# Patient Record
Sex: MALE | Race: WHITE | NOT HISPANIC OR LATINO | URBAN - METROPOLITAN AREA
[De-identification: names, ages, dates, MRNs, and addresses within clinical notes are randomized per-mention and may not be internally consistent; named-entity substitution may affect disease eponyms.]

---

## 2017-03-14 ENCOUNTER — APPOINTMENT (RX ONLY)
Dept: URBAN - METROPOLITAN AREA CLINIC 349 | Facility: CLINIC | Age: 72
Setting detail: DERMATOLOGY
End: 2017-03-14

## 2017-03-14 DIAGNOSIS — Z85.820 PERSONAL HISTORY OF MALIGNANT MELANOMA OF SKIN: ICD-10-CM

## 2017-03-14 DIAGNOSIS — L57.0 ACTINIC KERATOSIS: ICD-10-CM

## 2017-03-14 DIAGNOSIS — Z85.828 PERSONAL HISTORY OF OTHER MALIGNANT NEOPLASM OF SKIN: ICD-10-CM

## 2017-03-14 DIAGNOSIS — D22 MELANOCYTIC NEVI: ICD-10-CM | Status: STABLE

## 2017-03-14 PROBLEM — D22.71 MELANOCYTIC NEVI OF RIGHT LOWER LIMB, INCLUDING HIP: Status: ACTIVE | Noted: 2017-03-14

## 2017-03-14 PROBLEM — D22.61 MELANOCYTIC NEVI OF RIGHT UPPER LIMB, INCLUDING SHOULDER: Status: ACTIVE | Noted: 2017-03-14

## 2017-03-14 PROBLEM — D22.62 MELANOCYTIC NEVI OF LEFT UPPER LIMB, INCLUDING SHOULDER: Status: ACTIVE | Noted: 2017-03-14

## 2017-03-14 PROBLEM — D22.72 MELANOCYTIC NEVI OF LEFT LOWER LIMB, INCLUDING HIP: Status: ACTIVE | Noted: 2017-03-14

## 2017-03-14 PROBLEM — D22.5 MELANOCYTIC NEVI OF TRUNK: Status: ACTIVE | Noted: 2017-03-14

## 2017-03-14 PROCEDURE — 17003 DESTRUCT PREMALG LES 2-14: CPT

## 2017-03-14 PROCEDURE — 99214 OFFICE O/P EST MOD 30 MIN: CPT | Mod: 25

## 2017-03-14 PROCEDURE — 17000 DESTRUCT PREMALG LESION: CPT

## 2017-03-14 PROCEDURE — ? MEDICAL PHOTOGRAPHY REVIEW

## 2017-03-14 PROCEDURE — ? LIQUID NITROGEN

## 2017-03-14 PROCEDURE — ? COUNSELING

## 2017-03-14 ASSESSMENT — LOCATION DETAILED DESCRIPTION DERM
LOCATION DETAILED: RIGHT CENTRAL MALAR CHEEK
LOCATION DETAILED: RIGHT PROXIMAL POSTERIOR UPPER ARM
LOCATION DETAILED: LEFT ULNAR DORSAL HAND
LOCATION DETAILED: RIGHT PROXIMAL POSTERIOR THIGH
LOCATION DETAILED: RIGHT SUPERIOR LATERAL MIDBACK
LOCATION DETAILED: LEFT ANTERIOR PROXIMAL THIGH
LOCATION DETAILED: LEFT INFERIOR CENTRAL MALAR CHEEK
LOCATION DETAILED: LEFT SUPERIOR HELIX
LOCATION DETAILED: LEFT PROXIMAL POSTERIOR THIGH
LOCATION DETAILED: RIGHT INFERIOR MEDIAL MALAR CHEEK
LOCATION DETAILED: LEFT SUPERIOR CENTRAL MALAR CHEEK
LOCATION DETAILED: EPIGASTRIC SKIN
LOCATION DETAILED: LEFT ANTERIOR EARLOBE
LOCATION DETAILED: RIGHT INFERIOR CENTRAL MALAR CHEEK
LOCATION DETAILED: RIGHT MEDIAL UPPER BACK
LOCATION DETAILED: LEFT PROXIMAL POSTERIOR UPPER ARM

## 2017-03-14 ASSESSMENT — LOCATION SIMPLE DESCRIPTION DERM
LOCATION SIMPLE: LEFT HAND
LOCATION SIMPLE: LEFT THIGH
LOCATION SIMPLE: RIGHT POSTERIOR THIGH
LOCATION SIMPLE: LEFT POSTERIOR THIGH
LOCATION SIMPLE: RIGHT UPPER BACK
LOCATION SIMPLE: RIGHT CHEEK
LOCATION SIMPLE: RIGHT LOWER BACK
LOCATION SIMPLE: LEFT EAR
LOCATION SIMPLE: LEFT UPPER ARM
LOCATION SIMPLE: ABDOMEN
LOCATION SIMPLE: RIGHT UPPER ARM
LOCATION SIMPLE: LEFT CHEEK

## 2017-03-14 ASSESSMENT — LOCATION ZONE DERM
LOCATION ZONE: LEG
LOCATION ZONE: FACE
LOCATION ZONE: EAR
LOCATION ZONE: ARM
LOCATION ZONE: HAND
LOCATION ZONE: TRUNK

## 2017-03-14 ASSESSMENT — PAIN INTENSITY VAS: HOW INTENSE IS YOUR PAIN 0 BEING NO PAIN, 10 BEING THE MOST SEVERE PAIN POSSIBLE?: NO PAIN

## 2017-03-14 NOTE — PROCEDURE: LIQUID NITROGEN
Consent: The patient's consent was obtained including but not limited to risks of crusting, scabbing, blistering, scarring, darker or lighter pigmentary change, recurrence, incomplete removal and infection.
Post-Care Instructions: I reviewed with the patient in detail post-care instructions. Patient is to wear sunprotection, and avoid picking at any of the treated lesions. Pt may apply Vaseline to crusted or scabbing areas.
Number Of Freeze-Thaw Cycles: 2 freeze-thaw cycles
Duration Of Freeze Thaw-Cycle (Seconds): 0
Render Post-Care Instructions In Note?: no
Detail Level: Detailed

## 2017-03-14 NOTE — PROCEDURE: MEDICAL PHOTOGRAPHY REVIEW
Number Of Photographs Reviewed: 3
Review Findings: no new or changing lesions
Detail Level: Detailed

## 2017-03-14 NOTE — HPI: MELANOMA F/U (HISTORY OF MALIGNANT MELANOMA)
What Stage Is The Melanoma?: Stage IA
What Is The Reason For Today's Visit?: History of Melanoma
Year Excised?: 06/2014
Breslow Depth?: .55

## 2017-10-24 ENCOUNTER — APPOINTMENT (RX ONLY)
Dept: URBAN - METROPOLITAN AREA CLINIC 349 | Facility: CLINIC | Age: 72
Setting detail: DERMATOLOGY
End: 2017-10-24

## 2017-10-24 DIAGNOSIS — Z85.820 PERSONAL HISTORY OF MALIGNANT MELANOMA OF SKIN: ICD-10-CM

## 2017-10-24 DIAGNOSIS — L57.0 ACTINIC KERATOSIS: ICD-10-CM

## 2017-10-24 DIAGNOSIS — D22 MELANOCYTIC NEVI: ICD-10-CM | Status: STABLE

## 2017-10-24 DIAGNOSIS — L82.1 OTHER SEBORRHEIC KERATOSIS: ICD-10-CM

## 2017-10-24 DIAGNOSIS — Z85.828 PERSONAL HISTORY OF OTHER MALIGNANT NEOPLASM OF SKIN: ICD-10-CM

## 2017-10-24 PROBLEM — D22.72 MELANOCYTIC NEVI OF LEFT LOWER LIMB, INCLUDING HIP: Status: ACTIVE | Noted: 2017-10-24

## 2017-10-24 PROBLEM — D22.61 MELANOCYTIC NEVI OF RIGHT UPPER LIMB, INCLUDING SHOULDER: Status: ACTIVE | Noted: 2017-10-24

## 2017-10-24 PROBLEM — I10 ESSENTIAL (PRIMARY) HYPERTENSION: Status: ACTIVE | Noted: 2017-10-24

## 2017-10-24 PROBLEM — D22.62 MELANOCYTIC NEVI OF LEFT UPPER LIMB, INCLUDING SHOULDER: Status: ACTIVE | Noted: 2017-10-24

## 2017-10-24 PROBLEM — D22.71 MELANOCYTIC NEVI OF RIGHT LOWER LIMB, INCLUDING HIP: Status: ACTIVE | Noted: 2017-10-24

## 2017-10-24 PROBLEM — D22.5 MELANOCYTIC NEVI OF TRUNK: Status: ACTIVE | Noted: 2017-10-24

## 2017-10-24 PROCEDURE — ? OTHER

## 2017-10-24 PROCEDURE — 17000 DESTRUCT PREMALG LESION: CPT

## 2017-10-24 PROCEDURE — 99214 OFFICE O/P EST MOD 30 MIN: CPT | Mod: 25

## 2017-10-24 PROCEDURE — 17003 DESTRUCT PREMALG LES 2-14: CPT

## 2017-10-24 PROCEDURE — ? LIQUID NITROGEN

## 2017-10-24 PROCEDURE — ? MEDICAL PHOTOGRAPHY REVIEW

## 2017-10-24 PROCEDURE — ? DEFER

## 2017-10-24 PROCEDURE — ? COUNSELING

## 2017-10-24 ASSESSMENT — LOCATION ZONE DERM
LOCATION ZONE: TRUNK
LOCATION ZONE: LEG
LOCATION ZONE: ARM
LOCATION ZONE: EAR
LOCATION ZONE: SCALP
LOCATION ZONE: FACE
LOCATION ZONE: HAND

## 2017-10-24 ASSESSMENT — LOCATION DETAILED DESCRIPTION DERM
LOCATION DETAILED: LEFT ANTERIOR PROXIMAL THIGH
LOCATION DETAILED: RIGHT INFERIOR MEDIAL MALAR CHEEK
LOCATION DETAILED: LEFT AREOLA
LOCATION DETAILED: LEFT ULNAR DORSAL HAND
LOCATION DETAILED: RIGHT MEDIAL UPPER BACK
LOCATION DETAILED: LEFT ANTERIOR EARLOBE
LOCATION DETAILED: LEFT PROXIMAL POSTERIOR THIGH
LOCATION DETAILED: RIGHT SUPERIOR CENTRAL MALAR CHEEK
LOCATION DETAILED: RIGHT SUPERIOR HELIX
LOCATION DETAILED: RIGHT PROXIMAL POSTERIOR UPPER ARM
LOCATION DETAILED: RIGHT INFERIOR HELIX
LOCATION DETAILED: RIGHT SUPERIOR LATERAL MIDBACK
LOCATION DETAILED: LEFT INFERIOR POSTAURICULAR SKIN
LOCATION DETAILED: LEFT INFERIOR CENTRAL MALAR CHEEK
LOCATION DETAILED: RIGHT PROXIMAL POSTERIOR THIGH
LOCATION DETAILED: LEFT PROXIMAL POSTERIOR UPPER ARM
LOCATION DETAILED: LEFT SUPERIOR PREAURICULAR CHEEK
LOCATION DETAILED: EPIGASTRIC SKIN
LOCATION DETAILED: RIGHT INFERIOR CENTRAL MALAR CHEEK
LOCATION DETAILED: RIGHT CENTRAL ZYGOMA

## 2017-10-24 ASSESSMENT — LOCATION SIMPLE DESCRIPTION DERM
LOCATION SIMPLE: RIGHT CHEEK
LOCATION SIMPLE: LEFT CHEEK
LOCATION SIMPLE: RIGHT UPPER BACK
LOCATION SIMPLE: RIGHT POSTERIOR THIGH
LOCATION SIMPLE: LEFT THIGH
LOCATION SIMPLE: LEFT POSTERIOR THIGH
LOCATION SIMPLE: SCALP
LOCATION SIMPLE: RIGHT LOWER BACK
LOCATION SIMPLE: RIGHT EAR
LOCATION SIMPLE: RIGHT UPPER ARM
LOCATION SIMPLE: RIGHT ZYGOMA
LOCATION SIMPLE: LEFT EAR
LOCATION SIMPLE: ABDOMEN
LOCATION SIMPLE: LEFT HAND
LOCATION SIMPLE: LEFT CHEST
LOCATION SIMPLE: LEFT UPPER ARM

## 2017-10-24 NOTE — PROCEDURE: OTHER
Note Text (......Xxx Chief Complaint.): This diagnosis correlates with the
Detail Level: Detailed
Other (Free Text): Spoke to patient about Photo dynamic therapy

## 2017-10-24 NOTE — HPI: MELANOMA F/U (HISTORY OF MALIGNANT MELANOMA)
What Stage Is The Melanoma?: Stage IA
What Is The Reason For Today's Visit?: History of Melanoma
Year Excised?: 06/2014
Breslow Depth?: 0.55

## 2018-01-17 ENCOUNTER — APPOINTMENT (RX ONLY)
Dept: URBAN - METROPOLITAN AREA CLINIC 349 | Facility: CLINIC | Age: 73
Setting detail: DERMATOLOGY
End: 2018-01-17

## 2018-01-17 DIAGNOSIS — L57.0 ACTINIC KERATOSIS: ICD-10-CM

## 2018-01-17 PROCEDURE — ? COUNSELING

## 2018-01-17 PROCEDURE — ? DEFER

## 2018-01-17 PROCEDURE — ? OTHER

## 2018-01-17 ASSESSMENT — LOCATION SIMPLE DESCRIPTION DERM
LOCATION SIMPLE: RIGHT CHEEK
LOCATION SIMPLE: SCALP
LOCATION SIMPLE: RIGHT ZYGOMA
LOCATION SIMPLE: LEFT CHEEK

## 2018-01-17 ASSESSMENT — LOCATION ZONE DERM
LOCATION ZONE: FACE
LOCATION ZONE: SCALP

## 2018-01-17 ASSESSMENT — LOCATION DETAILED DESCRIPTION DERM
LOCATION DETAILED: LEFT SUPERIOR PREAURICULAR CHEEK
LOCATION DETAILED: LEFT INFERIOR POSTAURICULAR SKIN
LOCATION DETAILED: LEFT INFERIOR CENTRAL MALAR CHEEK
LOCATION DETAILED: RIGHT INFERIOR CENTRAL MALAR CHEEK
LOCATION DETAILED: RIGHT CENTRAL ZYGOMA

## 2018-04-25 ENCOUNTER — APPOINTMENT (RX ONLY)
Dept: URBAN - METROPOLITAN AREA CLINIC 349 | Facility: CLINIC | Age: 73
Setting detail: DERMATOLOGY
End: 2018-04-25

## 2018-04-25 DIAGNOSIS — L57.0 ACTINIC KERATOSIS: ICD-10-CM

## 2018-04-25 DIAGNOSIS — Z85.828 PERSONAL HISTORY OF OTHER MALIGNANT NEOPLASM OF SKIN: ICD-10-CM

## 2018-04-25 DIAGNOSIS — D22 MELANOCYTIC NEVI: ICD-10-CM | Status: STABLE

## 2018-04-25 DIAGNOSIS — Z85.820 PERSONAL HISTORY OF MALIGNANT MELANOMA OF SKIN: ICD-10-CM

## 2018-04-25 PROBLEM — D22.72 MELANOCYTIC NEVI OF LEFT LOWER LIMB, INCLUDING HIP: Status: ACTIVE | Noted: 2018-04-25

## 2018-04-25 PROBLEM — D22.62 MELANOCYTIC NEVI OF LEFT UPPER LIMB, INCLUDING SHOULDER: Status: ACTIVE | Noted: 2018-04-25

## 2018-04-25 PROBLEM — D22.71 MELANOCYTIC NEVI OF RIGHT LOWER LIMB, INCLUDING HIP: Status: ACTIVE | Noted: 2018-04-25

## 2018-04-25 PROBLEM — D22.5 MELANOCYTIC NEVI OF TRUNK: Status: ACTIVE | Noted: 2018-04-25

## 2018-04-25 PROBLEM — D22.61 MELANOCYTIC NEVI OF RIGHT UPPER LIMB, INCLUDING SHOULDER: Status: ACTIVE | Noted: 2018-04-25

## 2018-04-25 PROCEDURE — ? MEDICAL PHOTOGRAPHY REVIEW

## 2018-04-25 PROCEDURE — 17003 DESTRUCT PREMALG LES 2-14: CPT

## 2018-04-25 PROCEDURE — 17000 DESTRUCT PREMALG LESION: CPT

## 2018-04-25 PROCEDURE — 99214 OFFICE O/P EST MOD 30 MIN: CPT | Mod: 25

## 2018-04-25 PROCEDURE — ? COUNSELING

## 2018-04-25 PROCEDURE — ? LIQUID NITROGEN

## 2018-04-25 ASSESSMENT — LOCATION DETAILED DESCRIPTION DERM
LOCATION DETAILED: RIGHT SUPERIOR CENTRAL MALAR CHEEK
LOCATION DETAILED: LEFT ANTERIOR EARLOBE
LOCATION DETAILED: EPIGASTRIC SKIN
LOCATION DETAILED: LEFT PROXIMAL POSTERIOR THIGH
LOCATION DETAILED: RIGHT PROXIMAL POSTERIOR UPPER ARM
LOCATION DETAILED: RIGHT MEDIAL UPPER BACK
LOCATION DETAILED: RIGHT CENTRAL MALAR CHEEK
LOCATION DETAILED: LEFT LATERAL FOREHEAD
LOCATION DETAILED: LEFT SUPERIOR CENTRAL MALAR CHEEK
LOCATION DETAILED: RIGHT PROXIMAL POSTERIOR THIGH
LOCATION DETAILED: LEFT PROXIMAL POSTERIOR UPPER ARM
LOCATION DETAILED: RIGHT SUPERIOR LATERAL MIDBACK
LOCATION DETAILED: LEFT ULNAR DORSAL HAND
LOCATION DETAILED: RIGHT INFERIOR MEDIAL MALAR CHEEK
LOCATION DETAILED: LEFT ANTERIOR PROXIMAL THIGH

## 2018-04-25 ASSESSMENT — LOCATION SIMPLE DESCRIPTION DERM
LOCATION SIMPLE: LEFT HAND
LOCATION SIMPLE: LEFT UPPER ARM
LOCATION SIMPLE: RIGHT UPPER BACK
LOCATION SIMPLE: LEFT CHEEK
LOCATION SIMPLE: ABDOMEN
LOCATION SIMPLE: RIGHT UPPER ARM
LOCATION SIMPLE: LEFT POSTERIOR THIGH
LOCATION SIMPLE: LEFT EAR
LOCATION SIMPLE: RIGHT POSTERIOR THIGH
LOCATION SIMPLE: RIGHT LOWER BACK
LOCATION SIMPLE: LEFT THIGH
LOCATION SIMPLE: RIGHT CHEEK
LOCATION SIMPLE: LEFT FOREHEAD

## 2018-04-25 ASSESSMENT — LOCATION ZONE DERM
LOCATION ZONE: FACE
LOCATION ZONE: EAR
LOCATION ZONE: HAND
LOCATION ZONE: TRUNK
LOCATION ZONE: ARM
LOCATION ZONE: LEG

## 2018-04-25 NOTE — PROCEDURE: LIQUID NITROGEN
Number Of Freeze-Thaw Cycles: 2 freeze-thaw cycles
Post-Care Instructions: I reviewed with the patient in detail post-care instructions. Patient is to wear sunprotection, and avoid picking at any of the treated lesions. Pt may apply Vaseline to crusted or scabbing areas.
Duration Of Freeze Thaw-Cycle (Seconds): 3
Consent: The patient's consent was obtained including but not limited to risks of crusting, scabbing, blistering, scarring, darker or lighter pigmentary change, recurrence, incomplete removal and infection.
Detail Level: Detailed
Render Post-Care Instructions In Note?: no

## 2018-09-13 ENCOUNTER — HOSPITAL ENCOUNTER (OUTPATIENT)
Dept: PHYSICAL THERAPY | Age: 73
Discharge: HOME | End: 2018-09-13
Payer: MEDICARE

## 2018-09-13 PROCEDURE — G8981 BODY POS CURRENT STATUS: HCPCS

## 2018-09-13 PROCEDURE — 97161 PT EVAL LOW COMPLEX 20 MIN: CPT

## 2018-09-13 PROCEDURE — G8982 BODY POS GOAL STATUS: HCPCS

## 2018-09-13 NOTE — PROGRESS NOTES
Ambulatory/Rehab Services H2 Model Falls Risk Assessment    Risk Factor Pts. ·   Confusion/Disorientation/Impulsivity  []    4 ·   Symptomatic Depression  []   2 ·   Altered Elimination  []   1 ·   Dizziness/Vertigo  []   1 ·   Gender (Male)  [x]   1 ·   Any administered antiepileptics (anticonvulsants):  []   2 ·   Any administered benzodiazepines:  []   1 ·   Visual Impairment (specify):  []   1 ·   Portable Oxygen Use  []   1 ·   Orthostatic ? BP  []   1 ·   History of Recent Falls (within 3 mos.)  []   5     Ability to Rise from Chair (choose one) Pts. ·   Ability to rise in a single movement  []   0 ·   Pushes up, successful in one attempt  [x]   1 ·   Multiple attempts, but successful  []   3 ·   Unable to rise without assistance  []   4   Total: (5 or greater = High Risk) 1     Falls Prevention Plan:   []                Physical Limitations to Exercise (specify):   []                Mobility Assistance Device (type):   []                Exercise/Equipment Adaptation (specify):    ©2010 [facility] [place] Patent #[id number].  Federal Law prohibits the replication, distribution or use without written permission from [facility] [place]
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
## 2018-09-19 ENCOUNTER — HOSPITAL ENCOUNTER (OUTPATIENT)
Dept: PHYSICAL THERAPY | Age: 73
Discharge: HOME | End: 2018-09-19
Payer: MEDICARE

## 2018-09-19 PROCEDURE — 97110 THERAPEUTIC EXERCISES: CPT
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
## 2018-09-21 ENCOUNTER — HOSPITAL ENCOUNTER (OUTPATIENT)
Dept: PHYSICAL THERAPY | Age: 73
Discharge: HOME | End: 2018-09-21
Payer: MEDICARE

## 2018-09-21 PROCEDURE — 97110 THERAPEUTIC EXERCISES: CPT

## 2018-09-21 PROCEDURE — 97140 MANUAL THERAPY 1/> REGIONS: CPT
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
## 2018-09-24 ENCOUNTER — HOSPITAL ENCOUNTER (OUTPATIENT)
Dept: PHYSICAL THERAPY | Age: 73
Discharge: HOME | End: 2018-09-24
Payer: MEDICARE

## 2018-09-24 PROCEDURE — 97110 THERAPEUTIC EXERCISES: CPT

## 2018-09-24 PROCEDURE — 97140 MANUAL THERAPY 1/> REGIONS: CPT
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
## 2018-09-28 ENCOUNTER — HOSPITAL ENCOUNTER (OUTPATIENT)
Dept: PHYSICAL THERAPY | Age: 73
Discharge: HOME | End: 2018-09-28
Payer: MEDICARE

## 2018-09-28 PROCEDURE — 97140 MANUAL THERAPY 1/> REGIONS: CPT

## 2018-09-28 PROCEDURE — 97110 THERAPEUTIC EXERCISES: CPT
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
## 2018-10-02 ENCOUNTER — HOSPITAL ENCOUNTER (OUTPATIENT)
Dept: PHYSICAL THERAPY | Age: 73
Discharge: HOME | End: 2018-10-02
Payer: MEDICARE

## 2018-10-02 PROCEDURE — 97110 THERAPEUTIC EXERCISES: CPT
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
## 2018-10-04 ENCOUNTER — HOSPITAL ENCOUNTER (OUTPATIENT)
Dept: PHYSICAL THERAPY | Age: 73
Discharge: HOME | End: 2018-10-04
Payer: MEDICARE

## 2018-10-04 PROCEDURE — 97110 THERAPEUTIC EXERCISES: CPT
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
## 2018-10-10 ENCOUNTER — HOSPITAL ENCOUNTER (OUTPATIENT)
Dept: PHYSICAL THERAPY | Age: 73
Discharge: HOME | End: 2018-10-10
Payer: MEDICARE

## 2018-10-10 PROCEDURE — 97110 THERAPEUTIC EXERCISES: CPT
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
## 2018-10-12 ENCOUNTER — HOSPITAL ENCOUNTER (OUTPATIENT)
Dept: PHYSICAL THERAPY | Age: 73
Discharge: HOME | End: 2018-10-12
Payer: MEDICARE

## 2018-10-12 PROCEDURE — 97110 THERAPEUTIC EXERCISES: CPT
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
## 2018-10-15 ENCOUNTER — HOSPITAL ENCOUNTER (OUTPATIENT)
Dept: PHYSICAL THERAPY | Age: 73
Discharge: HOME | End: 2018-10-15
Payer: MEDICARE

## 2018-10-15 PROCEDURE — G8983 BODY POS D/C STATUS: HCPCS

## 2018-10-15 PROCEDURE — 97110 THERAPEUTIC EXERCISES: CPT

## 2018-10-15 PROCEDURE — G8982 BODY POS GOAL STATUS: HCPCS
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
## 2018-10-17 ENCOUNTER — APPOINTMENT (OUTPATIENT)
Dept: PHYSICAL THERAPY | Age: 73
End: 2018-10-17
Payer: MEDICARE

## 2018-10-25 ENCOUNTER — APPOINTMENT (RX ONLY)
Dept: URBAN - METROPOLITAN AREA CLINIC 349 | Facility: CLINIC | Age: 73
Setting detail: DERMATOLOGY
End: 2018-10-25

## 2018-10-25 DIAGNOSIS — Z85.820 PERSONAL HISTORY OF MALIGNANT MELANOMA OF SKIN: ICD-10-CM

## 2018-10-25 DIAGNOSIS — D22 MELANOCYTIC NEVI: ICD-10-CM | Status: STABLE

## 2018-10-25 DIAGNOSIS — L57.0 ACTINIC KERATOSIS: ICD-10-CM

## 2018-10-25 DIAGNOSIS — Z85.828 PERSONAL HISTORY OF OTHER MALIGNANT NEOPLASM OF SKIN: ICD-10-CM

## 2018-10-25 PROBLEM — D22.72 MELANOCYTIC NEVI OF LEFT LOWER LIMB, INCLUDING HIP: Status: ACTIVE | Noted: 2018-10-25

## 2018-10-25 PROBLEM — D22.5 MELANOCYTIC NEVI OF TRUNK: Status: ACTIVE | Noted: 2018-10-25

## 2018-10-25 PROCEDURE — 17003 DESTRUCT PREMALG LES 2-14: CPT

## 2018-10-25 PROCEDURE — ? COUNSELING

## 2018-10-25 PROCEDURE — ? LIQUID NITROGEN

## 2018-10-25 PROCEDURE — 17000 DESTRUCT PREMALG LESION: CPT

## 2018-10-25 PROCEDURE — ? MEDICAL PHOTOGRAPHY REVIEW

## 2018-10-25 PROCEDURE — 99214 OFFICE O/P EST MOD 30 MIN: CPT | Mod: 25

## 2018-10-25 ASSESSMENT — LOCATION DETAILED DESCRIPTION DERM
LOCATION DETAILED: LEFT SUPERIOR LATERAL MALAR CHEEK
LOCATION DETAILED: LEFT ANTERIOR EARLOBE
LOCATION DETAILED: RIGHT DORSAL INDEX METACARPOPHALANGEAL JOINT
LOCATION DETAILED: RIGHT PROXIMAL DORSAL FOREARM
LOCATION DETAILED: RIGHT INFERIOR MEDIAL MALAR CHEEK
LOCATION DETAILED: RIGHT INFERIOR CENTRAL MALAR CHEEK
LOCATION DETAILED: RIGHT INFERIOR FOREHEAD
LOCATION DETAILED: RIGHT MEDIAL UPPER BACK
LOCATION DETAILED: LEFT LATERAL EYEBROW
LOCATION DETAILED: LEFT ULNAR DORSAL HAND
LOCATION DETAILED: EPIGASTRIC SKIN
LOCATION DETAILED: LEFT CENTRAL EYEBROW
LOCATION DETAILED: LEFT RADIAL DORSAL HAND
LOCATION DETAILED: RIGHT SUPERIOR LATERAL MIDBACK
LOCATION DETAILED: LEFT SUPERIOR HELIX
LOCATION DETAILED: LEFT ANTERIOR PROXIMAL THIGH

## 2018-10-25 ASSESSMENT — LOCATION SIMPLE DESCRIPTION DERM
LOCATION SIMPLE: LEFT EAR
LOCATION SIMPLE: LEFT THIGH
LOCATION SIMPLE: LEFT EYEBROW
LOCATION SIMPLE: RIGHT UPPER BACK
LOCATION SIMPLE: RIGHT CHEEK
LOCATION SIMPLE: RIGHT HAND
LOCATION SIMPLE: ABDOMEN
LOCATION SIMPLE: RIGHT LOWER BACK
LOCATION SIMPLE: LEFT CHEEK
LOCATION SIMPLE: RIGHT FOREHEAD
LOCATION SIMPLE: RIGHT FOREARM
LOCATION SIMPLE: LEFT HAND

## 2018-10-25 ASSESSMENT — LOCATION ZONE DERM
LOCATION ZONE: FACE
LOCATION ZONE: LEG
LOCATION ZONE: ARM
LOCATION ZONE: EAR
LOCATION ZONE: TRUNK
LOCATION ZONE: HAND

## 2018-10-25 NOTE — PROCEDURE: MEDICAL PHOTOGRAPHY REVIEW
Review Findings: no new or changing lesions
Number Of Photographs Reviewed: 3
Detail Level: Detailed

## 2019-05-23 ENCOUNTER — APPOINTMENT (RX ONLY)
Dept: URBAN - METROPOLITAN AREA CLINIC 349 | Facility: CLINIC | Age: 74
Setting detail: DERMATOLOGY
End: 2019-05-23

## 2019-05-23 DIAGNOSIS — Z85.828 PERSONAL HISTORY OF OTHER MALIGNANT NEOPLASM OF SKIN: ICD-10-CM

## 2019-05-23 DIAGNOSIS — L85.3 XEROSIS CUTIS: ICD-10-CM

## 2019-05-23 DIAGNOSIS — Z85.820 PERSONAL HISTORY OF MALIGNANT MELANOMA OF SKIN: ICD-10-CM

## 2019-05-23 DIAGNOSIS — L57.0 ACTINIC KERATOSIS: ICD-10-CM

## 2019-05-23 DIAGNOSIS — D22 MELANOCYTIC NEVI: ICD-10-CM | Status: STABLE

## 2019-05-23 PROBLEM — D22.5 MELANOCYTIC NEVI OF TRUNK: Status: ACTIVE | Noted: 2019-05-23

## 2019-05-23 PROBLEM — I48.91 UNSPECIFIED ATRIAL FIBRILLATION: Status: ACTIVE | Noted: 2019-05-23

## 2019-05-23 PROBLEM — I10 ESSENTIAL (PRIMARY) HYPERTENSION: Status: ACTIVE | Noted: 2019-05-23

## 2019-05-23 PROCEDURE — ? COUNSELING

## 2019-05-23 PROCEDURE — ? MEDICAL PHOTOGRAPHY REVIEW

## 2019-05-23 PROCEDURE — 17003 DESTRUCT PREMALG LES 2-14: CPT

## 2019-05-23 PROCEDURE — ? TREATMENT REGIMEN

## 2019-05-23 PROCEDURE — 99214 OFFICE O/P EST MOD 30 MIN: CPT | Mod: 25

## 2019-05-23 PROCEDURE — ? LIQUID NITROGEN

## 2019-05-23 PROCEDURE — 17000 DESTRUCT PREMALG LESION: CPT

## 2019-05-23 ASSESSMENT — LOCATION DETAILED DESCRIPTION DERM
LOCATION DETAILED: RIGHT MEDIAL UPPER BACK
LOCATION DETAILED: RIGHT SUPERIOR LATERAL MIDBACK
LOCATION DETAILED: LEFT SUPERIOR HELIX
LOCATION DETAILED: RIGHT POSTERIOR ANKLE
LOCATION DETAILED: LEFT ANTERIOR EARLOBE
LOCATION DETAILED: LEFT SUPERIOR CENTRAL MALAR CHEEK
LOCATION DETAILED: LEFT CENTRAL EYEBROW
LOCATION DETAILED: LEFT INFERIOR HELIX
LOCATION DETAILED: RIGHT INFERIOR MEDIAL MALAR CHEEK
LOCATION DETAILED: LEFT INFERIOR LATERAL FOREHEAD
LOCATION DETAILED: LEFT ULNAR DORSAL HAND

## 2019-05-23 ASSESSMENT — LOCATION SIMPLE DESCRIPTION DERM
LOCATION SIMPLE: LEFT EYEBROW
LOCATION SIMPLE: RIGHT CHEEK
LOCATION SIMPLE: LEFT EAR
LOCATION SIMPLE: RIGHT LOWER BACK
LOCATION SIMPLE: RIGHT UPPER BACK
LOCATION SIMPLE: LEFT CHEEK
LOCATION SIMPLE: LEFT HAND
LOCATION SIMPLE: RIGHT ANKLE
LOCATION SIMPLE: LEFT FOREHEAD

## 2019-05-23 ASSESSMENT — LOCATION ZONE DERM
LOCATION ZONE: FACE
LOCATION ZONE: HAND
LOCATION ZONE: LEG
LOCATION ZONE: EAR
LOCATION ZONE: TRUNK

## 2019-05-23 ASSESSMENT — PAIN INTENSITY VAS: HOW INTENSE IS YOUR PAIN 0 BEING NO PAIN, 10 BEING THE MOST SEVERE PAIN POSSIBLE?: 1/10 PAIN

## 2019-05-23 NOTE — PROCEDURE: LIQUID NITROGEN
Detail Level: Detailed
Duration Of Freeze Thaw-Cycle (Seconds): 3
Post-Care Instructions: I reviewed with the patient in detail post-care instructions. Patient is to wear sunprotection, and avoid picking at any of the treated lesions. Pt may apply Vaseline to crusted or scabbing areas.
Render Note In Bullet Format When Appropriate: No
Consent: The patient's consent was obtained including but not limited to risks of crusting, scabbing, blistering, scarring, darker or lighter pigmentary change, recurrence, incomplete removal and infection.
Number Of Freeze-Thaw Cycles: 2 freeze-thaw cycles

## 2019-05-23 NOTE — PROCEDURE: MIPS QUALITY
Quality 137: Melanoma: Continuity Of Care - Recall System: Recall system not utilized, reason not otherwise specified
Quality 138: Melanoma: Coordination Of Care: A treatment plan was communicated to the physicians providing continuing care within one month of diagnosis outlining: diagnosis, tumor thickness and a plan for surgery or alternate care.
Quality 111:Pneumonia Vaccination Status For Older Adults: Pneumococcal Vaccination Previously Received
Quality 397: Melanoma: Reporting: The pathology report includes a pT Category and statement on thickness and ulceration for pT1, mitotic rate.
Quality 110: Preventive Care And Screening: Influenza Immunization: Influenza Immunization not Administered because Patient Refused.
Detail Level: Detailed

## 2019-05-23 NOTE — HPI: MELANOMA F/U (HISTORY OF MALIGNANT MELANOMA)
What Stage Is The Melanoma?: Stage IB
What Is The Reason For Today's Visit?: History of Melanoma
Year Excised?: 06/14
Breslow Depth?: 0.55

## 2019-12-03 ENCOUNTER — APPOINTMENT (RX ONLY)
Dept: URBAN - METROPOLITAN AREA CLINIC 349 | Facility: CLINIC | Age: 74
Setting detail: DERMATOLOGY
End: 2019-12-03

## 2019-12-03 DIAGNOSIS — Z85.820 PERSONAL HISTORY OF MALIGNANT MELANOMA OF SKIN: ICD-10-CM

## 2019-12-03 DIAGNOSIS — D22 MELANOCYTIC NEVI: ICD-10-CM | Status: STABLE

## 2019-12-03 DIAGNOSIS — D18.0 HEMANGIOMA: ICD-10-CM

## 2019-12-03 DIAGNOSIS — L57.0 ACTINIC KERATOSIS: ICD-10-CM

## 2019-12-03 DIAGNOSIS — Z85.828 PERSONAL HISTORY OF OTHER MALIGNANT NEOPLASM OF SKIN: ICD-10-CM

## 2019-12-03 PROBLEM — I48.91 UNSPECIFIED ATRIAL FIBRILLATION: Status: ACTIVE | Noted: 2019-12-03

## 2019-12-03 PROBLEM — D18.01 HEMANGIOMA OF SKIN AND SUBCUTANEOUS TISSUE: Status: ACTIVE | Noted: 2019-12-03

## 2019-12-03 PROBLEM — D22.5 MELANOCYTIC NEVI OF TRUNK: Status: ACTIVE | Noted: 2019-12-03

## 2019-12-03 PROBLEM — H91.90 UNSPECIFIED HEARING LOSS, UNSPECIFIED EAR: Status: ACTIVE | Noted: 2019-12-03

## 2019-12-03 PROCEDURE — ? MEDICAL PHOTOGRAPHY REVIEW

## 2019-12-03 PROCEDURE — ? LIQUID NITROGEN

## 2019-12-03 PROCEDURE — ? OBSERVATION

## 2019-12-03 PROCEDURE — 99214 OFFICE O/P EST MOD 30 MIN: CPT | Mod: 25

## 2019-12-03 PROCEDURE — ? MEDICATION COUNSELING

## 2019-12-03 PROCEDURE — 17003 DESTRUCT PREMALG LES 2-14: CPT

## 2019-12-03 PROCEDURE — ? PRESCRIPTION

## 2019-12-03 PROCEDURE — 17000 DESTRUCT PREMALG LESION: CPT

## 2019-12-03 PROCEDURE — ? COUNSELING

## 2019-12-03 RX ORDER — FLUOROURACIL 2 G/40G
CREAM TOPICAL
Qty: 1 | Refills: 0 | Status: ERX | COMMUNITY
Start: 2019-12-03

## 2019-12-03 RX ADMIN — FLUOROURACIL: 2 CREAM TOPICAL at 00:00

## 2019-12-03 ASSESSMENT — LOCATION ZONE DERM
LOCATION ZONE: FACE
LOCATION ZONE: EAR
LOCATION ZONE: HAND
LOCATION ZONE: LEG
LOCATION ZONE: ARM
LOCATION ZONE: TRUNK

## 2019-12-03 ASSESSMENT — LOCATION DETAILED DESCRIPTION DERM
LOCATION DETAILED: RIGHT INFERIOR HELIX
LOCATION DETAILED: LEFT INFERIOR CENTRAL MALAR CHEEK
LOCATION DETAILED: RIGHT MEDIAL UPPER BACK
LOCATION DETAILED: RIGHT SUPERIOR CENTRAL MALAR CHEEK
LOCATION DETAILED: LEFT LATERAL EYEBROW
LOCATION DETAILED: LEFT INFERIOR HELIX
LOCATION DETAILED: RIGHT INFERIOR MEDIAL MALAR CHEEK
LOCATION DETAILED: LEFT ULNAR DORSAL HAND
LOCATION DETAILED: LEFT ANTERIOR EARLOBE
LOCATION DETAILED: RIGHT SUPERIOR LATERAL MIDBACK
LOCATION DETAILED: LEFT SUPERIOR CENTRAL MALAR CHEEK
LOCATION DETAILED: RIGHT SUPERIOR HELIX
LOCATION DETAILED: LEFT LATERAL FOREHEAD
LOCATION DETAILED: RIGHT RADIAL DORSAL HAND
LOCATION DETAILED: LEFT LATERAL PROXIMAL PRETIBIAL REGION
LOCATION DETAILED: RIGHT DISTAL RADIAL DORSAL FOREARM

## 2019-12-03 ASSESSMENT — PAIN INTENSITY VAS: HOW INTENSE IS YOUR PAIN 0 BEING NO PAIN, 10 BEING THE MOST SEVERE PAIN POSSIBLE?: 1/10 PAIN

## 2019-12-03 ASSESSMENT — LOCATION SIMPLE DESCRIPTION DERM
LOCATION SIMPLE: RIGHT HAND
LOCATION SIMPLE: RIGHT FOREARM
LOCATION SIMPLE: LEFT HAND
LOCATION SIMPLE: RIGHT EAR
LOCATION SIMPLE: RIGHT UPPER BACK
LOCATION SIMPLE: RIGHT CHEEK
LOCATION SIMPLE: LEFT CHEEK
LOCATION SIMPLE: LEFT PRETIBIAL REGION
LOCATION SIMPLE: RIGHT LOWER BACK
LOCATION SIMPLE: LEFT EAR
LOCATION SIMPLE: LEFT EYEBROW
LOCATION SIMPLE: LEFT FOREHEAD

## 2019-12-03 NOTE — PROCEDURE: MEDICATION COUNSELING
Xeldoylez Pregnancy And Lactation Text: This medication is Pregnancy Category D and is not considered safe during pregnancy.  The risk during breast feeding is also uncertain.

## 2019-12-03 NOTE — PROCEDURE: REASSURANCE
Quality 137: Melanoma: Continuity Of Care - Recall System: Recall system not utilized, reason not otherwise specified
Quality 224: Stage 0-Iic Melanoma: Overutilization Of Imaging Studies For Only Stage 0-Iic Melanoma: None of the following diagnostic imaging studies ordered: chest X-ray, CT, Ultrasound, MRI, PET, or nuclear medicine scans (ML)
Detail Level: Detailed

## 2019-12-03 NOTE — PROCEDURE: LIQUID NITROGEN
Number Of Freeze-Thaw Cycles: 2 freeze-thaw cycles
Render Note In Bullet Format When Appropriate: No
Duration Of Freeze Thaw-Cycle (Seconds): 3
Consent: The patient's consent was obtained including but not limited to risks of crusting, scabbing, blistering, scarring, darker or lighter pigmentary change, recurrence, incomplete removal and infection.
Detail Level: Detailed
Post-Care Instructions: I reviewed with the patient in detail post-care instructions. Patient is to wear sunprotection, and avoid picking at any of the treated lesions. Pt may apply Vaseline to crusted or scabbing areas.

## 2020-03-30 NOTE — PROCEDURE: LIQUID NITROGEN
Number Of Freeze-Thaw Cycles: 2 freeze-thaw cycles
Duration Of Freeze Thaw-Cycle (Seconds): 3
Consent: The patient's consent was obtained including but not limited to risks of crusting, scabbing, blistering, scarring, darker or lighter pigmentary change, recurrence, incomplete removal and infection.
Post-Care Instructions: I reviewed with the patient in detail post-care instructions. Patient is to wear sunprotection, and avoid picking at any of the treated lesions. Pt may apply Vaseline to crusted or scabbing areas.
Render Post-Care Instructions In Note?: no
Detail Level: Detailed
PAIN SCALE 10 OF 10.

## 2020-06-03 ENCOUNTER — APPOINTMENT (RX ONLY)
Dept: URBAN - METROPOLITAN AREA CLINIC 349 | Facility: CLINIC | Age: 75
Setting detail: DERMATOLOGY
End: 2020-06-03

## 2020-06-03 DIAGNOSIS — L57.0 ACTINIC KERATOSIS: ICD-10-CM

## 2020-06-03 DIAGNOSIS — Z85.828 PERSONAL HISTORY OF OTHER MALIGNANT NEOPLASM OF SKIN: ICD-10-CM

## 2020-06-03 DIAGNOSIS — Z12.83 ENCOUNTER FOR SCREENING FOR MALIGNANT NEOPLASM OF SKIN: ICD-10-CM

## 2020-06-03 DIAGNOSIS — Z85.820 PERSONAL HISTORY OF MALIGNANT MELANOMA OF SKIN: ICD-10-CM

## 2020-06-03 DIAGNOSIS — D22 MELANOCYTIC NEVI: ICD-10-CM | Status: STABLE

## 2020-06-03 PROBLEM — H91.90 UNSPECIFIED HEARING LOSS, UNSPECIFIED EAR: Status: ACTIVE | Noted: 2020-06-03

## 2020-06-03 PROBLEM — M12.9 ARTHROPATHY, UNSPECIFIED: Status: ACTIVE | Noted: 2020-06-03

## 2020-06-03 PROBLEM — E13.9 OTHER SPECIFIED DIABETES MELLITUS WITHOUT COMPLICATIONS: Status: ACTIVE | Noted: 2020-06-03

## 2020-06-03 PROBLEM — D22.5 MELANOCYTIC NEVI OF TRUNK: Status: ACTIVE | Noted: 2020-06-03

## 2020-06-03 PROCEDURE — 17003 DESTRUCT PREMALG LES 2-14: CPT

## 2020-06-03 PROCEDURE — ? COUNSELING

## 2020-06-03 PROCEDURE — 99214 OFFICE O/P EST MOD 30 MIN: CPT | Mod: 25

## 2020-06-03 PROCEDURE — ? MEDICAL PHOTOGRAPHY REVIEW

## 2020-06-03 PROCEDURE — ? LIQUID NITROGEN

## 2020-06-03 PROCEDURE — 17000 DESTRUCT PREMALG LESION: CPT

## 2020-06-03 ASSESSMENT — LOCATION ZONE DERM
LOCATION ZONE: ARM
LOCATION ZONE: HAND
LOCATION ZONE: TRUNK
LOCATION ZONE: EAR
LOCATION ZONE: FACE

## 2020-06-03 ASSESSMENT — LOCATION DETAILED DESCRIPTION DERM
LOCATION DETAILED: LEFT INFERIOR FOREHEAD
LOCATION DETAILED: LEFT PROXIMAL DORSAL FOREARM
LOCATION DETAILED: LEFT ANTERIOR EARLOBE
LOCATION DETAILED: LEFT ULNAR DORSAL HAND
LOCATION DETAILED: LEFT RADIAL DORSAL HAND
LOCATION DETAILED: LEFT LATERAL EYEBROW
LOCATION DETAILED: LEFT SUPERIOR HELIX
LOCATION DETAILED: RIGHT DISTAL DORSAL FOREARM
LOCATION DETAILED: RIGHT LATERAL ZYGOMA
LOCATION DETAILED: LEFT DISTAL DORSAL FOREARM
LOCATION DETAILED: RIGHT MEDIAL UPPER BACK
LOCATION DETAILED: RIGHT RADIAL DORSAL HAND
LOCATION DETAILED: RIGHT INFERIOR MEDIAL MALAR CHEEK
LOCATION DETAILED: RIGHT INFERIOR MEDIAL UPPER BACK
LOCATION DETAILED: RIGHT SUPERIOR LATERAL MIDBACK

## 2020-06-03 ASSESSMENT — LOCATION SIMPLE DESCRIPTION DERM
LOCATION SIMPLE: RIGHT LOWER BACK
LOCATION SIMPLE: RIGHT ZYGOMA
LOCATION SIMPLE: LEFT EYEBROW
LOCATION SIMPLE: LEFT FOREHEAD
LOCATION SIMPLE: LEFT FOREARM
LOCATION SIMPLE: RIGHT UPPER BACK
LOCATION SIMPLE: RIGHT CHEEK
LOCATION SIMPLE: RIGHT HAND
LOCATION SIMPLE: RIGHT FOREARM
LOCATION SIMPLE: LEFT EAR
LOCATION SIMPLE: LEFT HAND

## 2020-06-03 ASSESSMENT — PAIN INTENSITY VAS: HOW INTENSE IS YOUR PAIN 0 BEING NO PAIN, 10 BEING THE MOST SEVERE PAIN POSSIBLE?: 1/10 PAIN

## 2020-06-03 NOTE — PROCEDURE: REASSURANCE
Quality 224: Stage 0-Iic Melanoma: Overutilization Of Imaging Studies For Only Stage 0-Iic Melanoma: None of the following diagnostic imaging studies ordered: chest X-ray, CT, Ultrasound, MRI, PET, or nuclear medicine scans (ML)
Quality 137: Melanoma: Continuity Of Care - Recall System: Recall system not utilized, reason not otherwise specified
Detail Level: Detailed
Hide Additional Notes?: No
Additional Notes (Optional): Compared to photo this has resolved\\nPatient states he has also been using efudex on this spot. He is instructed to discontinue

## 2020-06-03 NOTE — PROCEDURE: LIQUID NITROGEN
Render Note In Bullet Format When Appropriate: No
Consent: The patient's consent was obtained including but not limited to risks of crusting, scabbing, blistering, scarring, darker or lighter pigmentary change, recurrence, incomplete removal and infection.
Post-Care Instructions: I reviewed with the patient in detail post-care instructions. Patient is to wear sunprotection, and avoid picking at any of the treated lesions. Pt may apply Vaseline to crusted or scabbing areas.
Detail Level: Detailed
Duration Of Freeze Thaw-Cycle (Seconds): 3
Number Of Freeze-Thaw Cycles: 2 freeze-thaw cycles

## 2020-06-09 PROBLEM — K21.9 GASTROESOPHAGEAL REFLUX DISEASE WITHOUT ESOPHAGITIS: Status: ACTIVE | Noted: 2020-06-09

## 2020-06-09 PROBLEM — R42 DIZZINESS: Status: ACTIVE | Noted: 2020-06-09

## 2020-06-09 PROBLEM — E66.01 CLASS 2 SEVERE OBESITY WITH SERIOUS COMORBIDITY AND BODY MASS INDEX (BMI) OF 39.0 TO 39.9 IN ADULT (HCC): Status: ACTIVE | Noted: 2020-06-09

## 2020-06-09 PROBLEM — I73.9 PVD (PERIPHERAL VASCULAR DISEASE) WITH CLAUDICATION (HCC): Status: ACTIVE | Noted: 2020-06-09

## 2020-06-09 PROBLEM — E78.2 MIXED HYPERLIPIDEMIA: Status: ACTIVE | Noted: 2020-06-09

## 2020-06-09 PROBLEM — N18.30 STAGE 3 CHRONIC KIDNEY DISEASE (HCC): Status: ACTIVE | Noted: 2020-06-09

## 2020-06-09 PROBLEM — E11.9 ENCOUNTER FOR DIABETIC FOOT EXAM (HCC): Status: ACTIVE | Noted: 2020-06-09

## 2020-09-23 PROBLEM — Z79.4 TYPE 2 DIABETES MELLITUS WITH HYPERGLYCEMIA, WITH LONG-TERM CURRENT USE OF INSULIN (HCC): Status: ACTIVE | Noted: 2020-09-23

## 2020-09-23 PROBLEM — G25.2 INTENTION TREMOR: Status: ACTIVE | Noted: 2020-09-23

## 2020-09-23 PROBLEM — M10.9 GOUT: Status: ACTIVE | Noted: 2020-09-23

## 2020-09-23 PROBLEM — E11.65 TYPE 2 DIABETES MELLITUS WITH HYPERGLYCEMIA, WITH LONG-TERM CURRENT USE OF INSULIN (HCC): Status: ACTIVE | Noted: 2020-09-23

## 2020-12-03 ENCOUNTER — APPOINTMENT (RX ONLY)
Dept: URBAN - METROPOLITAN AREA CLINIC 349 | Facility: CLINIC | Age: 75
Setting detail: DERMATOLOGY
End: 2020-12-03

## 2020-12-03 DIAGNOSIS — Z85.820 PERSONAL HISTORY OF MALIGNANT MELANOMA OF SKIN: ICD-10-CM

## 2020-12-03 DIAGNOSIS — D22 MELANOCYTIC NEVI: ICD-10-CM | Status: STABLE

## 2020-12-03 DIAGNOSIS — Z85.828 PERSONAL HISTORY OF OTHER MALIGNANT NEOPLASM OF SKIN: ICD-10-CM

## 2020-12-03 DIAGNOSIS — L57.0 ACTINIC KERATOSIS: ICD-10-CM

## 2020-12-03 PROBLEM — D22.5 MELANOCYTIC NEVI OF TRUNK: Status: ACTIVE | Noted: 2020-12-03

## 2020-12-03 PROBLEM — M12.9 ARTHROPATHY, UNSPECIFIED: Status: ACTIVE | Noted: 2020-12-03

## 2020-12-03 PROCEDURE — 17000 DESTRUCT PREMALG LESION: CPT

## 2020-12-03 PROCEDURE — 99214 OFFICE O/P EST MOD 30 MIN: CPT | Mod: 25

## 2020-12-03 PROCEDURE — ? LIQUID NITROGEN

## 2020-12-03 PROCEDURE — 17003 DESTRUCT PREMALG LES 2-14: CPT

## 2020-12-03 PROCEDURE — ? COUNSELING

## 2020-12-03 PROCEDURE — ? MEDICAL PHOTOGRAPHY REVIEW

## 2020-12-03 ASSESSMENT — LOCATION SIMPLE DESCRIPTION DERM
LOCATION SIMPLE: RIGHT FOREHEAD
LOCATION SIMPLE: RIGHT TEMPLE
LOCATION SIMPLE: LEFT FOREHEAD
LOCATION SIMPLE: LEFT HAND
LOCATION SIMPLE: NOSE
LOCATION SIMPLE: LEFT CHEEK
LOCATION SIMPLE: RIGHT CHEEK
LOCATION SIMPLE: LEFT EAR
LOCATION SIMPLE: RIGHT LOWER BACK
LOCATION SIMPLE: RIGHT UPPER BACK
LOCATION SIMPLE: RIGHT EYEBROW
LOCATION SIMPLE: LEFT TEMPLE

## 2020-12-03 ASSESSMENT — LOCATION DETAILED DESCRIPTION DERM
LOCATION DETAILED: LEFT FOREHEAD
LOCATION DETAILED: LEFT MID TEMPLE
LOCATION DETAILED: RIGHT MID TEMPLE
LOCATION DETAILED: LEFT SUPERIOR HELIX
LOCATION DETAILED: LEFT ANTERIOR EARLOBE
LOCATION DETAILED: RIGHT MEDIAL UPPER BACK
LOCATION DETAILED: RIGHT INFERIOR MEDIAL FOREHEAD
LOCATION DETAILED: LEFT INFERIOR FOREHEAD
LOCATION DETAILED: NASAL ROOT
LOCATION DETAILED: LEFT INFERIOR TEMPLE
LOCATION DETAILED: RIGHT CENTRAL EYEBROW
LOCATION DETAILED: RIGHT INFERIOR MEDIAL MALAR CHEEK
LOCATION DETAILED: RIGHT SUPERIOR LATERAL MIDBACK
LOCATION DETAILED: LEFT ANTIHELIX
LOCATION DETAILED: LEFT SUPERIOR CENTRAL MALAR CHEEK
LOCATION DETAILED: LEFT ULNAR DORSAL HAND

## 2020-12-03 ASSESSMENT — LOCATION ZONE DERM
LOCATION ZONE: FACE
LOCATION ZONE: TRUNK
LOCATION ZONE: NOSE
LOCATION ZONE: HAND
LOCATION ZONE: EAR

## 2020-12-03 ASSESSMENT — PAIN INTENSITY VAS: HOW INTENSE IS YOUR PAIN 0 BEING NO PAIN, 10 BEING THE MOST SEVERE PAIN POSSIBLE?: 1/10 PAIN

## 2020-12-03 NOTE — HPI: MELANOMA F/U (HISTORY OF MALIGNANT MELANOMA)
What Is The Reason For Today's Visit?: History of Melanoma
Year Excised?: 6/2014
Breslow Depth?: 0.55mm

## 2020-12-03 NOTE — PROCEDURE: LIQUID NITROGEN
Consent: The patient's consent was obtained including but not limited to risks of crusting, scabbing, blistering, scarring, darker or lighter pigmentary change, recurrence, incomplete removal and infection.
Duration Of Freeze Thaw-Cycle (Seconds): 3
Render Note In Bullet Format When Appropriate: No
Detail Level: Detailed
Post-Care Instructions: I reviewed with the patient in detail post-care instructions. Patient is to wear sunprotection, and avoid picking at any of the treated lesions. Pt may apply Vaseline to crusted or scabbing areas.
Number Of Freeze-Thaw Cycles: 2 freeze-thaw cycles

## 2020-12-16 ENCOUNTER — RX ONLY (OUTPATIENT)
Age: 75
Setting detail: RX ONLY
End: 2020-12-16

## 2020-12-16 RX ORDER — TRIAMCINOLONE ACETONIDE 1 MG/G
OINTMENT TOPICAL
Qty: 1 | Refills: 0 | Status: ERX | COMMUNITY
Start: 2020-12-16

## 2020-12-21 ENCOUNTER — HOSPITAL ENCOUNTER (EMERGENCY)
Age: 75
Discharge: HOME | End: 2020-12-22
Attending: STUDENT IN AN ORGANIZED HEALTH CARE EDUCATION/TRAINING PROGRAM
Payer: MEDICARE

## 2020-12-21 ENCOUNTER — APPOINTMENT (OUTPATIENT)
Dept: GENERAL RADIOLOGY | Age: 75
End: 2020-12-21
Attending: EMERGENCY MEDICINE
Payer: MEDICARE

## 2020-12-21 DIAGNOSIS — R07.9 CHEST PAIN, UNSPECIFIED TYPE: Primary | ICD-10-CM

## 2020-12-21 LAB
ALBUMIN SERPL-MCNC: 4.1 G/DL (ref 3.2–4.6)
ALBUMIN/GLOB SERPL: 1 {RATIO} (ref 1.2–3.5)
ALP SERPL-CCNC: 92 U/L (ref 50–136)
ALT SERPL-CCNC: 41 U/L (ref 12–65)
ANION GAP SERPL CALC-SCNC: 5 MMOL/L (ref 7–16)
AST SERPL-CCNC: 20 U/L (ref 15–37)
BASOPHILS # BLD: 0.1 K/UL (ref 0–0.2)
BASOPHILS NFR BLD: 0 % (ref 0–2)
BILIRUB SERPL-MCNC: 0.3 MG/DL (ref 0.2–1.1)
BNP SERPL-MCNC: 175 PG/ML (ref 5–125)
BUN SERPL-MCNC: 29 MG/DL (ref 8–23)
CALCIUM SERPL-MCNC: 9.5 MG/DL (ref 8.3–10.4)
CHLORIDE SERPL-SCNC: 104 MMOL/L (ref 98–107)
CO2 SERPL-SCNC: 28 MMOL/L (ref 21–32)
CREAT SERPL-MCNC: 1.84 MG/DL (ref 0.8–1.5)
D DIMER PPP FEU-MCNC: 0.63 UG/ML(FEU)
DIFFERENTIAL METHOD BLD: ABNORMAL
EOSINOPHIL # BLD: 0.2 K/UL (ref 0–0.8)
EOSINOPHIL NFR BLD: 1 % (ref 0.5–7.8)
ERYTHROCYTE [DISTWIDTH] IN BLOOD BY AUTOMATED COUNT: 13.4 % (ref 11.9–14.6)
GLOBULIN SER CALC-MCNC: 4.1 G/DL (ref 2.3–3.5)
GLUCOSE SERPL-MCNC: 185 MG/DL (ref 65–100)
HCT VFR BLD AUTO: 43.8 % (ref 41.1–50.3)
HGB BLD-MCNC: 13.8 G/DL (ref 13.6–17.2)
IMM GRANULOCYTES # BLD AUTO: 0.1 K/UL (ref 0–0.5)
IMM GRANULOCYTES NFR BLD AUTO: 0 % (ref 0–5)
LYMPHOCYTES # BLD: 1.9 K/UL (ref 0.5–4.6)
LYMPHOCYTES NFR BLD: 12 % (ref 13–44)
MAGNESIUM SERPL-MCNC: 2 MG/DL (ref 1.8–2.4)
MCH RBC QN AUTO: 28.2 PG (ref 26.1–32.9)
MCHC RBC AUTO-ENTMCNC: 31.5 G/DL (ref 31.4–35)
MCV RBC AUTO: 89.4 FL (ref 79.6–97.8)
MONOCYTES # BLD: 1.3 K/UL (ref 0.1–1.3)
MONOCYTES NFR BLD: 9 % (ref 4–12)
NEUTS SEG # BLD: 11.9 K/UL (ref 1.7–8.2)
NEUTS SEG NFR BLD: 77 % (ref 43–78)
NRBC # BLD: 0 K/UL (ref 0–0.2)
PLATELET # BLD AUTO: 244 K/UL (ref 150–450)
PMV BLD AUTO: 10.3 FL (ref 9.4–12.3)
POTASSIUM SERPL-SCNC: 4.7 MMOL/L (ref 3.5–5.1)
PROT SERPL-MCNC: 8.2 G/DL (ref 6.3–8.2)
RBC # BLD AUTO: 4.9 M/UL (ref 4.23–5.6)
SODIUM SERPL-SCNC: 137 MMOL/L (ref 138–145)
TROPONIN-HIGH SENSITIVITY: 14.7 PG/ML (ref 0–14)
TROPONIN-HIGH SENSITIVITY: 16.5 PG/ML (ref 0–14)
WBC # BLD AUTO: 15.4 K/UL (ref 4.3–11.1)

## 2020-12-21 PROCEDURE — 71046 X-RAY EXAM CHEST 2 VIEWS: CPT

## 2020-12-21 PROCEDURE — 83880 ASSAY OF NATRIURETIC PEPTIDE: CPT

## 2020-12-21 PROCEDURE — 85379 FIBRIN DEGRADATION QUANT: CPT

## 2020-12-21 PROCEDURE — 99285 EMERGENCY DEPT VISIT HI MDM: CPT

## 2020-12-21 PROCEDURE — 74011250637 HC RX REV CODE- 250/637: Performed by: STUDENT IN AN ORGANIZED HEALTH CARE EDUCATION/TRAINING PROGRAM

## 2020-12-21 PROCEDURE — 84484 ASSAY OF TROPONIN QUANT: CPT

## 2020-12-21 PROCEDURE — 80053 COMPREHEN METABOLIC PANEL: CPT

## 2020-12-21 PROCEDURE — 83735 ASSAY OF MAGNESIUM: CPT

## 2020-12-21 PROCEDURE — 85025 COMPLETE CBC W/AUTO DIFF WBC: CPT

## 2020-12-21 PROCEDURE — 93005 ELECTROCARDIOGRAM TRACING: CPT | Performed by: STUDENT IN AN ORGANIZED HEALTH CARE EDUCATION/TRAINING PROGRAM

## 2020-12-21 PROCEDURE — 83605 ASSAY OF LACTIC ACID: CPT

## 2020-12-21 RX ORDER — NITROGLYCERIN 0.4 MG/1
0.4 TABLET SUBLINGUAL
Status: DISCONTINUED | OUTPATIENT
Start: 2020-12-21 | End: 2020-12-22 | Stop reason: HOSPADM

## 2020-12-21 RX ORDER — GUAIFENESIN 100 MG/5ML
324 LIQUID (ML) ORAL
Status: COMPLETED | OUTPATIENT
Start: 2020-12-21 | End: 2020-12-21

## 2020-12-21 RX ADMIN — ASPIRIN 81 MG CHEWABLE TABLET 324 MG: 81 TABLET CHEWABLE at 23:03

## 2020-12-21 RX ADMIN — NITROGLYCERIN 0.4 MG: 0.4 TABLET SUBLINGUAL at 23:03

## 2020-12-22 VITALS
HEIGHT: 69 IN | TEMPERATURE: 97.9 F | OXYGEN SATURATION: 97 % | RESPIRATION RATE: 20 BRPM | DIASTOLIC BLOOD PRESSURE: 69 MMHG | BODY MASS INDEX: 38.51 KG/M2 | HEART RATE: 84 BPM | SYSTOLIC BLOOD PRESSURE: 145 MMHG | WEIGHT: 260 LBS

## 2020-12-22 LAB
ATRIAL RATE: 113 BPM
ATRIAL RATE: 92 BPM
CALCULATED P AXIS, ECG09: 60 DEGREES
CALCULATED P AXIS, ECG09: 69 DEGREES
CALCULATED R AXIS, ECG10: -134 DEGREES
CALCULATED R AXIS, ECG10: -144 DEGREES
CALCULATED T AXIS, ECG11: 51 DEGREES
CALCULATED T AXIS, ECG11: 60 DEGREES
DIAGNOSIS, 93000: NORMAL
DIAGNOSIS, 93000: NORMAL
LACTATE SERPL-SCNC: 1.4 MMOL/L (ref 0.4–2)
P-R INTERVAL, ECG05: 162 MS
P-R INTERVAL, ECG05: 164 MS
Q-T INTERVAL, ECG07: 386 MS
Q-T INTERVAL, ECG07: 400 MS
QRS DURATION, ECG06: 168 MS
QRS DURATION, ECG06: 174 MS
QTC CALCULATION (BEZET), ECG08: 477 MS
QTC CALCULATION (BEZET), ECG08: 548 MS
VENTRICULAR RATE, ECG03: 113 BPM
VENTRICULAR RATE, ECG03: 92 BPM

## 2020-12-22 NOTE — ED TRIAGE NOTES
Arrives with face mask in place. Reports midsternal non-radiating chest tightness. Onset 4 hours pta while driving. States increases with inspiration. Denies cough, n/v/d, fever/chills. Denies attempting meds pta.  Followed by [facility] cards, dr Shankar Santillan

## 2020-12-22 NOTE — DISCHARGE INSTRUCTIONS
Patient Education      Although your evaluation tonight has been normal, you require close outpatient follow-up with your cardiology specialist.  This appointment has been arranged however you should receive a call in the next several days to schedule this appointment. If you do not receive this call, please call the number listed below. Return immediately for worsening symptoms, concerns or questions. Chest Pain: Care Instructions  Your Care Instructions     There are many things that can cause chest pain. Some are not serious and will get better on their own in a few days. But some kinds of chest pain need more testing and treatment. Your doctor may have recommended a follow-up visit in the next 8 to 12 hours. If you are not getting better, you may need more tests or treatment. Even though your doctor has released you, you still need to watch for any problems. The doctor carefully checked you, but sometimes problems can develop later. If you have new symptoms or if your symptoms do not get better, get medical care right away. If you have worse or different chest pain or pressure that lasts more than 5 minutes or you passed out (lost consciousness), call 911 or seek other emergency help right away. A medical visit is only one step in your treatment. Even if you feel better, you still need to do what your doctor recommends, such as going to all suggested follow-up appointments and taking medicines exactly as directed. This will help you recover and help prevent future problems. How can you care for yourself at home? · Rest until you feel better. · Take your medicine exactly as prescribed. Call your doctor if you think you are having a problem with your medicine. · Do not drive after taking a prescription pain medicine. When should you call for help? Call 911 if:     · You passed out (lost consciousness).     · You have severe difficulty breathing.     · You have symptoms of a heart attack.  These may include:  ? Chest pain or pressure, or a strange feeling in your chest.  ? Sweating. ? Shortness of breath. ? Nausea or vomiting. ? Pain, pressure, or a strange feeling in your back, neck, jaw, or upper belly or in one or both shoulders or arms. ? Lightheadedness or sudden weakness. ? A fast or irregular heartbeat. After you call 911, the  may tell you to chew 1 adult-strength or 2 to 4 low-dose aspirin. Wait for an ambulance. Do not try to drive yourself. Call your doctor today if:     · You have any trouble breathing.     · Your chest pain gets worse.     · You are dizzy or lightheaded, or you feel like you may faint.     · You are not getting better as expected.     · You are having new or different chest pain. Where can you learn more? Go to http://www.hernandez.com/  Enter A120 in the search box to learn more about \"Chest Pain: Care Instructions. \"  Current as of: June 26, 2019               Content Version: 12.6  © 5461-5726 [employer]. Care instructions adapted under license by [employer] (which disclaims liability or warranty for this information). If you have questions about a medical condition or this instruction, always ask your healthcare professional. [address] any warranty or liability for your use of this information.

## 2020-12-22 NOTE — ED PROVIDER NOTES
78-year-old male patient with a history of congestive heart failure presents to the emergency department with reports of sudden onset chest pressure at the center of his chest.  This started proximally 230 this afternoon while driving. Patient reports continuous discomfort since onset. He denies any radiation of symptoms but does report some increased pain with deep inspiration. He also endorses some mild shortness of breath. He denies cough, congestion, Trice emesis or hemoptysis. Patient reports noes nausea, vomiting or diaphoresis. Patient was feeling well prior to the onset of his symptoms today. He has noted a slight elevation in his blood pressure at home as of late.   Patient followed by [facility] cardiology, Dr. Ridge Hargrove           Past Medical History:   Diagnosis Date    Age-related cognitive decline 2020    30 out of 30 MMSE    Arthritis     DJD- shoulder, knees, hips    Diabetes ([address]     idd x 6 yrs, avg am sqbs 100    Dizziness 06/09/2020    normal carotid ultrasound    Gastroesophageal reflux disease without esophagitis 6/9/2020    GERD (gastroesophageal reflux disease)     Gout     right foot    Heart failure ([address]     Hip pain, bilateral     POA    Hypertension     x 10 yrs    Morbid obesity ([address]     Neuropathy     feet and legs    Obstructive sleep apnea (adult) (pediatric) 08/26/2014    txed with CPAP- Dr. Leta Bal Pacemaker     Psychiatric disorder     DEPRESSION    Routine eye exam 2020    no diabetic retinopathy- Rolena Radha Spinal stenosis of lumbar region     Type 2 diabetes mellitus with hyperglycemia, with long-term current use of insulin ([address] 9/23/2020       Past Surgical History:   Procedure Laterality Date    CARDIAC SURG PROCEDURE UNLIST  cath    HX APPENDECTOMY      HX COLONOSCOPY      Dr. Kamla Robb HX ORTHOPAEDIC      right achilles tendon repair    HX ORTHOPAEDIC      knee    HX PACEMAKER      pacemaker/defibrillator placed 6/25/14     HX TONSILLECTOMY Family History:   Problem Relation Age of Onset    Heart Attack Father     Cancer Mother        Social History     Socioeconomic History    Marital status:      Spouse name: Not on file    Number of children: Not on file    Years of education: Not on file    Highest education level: Not on file   Occupational History    Not on file   Social Needs    Financial resource strain: Not on file    Food insecurity     Worry: Not on file     Inability: Not on file    Transportation needs     Medical: Not on file     Non-medical: Not on file   Tobacco Use    Smoking status: Never Smoker    Smokeless tobacco: Never Used   Substance and Sexual Activity    Alcohol use: No    Drug use: No    Sexual activity: Not on file   Lifestyle    Physical activity     Days per week: Not on file     Minutes per session: Not on file    Stress: Not on file   Relationships    Social connections     Talks on phone: Not on file     Gets together: Not on file     Attends Yarsani service: Not on file     Active member of club or organization: Not on file     Attends meetings of clubs or organizations: Not on file     Relationship status: Not on file    Intimate partner violence     Fear of current or ex partner: Not on file     Emotionally abused: Not on file     Physically abused: Not on file     Forced sexual activity: Not on file   Other Topics Concern    Not on file   Social History Narrative    Not on file         ALLERGIES: Patient has no known allergies. Review of Systems   Constitutional: Negative for chills, diaphoresis and fever. HENT: Negative for congestion, sneezing and sore throat. Eyes: Negative for visual disturbance. Respiratory: Positive for chest tightness and shortness of breath. Negative for cough and wheezing. Cardiovascular: Positive for chest pain. Negative for leg swelling. Gastrointestinal: Negative for abdominal pain, blood in stool, diarrhea, nausea and vomiting. Endocrine: Negative for polyuria. Genitourinary: Negative for difficulty urinating, dysuria, flank pain, hematuria and urgency. Musculoskeletal: Negative for back pain, myalgias, neck pain and neck stiffness. Skin: Negative for color change and rash. Neurological: Negative for dizziness, syncope, speech difficulty, weakness, light-headedness, numbness and headaches. Psychiatric/Behavioral: Negative for behavioral problems. All other systems reviewed and are negative. Vitals:    12/21/20 1918 12/21/20 2000   BP: (!) 159/82 (!) 158/71   Pulse: 97 99   Resp: 18 15   Temp: 97.9 °F (36.6 °C)    SpO2: 99% 100%   Weight: 117.9 kg (260 lb)    Height: 5' 9\" (1.753 m)             Physical Exam  Vitals signs and nursing note reviewed. Constitutional:       General: He is not in acute distress. Appearance: He is well-developed. He is not diaphoretic. Comments: Alert and oriented to person place and time. No acute distress, speaks in clear, fluid sentences. HENT:      Head: Normocephalic and atraumatic. Right Ear: External ear normal.      Left Ear: External ear normal.      Nose: Nose normal.   Eyes:      Pupils: Pupils are equal, round, and reactive to light. Neck:      Musculoskeletal: Normal range of motion. Cardiovascular:      Rate and Rhythm: Normal rate and regular rhythm. Heart sounds: Normal heart sounds. No murmur. No friction rub. No gallop. Pulmonary:      Effort: Pulmonary effort is normal. No respiratory distress. Breath sounds: No stridor. Decreased breath sounds present. No wheezing, rhonchi or rales. Comments: Slightly diminished throughout, no focal findings. Chest:      Chest wall: No tenderness. Abdominal:      General: There is no distension. Palpations: Abdomen is soft. There is no mass. Tenderness: There is no abdominal tenderness. There is no guarding or rebound. Hernia: No hernia is present.    Musculoskeletal: Normal range of motion. General: No tenderness or deformity. Skin:     General: Skin is warm and dry. Neurological:      Mental Status: He is alert and oriented to person, place, and time. Cranial Nerves: No cranial nerve deficit. MDM  Number of Diagnoses or Management Options  Diagnosis management comments: I was able to speak to the on-call cardiologist who spoke with patient earlier today. From cardiology standpoint, patient is stable for discharge with close outpatient follow-up. His labs today suggest a new white cell count elevation and mild creatinine elevation. Unclear as to the cause of these findings. I see no evidence of active infection in patient's work-up thus far. His signs do not suggest sepsis. Lactic pending. Trop X2 and EKG x2 appear stable. Patient established with [facility] cardiology will be referred for follow-up in the outpatient setting. He does not wish to be admitted at this time. Age-adjusted D-dimer within normal limits. Amount and/or Complexity of Data Reviewed  Clinical lab tests: reviewed and ordered  Tests in the radiology section of CPT®: ordered and reviewed  Tests in the medicine section of CPT®: ordered and reviewed  Review and summarize past medical records: yes  Discuss the patient with other providers: yes  Independent visualization of images, tracings, or specimens: yes    Risk of Complications, Morbidity, and/or Mortality  Presenting problems: moderate  Diagnostic procedures: low  Management options: moderate    Patient Progress  Patient progress: stable    ED Course as of Dec 21 2349   Mon Dec 21, 2020   2218 Age-adjusted D-dimer within normal limits. [BR]   5288 Patient is yet to receive nitroglycerin. Initial troponin is slightly elevated at 16.5. States his pain is about a 5 out of 10 at this time. Repeat EKG appears unchanged. Unsure as to why patient's leukocytosis is present. He arrives with a mild tachycardia as well.   Addition of lactic acid made. We will await effects of nitro, repeat troponin and plan to consult cardiology.     [BR]   2058 LACTIC ACID [BR]      ED Course User Index  [BR] Ruma Rodriguez, DO       Procedures

## 2021-01-07 ENCOUNTER — HOSPITAL ENCOUNTER (OUTPATIENT)
Dept: LAB | Age: 76
Discharge: HOME | End: 2021-01-07
Payer: MEDICARE

## 2021-01-07 DIAGNOSIS — D72.823 LEUKEMOID REACTION: ICD-10-CM

## 2021-01-07 LAB
BASOPHILS # BLD: 0.1 K/UL (ref 0–0.2)
BASOPHILS NFR BLD: 1 % (ref 0–2)
DIFFERENTIAL METHOD BLD: ABNORMAL
EOSINOPHIL # BLD: 0.2 K/UL (ref 0–0.8)
EOSINOPHIL NFR BLD: 2 % (ref 0.5–7.8)
ERYTHROCYTE [DISTWIDTH] IN BLOOD BY AUTOMATED COUNT: 13.5 % (ref 11.9–14.6)
HCT VFR BLD AUTO: 42.2 % (ref 41.1–50.3)
HGB BLD-MCNC: 13.4 G/DL (ref 13.6–17.2)
IMM GRANULOCYTES # BLD AUTO: 0 K/UL (ref 0–0.5)
IMM GRANULOCYTES NFR BLD AUTO: 1 % (ref 0–5)
LYMPHOCYTES # BLD: 2.5 K/UL (ref 0.5–4.6)
LYMPHOCYTES NFR BLD: 29 % (ref 13–44)
MCH RBC QN AUTO: 28 PG (ref 26.1–32.9)
MCHC RBC AUTO-ENTMCNC: 31.8 G/DL (ref 31.4–35)
MCV RBC AUTO: 88.3 FL (ref 79.6–97.8)
MONOCYTES # BLD: 0.8 K/UL (ref 0.1–1.3)
MONOCYTES NFR BLD: 10 % (ref 4–12)
NEUTS SEG # BLD: 5 K/UL (ref 1.7–8.2)
NEUTS SEG NFR BLD: 58 % (ref 43–78)
NRBC # BLD: 0 K/UL (ref 0–0.2)
PLATELET # BLD AUTO: 256 K/UL (ref 150–450)
PMV BLD AUTO: 10.7 FL (ref 9.4–12.3)
RBC # BLD AUTO: 4.78 M/UL (ref 4.23–5.6)
WBC # BLD AUTO: 8.7 K/UL (ref 4.3–11.1)

## 2021-01-07 PROCEDURE — 85025 COMPLETE CBC W/AUTO DIFF WBC: CPT

## 2021-01-07 PROCEDURE — 36415 COLL VENOUS BLD VENIPUNCTURE: CPT

## 2021-02-09 ENCOUNTER — HOSPITAL ENCOUNTER (INPATIENT)
Age: 76
LOS: 10 days | Discharge: NURSING, REHAB OR LONG-TERM CARE | DRG: 871 | End: 2021-02-19
Attending: EMERGENCY MEDICINE | Admitting: HOSPITALIST
Payer: MEDICARE

## 2021-02-09 ENCOUNTER — APPOINTMENT (OUTPATIENT)
Dept: CT IMAGING | Age: 76
DRG: 871 | End: 2021-02-09
Attending: EMERGENCY MEDICINE
Payer: MEDICARE

## 2021-02-09 ENCOUNTER — APPOINTMENT (OUTPATIENT)
Dept: GENERAL RADIOLOGY | Age: 76
DRG: 871 | End: 2021-02-09
Attending: EMERGENCY MEDICINE
Payer: MEDICARE

## 2021-02-09 DIAGNOSIS — Z79.4 TYPE 2 DIABETES MELLITUS WITH HYPERGLYCEMIA, WITH LONG-TERM CURRENT USE OF INSULIN (HCC): ICD-10-CM

## 2021-02-09 DIAGNOSIS — J12.9 VIRAL PNEUMONITIS: ICD-10-CM

## 2021-02-09 DIAGNOSIS — R41.0 DELIRIUM: Primary | ICD-10-CM

## 2021-02-09 DIAGNOSIS — U07.1 COVID-19: ICD-10-CM

## 2021-02-09 DIAGNOSIS — I50.22 CHRONIC SYSTOLIC CHF (CONGESTIVE HEART FAILURE) (HCC): ICD-10-CM

## 2021-02-09 DIAGNOSIS — E11.65 TYPE 2 DIABETES MELLITUS WITH HYPERGLYCEMIA, WITH LONG-TERM CURRENT USE OF INSULIN (HCC): ICD-10-CM

## 2021-02-09 DIAGNOSIS — G93.41 ACUTE METABOLIC ENCEPHALOPATHY: ICD-10-CM

## 2021-02-09 PROBLEM — N17.9 AKI (ACUTE KIDNEY INJURY) (HCC): Status: ACTIVE | Noted: 2021-02-09

## 2021-02-09 LAB
ABO + RH BLD: NORMAL
ALBUMIN SERPL-MCNC: 3.3 G/DL (ref 3.2–4.6)
ALBUMIN/GLOB SERPL: 0.6 {RATIO} (ref 1.2–3.5)
ALP SERPL-CCNC: 101 U/L (ref 50–136)
ALT SERPL-CCNC: 78 U/L (ref 12–65)
ANION GAP SERPL CALC-SCNC: 11 MMOL/L (ref 7–16)
ARTERIAL PATENCY WRIST A: YES
AST SERPL-CCNC: 97 U/L (ref 15–37)
ATRIAL RATE: 105 BPM
BACTERIA URNS QL MICRO: 0 /HPF
BASE DEFICIT BLD-SCNC: 8 MMOL/L
BASOPHILS # BLD: 0 K/UL (ref 0–0.2)
BASOPHILS NFR BLD: 0 % (ref 0–2)
BDY SITE: ABNORMAL
BILIRUB SERPL-MCNC: 0.9 MG/DL (ref 0.2–1.1)
BLOOD GROUP ANTIBODIES SERPL: NORMAL
BUN SERPL-MCNC: 60 MG/DL (ref 8–23)
CALCIUM SERPL-MCNC: 10 MG/DL (ref 8.3–10.4)
CALCULATED P AXIS, ECG09: 76 DEGREES
CALCULATED R AXIS, ECG10: 151 DEGREES
CALCULATED T AXIS, ECG11: 12 DEGREES
CASTS URNS QL MICRO: NORMAL /LPF
CHLORIDE SERPL-SCNC: 108 MMOL/L (ref 98–107)
CO2 BLD-SCNC: 16 MMOL/L
CO2 SERPL-SCNC: 20 MMOL/L (ref 21–32)
COLLECT TIME,HTIME: 1035
CREAT SERPL-MCNC: 1.63 MG/DL (ref 0.8–1.5)
CRP SERPL-MCNC: 8.3 MG/DL (ref 0–0.9)
D DIMER PPP FEU-MCNC: 2.5 UG/ML(FEU)
DIAGNOSIS, 93000: NORMAL
DIFFERENTIAL METHOD BLD: ABNORMAL
EOSINOPHIL # BLD: 0 K/UL (ref 0–0.8)
EOSINOPHIL NFR BLD: 0 % (ref 0.5–7.8)
EPI CELLS #/AREA URNS HPF: 0 /HPF
ERYTHROCYTE [DISTWIDTH] IN BLOOD BY AUTOMATED COUNT: 13.6 % (ref 11.9–14.6)
FERRITIN SERPL-MCNC: 338 NG/ML (ref 8–388)
GAS FLOW.O2 O2 DELIVERY SYS: ABNORMAL L/MIN
GLOBULIN SER CALC-MCNC: 5.5 G/DL (ref 2.3–3.5)
GLUCOSE BLD STRIP.AUTO-MCNC: 189 MG/DL (ref 65–100)
GLUCOSE BLD STRIP.AUTO-MCNC: 196 MG/DL (ref 65–100)
GLUCOSE SERPL-MCNC: 189 MG/DL (ref 65–100)
HCO3 BLD-SCNC: 15.4 MMOL/L (ref 22–26)
HCT VFR BLD AUTO: 44.7 % (ref 41.1–50.3)
HGB BLD-MCNC: 14.1 G/DL (ref 13.6–17.2)
IMM GRANULOCYTES # BLD AUTO: 0.1 K/UL (ref 0–0.5)
IMM GRANULOCYTES NFR BLD AUTO: 1 % (ref 0–5)
LACTATE SERPL-SCNC: 1.3 MMOL/L (ref 0.4–2)
LYMPHOCYTES # BLD: 1.6 K/UL (ref 0.5–4.6)
LYMPHOCYTES NFR BLD: 16 % (ref 13–44)
MCH RBC QN AUTO: 27.3 PG (ref 26.1–32.9)
MCHC RBC AUTO-ENTMCNC: 31.5 G/DL (ref 31.4–35)
MCV RBC AUTO: 86.6 FL (ref 79.6–97.8)
MONOCYTES # BLD: 1.1 K/UL (ref 0.1–1.3)
MONOCYTES NFR BLD: 11 % (ref 4–12)
NEUTS SEG # BLD: 7.5 K/UL (ref 1.7–8.2)
NEUTS SEG NFR BLD: 73 % (ref 43–78)
NRBC # BLD: 0 K/UL (ref 0–0.2)
O2/TOTAL GAS SETTING VFR VENT: 21 %
P-R INTERVAL, ECG05: 162 MS
PCO2 BLD: 26 MMHG (ref 35–45)
PH BLD: 7.38 [PH] (ref 7.35–7.45)
PLATELET # BLD AUTO: 403 K/UL (ref 150–450)
PMV BLD AUTO: 10.6 FL (ref 9.4–12.3)
PO2 BLD: 80 MMHG (ref 75–100)
POTASSIUM SERPL-SCNC: 4.6 MMOL/L (ref 3.5–5.1)
PROCALCITONIN SERPL-MCNC: 0.1 NG/ML
PROT SERPL-MCNC: 8.8 G/DL (ref 6.3–8.2)
Q-T INTERVAL, ECG07: 380 MS
QRS DURATION, ECG06: 152 MS
QTC CALCULATION (BEZET), ECG08: 502 MS
RBC # BLD AUTO: 5.16 M/UL (ref 4.23–5.6)
RBC #/AREA URNS HPF: NORMAL /HPF
SAO2 % BLD: 96 % (ref 95–98)
SERVICE CMNT-IMP: ABNORMAL
SODIUM SERPL-SCNC: 139 MMOL/L (ref 138–145)
SPECIMEN EXP DATE BLD: NORMAL
SPECIMEN TYPE: ABNORMAL
TROPONIN-HIGH SENSITIVITY: 63.9 PG/ML (ref 0–14)
TROPONIN-HIGH SENSITIVITY: 66.5 PG/ML (ref 0–14)
VENTRICULAR RATE, ECG03: 105 BPM
WBC # BLD AUTO: 10.3 K/UL (ref 4.3–11.1)
WBC URNS QL MICRO: NORMAL /HPF

## 2021-02-09 PROCEDURE — 70450 CT HEAD/BRAIN W/O DYE: CPT

## 2021-02-09 PROCEDURE — 94762 N-INVAS EAR/PLS OXIMTRY CONT: CPT

## 2021-02-09 PROCEDURE — 93005 ELECTROCARDIOGRAM TRACING: CPT | Performed by: EMERGENCY MEDICINE

## 2021-02-09 PROCEDURE — 82728 ASSAY OF FERRITIN: CPT

## 2021-02-09 PROCEDURE — 71045 X-RAY EXAM CHEST 1 VIEW: CPT

## 2021-02-09 PROCEDURE — 81015 MICROSCOPIC EXAM OF URINE: CPT

## 2021-02-09 PROCEDURE — 85379 FIBRIN DEGRADATION QUANT: CPT

## 2021-02-09 PROCEDURE — 2709999900 HC NON-CHARGEABLE SUPPLY

## 2021-02-09 PROCEDURE — 96360 HYDRATION IV INFUSION INIT: CPT

## 2021-02-09 PROCEDURE — 80053 COMPREHEN METABOLIC PANEL: CPT

## 2021-02-09 PROCEDURE — 74011250636 HC RX REV CODE- 250/636: Performed by: EMERGENCY MEDICINE

## 2021-02-09 PROCEDURE — 74011250636 HC RX REV CODE- 250/636: Performed by: HOSPITALIST

## 2021-02-09 PROCEDURE — 74011636637 HC RX REV CODE- 636/637: Performed by: HOSPITALIST

## 2021-02-09 PROCEDURE — 82962 GLUCOSE BLOOD TEST: CPT

## 2021-02-09 PROCEDURE — 86140 C-REACTIVE PROTEIN: CPT

## 2021-02-09 PROCEDURE — 86900 BLOOD TYPING SEROLOGIC ABO: CPT

## 2021-02-09 PROCEDURE — 99285 EMERGENCY DEPT VISIT HI MDM: CPT

## 2021-02-09 PROCEDURE — 83605 ASSAY OF LACTIC ACID: CPT

## 2021-02-09 PROCEDURE — 96361 HYDRATE IV INFUSION ADD-ON: CPT

## 2021-02-09 PROCEDURE — 84484 ASSAY OF TROPONIN QUANT: CPT

## 2021-02-09 PROCEDURE — 74011250637 HC RX REV CODE- 250/637: Performed by: HOSPITALIST

## 2021-02-09 PROCEDURE — 65270000029 HC RM PRIVATE

## 2021-02-09 PROCEDURE — 85025 COMPLETE CBC W/AUTO DIFF WBC: CPT

## 2021-02-09 PROCEDURE — 81003 URINALYSIS AUTO W/O SCOPE: CPT

## 2021-02-09 PROCEDURE — 36600 WITHDRAWAL OF ARTERIAL BLOOD: CPT

## 2021-02-09 PROCEDURE — 82803 BLOOD GASES ANY COMBINATION: CPT

## 2021-02-09 PROCEDURE — 84145 PROCALCITONIN (PCT): CPT

## 2021-02-09 PROCEDURE — 36415 COLL VENOUS BLD VENIPUNCTURE: CPT

## 2021-02-09 RX ORDER — ACETAMINOPHEN 650 MG/1
650 SUPPOSITORY RECTAL
Status: DISCONTINUED | OUTPATIENT
Start: 2021-02-09 | End: 2021-02-19 | Stop reason: HOSPADM

## 2021-02-09 RX ORDER — FAMOTIDINE 20 MG/1
20 TABLET, FILM COATED ORAL 2 TIMES DAILY
Status: DISCONTINUED | OUTPATIENT
Start: 2021-02-09 | End: 2021-02-09

## 2021-02-09 RX ORDER — CARVEDILOL 25 MG/1
25 TABLET ORAL 2 TIMES DAILY WITH MEALS
Status: DISCONTINUED | OUTPATIENT
Start: 2021-02-09 | End: 2021-02-19 | Stop reason: HOSPADM

## 2021-02-09 RX ORDER — MELATONIN
2000 DAILY
Status: DISCONTINUED | OUTPATIENT
Start: 2021-02-10 | End: 2021-02-19 | Stop reason: HOSPADM

## 2021-02-09 RX ORDER — SODIUM CHLORIDE 0.9 % (FLUSH) 0.9 %
5-40 SYRINGE (ML) INJECTION AS NEEDED
Status: DISCONTINUED | OUTPATIENT
Start: 2021-02-09 | End: 2021-02-19 | Stop reason: HOSPADM

## 2021-02-09 RX ORDER — PANTOPRAZOLE SODIUM 40 MG/1
40 TABLET, DELAYED RELEASE ORAL
Status: DISCONTINUED | OUTPATIENT
Start: 2021-02-10 | End: 2021-02-19 | Stop reason: HOSPADM

## 2021-02-09 RX ORDER — LANOLIN ALCOHOL/MO/W.PET/CERES
1000 CREAM (GRAM) TOPICAL DAILY
Status: DISCONTINUED | OUTPATIENT
Start: 2021-02-10 | End: 2021-02-19 | Stop reason: HOSPADM

## 2021-02-09 RX ORDER — GUAIFENESIN 100 MG/5ML
81 LIQUID (ML) ORAL
Status: DISCONTINUED | OUTPATIENT
Start: 2021-02-10 | End: 2021-02-19 | Stop reason: HOSPADM

## 2021-02-09 RX ORDER — AMITRIPTYLINE HYDROCHLORIDE 10 MG/1
20 TABLET, FILM COATED ORAL
Status: DISCONTINUED | OUTPATIENT
Start: 2021-02-09 | End: 2021-02-14

## 2021-02-09 RX ORDER — SODIUM CHLORIDE 9 MG/ML
75 INJECTION, SOLUTION INTRAVENOUS CONTINUOUS
Status: DISPENSED | OUTPATIENT
Start: 2021-02-09 | End: 2021-02-10

## 2021-02-09 RX ORDER — SAME BUTANEDISULFONATE/BETAINE 400-600 MG
250 POWDER IN PACKET (EA) ORAL 2 TIMES DAILY
Status: DISCONTINUED | OUTPATIENT
Start: 2021-02-09 | End: 2021-02-19 | Stop reason: HOSPADM

## 2021-02-09 RX ORDER — HEPARIN SODIUM 5000 [USP'U]/ML
5000 INJECTION, SOLUTION INTRAVENOUS; SUBCUTANEOUS EVERY 8 HOURS
Status: DISCONTINUED | OUTPATIENT
Start: 2021-02-09 | End: 2021-02-19 | Stop reason: HOSPADM

## 2021-02-09 RX ORDER — ONDANSETRON 2 MG/ML
4 INJECTION INTRAMUSCULAR; INTRAVENOUS
Status: DISCONTINUED | OUTPATIENT
Start: 2021-02-09 | End: 2021-02-19 | Stop reason: HOSPADM

## 2021-02-09 RX ORDER — SODIUM CHLORIDE 0.9 % (FLUSH) 0.9 %
5-40 SYRINGE (ML) INJECTION EVERY 8 HOURS
Status: DISCONTINUED | OUTPATIENT
Start: 2021-02-09 | End: 2021-02-19 | Stop reason: HOSPADM

## 2021-02-09 RX ORDER — GABAPENTIN 300 MG/1
300 CAPSULE ORAL DAILY
Status: DISCONTINUED | OUTPATIENT
Start: 2021-02-10 | End: 2021-02-13

## 2021-02-09 RX ORDER — INSULIN GLARGINE 100 [IU]/ML
20 INJECTION, SOLUTION SUBCUTANEOUS DAILY
Status: DISCONTINUED | OUTPATIENT
Start: 2021-02-10 | End: 2021-02-18

## 2021-02-09 RX ORDER — GUAIFENESIN/DEXTROMETHORPHAN 100-10MG/5
5 SYRUP ORAL
Status: DISCONTINUED | OUTPATIENT
Start: 2021-02-09 | End: 2021-02-19 | Stop reason: HOSPADM

## 2021-02-09 RX ORDER — PROMETHAZINE HYDROCHLORIDE 25 MG/1
12.5 TABLET ORAL
Status: DISCONTINUED | OUTPATIENT
Start: 2021-02-09 | End: 2021-02-19 | Stop reason: HOSPADM

## 2021-02-09 RX ORDER — POLYETHYLENE GLYCOL 3350 17 G/17G
17 POWDER, FOR SOLUTION ORAL DAILY PRN
Status: DISCONTINUED | OUTPATIENT
Start: 2021-02-09 | End: 2021-02-19 | Stop reason: HOSPADM

## 2021-02-09 RX ORDER — ZINC SULFATE 50(220)MG
1 CAPSULE ORAL DAILY
Status: DISCONTINUED | OUTPATIENT
Start: 2021-02-10 | End: 2021-02-19 | Stop reason: HOSPADM

## 2021-02-09 RX ORDER — LORAZEPAM 2 MG/ML
1 INJECTION INTRAMUSCULAR
Status: COMPLETED | OUTPATIENT
Start: 2021-02-09 | End: 2021-02-09

## 2021-02-09 RX ORDER — ACETAMINOPHEN 325 MG/1
650 TABLET ORAL
Status: DISCONTINUED | OUTPATIENT
Start: 2021-02-09 | End: 2021-02-19 | Stop reason: HOSPADM

## 2021-02-09 RX ORDER — GABAPENTIN 300 MG/1
600 CAPSULE ORAL
Status: DISCONTINUED | OUTPATIENT
Start: 2021-02-09 | End: 2021-02-13

## 2021-02-09 RX ORDER — ASCORBIC ACID 500 MG
1000 TABLET ORAL DAILY
Status: DISCONTINUED | OUTPATIENT
Start: 2021-02-10 | End: 2021-02-19 | Stop reason: HOSPADM

## 2021-02-09 RX ORDER — INSULIN LISPRO 100 [IU]/ML
INJECTION, SOLUTION INTRAVENOUS; SUBCUTANEOUS
Status: DISCONTINUED | OUTPATIENT
Start: 2021-02-09 | End: 2021-02-19 | Stop reason: HOSPADM

## 2021-02-09 RX ORDER — ROSUVASTATIN CALCIUM 10 MG/1
10 TABLET, COATED ORAL
Status: DISCONTINUED | OUTPATIENT
Start: 2021-02-09 | End: 2021-02-19 | Stop reason: HOSPADM

## 2021-02-09 RX ADMIN — GABAPENTIN 600 MG: 300 CAPSULE ORAL at 21:40

## 2021-02-09 RX ADMIN — HEPARIN SODIUM 5000 UNITS: 5000 INJECTION INTRAVENOUS; SUBCUTANEOUS at 16:30

## 2021-02-09 RX ADMIN — ROSUVASTATIN 10 MG: 10 TABLET, FILM COATED ORAL at 21:40

## 2021-02-09 RX ADMIN — RDII 250 MG CAPSULE 250 MG: at 20:31

## 2021-02-09 RX ADMIN — SODIUM CHLORIDE 75 ML/HR: 900 INJECTION, SOLUTION INTRAVENOUS at 15:22

## 2021-02-09 RX ADMIN — AMITRIPTYLINE HYDROCHLORIDE 20 MG: 10 TABLET, FILM COATED ORAL at 21:40

## 2021-02-09 RX ADMIN — Medication 10 ML: at 21:41

## 2021-02-09 RX ADMIN — HEPARIN SODIUM 5000 UNITS: 5000 INJECTION INTRAVENOUS; SUBCUTANEOUS at 21:41

## 2021-02-09 RX ADMIN — INSULIN LISPRO 2 UNITS: 100 INJECTION, SOLUTION INTRAVENOUS; SUBCUTANEOUS at 16:41

## 2021-02-09 RX ADMIN — INSULIN LISPRO 2 UNITS: 100 INJECTION, SOLUTION INTRAVENOUS; SUBCUTANEOUS at 21:41

## 2021-02-09 RX ADMIN — CARVEDILOL 25 MG: 25 TABLET, FILM COATED ORAL at 16:29

## 2021-02-09 RX ADMIN — LORAZEPAM 1 MG: 2 INJECTION INTRAMUSCULAR; INTRAVENOUS at 13:43

## 2021-02-09 RX ADMIN — SODIUM CHLORIDE 1000 ML: 900 INJECTION, SOLUTION INTRAVENOUS at 11:20

## 2021-02-09 NOTE — ED NOTES
Pt repeatedly getting out of bed and standing in room. Redirected multiple times to get back into bed. At this time pt stanfing with no clothes on, his IV taken out and off of the monitor. Pt then redressed into a gown and brief placed.  ER MD aware see orders

## 2021-02-09 NOTE — ED PROVIDER NOTES
56-year-old male brought from home with about a 1 week history of generalized weakness and fatigue and accompanying altered mental status. Patient's wife states that he has been increasingly confused over several days. He was recently diagnosed Covid positive  Patient also has not been compliant with his medications. He did receive his first dose of Covid vaccine about a week ago  No overt fevers vomiting or diarrhea  Patient is restless and somewhat agitated able to follow commands but does struggle somewhat  Wife says that his baseline is usually completely normal and that he has been quite different for several days. States that actually took 3 days to convince him to come to the hospital for evaluation    The history is provided by the patient and the spouse. Altered mental status   This is a new problem. The current episode started more than 1 week ago. The problem has been gradually worsening. Associated symptoms include confusion and agitation. Pertinent negatives include no numbness. Mental status baseline is normal.  His past medical history is significant for diabetes. His past medical history does not include COPD.         Past Medical History:   Diagnosis Date    Age-related cognitive decline 2020    30 out of 30 MMSE    Arthritis     DJD- shoulder, knees, hips    Diabetes ([address]     idd x 6 yrs, avg am sqbs 100    Dizziness 06/09/2020    normal carotid ultrasound    Gastroesophageal reflux disease without esophagitis 6/9/2020    GERD (gastroesophageal reflux disease)     Gout     right foot    Heart failure (HCC)     Hip pain, bilateral     POA    Hypertension     x 10 yrs    Morbid obesity ([address]     Neuropathy     feet and legs    Obstructive sleep apnea (adult) (pediatric) 08/26/2014    txed with CPAP- Dr. Hollis Done disorder     DEPRESSION    Routine eye exam 2020    no diabetic retinopathy- Qing Broussard    Spinal stenosis of lumbar region     Type 2 diabetes mellitus with hyperglycemia, with long-term current use of insulin ([facility] 9/23/2020       Past Surgical History:   Procedure Laterality Date    HX APPENDECTOMY      HX COLONOSCOPY      Dr. Arceo Plate HX ORTHOPAEDIC      right achilles tendon repair    HX ORTHOPAEDIC      knee    HX PACEMAKER      pacemaker/defibrillator placed 6/25/14     HX TONSILLECTOMY      [place] CARDIAC SURG PROCEDURE UNLIST  cath         Family History:   Problem Relation Age of Onset    Heart Attack Father     Cancer Mother        Social History     Socioeconomic History    Marital status:      Spouse name: Not on file    Number of children: Not on file    Years of education: Not on file    Highest education level: Not on file   Occupational History    Not on file   Social Needs    Financial resource strain: Not on file    Food insecurity     Worry: Not on file     Inability: Not on file    Transportation needs     Medical: Not on file     Non-medical: Not on file   Tobacco Use    Smoking status: Never Smoker    Smokeless tobacco: Never Used   Substance and Sexual Activity    Alcohol use: No    Drug use: No    Sexual activity: Not on file   Lifestyle    Physical activity     Days per week: Not on file     Minutes per session: Not on file    Stress: Not on file   Relationships    Social connections     Talks on phone: Not on file     Gets together: Not on file     Attends Buddhist service: Not on file     Active member of club or organization: Not on file     Attends meetings of clubs or organizations: Not on file     Relationship status: Not on file    Intimate partner violence     Fear of current or ex partner: Not on file     Emotionally abused: Not on file     Physically abused: Not on file     Forced sexual activity: Not on file   Other Topics Concern    Not on file   Social History Narrative    Not on file         ALLERGIES: Patient has no known allergies.     Review of Systems   Constitutional: Negative for activity change, chills, diaphoresis and fever. HENT: Negative for dental problem, hearing loss, nosebleeds, rhinorrhea and sore throat. Eyes: Negative for pain, discharge, redness and visual disturbance. Respiratory: Negative for cough, chest tightness and shortness of breath. Cardiovascular: Negative for chest pain, palpitations and leg swelling. Gastrointestinal: Negative for abdominal pain, constipation, diarrhea, nausea and vomiting. Endocrine: Negative for cold intolerance, heat intolerance, polydipsia and polyuria. Genitourinary: Negative for dysuria and flank pain. Musculoskeletal: Positive for arthralgias. Negative for back pain, joint swelling, myalgias and neck pain. Skin: Negative for pallor and rash. Allergic/Immunologic: Negative for environmental allergies and food allergies. Neurological: Negative for dizziness, tremors, light-headedness, numbness and headaches. Hematological: Negative for adenopathy. Does not bruise/bleed easily. Psychiatric/Behavioral: Positive for agitation and confusion. Negative for dysphoric mood. The patient is not nervous/anxious and is not hyperactive. All other systems reviewed and are negative. Vitals:    02/09/21 0933   BP: 136/75   Pulse: (!) 113   Resp: 18   Temp: 97.7 °F (36.5 °C)   SpO2: 97%   Weight: 108.9 kg (240 lb)   Height: 5' 10\" (1.778 m)            Physical Exam  Vitals signs and nursing note reviewed. Constitutional:       General: He is in acute distress. Appearance: Normal appearance. He is well-developed. He is obese. HENT:      Head: Normocephalic and atraumatic. Right Ear: External ear normal.      Left Ear: External ear normal.      Mouth/Throat:      Mouth: Mucous membranes are dry. Pharynx: Oropharynx is clear. No oropharyngeal exudate. Eyes:      General: No scleral icterus. Extraocular Movements: Extraocular movements intact.       Conjunctiva/sclera: Conjunctivae normal.      Pupils: Pupils are equal, round, and reactive to light. Neck:      Musculoskeletal: Normal range of motion and neck supple. Thyroid: No thyromegaly. Vascular: No JVD. Cardiovascular:      Rate and Rhythm: Regular rhythm. Tachycardia present. Pulses: Normal pulses. Heart sounds: Normal heart sounds. No murmur. No friction rub. No gallop. Pulmonary:      Effort: Pulmonary effort is normal. No respiratory distress. Breath sounds: Normal breath sounds. No wheezing. Abdominal:      General: Bowel sounds are normal. There is no distension. Palpations: Abdomen is soft. Tenderness: There is no abdominal tenderness. Musculoskeletal: Normal range of motion. General: No tenderness or deformity. Skin:     General: Skin is warm and dry. Capillary Refill: Capillary refill takes less than 2 seconds. Findings: No rash. Neurological:      General: No focal deficit present. Mental Status: He is alert and oriented to person, place, and time. Cranial Nerves: No cranial nerve deficit. Sensory: No sensory deficit. Motor: No abnormal muscle tone. Coordination: Coordination normal.   Psychiatric:         Mood and Affect: Affect is blunt and flat. Speech: Speech is delayed. Behavior: Behavior is uncooperative. Cognition and Memory: He exhibits impaired recent memory. MDM  Number of Diagnoses or Management Options  COVID-19: established and worsening  Delirium: established and worsening  Viral pneumonitis: established and worsening  Diagnosis management comments: 80-year-old male brought from home by his wife with about a week history of increasing confusion  No measured fever  Recently diagnosed Covid positive.   Also recently received first dose of Covid vaccine    No known vomiting or diarrhea    Patient tachycardic here on arrival today quite agitated    We will start septic work-up/evaluation of altered mental status    I reviewed the chest x-ray. Increased interstitial markings with patchy infiltrates consistent with COVID-19       Amount and/or Complexity of Data Reviewed  Clinical lab tests: ordered and reviewed  Tests in the radiology section of CPT®: ordered and reviewed  Tests in the medicine section of CPT®: ordered and reviewed  Decide to obtain previous medical records or to obtain history from someone other than the patient: yes  Obtain history from someone other than the patient: yes  Review and summarize past medical records: yes  Discuss the patient with other providers: yes  Independent visualization of images, tracings, or specimens: yes    Risk of Complications, Morbidity, and/or Mortality  Presenting problems: high  Diagnostic procedures: high  Management options: high  General comments: Elements of this note have been dictated via voice recognition software. Text and phrases may be limited by the accuracy of the software. The chart has been reviewed, but errors may still be present.       Patient Progress  Patient progress: stable         EKG    Date/Time: 2/9/2021 10:48 AM  Performed by: Estelle Escoto MD  Authorized by: Estelle Escoto MD     ECG reviewed by ED Physician in the absence of a cardiologist: yes    Previous ECG:     Previous ECG:  Compared to current    Similarity:  No change  Interpretation:     Interpretation: non-specific    Quality:     Tracing quality:  Limited by artifact  Rate:     ECG rate:  105    ECG rate assessment: tachycardic    Rhythm:     Rhythm: paced    Pacing:     Capture:  Complete  Ectopy:     Ectopy: none    Conduction:     Conduction: normal    Comments:      Sinus tachycardia with persistent interventricular conduction delay  Likely pacer spikes noted most prominently in V4 V5  Study limited by artifact

## 2021-02-09 NOTE — ED NOTES
TRANSFER - OUT REPORT:    Verbal report given to froylan Perez (name) on Savannah Mckenna  being transferred to 504 (unit) for routine progression of care       Report consisted of patients Situation, Background, Assessment and   Recommendations(SBAR). Information from the following report(s) ED Summary was reviewed with the receiving nurse. Lines:   Peripheral IV 02/09/21 Right Antecubital (Active)        Opportunity for questions and clarification was provided.       Patient transported with:  Peri

## 2021-02-09 NOTE — PROGRESS NOTES
TRANSFER - IN REPORT:    Verbal report received from [place], RN(name) on Leticia Pang  being received from ED(unit) for routine progression of care      Report consisted of patients Situation, Background, Assessment and   Recommendations(SBAR). Information from the following report(s) SBAR, ED Summary and Recent Results was reviewed with the receiving nurse. Opportunity for questions and clarification was provided. Assessment completed upon patients arrival to unit and care assumed. Pt alert and confused. Oriented to self. Weak.

## 2021-02-09 NOTE — ED TRIAGE NOTES
Pt arrives via ems form home. Had covid vaccine 1/26. Tested positive for covid on 2/5. Reports AMS. Pt not normally confused. Reports vss en route. Pt a+o to person, place, and situation. Disoriented to time. Spouse reports confusion started about a week ago.

## 2021-02-09 NOTE — H&P
Hospitalist Note     Admit Date:  2021  9:53 AM   Name:  Radhames Kohler   Age:  75 y.o.  :  1945   MRN:  [id number]   PCP:  Ara Renteria MD  Treatment Team: Attending Provider: Kelle Saab MD; Care Manager: Kristal Ventura    HPI/Subjective:     Chief complaint altered mental status    History of present illness and review of systems cannot be obtained from the patient secondary to his altered mental status.  Most of the information is obtained from patient's wife at bedside ER physician and prior medical records.    Patient is a 75-year-old male brought to the ER from home because of generalized weakness, altered mental status.  Patient's wife states that he has been having increasing confusion over the last few days.  He tested positive for Covid On .  Patient received Covid vaccine on .  No fever no chills.  No nausea no vomiting diarrhea.  Patient has been confused and was in the garage for about 3 hours before his wife found him.  At baseline patient is normal and able to do his activities.  No  chest pain no palpitations.  No cough no shortness of breath.  Not been able to eat or drink much in the last few days.  No tingling numbness or weakness of extremities.  At the time of my evaluation patient is walking in the room.  He is alert awake but not oriented to person place or time.    10 systems reviewed and negative except as noted in HPI.    Patient admitted for further evaluation management of acute metabolic encephalopathy.    Past Medical History:   Diagnosis Date   • Age-related cognitive decline     30 out of 30 MMSE   • Arthritis     DJD- shoulder, knees, hips   • COVID-19 2021   • Diabetes (HCC)     idd x 6 yrs, avg am sqbs 100   • Dizziness 2020    normal carotid ultrasound   • Gastroesophageal reflux disease without esophagitis 2020   • GERD (gastroesophageal reflux disease)    • Gout     right foot   • Heart failure (HCC)    • Hip pain,  bilateral     POA    Hypertension     x 10 yrs    Morbid obesity ([address]     Neuropathy     feet and legs    Obstructive sleep apnea (adult) (pediatric) 08/26/2014    txed with CPAP- Dr. Hollis Done disorder     DEPRESSION    Routine eye exam 2020    no diabetic retinopathy- [facility] Pain Spinal stenosis of lumbar region     Type 2 diabetes mellitus with hyperglycemia, with long-term current use of insulin ([facility] [address] 9/23/2020      Past Surgical History:   Procedure Laterality Date    HX APPENDECTOMY      HX COLONOSCOPY      Dr. Ozzie Dan HX ORTHOPAEDIC      right achilles tendon repair    HX ORTHOPAEDIC      knee    HX PACEMAKER      pacemaker/defibrillator placed 6/25/14     HX TONSILLECTOMY      RI CARDIAC SURG PROCEDURE UNLIST  cath      No Known Allergies   Social History     Tobacco Use    Smoking status: Never Smoker    Smokeless tobacco: Never Used   Substance Use Topics    Alcohol use: No      Family History   Problem Relation Age of Onset    Heart Attack Father     Cancer Mother       Immunization History   Administered Date(s) Administered    Covid-19, MODERNA, Mrna, Lnp-s, Pf, 100mcg/0.5mL 01/27/2021    Influenza Vaccine 12/09/2012, 01/06/2014    Influenza, Quadrivalent, Adjuvanted (>65 Yrs FLUAD QUAD [id number]) 09/23/2020    Pneumococcal Conjugate (PCV-13) 01/22/2018    Pneumococcal Polysaccharide (PPSV-23) 03/02/2020    Tdap 01/22/2018    Zoster Recombinant 03/07/2018, 07/17/2018    Zoster Vaccine, Live 01/10/2013     PTA Medications:  Prior to Admission Medications   Prescriptions Last Dose Informant Patient Reported? Taking? Bifidobacterium Infantis (ALIGN) 4 mg cap   Yes No   Sig: Take 1 Tab by mouth. Every other day   DISABLED PLACARD (DISABLED PLACARD) DMV   No No   Sig: Handicap placard   FLUORIDE ION/MULTIVITAMINS (MULTI VIT-FLUORIDE PO)   Yes No   Sig: take  by mouth daily. Stopped 6/22/09    MELOXICAM PO   Yes No   Sig: take 15 mg by mouth every morning. Pt states unable to stopped , pt to talk with dr. Debby Porter   Yes No   Sig: Take  by mouth. allopurinol (ZYLOPRIM) 100 mg tablet   Yes No   Sig: Take  by mouth daily. amitriptyline (ELAVIL) 10 mg tablet   Yes No   Sig: Take 20 mg by mouth nightly. ascorbic acid (VITAMIN C PO)   Yes No   Sig: Take  by mouth. aspirin 81 mg chewable tablet   Yes No   Sig: take 81 mg by mouth every morning. carvedilol (COREG) 25 mg tablet   Yes No   Sig: Take 25 mg by mouth two (2) times daily (with meals). cholecalciferol, vitamin D3, (VITAMIN D3) 2,000 unit tab   Yes No   Sig: Take  by mouth. cpap machine kit   Yes No   Sig: by Does Not Apply route. 8 cm   cyanocobalamin (VITAMIN B-12) 1,000 mcg sublingual tablet   Yes No   Sig: Take 1,000 mcg by mouth daily. furosemide (LASIX) 20 mg tablet   Yes No   Sig: Take 20 mg by mouth every Monday, Wednesday, Friday. gabapentin (NEURONTIN) 300 mg Tab   Yes No   Sig: Take 300 mg by mouth nightly. One po every am and 2 po at bedtime   indomethacin (INDOCIN) 25 mg capsule   Yes No   Sig: Take  by mouth three (3) times daily. As needed   insulin de[employer] FlexTouch U-200) 200 unit/mL (3 mL) inpn   Yes No   Si Units by SubCUTAneous route daily. nitroglycerin (NITROSTAT) 0.4 mg SL tablet   No No   Si Tab by SubLINGual route every five (5) minutes as needed for Chest Pain. Up to 3 doses. omeprazole (PRILOSEC) 20 mg capsule   Yes No   Sig: Take 20 mg by mouth daily. rosuvastatin (Crestor) 10 mg tablet   No No   Sig: Take 1 Tab by mouth nightly. sitaGLIPtin-metFORMIN (JANUMET) 50-1,000 mg per tablet   Yes No   Sig: Take 1 Tab by mouth two (2) times daily (with meals). spironolactone (ALDACTONE) 25 mg tablet   Yes No   Sig: Take 12.5 mg by mouth daily. valsartan-hydroCHLOROthiazide (DIOVAN-HCT) 320-25 mg per tablet   Yes No   Sig: Take 1 Tab by mouth daily.       Facility-Administered Medications: None       Objective:     Patient Vitals for the past 24 hrs:   Temp Pulse Resp BP SpO2   02/09/21 1433  (!) 50 24  95 %   02/09/21 1424  63 22  95 %   02/09/21 1344  (!) 105 22 (!) 151/88 94 %   02/09/21 0933 97.7 °F (36.5 °C) (!) 113 18 136/75 97 %     Oxygen Therapy  O2 Sat (%): 95 % (02/09/21 1433)  Pulse via Oximetry: 93 beats per minute (02/09/21 1433)  O2 Device: Room air (02/09/21 1037)    No intake or output data in the 24 hours ending 02/09/21 1525    *Note that automatically entered I/Os may not be accurate; dependent on patient compliance with collection and accurate  by assistants. Physical Exam:  General:    Well nourished. Alert, awake, wandering in the room,  not oriented to person place or time,  Eyes:   Normal sclerae. Extraocular movements intact. HENT:  Normocephalic, atraumatic.  dry mucous membranes  CV:   RRR. No m/r/g. Lungs:  CTAB. No wheezing, rhonchi, or rales. Abdomen: Soft, nontender, nondistended. Active bowel sounds, no organomegaly,   Extremities: Warm and dry. No cyanosis or edema. Neurologic: CN II-XII grossly intact. Sensation intact. Skin:     No rashes or jaundice. Normal coloration  Psych:  Normal mood and affect. I reviewed the labs, imaging, EKGs, telemetry, and other studies done this admission.     Data Review:   Recent Results (from the past 24 hour(s))   C REACTIVE PROTEIN, QT    Collection Time: 02/09/21  9:46 AM   Result Value Ref Range    C-Reactive protein 8.3 (H) 0.0 - 0.9 mg/dL   CBC WITH AUTOMATED DIFF    Collection Time: 02/09/21  9:47 AM   Result Value Ref Range    WBC 10.3 4.3 - 11.1 K/uL    RBC 5.16 4.23 - 5.6 M/uL    HGB 14.1 13.6 - 17.2 g/dL    HCT 44.7 41.1 - 50.3 %    MCV 86.6 79.6 - 97.8 FL    MCH 27.3 26.1 - 32.9 PG    MCHC 31.5 31.4 - 35.0 g/dL    RDW 13.6 11.9 - 14.6 %    PLATELET 534 093 - 854 K/uL    MPV 10.6 9.4 - 12.3 FL    ABSOLUTE NRBC 0.00 0.0 - 0.2 K/uL    DF AUTOMATED      NEUTROPHILS 73 43 - 78 %    LYMPHOCYTES 16 13 - 44 %    MONOCYTES 11 4.0 - 12.0 % EOSINOPHILS 0 (L) 0.5 - 7.8 %    BASOPHILS 0 0.0 - 2.0 %    IMMATURE GRANULOCYTES 1 0.0 - 5.0 %    ABS. NEUTROPHILS 7.5 1.7 - 8.2 K/UL    ABS. LYMPHOCYTES 1.6 0.5 - 4.6 K/UL    ABS. MONOCYTES 1.1 0.1 - 1.3 K/UL    ABS. EOSINOPHILS 0.0 0.0 - 0.8 K/UL    ABS. BASOPHILS 0.0 0.0 - 0.2 K/UL    ABS. IMM. GRANS. 0.1 0.0 - 0.5 K/UL   METABOLIC PANEL, COMPREHENSIVE    Collection Time: 02/09/21  9:47 AM   Result Value Ref Range    Sodium 139 138 - 145 mmol/L    Potassium 4.6 3.5 - 5.1 mmol/L    Chloride 108 (H) 98 - 107 mmol/L    CO2 20 (L) 21 - 32 mmol/L    Anion gap 11 7 - 16 mmol/L    Glucose 189 (H) 65 - 100 mg/dL    BUN 60 (H) 8 - 23 MG/DL    Creatinine 1.63 (H) 0.8 - 1.5 MG/DL    GFR est AA 53 (L) >60 ml/min/1.73m2    GFR est non-AA 44 (L) >60 ml/min/1.73m2    Calcium 10.0 8.3 - 10.4 MG/DL    Bilirubin, total 0.9 0.2 - 1.1 MG/DL    ALT (SGPT) 78 (H) 12 - 65 U/L    AST (SGOT) 97 (H) 15 - 37 U/L    Alk.  phosphatase 101 50 - 136 U/L    Protein, total 8.8 (H) 6.3 - 8.2 g/dL    Albumin 3.3 3.2 - 4.6 g/dL    Globulin 5.5 (H) 2.3 - 3.5 g/dL    A-G Ratio 0.6 (L) 1.2 - 3.5     LACTIC ACID    Collection Time: 02/09/21  9:47 AM   Result Value Ref Range    Lactic acid 1.3 0.4 - 2.0 MMOL/L   D DIMER    Collection Time: 02/09/21  9:47 AM   Result Value Ref Range    D DIMER 2.50 (H) <0.56 ug/ml(FEU)   FERRITIN    Collection Time: 02/09/21  9:47 AM   Result Value Ref Range    Ferritin 338 8 - 388 NG/ML   PROCALCITONIN    Collection Time: 02/09/21  9:47 AM   Result Value Ref Range    Procalcitonin 0.10 ng/mL   TROPONIN-HIGH SENSITIVITY    Collection Time: 02/09/21  9:47 AM   Result Value Ref Range    Troponin-High Sensitivity 66.5 (H) 0 - 14 pg/mL   EKG, 12 LEAD, INITIAL    Collection Time: 02/09/21 10:16 AM   Result Value Ref Range    Ventricular Rate 105 BPM    Atrial Rate 105 BPM    P-R Interval 162 ms    QRS Duration 152 ms    Q-T Interval 380 ms    QTC Calculation (Bezet) 502 ms    Calculated P Axis 76 degrees    Calculated R Axis 151 degrees    Calculated T Axis 12 degrees    Diagnosis       !! AGE AND GENDER SPECIFIC ECG ANALYSIS !!   Sinus tachycardia  Electronic ventricular pacemaker  Confirmed by Kailee Linares MD (), BRIAN CHA ([id number]) on 2/9/2021 11:42:10 AM     POC G3    Collection Time: 02/09/21 10:38 AM   Result Value Ref Range    Device: ROOM AIR      FIO2 (POC) 21 %    pH (POC) 7.38 7.35 - 7.45      pCO2 (POC) 26.0 (L) 35 - 45 MMHG    pO2 (POC) 80 75 - 100 MMHG    HCO3 (POC) 15.4 (L) 22 - 26 MMOL/L    sO2 (POC) 96 95 - 98 %    Base deficit (POC) 8 mmol/L    Allens test (POC) YES      Site RIGHT RADIAL      Specimen type (POC) ARTERIAL      Performed by Tyrell     CO2, POC 16 MMOL/L    COLLECT TIME 1,035     URINE MICROSCOPIC    Collection Time: 02/09/21 11:10 AM   Result Value Ref Range    WBC 0-3 0 /hpf    RBC 3-5 0 /hpf    Epithelial cells 0 0 /hpf    Bacteria 0 0 /hpf    Casts 3-5 0 /lpf   TROPONIN-HIGH SENSITIVITY    Collection Time: 02/09/21  1:28 PM   Result Value Ref Range    Troponin-High Sensitivity 63.9 (H) 0 - 14 pg/mL       All Micro Results     None          Current Facility-Administered Medications   Medication Dose Route Frequency    sodium chloride (NS) flush 5-40 mL  5-40 mL IntraVENous Q8H    sodium chloride (NS) flush 5-40 mL  5-40 mL IntraVENous PRN    acetaminophen (TYLENOL) tablet 650 mg  650 mg Oral Q6H PRN    Or    acetaminophen (TYLENOL) suppository 650 mg  650 mg Rectal Q6H PRN    polyethylene glycol (MIRALAX) packet 17 g  17 g Oral DAILY PRN    promethazine (PHENERGAN) tablet 12.5 mg  12.5 mg Oral Q6H PRN    Or    ondansetron (ZOFRAN) injection 4 mg  4 mg IntraVENous Q6H PRN    0.9% sodium chloride infusion  75 mL/hr IntraVENous CONTINUOUS    heparin (porcine) injection 5,000 Units  5,000 Units SubCUTAneous Q8H    guaiFENesin-dextromethorphan (ROBITUSSIN DM) 100-10 mg/5 mL syrup 5 mL  5 mL Oral Q4H PRN    amitriptyline (ELAVIL) tablet 20 mg  20 mg Oral QHS    [START ON 2/10/2021] ascorbic acid (vitamin C) (VITAMIN C) tablet 1,000 mg  1,000 mg Oral DAILY    [START ON 2/10/2021] aspirin chewable tablet 81 mg  81 mg Oral 7am    Saccharomyces boulardii (FLORASTOR) capsule 250 mg  250 mg Oral BID    carvediloL (COREG) tablet 25 mg  25 mg Oral BID WITH MEALS    . PHARMACY TO SUBSTITUTE PER PROTOCOL (Reordered from: cholecalciferol, vitamin D3, (VITAMIN D3) 2,000 unit tab)    Per Protocol    . PHARMACY TO SUBSTITUTE PER PROTOCOL (Reordered from: cyanocobalamin (VITAMIN B-12) 1,000 mcg sublingual tablet)    Per Protocol    . PHARMACY TO SUBSTITUTE PER PROTOCOL (Reordered from: FLUORIDE ION/MULTIVITAMINS (MULTI VIT-FLUORIDE PO))    Per Protocol    . PHARMACY TO SUBSTITUTE PER PROTOCOL (Reordered from: gabapentin (NEURONTIN) 300 mg Tab)    Per Protocol    [START ON 2/10/2021] pantoprazole (PROTONIX) tablet 40 mg  40 mg Oral ACB    rosuvastatin (CRESTOR) tablet 10 mg  10 mg Oral QHS    . PHARMACY TO SUBSTITUTE PER PROTOCOL (Reordered from: ZINC ACETATE PO)    Per Protocol    [START ON 2/10/2021] insulin glargine (LANTUS) injection 20 Units  20 Units SubCUTAneous DAILY    insulin lispro (HUMALOG) injection   SubCUTAneous AC&HS       Other Studies:  Ct Head Wo Cont    Result Date: 2/9/2021  CT Brain Without Contrast 2/9/2021 11:12 AM Indication: Altered mental status. Recent positive Covid 19 status-confirmed 2/5/2021. Comparison: CT brain 11/3/2016 Technique:  Multiple contiguous axial images are obtained encompassing the brain from the skull base to the vertex. For this CT scanner at least one of the following techniques is utilized to decrease patient radiation dose: Automatic exposure control, KVP and mA modulation based on patient weight, and iterative reconstruction. Findings: The ventricles are midline and of appropriate size and configuration. The midline structures and posterior fossa are intact. There is no finding of an acute cortical stroke, mass lesion, or acute bleed.   No extra-axial fluid collection. The skull is intact, no discreet abnormality. The sphenoid sinus is significantly opacified with nonfluid like material now-this is a change from the prior. The visualized orbits and mastoid air-cells are patent. 1. No acute intracranial abnormality. 2. Increased sinus disease findings now at the sphenoid sinus. Xr Chest Port    Result Date: 2/9/2021  1 View portable chest x-ray 2/9/2021 9:57 AM Indication: Patient has Covid 19 positive status. Comparison: 12/21/2020 Findings: This portable sitting upright AP chest of 09 42 shows the cardiomediastinal silhouette stable. Left-sided permanent AICD unit appearance is stable. Pulmonary inflation similar to before. There are now however well evident bilateral patchy lung opacities, progressed from before. Probably more pronounced on the right than the left. No significant effusion or pneumothorax. There is not overt pulmonary edema. There are now patchy lung opacities bilaterally, right greater than left, regressed from before. This is a nonspecific pattern but with a history probably represents multifocal Covid 19 viral pneumonitis changes.         Assessment and Plan:     Hospital Problems as of 2/9/2021 Date Reviewed: 1/7/2021          Codes Class Noted - Resolved POA    * (Principal) Acute metabolic encephalopathy UZF-63-AI: G93.41  ICD-9-CM: 348.31  2/9/2021 - Present Unknown        DANIELLA (acute kidney injury) ([facility] ICD-10-CM: N17.9  ICD-9-CM: 584.9  2/9/2021 - Present Unknown        COVID-19 ICD-10-CM: U07.1  ICD-9-CM: 079.89  2/9/2021 - Present Unknown        Type 2 diabetes mellitus with hyperglycemia, with long-term current use of insulin (HCC) ICD-10-CM: E11.65, Z79.4  ICD-9-CM: 250.00, 790.29, V58.67  9/23/2020 - Present Yes        Gastroesophageal reflux disease without esophagitis ICD-10-CM: K21.9  ICD-9-CM: 530.81  6/9/2020 - Present Yes        Class 2 severe obesity with serious comorbidity and body mass index (BMI) of 39.0 to 39.9 in adult [facility]) ICD-10-CM: E66.01, Z68.39  ICD-9-CM: 278.01, V85.39  6/9/2020 - Present Yes        Stage 3 chronic kidney disease ICD-10-CM: N18.30  ICD-9-CM: 585.3  6/9/2020 - Present Yes        Chronic systolic CHF (congestive heart failure) ([facility] ICD-10-CM: I50.22  ICD-9-CM: 428.22, 428.0  6/25/2014 - Present Yes    Overview Addendum 1/21/2021  7:38 AM by Nuha Lomax MD     EF 20% by echo September 2011  Echo October 2012: EF improved to 35-40%, no significant valve disease  Jan 2014: images so poor even with Definity contrast an EF could not be estimated, only \"probably moderately depressed\". Mar 2014 - MUGA - EF 33%  Mar 2015 - even with contrast, difficult to see, EF estimated 45-50%   Oct 2016 - EF 43%, aortic root 3.8  Sep 2017 - 50-55% with distal apical dyskinesis, mild to moderate MR, AI, and PI  Oct 2018 - 54% with apical wma, impaired relaxation, no significant valvular regurgitation  Oct 2019- Echo EF normal, cannot assess regional wma d/t poor endocardial border definition, declined contrast.  No TR envelope to assess RVSP. Abnormal DF  Jan 2021- vasodilator perfusion study inferior fixed defect, no ischemia, EF 49%                   Plan: This is a 26-year-old male with    Acute metabolic encephalopathy multifactorial  Likely from COVID-19 infection. CT head on admission negative. Urine analysis negative. Chest x-ray with findings concerning for COVID-19 infection. COVID-19 infection  No signs of hypoxia. Hold off on steroids. Acute kidney injury on chronic kidney disease stage III  Creatinine 1.6. Baseline 0  Creatinine 1.3-1.4. Gentle hydration with IV fluids. Avoid nephrotoxic medications. Insulin-dependent diabetes mellitus type 2  Lantus, sliding scale insulin    Hypertension  Resume home medications. Chronic systolic congestive heart failure  Not in acute exacerbation. Hold Lasix due to acute kidney injury.     GERD  PPI    Discharge planning: Medical, inpatient    DVT ppx: Heparin subcu    Code status:  Full    DPOA patient's wife Ms. Church phone number [phone]    Estimated LOS:  Greater than 2 midnights    Risk:  high    Signed:  Amina Ayala MD

## 2021-02-10 LAB
ALBUMIN SERPL-MCNC: 2.8 G/DL (ref 3.2–4.6)
ALBUMIN/GLOB SERPL: 0.6 {RATIO} (ref 1.2–3.5)
ALP SERPL-CCNC: 94 U/L (ref 50–136)
ALT SERPL-CCNC: 67 U/L (ref 12–65)
ANION GAP SERPL CALC-SCNC: 9 MMOL/L (ref 7–16)
APTT PPP: 28 SEC (ref 24.1–35.1)
AST SERPL-CCNC: 49 U/L (ref 15–37)
BASOPHILS # BLD: 0 K/UL (ref 0–0.2)
BASOPHILS NFR BLD: 0 % (ref 0–2)
BILIRUB SERPL-MCNC: 0.8 MG/DL (ref 0.2–1.1)
BUN SERPL-MCNC: 53 MG/DL (ref 8–23)
CALCIUM SERPL-MCNC: 9 MG/DL (ref 8.3–10.4)
CHLORIDE SERPL-SCNC: 112 MMOL/L (ref 98–107)
CO2 SERPL-SCNC: 21 MMOL/L (ref 21–32)
CREAT SERPL-MCNC: 1.38 MG/DL (ref 0.8–1.5)
CRP SERPL-MCNC: 5.4 MG/DL (ref 0–0.9)
D DIMER PPP FEU-MCNC: 2.07 UG/ML(FEU)
DIFFERENTIAL METHOD BLD: ABNORMAL
EOSINOPHIL # BLD: 0.1 K/UL (ref 0–0.8)
EOSINOPHIL NFR BLD: 1 % (ref 0.5–7.8)
ERYTHROCYTE [DISTWIDTH] IN BLOOD BY AUTOMATED COUNT: 13.5 % (ref 11.9–14.6)
FERRITIN SERPL-MCNC: 291 NG/ML (ref 8–388)
FIBRINOGEN PPP-MCNC: 733 MG/DL (ref 190–501)
GLOBULIN SER CALC-MCNC: 4.7 G/DL (ref 2.3–3.5)
GLUCOSE BLD STRIP.AUTO-MCNC: 169 MG/DL (ref 65–100)
GLUCOSE BLD STRIP.AUTO-MCNC: 190 MG/DL (ref 65–100)
GLUCOSE BLD STRIP.AUTO-MCNC: 196 MG/DL (ref 65–100)
GLUCOSE BLD STRIP.AUTO-MCNC: 206 MG/DL (ref 65–100)
GLUCOSE SERPL-MCNC: 191 MG/DL (ref 65–100)
HCT VFR BLD AUTO: 42.7 % (ref 41.1–50.3)
HGB BLD-MCNC: 13.1 G/DL (ref 13.6–17.2)
IMM GRANULOCYTES # BLD AUTO: 0 K/UL (ref 0–0.5)
IMM GRANULOCYTES NFR BLD AUTO: 0 % (ref 0–5)
INR PPP: 1.2
LDH SERPL L TO P-CCNC: 264 U/L (ref 110–210)
LYMPHOCYTES # BLD: 1.2 K/UL (ref 0.5–4.6)
LYMPHOCYTES NFR BLD: 14 % (ref 13–44)
MCH RBC QN AUTO: 27.1 PG (ref 26.1–32.9)
MCHC RBC AUTO-ENTMCNC: 30.7 G/DL (ref 31.4–35)
MCV RBC AUTO: 88.4 FL (ref 79.6–97.8)
MONOCYTES # BLD: 1 K/UL (ref 0.1–1.3)
MONOCYTES NFR BLD: 12 % (ref 4–12)
NEUTS SEG # BLD: 6 K/UL (ref 1.7–8.2)
NEUTS SEG NFR BLD: 73 % (ref 43–78)
NRBC # BLD: 0 K/UL (ref 0–0.2)
PLATELET # BLD AUTO: 289 K/UL (ref 150–450)
PMV BLD AUTO: 10.2 FL (ref 9.4–12.3)
POTASSIUM SERPL-SCNC: 4.3 MMOL/L (ref 3.5–5.1)
PROCALCITONIN SERPL-MCNC: 0.09 NG/ML
PROT SERPL-MCNC: 7.5 G/DL (ref 6.3–8.2)
PROTHROMBIN TIME: 15.6 SEC (ref 12.5–14.7)
RBC # BLD AUTO: 4.83 M/UL (ref 4.23–5.6)
SODIUM SERPL-SCNC: 142 MMOL/L (ref 136–145)
WBC # BLD AUTO: 8.3 K/UL (ref 4.3–11.1)

## 2021-02-10 PROCEDURE — 82728 ASSAY OF FERRITIN: CPT

## 2021-02-10 PROCEDURE — 84145 PROCALCITONIN (PCT): CPT

## 2021-02-10 PROCEDURE — 85379 FIBRIN DEGRADATION QUANT: CPT

## 2021-02-10 PROCEDURE — 83615 LACTATE (LD) (LDH) ENZYME: CPT

## 2021-02-10 PROCEDURE — 85384 FIBRINOGEN ACTIVITY: CPT

## 2021-02-10 PROCEDURE — 85025 COMPLETE CBC W/AUTO DIFF WBC: CPT

## 2021-02-10 PROCEDURE — 82962 GLUCOSE BLOOD TEST: CPT

## 2021-02-10 PROCEDURE — 74011636637 HC RX REV CODE- 636/637: Performed by: HOSPITALIST

## 2021-02-10 PROCEDURE — 2709999900 HC NON-CHARGEABLE SUPPLY

## 2021-02-10 PROCEDURE — 86140 C-REACTIVE PROTEIN: CPT

## 2021-02-10 PROCEDURE — 97530 THERAPEUTIC ACTIVITIES: CPT | Performed by: PHYSICAL THERAPIST

## 2021-02-10 PROCEDURE — 74011250637 HC RX REV CODE- 250/637: Performed by: HOSPITALIST

## 2021-02-10 PROCEDURE — 97166 OT EVAL MOD COMPLEX 45 MIN: CPT

## 2021-02-10 PROCEDURE — 74011250636 HC RX REV CODE- 250/636: Performed by: HOSPITALIST

## 2021-02-10 PROCEDURE — 36415 COLL VENOUS BLD VENIPUNCTURE: CPT

## 2021-02-10 PROCEDURE — 85730 THROMBOPLASTIN TIME PARTIAL: CPT

## 2021-02-10 PROCEDURE — 80053 COMPREHEN METABOLIC PANEL: CPT

## 2021-02-10 PROCEDURE — 97535 SELF CARE MNGMENT TRAINING: CPT

## 2021-02-10 PROCEDURE — 85610 PROTHROMBIN TIME: CPT

## 2021-02-10 PROCEDURE — 65270000029 HC RM PRIVATE

## 2021-02-10 PROCEDURE — 97161 PT EVAL LOW COMPLEX 20 MIN: CPT | Performed by: PHYSICAL THERAPIST

## 2021-02-10 RX ADMIN — GABAPENTIN 600 MG: 300 CAPSULE ORAL at 21:00

## 2021-02-10 RX ADMIN — ZINC SULFATE 220 MG (50 MG) CAPSULE 1 CAPSULE: CAPSULE at 09:27

## 2021-02-10 RX ADMIN — MELATONIN 2000 UNITS: at 09:27

## 2021-02-10 RX ADMIN — Medication 1000 MG: at 09:27

## 2021-02-10 RX ADMIN — B-COMPLEX W/ C & FOLIC ACID TAB 1 TABLET: TAB at 09:27

## 2021-02-10 RX ADMIN — RDII 250 MG CAPSULE 250 MG: at 17:17

## 2021-02-10 RX ADMIN — PANTOPRAZOLE SODIUM 40 MG: 40 TABLET, DELAYED RELEASE ORAL at 07:19

## 2021-02-10 RX ADMIN — Medication 10 ML: at 06:08

## 2021-02-10 RX ADMIN — HEPARIN SODIUM 5000 UNITS: 5000 INJECTION INTRAVENOUS; SUBCUTANEOUS at 06:07

## 2021-02-10 RX ADMIN — INSULIN GLARGINE 20 UNITS: 100 INJECTION, SOLUTION SUBCUTANEOUS at 09:27

## 2021-02-10 RX ADMIN — GABAPENTIN 300 MG: 300 CAPSULE ORAL at 09:27

## 2021-02-10 RX ADMIN — INSULIN LISPRO 2 UNITS: 100 INJECTION, SOLUTION INTRAVENOUS; SUBCUTANEOUS at 07:30

## 2021-02-10 RX ADMIN — Medication 10 ML: at 22:00

## 2021-02-10 RX ADMIN — HEPARIN SODIUM 5000 UNITS: 5000 INJECTION INTRAVENOUS; SUBCUTANEOUS at 21:00

## 2021-02-10 RX ADMIN — Medication 10 ML: at 13:38

## 2021-02-10 RX ADMIN — CYANOCOBALAMIN TAB 1000 MCG 1000 MCG: 1000 TAB at 09:27

## 2021-02-10 RX ADMIN — AMITRIPTYLINE HYDROCHLORIDE 20 MG: 10 TABLET, FILM COATED ORAL at 21:00

## 2021-02-10 RX ADMIN — INSULIN LISPRO 2 UNITS: 100 INJECTION, SOLUTION INTRAVENOUS; SUBCUTANEOUS at 11:51

## 2021-02-10 RX ADMIN — INSULIN LISPRO 4 UNITS: 100 INJECTION, SOLUTION INTRAVENOUS; SUBCUTANEOUS at 21:00

## 2021-02-10 RX ADMIN — CARVEDILOL 25 MG: 25 TABLET, FILM COATED ORAL at 07:30

## 2021-02-10 RX ADMIN — RDII 250 MG CAPSULE 250 MG: at 09:27

## 2021-02-10 RX ADMIN — ROSUVASTATIN 10 MG: 10 TABLET, FILM COATED ORAL at 21:01

## 2021-02-10 RX ADMIN — HEPARIN SODIUM 5000 UNITS: 5000 INJECTION INTRAVENOUS; SUBCUTANEOUS at 13:37

## 2021-02-10 RX ADMIN — ASPIRIN 81 MG 81 MG: 81 TABLET ORAL at 07:19

## 2021-02-10 RX ADMIN — CARVEDILOL 25 MG: 25 TABLET, FILM COATED ORAL at 17:17

## 2021-02-10 RX ADMIN — INSULIN LISPRO 2 UNITS: 100 INJECTION, SOLUTION INTRAVENOUS; SUBCUTANEOUS at 17:01

## 2021-02-10 NOTE — PROGRESS NOTES
Progress Note    Patient: Jhon Morgan MRN: [id number]  SSN: [id number]    YOB: 1945  Age: 77 y.o. Sex: male      Admit Date: 2/9/2021    LOS: 1 day     Subjective:   78 y/o M with PMH of HTN, CKD III, DM, GERD, HFrEF, that presented in the setting of altered mental status. Patient seen and examined at bedside. This morning is awake but still mildly confused. Denies chest pain, no abdominal pain, no nausea or vomiting. Objective:     Vitals:    02/10/21 0422 02/10/21 0726 02/10/21 0730 02/10/21 1058   BP: (!) 140/84 (!) 126/91 (!) 126/91 116/70   Pulse: 90 86 86 79   Resp: 20 18  18   Temp: 98.6 °F (37 °C) 97.7 °F (36.5 °C)  98 °F (36.7 °C)   SpO2: 95% 97%  99%   Weight:       Height:            Intake and Output:  Current Shift: 02/10 0701 - 02/10 1900  In: 400 [P.O.:400]  Out: -   Last three shifts: No intake/output data recorded.     ROS  10 ROS negative except from stated on subjective    Physical Exam:   General: Alert, confused, NAD  HEENT: NC/AT, EOM are intact  Neck: supple, no JVD  Cardiovascular: RRR, S1, S2, no murmurs  Respiratory: Lungs are clear, no wheezes or rales  Abdomen: Soft, NT, ND  Back: No CVA tenderness, no paraspinal tenderness  Extremities: LE without pedal edema, no erythema  Neuro: CN are intact, no focal deficits  Skin: no rash or ulcers  Psych: good mood and affect    Lab/Data Review:  I have personally reviewed patients laboratory data showing  Recent Results (from the past 24 hour(s))   GLUCOSE, POC    Collection Time: 02/09/21  4:26 PM   Result Value Ref Range    Glucose (POC) 196 (H) 65 - 100 mg/dL   TYPE & SCREEN    Collection Time: 02/09/21  5:35 PM   Result Value Ref Range    Crossmatch Expiration 02/12/2021,2359     ABO/Rh(D) Lorrie Cogan POSITIVE     Antibody screen NEG    GLUCOSE, POC    Collection Time: 02/09/21  9:22 PM   Result Value Ref Range    Glucose (POC) 189 (H) 65 - 100 mg/dL   GLUCOSE, POC    Collection Time: 02/10/21  7:28 AM   Result Value Ref Range    Glucose (POC) 169 (H) 65 - 100 mg/dL   GLUCOSE, POC    Collection Time: 02/10/21 11:00 AM   Result Value Ref Range    Glucose (POC) 196 (H) 65 - 100 mg/dL   CBC WITH AUTOMATED DIFF    Collection Time: 02/10/21 12:35 PM   Result Value Ref Range    WBC 8.3 4.3 - 11.1 K/uL    RBC 4.83 4.23 - 5.6 M/uL    HGB 13.1 (L) 13.6 - 17.2 g/dL    HCT 42.7 41.1 - 50.3 %    MCV 88.4 79.6 - 97.8 FL    MCH 27.1 26.1 - 32.9 PG    MCHC 30.7 (L) 31.4 - 35.0 g/dL    RDW 13.5 11.9 - 14.6 %    PLATELET 483 956 - 103 K/uL    MPV 10.2 9.4 - 12.3 FL    ABSOLUTE NRBC 0.00 0.0 - 0.2 K/uL    DF AUTOMATED      NEUTROPHILS 73 43 - 78 %    LYMPHOCYTES 14 13 - 44 %    MONOCYTES 12 4.0 - 12.0 %    EOSINOPHILS 1 0.5 - 7.8 %    BASOPHILS 0 0.0 - 2.0 %    IMMATURE GRANULOCYTES 0 0.0 - 5.0 %    ABS. NEUTROPHILS 6.0 1.7 - 8.2 K/UL    ABS. LYMPHOCYTES 1.2 0.5 - 4.6 K/UL    ABS. MONOCYTES 1.0 0.1 - 1.3 K/UL    ABS. EOSINOPHILS 0.1 0.0 - 0.8 K/UL    ABS. BASOPHILS 0.0 0.0 - 0.2 K/UL    ABS. IMM. GRANS. 0.0 0.0 - 0.5 K/UL   METABOLIC PANEL, COMPREHENSIVE    Collection Time: 02/10/21 12:35 PM   Result Value Ref Range    Sodium 142 136 - 145 mmol/L    Potassium 4.3 3.5 - 5.1 mmol/L    Chloride 112 (H) 98 - 107 mmol/L    CO2 21 21 - 32 mmol/L    Anion gap 9 7 - 16 mmol/L    Glucose 191 (H) 65 - 100 mg/dL    BUN 53 (H) 8 - 23 MG/DL    Creatinine 1.38 0.8 - 1.5 MG/DL    GFR est AA >60 >60 ml/min/1.73m2    GFR est non-AA 54 (L) >60 ml/min/1.73m2    Calcium 9.0 8.3 - 10.4 MG/DL    Bilirubin, total 0.8 0.2 - 1.1 MG/DL    ALT (SGPT) 67 (H) 12 - 65 U/L    AST (SGOT) 49 (H) 15 - 37 U/L    Alk.  phosphatase 94 50 - 136 U/L    Protein, total 7.5 6.3 - 8.2 g/dL    Albumin 2.8 (L) 3.2 - 4.6 g/dL    Globulin 4.7 (H) 2.3 - 3.5 g/dL    A-G Ratio 0.6 (L) 1.2 - 3.5     PROTHROMBIN TIME + INR    Collection Time: 02/10/21 12:35 PM   Result Value Ref Range    Prothrombin time 15.6 (H) 12.5 - 14.7 sec    INR 1.2     PTT    Collection Time: 02/10/21 12:35 PM   Result Value Ref Range    aPTT 28.0 24.1 - 35.1 SEC   FIBRINOGEN    Collection Time: 02/10/21 12:35 PM   Result Value Ref Range    Fibrinogen 733 (H) 190 - 501 mg/dL   PROCALCITONIN    Collection Time: 02/10/21 12:35 PM   Result Value Ref Range    Procalcitonin 0.09 ng/mL   C REACTIVE PROTEIN, QT    Collection Time: 02/10/21 12:35 PM   Result Value Ref Range    C-Reactive protein 5.4 (H) 0.0 - 0.9 mg/dL   LD    Collection Time: 02/10/21 12:35 PM   Result Value Ref Range     (H) 110 - 210 U/L   FERRITIN    Collection Time: 02/10/21 12:35 PM   Result Value Ref Range    Ferritin 291 8 - 388 NG/ML   D DIMER    Collection Time: 02/10/21 12:35 PM   Result Value Ref Range    D DIMER 2.07 (H) <0.56 ug/ml(FEU)        Image:  I have personally reviewed patients imaging showing  CT HEAD WO CONT   Final Result   1. No acute intracranial abnormality. 2. Increased sinus disease findings now at the sphenoid sinus. XR CHEST PORT   Final Result   There are now patchy lung opacities bilaterally, right greater than   left, regressed from before. This is a nonspecific pattern but with a history   probably represents multifocal Covid 19 viral pneumonitis changes. Hospital problems     Principal Problem:    Acute metabolic encephalopathy (4/2/1184)    Active Problems:    Chronic systolic CHF (congestive heart failure) ([facility] [address] (6/25/2014)      Overview: EF 20% by echo September 2011      Echo October 2012: EF improved to 35-40%, no significant valve disease      Jan 2014: images so poor even with Definity contrast an EF could not be       estimated, only \"probably moderately depressed\".         Mar 2014 - [facility] - EF 33%      Mar 2015 - even with contrast, difficult to see, EF estimated 45-50%       Oct 2016 - EF 43%, aortic root 3.8      Sep 2017 - 50-55% with distal apical dyskinesis, mild to moderate MR, AI,       and PI      Oct 2018 - 54% with apical wma, impaired relaxation, no significant valvular regurgitation      Oct 2019- Echo EF normal, cannot assess regional wma d/t poor endocardial       border definition, declined contrast.  No TR envelope to assess RVSP. Abnormal DF      Jan 2021- vasodilator perfusion study inferior fixed defect, no ischemia,       EF 49%      Gastroesophageal reflux disease without esophagitis (6/9/2020)      Class 2 severe obesity with serious comorbidity and body mass index (BMI) of 39.0 to 39.9 in adult [facility]) (6/9/2020)      Stage 3 chronic kidney disease (6/9/2020)      Type 2 diabetes mellitus with hyperglycemia, with long-term current use of insulin ([facility] [address] (9/23/2020)      DANIELLA (acute kidney injury) ([facility] [address] (2/9/2021)      COVID-19 (2/9/2021)        Assessment and Plan:   76 y/o M with PMH of HTN, CKD III, DM, GERD, HFrEF, that presented in the setting of altered mental status. 1. Acute metabolic encephalopathy likely in the setting of covid infection  -CT head without acute intracranial abnormality   -UA negative for UTI  -Avoid sedatives  -Monitor mental status  -Delirium precautions      2. COVID-19 infection with mild bibasilar infiltrates  -Currently not hypoxic   -Hold off on steroids  -Vitamin C, vitamin D, zinc  -Not candidate for remdesivir or plasma since not hypoxic   -Symptomatic management  -Ammonia, B12     3. CKD III, mild azotemia  -S/p gentle hydration with IV fluid hydration  -Avoid nephrotoxic agents  -Monitor renal function      4. Insulin-dependent diabetes mellitus type 2  -Lantus, sliding scale insulin  -BS ACHS     5. Hypertension  Continue carvedilol     6. HFrEF  -Not on acute exacerbation  -Holding lasix due to acute kidney injury     7. GERD  -Continue PPI     Dispo: TBD     DVT ppx: Heparin sq    I have reviewed, updated, and verified this note's content and spent 38 minutes of my 42 minutes visit performing counseling and coordination of care regarding medical management.       Signed By: Yarelis Vann MD     February 10, 2021

## 2021-02-10 NOTE — PROGRESS NOTES
ACUTE OT GOALS:  (Developed with and agreed upon by patient and/or caregiver.)  1) Patient will complete lower body bathing and dressing with setup and adaptive equipment as needed. 2) Patient will complete toileting with supervision. 3) Patient will complete functional transfers with supervision and adaptive equipment as needed. 4) Patient will tolerate at least 30 minutes of OT activity with as needed rest breaks while maintaining O2 sats >90%. 5) Patient will verbalize at least 3 energy conservation technique to utilize during ADL/IADL. Timeframe: 7 visits       OCCUPATIONAL THERAPY ASSESSMENT: Initial Assessment and Daily Note OT Treatment Day # 1    Scout Sepulveda is a 77 y.o. male   PRIMARY DIAGNOSIS: Acute metabolic encephalopathy  Acute metabolic encephalopathy [R74.25]       Reason for Referral:  AMS; generalized weakness secondary to COVID-19  ICD-10: Treatment Diagnosis: Dizziness and Giddiness (R42)  [place]PAT[place]: [place] / Plan[place] PART [place] B / Product Type: Medicare /   ASSESSMENT:     REHAB RECOMMENDATIONS:   Recommendation to date pending progress:  Setting:   Short-term Rehab  Equipment:    To Be Determined     PRIOR LEVEL OF FUNCTION:  (Prior to Hospitalization)  INITIAL/CURRENT LEVEL OF FUNCTION:  (Based on today's evaluation)   Bathing:   Independent  Dressing:   Independent  Feeding/Grooming:   Independent  Toileting:   Independent  Functional Mobility:   Independent     (PER CHART REVIEW)  Bathing:   Maximal Assistance  Dressing:   Maximal Assistance  Feeding/Grooming:   Minimal Assistance  Toileting:   Moderate Assistance  Functional Mobility:   Minimal Assistance     ASSESSMENT:  Mr. Norris Grossman is a 77year old male admitted with increased confusion and fatigue related to COVID-19. Relevant medical history: DM, obesity. Lives with wife & is independent at baseline, per chart. Today, confused and disoriented to place, situation, time.  Participated in ADL with maximal verbal cues to stay on task. Requires minimal assistance x2 for mobility. Will follow for acute OT. SUBJECTIVE:   Mr. Isabel Loaiza states, \"I'm here because I'm crazy. \"    SOCIAL HISTORY/LIVING ENVIRONMENT: Per chart, lives with wife and was independent with ADLs. Patient too confused to provide accurate history.         OBJECTIVE:     PAIN: VITAL SIGNS: LINES/DRAINS:   Pre Treatment: Pain Screen  Pain Scale 1: Numeric (0 - 10)  Pain Intensity 1: 0  Post Treatment: 0 Vital Signs  O2 Device: Room air IV  O2 Device: Room air     GROSS EVALUATION:  BUE Within Functional Limits Abnormal/ Functional Abnormal/ Non-Functional (see comments) Not Tested Comments:   AROM [x] [] [] []    PROM [] [] [] [x]    Strength [x] [] [] []    Balance [] [] [x] [] Poor dynamic standing    Posture [] [] [] [x]    Sensation [] [] [] [x]    Coordination [] [] [] [x]    Tone [] [] [] [x]    Edema [] [] [] [x]    Activity Tolerance [] [x] [] []     [] [] [] []      COGNITION/  PERCEPTION: Intact Impaired   (see comments) Comments:   Orientation [] [x] Oriented to person only; disoriented to place (\"[place]\"), time (\"2001\"), and situation    Vision [] []    Hearing [] []    Judgment/ Insight [] [x]    Attention [] [x]    Memory [] [x]    Command Following [] [x]    Emotional Regulation [] []     [] []      ACTIVITIES OF DAILY LIVING: I Mod I S SBA CGA Min Mod Max Total NT Comments   BASIC ADLs:              Bathing/ Showering [] [] [] [] [] [] [] [x] [] [] LB brief    Toileting [] [] [] [] [] [x] [] [] [] []    Dressing [] [] [] [] [] [] [] [x] [] [] Slip on slippers    Feeding [] [] [x] [] [] [] [] [] [] [] drink   Grooming [] [] [] [] [] [] [] [] [] [x]    Personal Device Care [] [] [] [] [] [] [] [] [] [x]    Functional Mobility [] [] [] [] [] [x] [] [] [] []    I=Independent, Mod I=Modified Independent, S=Supervision, SBA=Standby Assistance, CGA=Contact Guard Assistance,   Min=Minimal Assistance, Mod=Moderate Assistance, Max=Maximal Assistance, Total=Total Assistance, NT=Not Tested    MOBILITY: I Mod I S SBA CGA Min Mod Max Total  NT x2 Comments:   Supine to sit [] [] [] [] [] [] [x] [] [] [] [x] Max verbal cues    Sit to supine [] [] [] [] [] [] [x] [] [] [] [x]    Sit to stand [] [] [] [] [] [x] [] [] [] [] [x]    Bed to chair [] [] [] [] [] [] [] [] [] [x] []    I=Independent, Mod I=Modified Independent, S=Supervision, SBA=Standby Assistance, CGA=Contact Guard Assistance,   Min=Minimal Assistance, Mod=Moderate Assistance, Max=Maximal Assistance, Total=Total Assistance, NT=Not [employer] 6 Clicks   Daily Activity Inpatient Short Form        How much help from another person does the patient currently need. .. Total A Lot A Little None   1. Putting on and taking off regular lower body clothing? [] 1   [x] 2   [] 3   [] 4   2. Bathing (including washing, rinsing, drying)? [] 1   [x] 2   [] 3   [] 4   3. Toileting, which includes using toilet, bedpan or urinal?   [] 1   [x] 2   [] 3   [] 4   4. Putting on and taking off regular upper body clothing? [] 1   [x] 2   [] 3   [] 4   5. Taking care of personal grooming such as brushing teeth? [] 1   [x] 2   [] 3   [] 4   6. Eating meals? [] 1   [] 2   [x] 3   [] 4   © 2007, Trustees of [address] [place] [address] [address] [id number], under license to [employer]. All rights reserved     Score:  Initial: 13 Most Recent: X (Date: -- )   Interpretation of Tool:  Represents activities that are increasingly more difficult (i.e. Bed mobility, Transfers, Gait). PLAN:   FREQUENCY/DURATION: OT Plan of Care: 3 times/week for duration of hospital stay or until stated goals are met, whichever comes first.    PROBLEM LIST:   (Skilled intervention is medically necessary to address:)  1. Decreased ADL/Functional Activities  2. Decreased Activity Tolerance  3. Decreased Balance  4. Decreased Cognition  5.  Decreased Transfer Abilities   INTERVENTIONS PLANNED:   (Benefits and precautions of occupational therapy have been discussed with the patient.)  1. Self Care Training  2. Therapeutic Activity  3. Therapeutic Exercise/HEP  4. Neuromuscular Re-education  5. Education     TREATMENT:     EVALUATION: Moderate Complexity : (Untimed Charge)    TREATMENT:   ($$ Self Care/Home Management: 23-37 mins    )  Co-Treatment PT/OT necessary due to patient's decreased overall endurance/tolerance levels, as well as need for high level skilled assistance to complete functional transfers/mobility and functional tasks  Self Care (24 Minutes): Self care including Toileting, Lower Body Dressing and Self Feeding to increase independence and attention to task .     AFTER TREATMENT POSITION/PRECAUTIONS:  Alarm Activated, Bed, Needs within reach and RN notified    INTERDISCIPLINARY COLLABORATION:  RN/PCT, PT/PTA and OT/ELIZONDO    TOTAL TREATMENT DURATION:  OT Patient Time In/Time Out  Time In: 1020  Time Out: [address], OTR/L

## 2021-02-10 NOTE — ACP (ADVANCE CARE PLANNING)
Advance Care Planning     General Advance Care Planning (ACP) Conversation      Date of Conversation: 2/9/2021  Conducted with: Patient with Decision Making Capacity and Healthcare Decision Maker: Named in Advance Directive or Healthcare Power of  Shirley Luz:     Click here to complete [address] including selection of the Healthcare Decision Maker Relationship (ie \"Primary\")  Today we documented Decision Maker(s). The patient will provide ACP documents. Content/Action Overview: Has ACP document(s) NOT on file - requested patient to provide  Reviewed DNR/DNI and patient elects Full Code (Attempt Resuscitation)  Topics discussed: ventilation preferences and resuscitation preferences  Additional Comments: CM requested a copy of POA.      Length of Voluntary ACP Conversation in minutes:  16 minutes    Shanika Murray

## 2021-02-10 NOTE — DISCHARGE INSTRUCTIONS
DISCHARGE SUMMARY from Nurse    PATIENT INSTRUCTIONS:    After general anesthesia or intravenous sedation, for 24 hours or while taking prescription Narcotics:  · Limit your activities  · Do not drive and operate hazardous machinery  · Do not make important personal or business decisions  · Do  not drink alcoholic beverages  · If you have not urinated within 8 hours after discharge, please contact your surgeon on call. What to do at Home:  Recommended activity: Activity as tolerated. If you experience any of the following symptoms worsening cough or wheezing, shortness of breath or fatigue not relieved with rest, unrelieved pain, nausea or vomiting please follow up with MD.    *  Please give a list of your current medications to your Primary Care Provider. *  Please update this list whenever your medications are discontinued, doses are      changed, or new medications (including over-the-counter products) are added. *  Please carry medication information at all times in case of emergency situations. These are general instructions for a healthy lifestyle:    No smoking/ No tobacco products/ Avoid exposure to second hand smoke  Surgeon General's Warning:  Quitting smoking now greatly reduces serious risk to your health. Obesity, smoking, and sedentary lifestyle greatly increases your risk for illness    A healthy diet, regular physical exercise & weight monitoring are important for maintaining a healthy lifestyle    You may be retaining fluid if you have a history of heart failure or if you experience any of the following symptoms:  Weight gain of 3 pounds or more overnight or 5 pounds in a week, increased swelling in our hands or feet or shortness of breath while lying flat in bed. Please call your doctor as soon as you notice any of these symptoms; do not wait until your next office visit. The discharge information has been reviewed with the patient caregiver.   The patient caregiver verbalized understanding. Discharge medications reviewed with the patient caregiver and appropriate educational materials and side effects teaching were provided. Advance Care Planning  People with COVID-19 may have no symptoms, mild symptoms, such as fever, cough, and shortness of breath or they may have more severe illness, developing severe and fatal pneumonia. As a result, Advance Care Planning with attention to naming a health care decision maker (someone you trust to make healthcare decisions for you if you could not speak for yourself) and sharing other health care preferences is important BEFORE a possible health crisis. Please contact your Primary Care Provider to discuss Advance Care Planning. Preventing the Spread of Coronavirus Disease 2019 in Homes and Residential Communities  For the most recent information go to [email or web address]    Prevention steps for People with confirmed or suspected COVID-19 (including persons under investigation) who do not need to be hospitalized  and   People with confirmed COVID-19 who were hospitalized and determined to be medically stable to go home    Your healthcare provider and public health staff will evaluate whether you can be cared for at home. If it is determined that you do not need to be hospitalized and can be isolated at home, you will be monitored by staff from your local or state health department. You should follow the prevention steps below until a healthcare provider or local or state health department says you can return to your normal activities. Stay home except to get medical care  People who are mildly ill with COVID-19 are able to isolate at home during their illness. You should restrict activities outside your home, except for getting medical care. Do not go to work, school, or public areas. Avoid using public transportation, ride-sharing, or taxis.   Separate yourself from other people and animals in your home  People: As much as possible, you should stay in a specific room and away from other people in your home. Also, you should use a separate bathroom, if available. Animals: You should restrict contact with pets and other animals while you are sick with COVID-19, just like you would around other people. Although there have not been reports of pets or other animals becoming sick with COVID-19, it is still recommended that people sick with COVID-19 limit contact with animals until more information is known about the virus. When possible, have another member of your household care for your animals while you are sick. If you are sick with COVID-19, avoid contact with your pet, including petting, snuggling, being kissed or licked, and sharing food. If you must care for your pet or be around animals while you are sick, wash your hands before and after you interact with pets and wear a facemask. Call ahead before visiting your doctor  If you have a medical appointment, call the healthcare provider and tell them that you have or may have COVID-19. This will help the healthcare providers office take steps to keep other people from getting infected or exposed. Wear a facemask  You should wear a facemask when you are around other people (e.g., sharing a room or vehicle) or pets and before you enter a healthcare providers office. If you are not able to wear a facemask (for example, because it causes trouble breathing), then people who live with you should not stay in the same room with you, or they should wear a facemask if they enter your room. Cover your coughs and sneezes  Cover your mouth and nose with a tissue when you cough or sneeze. Throw used tissues in a lined trash can. Immediately wash your hands with soap and water for at least 20 seconds or, if soap and water are not available, clean your hands with an alcohol-based hand  that contains at least 60% alcohol.   Clean your hands often  Wash your hands often with soap and water for at least 20 seconds, especially after blowing your nose, coughing, or sneezing; going to the bathroom; and before eating or preparing food. If soap and water are not readily available, use an alcohol-based hand  with at least 60% alcohol, covering all surfaces of your hands and rubbing them together until they feel dry. Soap and water are the best option if hands are visibly dirty. Avoid touching your eyes, nose, and mouth with unwashed hands. Avoid sharing personal household items  You should not share dishes, drinking glasses, cups, eating utensils, towels, or bedding with other people or pets in your home. After using these items, they should be washed thoroughly with soap and water. Clean all high-touch surfaces everyday  High touch surfaces include counters, tabletops, doorknobs, bathroom fixtures, toilets, phones, keyboards, tablets, and bedside tables. Also, clean any surfaces that may have blood, stool, or body fluids on them. Use a household cleaning spray or wipe, according to the label instructions. Labels contain instructions for safe and effective use of the cleaning product including precautions you should take when applying the product, such as wearing gloves and making sure you have good ventilation during use of the product. Monitor your symptoms  Seek prompt medical attention if your illness is worsening (e.g., difficulty breathing). Before seeking care, call your healthcare provider and tell them that you have, or are being evaluated for, COVID-19. Put on a facemask before you enter the facility. These steps will help the healthcare providers office to keep other people in the office or waiting room from getting infected or exposed. Ask your healthcare provider to call the local or state health department.  Persons who are placed under active monitoring or facilitated self-monitoring should follow instructions provided by their local health department or occupational health professionals, as appropriate. When working with your local health department check their available hours. If you have a medical emergency and need to call 911, notify the dispatch personnel that you have, or are being evaluated for COVID-19. If possible, put on a facemask before emergency medical services arrive. Discontinuing home isolation  Patients with confirmed COVID-19 should remain under home isolation precautions until the risk of secondary transmission to others is thought to be low. The decision to discontinue home isolation precautions should be made on a case-by-case basis, in consultation with healthcare providers and [facility] and local health departments. _  Patient Education        Learning About Metabolic Encephalopathy  What is metabolic encephalopathy? Metabolic encephalopathy is a problem in the brain. It is caused by a chemical imbalance in the blood. The imbalance is caused by an illness or organs that are not working as well as they should. It is not caused by a head injury. When the imbalance affects the brain, it can lead to personality changes. It can also make it harder to think clearly and remember things. The problems may only last a short time if you get treatment right away. But this depends on the cause. If the imbalance has been building up because you've been sick for a long time, the mental changes may be more severe. They may also last longer. What happens when you have this problem? When things are working right, your body has many ways to keep the chemicals in your blood in balance. For example, your liver and kidneys remove waste from your blood. The kidneys also help keep fluids and sodium in balance. And your pancreas makes insulin. It is a hormone that helps control the amount of sugar in your blood.   But the chemicals in your blood can get out of balance and damage parts of your body because of a medical problem. This may be kidney or liver failure. Or it could be diabetes that isn't controlled well. When the imbalance affects the brain, normal thinking and behavior can change. What are the symptoms? Symptoms may include:  · Confusion. · Problems with thinking and remembering. · Being grouchy and depressed. · Feeling drowsy. · Not being able to sleep. · Passing out (fainting) now and then. How is it treated? The doctor will try to find the illness that's causing the problem. He or she may ask questions about your past health. The doctor will also do tests to find what is causing the chemical imbalance and to see how severe it is. The doctor may treat the organ system that's causing the problem. For example, if it's a kidney problem, you may have treatment to help your kidneys work better. If you have an infection, you may need antibiotics. If the doctor can't treat the cause of the problem, he or she will treat the symptoms. The doctor will carefully watch your blood chemicals to make sure that your treatment is being done safely. Follow-up care is a key part of your treatment and safety. Be sure to make and go to all appointments, and call your doctor if you are having problems. It's also a good idea to know your test results and keep a list of the medicines you take. Where can you learn more? Go to http://www.gray.com/  Enter R198 in the search box to learn more about \"Learning About Metabolic Encephalopathy. \"  Current as of: April 15, 2020               Content Version: 12.6  © 2006-2020 [employer]. Care instructions adapted under license by [employer] (which disclaims liability or warranty for this information). If you have questions about a medical condition or this instruction, always ask your healthcare professional. [place] [address] any warranty or liability for your use of this information.        Patient Education Acute Kidney Injury: Care Instructions  Your Care Instructions     Acute kidney injury (DANIELLA) is a sudden decrease in kidney function. This can happen over a period of hours, days or, in some cases, weeks. DANIELLA used to be called acute renal failure, but kidney failure doesn't always happen with DANIELLA. Common causes of DANIELLA are dehydration, blood loss, and medicines. When DANIELLA happens, the kidneys have trouble removing waste and excess fluids from the body. The waste and fluids build up and become harmful. Kidney function may return to normal if the cause of DANIELLA is treated quickly. Your chance of a full recovery depends on what caused the problem, how quickly the cause was treated, and what other medical problems you have. A machine may be used to help your kidneys remove waste and fluids for a short period of time. This is called dialysis. Follow-up care is a key part of your treatment and safety. Be sure to make and go to all appointments, and call your doctor if you are having problems. It's also a good idea to know your test results and keep a list of the medicines you take. How can you care for yourself at home? · Talk to your doctor about how much fluid you should drink. · Eat a balanced diet. Talk to your doctor or a dietitian about what type of diet may be best for you. You may need to limit sodium, potassium, and phosphorus. · If you need dialysis, follow the instructions and schedule for dialysis that your doctor gives you. · Do not smoke. Smoking can make your condition worse. If you need help quitting, talk to your doctor about stop-smoking programs and medicines. These can increase your chances of quitting for good. · Do not drink alcohol. · Review all of your medicines with your doctor. Do not take any medicines, including nonsteroidal anti-inflammatory drugs (NSAIDs) such as ibuprofen (Advil, Motrin) or naproxen (Aleve), unless your doctor says it is safe for you to do so.   · Make sure that anyone treating you for any health problem knows that you have had DANIELLA. When should you call for help? Call 911 anytime you think you may need emergency care. For example, call if:    · You passed out (lost consciousness). Call your doctor now or seek immediate medical care if:    · You have new or worse nausea and vomiting.     · You have much less urine than normal, or you have no urine.     · You are feeling confused or cannot think clearly.     · You have new or more blood in your urine.     · You have new swelling.     · You are dizzy or lightheaded, or feel like you may faint. Watch closely for changes in your health, and be sure to contact your doctor if:    · You do not get better as expected. Where can you learn more? Go to http://www.gray.com/  Enter T975 in the search box to learn more about \"Acute Kidney Injury: Care Instructions. \"  Current as of: April 15, 2020               Content Version: 12.6  © 2006-2020 [employer]. Care instructions adapted under license by [employer] (which disclaims liability or warranty for this information). If you have questions about a medical condition or this instruction, always ask your healthcare professional. [address] any warranty or liability for your use of this information.        __________________________________________________________________________________________________________________________________

## 2021-02-10 NOTE — PROGRESS NOTES
CM spoke with pt's spouse Ladan Otero [phone] to discuss d/c planning. Demographics verified. CM currently unable to converse with pt r/t Dx of Delirium and Acute Metabolic Encephalopathy. Pt resides with his spouse and [de-identified] y/o sister-in-law in a 2-story house with one step to enter. The second floor is a basement which has been turned into an apartment for pt's sister-in-law. Pt has the following home DME: rolling walker and grab bars in the bathroom. Prior to hospitalization pt was independent with ADLs and was not receiving any HH services. Pt currently on RA. He has insurance with pharmacy benefits. Per spouse and ED notes pt has had increased confusion over the past few days. He has been restless, agitated, unable to follow commands, and in the ED pulled out his IV lines, disrobed in his room, and kept trying to leave. PT and OT evals have been ordered but pt is not able yet to participate with his current mentation. Current d/c plan uncertain. No immediate needs identified. CM will continue to follow and remain available if any needs arise. Care Management Interventions  PCP Verified by CM: Yes(Ara Murray MD)  Mode of Transport at Discharge:  Other (see comment)(Family)  Transition of Care Consult (CM Consult): Discharge Planning  Physical Therapy Consult: Yes  Occupational Therapy Consult: Yes  Current Support Network: Lives with Spouse, Own Home  Confirm Follow Up Transport: Family  The Patient and/or Patient Representative was Provided with a Choice of Provider and Agrees with the Discharge Plan?: Yes  Freedom of Choice List was Provided with Basic Dialogue that Supports the Patient's Individualized Plan of Care/Goals, Treatment Preferences and Shares the Quality Data Associated with the Providers?: Yes   Resource Information Provided?: No  Discharge Location  Discharge Placement: Unable to determine at this time

## 2021-02-10 NOTE — PROGRESS NOTES
ACUTE PHYSICAL THERAPY GOALS:  (Developed with and agreed upon by patient and/or caregiver. )    LTG:  (1.)Mr. Erum Mariee will move from supine to sit and sit to supine , scoot up and down and roll side to side in bed with MINIMAL ASSIST within 7 treatment day(s). (2.)Mr. Erum Mariee will transfer from bed to chair and chair to bed with MINIMAL ASSIST using the least restrictive device within 7 treatment day(s). (3.)Mr. Erum Mariee will ambulate with MINIMAL ASSIST for 100 feet with the least restrictive device within 7 treatment day(s). (4.)Mr. Erum Mariee will tolerate at least 30 min of dynamic standing activity to assist standing ADLs with the least restrictive device within 7 treatment days.       ________________________________________________________________________________________________        PHYSICAL THERAPY ASSESSMENT: Initial Assessment, Daily Note and AM PT Treatment Day # 1      Sarah Earl is a 77 y.o. male   PRIMARY DIAGNOSIS: Acute metabolic encephalopathy  Acute metabolic encephalopathy [V90.66]       Reason for Referral:    ICD-10: Treatment Diagnosis: Generalized Muscle Weakness (M62.81)  Other lack of cordination (R27.8)  Difficulty in walking, Not elsewhere classified (R26.2)  Other abnormalities of gait and mobility (R26.89)  Unspecified Lack of Coordination (R27.9)  [place] / Product Type: Medicare /     ASSESSMENT:     REHAB RECOMMENDATIONS:   Recommendation to date pending progress:  Setting:   Short-term Rehab  Equipment:    To Be Determined     PRIOR LEVEL OF FUNCTION:  (Prior to Hospitalization) INITIAL/CURRENT LEVEL OF FUNCTION:  (Most Recently Demonstrated)   Bed Mobility:   Unknown  Sit to Stand:   Unknown  Transfers:   Unknown  Gait/Mobility:   Unknown Bed Mobility:   Moderate Assistance x 2  Sit to Stand:   Moderate Assistance x 2  Transfers:   Moderate Assistance x 2  Gait/Mobility:   min-mod Ax2     ASSESSMENT:  Mr. Erum Mariee presents in supine, A&Ox2. He requires frequent re-orientation and one-step commands throughout session. He is a poor historian. He moves to EOB slowly w/ mod Ax2, and requires postural cues from PT while OT cues shoes donning. He stands w/ mod Ax2 and ambulates slowly and unevenly w/ min-mod Ax2 (HHAx2) for 2 x 12' to bathroom and back. During ambulation, he requires heavy multi-modal safety cues. At end of session, he is supine in bed with all needs within reach, RN notified, alarm on.       SUBJECTIVE:   Mr. Donal Dodge states, \"I'm in [facility]"    SOCIAL HISTORY/LIVING ENVIRONMENT: Pnt is poor historian     OBJECTIVE:     PAIN: VITAL SIGNS: LINES/DRAINS:   Pre Treatment: Pain Screen  Pain Scale 1: Numeric (0 - 10)  Pain Intensity 1: 0  Post Treatment: 0   IV  O2 Device: Room air     GROSS EVALUATION:   Within Functional Limits Abnormal/ Functional Abnormal/ Non-Functional (see comments) Not Tested Comments:   AROM [x] [] [] []    PROM [x] [] [] []    Strength [] [x] [] []    Balance [] [] [x] [] poor   Posture [] [x] [] []    Sensation [] [x] [] []    Coordination [] [x] [] []    Tone [x] [] [] []    Edema [x] [] [] []    Activity Tolerance [] [] [x] [] Deconditioned     [] [] [] []      COGNITION/  PERCEPTION: Intact Impaired   (see comments) Comments:   Orientation [] [x] x2-3   Vision [x] []    Hearing [x] []    Command Following [] [x] impulsive   Safety Awareness [] [x]     [] []      MOBILITY: I Mod I S SBA CGA Min Mod Max Total  NT x2 Comments:   Bed Mobility    Rolling [] [] [] [] [] [] [x] [] [] [] [x]    Supine to Sit [] [] [] [] [] [] [x] [] [] [] [x]    Scooting [] [] [] [] [] [] [x] [] [] [] [x]    Sit to Supine [] [] [] [] [] [] [x] [] [] [] [x]    Transfers    Sit to Stand [] [] [] [] [] [] [x] [] [] [] [x]    Bed to Chair [] [] [] [] [] [] [] [] [] [x] []    Stand to Sit [] [] [] [] [] [] [x] [] [] [] [x]    I=Independent, Mod I=Modified Independent, S=Supervision, SBA=Standby Assistance, CGA=Contact Guard Assistance,   Min=Minimal Assistance, Mod=Moderate Assistance, Max=Maximal Assistance, Total=Total Assistance, NT=Not Tested  GAIT: I Mod I S SBA CGA Min Mod Max Total  NT x2 Comments:   Level of Assistance [] [] [] [] [] [x] [x] [] [] [] [x]    Distance 2 x 12'    DME Gait Belt and HHAx2    Gait Quality Uneven step length, trunk sway    Weightbearing Status N/A     I=Independent, Mod I=Modified Independent, S=Supervision, SBA=Standby Assistance, CGA=Contact Guard Assistance,   Min=Minimal Assistance, Mod=Moderate Assistance, Max=Maximal Assistance, Total=Total Assistance, NT=Not Tested    [place] Form       How much difficulty does the patient currently have. .. Unable A Lot A Little None   1. Turning over in bed (including adjusting bedclothes, sheets and blankets)? [] 1   [x] 2   [] 3   [] 4   2. Sitting down on and standing up from a chair with arms ( e.g., wheelchair, bedside commode, etc.)   [] 1   [x] 2   [] 3   [] 4   3. Moving from lying on back to sitting on the side of the bed? [] 1   [x] 2   [] 3   [] 4   How much help from another person does the patient currently need. .. Total A Lot A Little None   4. Moving to and from a bed to a chair (including a wheelchair)? [] 1   [x] 2   [] 3   [] 4   5. Need to walk in hospital room? [] 1   [x] 2   [] 3   [] 4   6. Climbing 3-5 steps with a railing? [] 1   [x] 2   [] 3   [] 4   © 2007, Trustees of [facility] [employer], under license to [employer]. All rights reserved     Score:  Initial: 12 Most Recent: X (Date: -- )    Interpretation of Tool:  Represents activities that are increasingly more difficult (i.e. Bed mobility, Transfers, Gait).     PLAN:   FREQUENCY/DURATION: PT Plan of Care: 3 times/week for duration of hospital stay or until stated goals are met, whichever comes first.    PROBLEM LIST:   (Skilled intervention is medically necessary to address:)  1. Decreased ADL/Functional Activities  2. Decreased Activity Tolerance  3. Decreased AROM/PROM  4. Decreased Balance  5. Decreased Cognition  6. Decreased Coordination  7. Decreased Gait Ability  8. Decreased Strength  9. Decreased Transfer Abilities   INTERVENTIONS PLANNED:   (Benefits and precautions of physical therapy have been discussed with the patient.)  1. Therapeutic Activity  2. Therapeutic Exercise/HEP  3. Neuromuscular Re-education  4. Gait Training  5. Manual Therapy  6. Education     TREATMENT:     EVALUATION: Low Complexity : (Untimed Charge)    TREATMENT:   ($$ Therapeutic Activity: 23-37 mins    )  Co-Treatment PT/OT necessary due to patient's decreased overall endurance/tolerance levels, as well as need for high level skilled assistance to complete functional transfers/mobility and functional tasks  Therapeutic Activity (24 Minutes): Therapeutic activity included Rolling, Supine to Sit, Sit to Supine, Scooting, Transfer Training, Ambulation on level ground, Sitting balance  and Standing balance to improve functional Mobility, Strength, ROM and Activity tolerance.     AFTER TREATMENT POSITION/PRECAUTIONS:  Alarm Activated, Bed, Needs within reach and RN notified    INTERDISCIPLINARY COLLABORATION:  RN/PCT and PT/PTA    TOTAL TREATMENT DURATION:  PT Patient Time In/Time Out  Time In: 1020  Time Out: [address]

## 2021-02-10 NOTE — PROGRESS NOTES
Problem: Falls - Risk of  Goal: *Absence of Falls  Description: Document Clydene Bone Fall Risk and appropriate interventions in the flowsheet. Outcome: Progressing Towards Goal  Note: Fall Risk Interventions:  Mobility Interventions: Bed/chair exit alarm    Mentation Interventions: Bed/chair exit alarm    Medication Interventions: Bed/chair exit alarm    Elimination Interventions: Bed/chair exit alarm, Call light in reach              Problem: Patient Education: Go to Patient Education Activity  Goal: Patient/Family Education  Outcome: Progressing Towards Goal     Problem: Pressure Injury - Risk of  Goal: *Prevention of pressure injury  Description: Document Benny Scale and appropriate interventions in the flowsheet.   Outcome: Progressing Towards Goal  Note: Pressure Injury Interventions:  Sensory Interventions: Assess changes in LOC, Discuss PT/OT consult with provider    Moisture Interventions: Absorbent underpads    Activity Interventions: PT/OT evaluation, Increase time out of bed    Mobility Interventions: PT/OT evaluation    Nutrition Interventions: Document food/fluid/supplement intake, Offer support with meals,snacks and hydration                     Problem: Patient Education: Go to Patient Education Activity  Goal: Patient/Family Education  Outcome: Progressing Towards Goal

## 2021-02-11 LAB
ALBUMIN SERPL-MCNC: 2.9 G/DL (ref 3.2–4.6)
ALBUMIN/GLOB SERPL: 0.6 {RATIO} (ref 1.2–3.5)
ALP SERPL-CCNC: 98 U/L (ref 50–136)
ALT SERPL-CCNC: 63 U/L (ref 12–65)
AMMONIA PLAS-SCNC: <10 UMOL/L (ref 11–32)
ANION GAP SERPL CALC-SCNC: 10 MMOL/L (ref 7–16)
AST SERPL-CCNC: 35 U/L (ref 15–37)
BASOPHILS # BLD: 0 K/UL (ref 0–0.2)
BASOPHILS NFR BLD: 0 % (ref 0–2)
BILIRUB SERPL-MCNC: 0.8 MG/DL (ref 0.2–1.1)
BUN SERPL-MCNC: 45 MG/DL (ref 8–23)
CALCIUM SERPL-MCNC: 9.5 MG/DL (ref 8.3–10.4)
CHLORIDE SERPL-SCNC: 113 MMOL/L (ref 98–107)
CO2 SERPL-SCNC: 20 MMOL/L (ref 21–32)
CREAT SERPL-MCNC: 1.3 MG/DL (ref 0.8–1.5)
CRP SERPL-MCNC: 4.4 MG/DL (ref 0–0.9)
D DIMER PPP FEU-MCNC: 2.17 UG/ML(FEU)
DIFFERENTIAL METHOD BLD: NORMAL
EOSINOPHIL # BLD: 0.1 K/UL (ref 0–0.8)
EOSINOPHIL NFR BLD: 1 % (ref 0.5–7.8)
ERYTHROCYTE [DISTWIDTH] IN BLOOD BY AUTOMATED COUNT: 13.6 % (ref 11.9–14.6)
GLOBULIN SER CALC-MCNC: 5.1 G/DL (ref 2.3–3.5)
GLUCOSE BLD STRIP.AUTO-MCNC: 156 MG/DL (ref 65–100)
GLUCOSE BLD STRIP.AUTO-MCNC: 163 MG/DL (ref 65–100)
GLUCOSE BLD STRIP.AUTO-MCNC: 173 MG/DL (ref 65–100)
GLUCOSE BLD STRIP.AUTO-MCNC: 213 MG/DL (ref 65–100)
GLUCOSE SERPL-MCNC: 155 MG/DL (ref 65–100)
HCT VFR BLD AUTO: 43 % (ref 41.1–50.3)
HGB BLD-MCNC: 13.7 G/DL (ref 13.6–17.2)
IMM GRANULOCYTES # BLD AUTO: 0 K/UL (ref 0–0.5)
IMM GRANULOCYTES NFR BLD AUTO: 0 % (ref 0–5)
LYMPHOCYTES # BLD: 1.4 K/UL (ref 0.5–4.6)
LYMPHOCYTES NFR BLD: 14 % (ref 13–44)
MCH RBC QN AUTO: 27.6 PG (ref 26.1–32.9)
MCHC RBC AUTO-ENTMCNC: 31.9 G/DL (ref 31.4–35)
MCV RBC AUTO: 86.5 FL (ref 79.6–97.8)
MONOCYTES # BLD: 1 K/UL (ref 0.1–1.3)
MONOCYTES NFR BLD: 10 % (ref 4–12)
NEUTS SEG # BLD: 7.7 K/UL (ref 1.7–8.2)
NEUTS SEG NFR BLD: 75 % (ref 43–78)
NRBC # BLD: 0 K/UL (ref 0–0.2)
PLATELET # BLD AUTO: 346 K/UL (ref 150–450)
PMV BLD AUTO: 10.4 FL (ref 9.4–12.3)
POTASSIUM SERPL-SCNC: 4 MMOL/L (ref 3.5–5.1)
PROT SERPL-MCNC: 8 G/DL (ref 6.3–8.2)
RBC # BLD AUTO: 4.97 M/UL (ref 4.23–5.6)
SODIUM SERPL-SCNC: 143 MMOL/L (ref 138–145)
VIT B12 SERPL-MCNC: >2000 PG/ML (ref 193–986)
WBC # BLD AUTO: 10.3 K/UL (ref 4.3–11.1)

## 2021-02-11 PROCEDURE — 85379 FIBRIN DEGRADATION QUANT: CPT

## 2021-02-11 PROCEDURE — 82962 GLUCOSE BLOOD TEST: CPT

## 2021-02-11 PROCEDURE — 74011250636 HC RX REV CODE- 250/636: Performed by: HOSPITALIST

## 2021-02-11 PROCEDURE — 65270000029 HC RM PRIVATE

## 2021-02-11 PROCEDURE — 86140 C-REACTIVE PROTEIN: CPT

## 2021-02-11 PROCEDURE — 82140 ASSAY OF AMMONIA: CPT

## 2021-02-11 PROCEDURE — 74011250637 HC RX REV CODE- 250/637: Performed by: HOSPITALIST

## 2021-02-11 PROCEDURE — 85025 COMPLETE CBC W/AUTO DIFF WBC: CPT

## 2021-02-11 PROCEDURE — 80053 COMPREHEN METABOLIC PANEL: CPT

## 2021-02-11 PROCEDURE — 82607 VITAMIN B-12: CPT

## 2021-02-11 PROCEDURE — 74011636637 HC RX REV CODE- 636/637: Performed by: HOSPITALIST

## 2021-02-11 RX ORDER — SPIRONOLACTONE 25 MG/1
12.5 TABLET ORAL DAILY
Status: DISCONTINUED | OUTPATIENT
Start: 2021-02-12 | End: 2021-02-19 | Stop reason: HOSPADM

## 2021-02-11 RX ADMIN — Medication 10 ML: at 22:00

## 2021-02-11 RX ADMIN — MELATONIN 2000 UNITS: at 08:43

## 2021-02-11 RX ADMIN — INSULIN LISPRO 2 UNITS: 100 INJECTION, SOLUTION INTRAVENOUS; SUBCUTANEOUS at 08:03

## 2021-02-11 RX ADMIN — RDII 250 MG CAPSULE 250 MG: at 08:43

## 2021-02-11 RX ADMIN — PANTOPRAZOLE SODIUM 40 MG: 40 TABLET, DELAYED RELEASE ORAL at 07:38

## 2021-02-11 RX ADMIN — ASPIRIN 81 MG 81 MG: 81 TABLET ORAL at 07:38

## 2021-02-11 RX ADMIN — B-COMPLEX W/ C & FOLIC ACID TAB 1 TABLET: TAB at 08:43

## 2021-02-11 RX ADMIN — Medication 1000 MG: at 08:43

## 2021-02-11 RX ADMIN — INSULIN LISPRO 4 UNITS: 100 INJECTION, SOLUTION INTRAVENOUS; SUBCUTANEOUS at 17:05

## 2021-02-11 RX ADMIN — Medication 10 ML: at 14:11

## 2021-02-11 RX ADMIN — CARVEDILOL 25 MG: 25 TABLET, FILM COATED ORAL at 08:43

## 2021-02-11 RX ADMIN — HEPARIN SODIUM 5000 UNITS: 5000 INJECTION INTRAVENOUS; SUBCUTANEOUS at 14:10

## 2021-02-11 RX ADMIN — INSULIN LISPRO 2 UNITS: 100 INJECTION, SOLUTION INTRAVENOUS; SUBCUTANEOUS at 12:22

## 2021-02-11 RX ADMIN — ZINC SULFATE 220 MG (50 MG) CAPSULE 1 CAPSULE: CAPSULE at 08:43

## 2021-02-11 RX ADMIN — HEPARIN SODIUM 5000 UNITS: 5000 INJECTION INTRAVENOUS; SUBCUTANEOUS at 05:30

## 2021-02-11 RX ADMIN — CYANOCOBALAMIN TAB 1000 MCG 1000 MCG: 1000 TAB at 08:43

## 2021-02-11 RX ADMIN — RDII 250 MG CAPSULE 250 MG: at 17:05

## 2021-02-11 RX ADMIN — HEPARIN SODIUM 5000 UNITS: 5000 INJECTION INTRAVENOUS; SUBCUTANEOUS at 21:36

## 2021-02-11 RX ADMIN — CARVEDILOL 25 MG: 25 TABLET, FILM COATED ORAL at 17:05

## 2021-02-11 RX ADMIN — INSULIN LISPRO 2 UNITS: 100 INJECTION, SOLUTION INTRAVENOUS; SUBCUTANEOUS at 21:36

## 2021-02-11 RX ADMIN — ROSUVASTATIN 10 MG: 10 TABLET, FILM COATED ORAL at 21:36

## 2021-02-11 RX ADMIN — Medication 10 ML: at 06:01

## 2021-02-11 RX ADMIN — INSULIN GLARGINE 20 UNITS: 100 INJECTION, SOLUTION SUBCUTANEOUS at 08:44

## 2021-02-11 NOTE — PROGRESS NOTES
Hospitalist Progress Note    2021  Admit Date: 2021  9:53 AM   NAME: Anton Carter   :  1945   MRN:  [id number]   Attending: Sandra Kim DO  PCP:  Drea Andres MD    SUBJECTIVE:   Patient is a 78 y/o M with PMH of HTN, CKD III, DM, GERD, HFrEF, that presented in the setting of altered mental status and generalized weakness. He was recently tested positive for COVID on 2021; he received his covid vaccine on . Wife found him confused in the garage unable to follow commands. CT head on admission was unremarkable for acute findings. UA was also negative. ABG, ammonia, B12 all wnl. CXR shows patchy lung opacities bilaterally, right greater than  left, regressed from before. His metabolic encephalopathy was suspicious for 2/2 covid infection. He remains non-hypoxic during hospitalization. This AM pt was alert and oriented x3. Stated he felt sick and weak after he got his first dose of vaccine. Follows commands well. Denies fever, chills, SOB, chest pain abdominal pain, peripheral edema, N/V, diarrhea or constipation. Review of Systems negative with exception of pertinent positives noted above  PHYSICAL EXAM     Visit Vitals  /79 (BP 1 Location: Left upper arm, BP Patient Position: Lying)   Pulse 88   Temp 97.7 °F (36.5 °C)   Resp 18   Ht 5' 10\" (1.778 m)   Wt 108.9 kg (240 lb)   SpO2 96%   BMI 34.44 kg/m²      Temp (24hrs), Av.8 °F (36.6 °C), Min:97.7 °F (36.5 °C), Max:97.9 °F (36.6 °C)    Oxygen Therapy  O2 Sat (%): 96 % (21 0733)  Pulse via Oximetry: 93 beats per minute (21 1433)  O2 Device: Room air (21 0744)    Intake/Output Summary (Last 24 hours) at 2021 1128  Last data filed at 2/10/2021 1708  Gross per 24 hour   Intake 200 ml   Output 100 ml   Net 100 ml      General: No acute distress. Lungs:  CTA Bilaterally.    Heart:  Regular rate and rhythm,  No murmur, rub, or gallop  Abdomen: Soft, Non distended, Non tender, Positive bowel sounds  Extremities: No cyanosis, clubbing or edema  Neurologic:  No focal deficits. Alert and oriented x3. Follows commands and answers questions appropriately. CT HEAD WO CONT   Final Result   1. No acute intracranial abnormality. 2. Increased sinus disease findings now at the sphenoid sinus. XR CHEST PORT   Final Result   There are now patchy lung opacities bilaterally, right greater than   left, regressed from before. This is a nonspecific pattern but with a history   probably represents multifocal Covid 19 viral pneumonitis changes. ASSESSMENT      Active Hospital Problems    Diagnosis Date Noted    Acute metabolic encephalopathy 65/56/7126    DANIELLA (acute kidney injury) ([facility] [address] 02/09/2021    COVID-19 02/09/2021    Type 2 diabetes mellitus with hyperglycemia, with long-term current use of insulin ([facility] [address] 09/23/2020    Gastroesophageal reflux disease without esophagitis 06/09/2020    Class 2 severe obesity with serious comorbidity and body mass index (BMI) of 39.0 to 39.9 in adult [facility]) 06/09/2020    Stage 3 chronic kidney disease 06/09/2020    Chronic systolic CHF (congestive heart failure) ([facility] [address] 06/25/2014     EF 20% by echo September 2011  Echo October 2012: EF improved to 35-40%, no significant valve disease  Jan 2014: images so poor even with Definity contrast an EF could not be estimated, only \"probably moderately depressed\". Mar 2014 - MUGA - EF 33%  Mar 2015 - even with contrast, difficult to see, EF estimated 45-50%   Oct 2016 - EF 43%, aortic root 3.8  Sep 2017 - 50-55% with distal apical dyskinesis, mild to moderate MR, AI, and PI  Oct 2018 - 54% with apical wma, impaired relaxation, no significant valvular regurgitation  Oct 2019- Echo EF normal, cannot assess regional wma d/t poor endocardial border definition, declined contrast.  No TR envelope to assess RVSP.   Abnormal DF  Jan 2021- vasodilator perfusion study inferior fixed defect, no ischemia, EF 49%       Plan:    Acute metabolic encephalopathy likely in the setting of covid infection (positive 2/5/21)  -CT head without acute intracranial abnormality. ABG w/o acidosis of hypercapnia. -UA negative for UTI  -Avoid sedatives: Holding home amitriptyline and gabapentin  -Delirium precautions   -Ammonia, [id number]  -Alert and oriented x3 this AM. PT/OT recommended STR-pending. CM to assist. Obtain TSH.     COVID-19 infection with mild bibasilar infiltrates  -Currently not hypoxic   -Hold off on steroids  -Vitamin C, vitamin D, zinc  -Not candidate for remdesivir or plasma since not hypoxic   -Symptomatic management  -D-dimer stable    CKD III, mild azotemia  -S/p gentle hydration with IV fluid hydration  -Avoid nephrotoxic agents  -Monitor renal function   -Cr back to baseline      Insulin-dependent diabetes mellitus type 2  -Lantus, sliding scale insulin  -BS ACHS     Hypertension  Continue carvedilol. Resume home spironolactone.     HFrEF  -NM stress test in 1/21/21 showing EF 49%  -Not on acute exacerbation  -Holding lasix due to acute kidney injury initially  -Resume home spironolactone low dose. Monitor renal function and BP, if tolerates can resume home valsartan and also Lasix every other day.     GERD  -Continue PPI    FANTA  -Cont hoem CPAP    DVT Prophylaxis: Heparin SQ  Dispo: PT/OT recommended SNF. Pending.     Signed By: Sabrina Litten, DO     February 11, 2021

## 2021-02-12 LAB
ALBUMIN SERPL-MCNC: 2.5 G/DL (ref 3.2–4.6)
ALBUMIN/GLOB SERPL: 0.5 {RATIO} (ref 1.2–3.5)
ALP SERPL-CCNC: 96 U/L (ref 50–136)
ALT SERPL-CCNC: 43 U/L (ref 12–65)
ANION GAP SERPL CALC-SCNC: 10 MMOL/L (ref 7–16)
ARTERIAL PATENCY WRIST A: ABNORMAL
AST SERPL-CCNC: 27 U/L (ref 15–37)
BASE DEFICIT BLDV-SCNC: 3 MMOL/L
BASOPHILS # BLD: 0 K/UL (ref 0–0.2)
BASOPHILS NFR BLD: 0 % (ref 0–2)
BDY SITE: ABNORMAL
BILIRUB SERPL-MCNC: 0.8 MG/DL (ref 0.2–1.1)
BUN SERPL-MCNC: 34 MG/DL (ref 8–23)
CALCIUM SERPL-MCNC: 9.6 MG/DL (ref 8.3–10.4)
CHLORIDE SERPL-SCNC: 112 MMOL/L (ref 98–107)
CO2 BLD-SCNC: 23 MMOL/L
CO2 SERPL-SCNC: 20 MMOL/L (ref 21–32)
COLLECT TIME,HTIME: 1522
CREAT SERPL-MCNC: 1.24 MG/DL (ref 0.8–1.5)
D DIMER PPP FEU-MCNC: 2.34 UG/ML(FEU)
DIFFERENTIAL METHOD BLD: ABNORMAL
EOSINOPHIL # BLD: 0 K/UL (ref 0–0.8)
EOSINOPHIL NFR BLD: 0 % (ref 0.5–7.8)
ERYTHROCYTE [DISTWIDTH] IN BLOOD BY AUTOMATED COUNT: 13.8 % (ref 11.9–14.6)
GAS FLOW.O2 O2 DELIVERY SYS: ABNORMAL L/MIN
GLOBULIN SER CALC-MCNC: 5.3 G/DL (ref 2.3–3.5)
GLUCOSE BLD STRIP.AUTO-MCNC: 193 MG/DL (ref 65–100)
GLUCOSE BLD STRIP.AUTO-MCNC: 194 MG/DL (ref 65–100)
GLUCOSE BLD STRIP.AUTO-MCNC: 258 MG/DL (ref 65–100)
GLUCOSE BLD STRIP.AUTO-MCNC: 295 MG/DL (ref 65–100)
GLUCOSE SERPL-MCNC: 178 MG/DL (ref 65–100)
HCO3 BLDV-SCNC: 21.8 MMOL/L (ref 23–28)
HCT VFR BLD AUTO: 43.1 % (ref 41.1–50.3)
HGB BLD-MCNC: 13.9 G/DL (ref 13.6–17.2)
IMM GRANULOCYTES # BLD AUTO: 0.1 K/UL (ref 0–0.5)
IMM GRANULOCYTES NFR BLD AUTO: 1 % (ref 0–5)
LYMPHOCYTES # BLD: 1.1 K/UL (ref 0.5–4.6)
LYMPHOCYTES NFR BLD: 9 % (ref 13–44)
MCH RBC QN AUTO: 27.7 PG (ref 26.1–32.9)
MCHC RBC AUTO-ENTMCNC: 32.3 G/DL (ref 31.4–35)
MCV RBC AUTO: 85.9 FL (ref 79.6–97.8)
MONOCYTES # BLD: 1.6 K/UL (ref 0.1–1.3)
MONOCYTES NFR BLD: 12 % (ref 4–12)
NEUTS SEG # BLD: 10.1 K/UL (ref 1.7–8.2)
NEUTS SEG NFR BLD: 78 % (ref 43–78)
NRBC # BLD: 0 K/UL (ref 0–0.2)
O2/TOTAL GAS SETTING VFR VENT: 21 %
PCO2 BLDV: 36.7 MMHG (ref 41–51)
PH BLDV: 7.38 [PH] (ref 7.32–7.42)
PLATELET # BLD AUTO: 337 K/UL (ref 150–450)
PMV BLD AUTO: 10.8 FL (ref 9.4–12.3)
PO2 BLDV: 17 MMHG
POTASSIUM SERPL-SCNC: 4.8 MMOL/L (ref 3.5–5.1)
PROT SERPL-MCNC: 7.8 G/DL (ref 6.3–8.2)
RBC # BLD AUTO: 5.02 M/UL (ref 4.23–5.6)
SAO2 % BLDV: 24 % (ref 65–88)
SERVICE CMNT-IMP: ABNORMAL
SODIUM SERPL-SCNC: 142 MMOL/L (ref 136–145)
SPECIMEN TYPE: ABNORMAL
TSH SERPL DL<=0.005 MIU/L-ACNC: 0.81 UIU/ML (ref 0.36–3.74)
WBC # BLD AUTO: 13 K/UL (ref 4.3–11.1)

## 2021-02-12 PROCEDURE — 74011250637 HC RX REV CODE- 250/637: Performed by: HOSPITALIST

## 2021-02-12 PROCEDURE — 74011636637 HC RX REV CODE- 636/637: Performed by: HOSPITALIST

## 2021-02-12 PROCEDURE — 74011250636 HC RX REV CODE- 250/636: Performed by: HOSPITALIST

## 2021-02-12 PROCEDURE — 65270000029 HC RM PRIVATE

## 2021-02-12 PROCEDURE — 92610 EVALUATE SWALLOWING FUNCTION: CPT

## 2021-02-12 PROCEDURE — 85379 FIBRIN DEGRADATION QUANT: CPT

## 2021-02-12 PROCEDURE — 80053 COMPREHEN METABOLIC PANEL: CPT

## 2021-02-12 PROCEDURE — 97530 THERAPEUTIC ACTIVITIES: CPT | Performed by: PHYSICAL THERAPIST

## 2021-02-12 PROCEDURE — 85025 COMPLETE CBC W/AUTO DIFF WBC: CPT

## 2021-02-12 PROCEDURE — 74011250637 HC RX REV CODE- 250/637: Performed by: FAMILY MEDICINE

## 2021-02-12 PROCEDURE — 84443 ASSAY THYROID STIM HORMONE: CPT

## 2021-02-12 PROCEDURE — 36415 COLL VENOUS BLD VENIPUNCTURE: CPT

## 2021-02-12 PROCEDURE — 82962 GLUCOSE BLOOD TEST: CPT

## 2021-02-12 PROCEDURE — 97530 THERAPEUTIC ACTIVITIES: CPT

## 2021-02-12 PROCEDURE — 94760 N-INVAS EAR/PLS OXIMETRY 1: CPT

## 2021-02-12 PROCEDURE — 82803 BLOOD GASES ANY COMBINATION: CPT

## 2021-02-12 RX ADMIN — INSULIN LISPRO 2 UNITS: 100 INJECTION, SOLUTION INTRAVENOUS; SUBCUTANEOUS at 08:03

## 2021-02-12 RX ADMIN — HEPARIN SODIUM 5000 UNITS: 5000 INJECTION INTRAVENOUS; SUBCUTANEOUS at 22:11

## 2021-02-12 RX ADMIN — Medication 10 ML: at 13:51

## 2021-02-12 RX ADMIN — HEPARIN SODIUM 5000 UNITS: 5000 INJECTION INTRAVENOUS; SUBCUTANEOUS at 05:10

## 2021-02-12 RX ADMIN — HEPARIN SODIUM 5000 UNITS: 5000 INJECTION INTRAVENOUS; SUBCUTANEOUS at 13:51

## 2021-02-12 RX ADMIN — CARVEDILOL 25 MG: 25 TABLET, FILM COATED ORAL at 16:47

## 2021-02-12 RX ADMIN — SPIRONOLACTONE 12.5 MG: 25 TABLET ORAL at 08:03

## 2021-02-12 RX ADMIN — CARVEDILOL 25 MG: 25 TABLET, FILM COATED ORAL at 08:03

## 2021-02-12 RX ADMIN — CYANOCOBALAMIN TAB 1000 MCG 1000 MCG: 1000 TAB at 08:03

## 2021-02-12 RX ADMIN — ZINC SULFATE 220 MG (50 MG) CAPSULE 1 CAPSULE: CAPSULE at 08:03

## 2021-02-12 RX ADMIN — ASPIRIN 81 MG 81 MG: 81 TABLET ORAL at 08:03

## 2021-02-12 RX ADMIN — B-COMPLEX W/ C & FOLIC ACID TAB 1 TABLET: TAB at 08:03

## 2021-02-12 RX ADMIN — INSULIN GLARGINE 20 UNITS: 100 INJECTION, SOLUTION SUBCUTANEOUS at 08:06

## 2021-02-12 RX ADMIN — Medication 1000 MG: at 08:03

## 2021-02-12 RX ADMIN — Medication 10 ML: at 22:11

## 2021-02-12 RX ADMIN — Medication 10 ML: at 05:10

## 2021-02-12 RX ADMIN — INSULIN LISPRO 2 UNITS: 100 INJECTION, SOLUTION INTRAVENOUS; SUBCUTANEOUS at 16:47

## 2021-02-12 RX ADMIN — PANTOPRAZOLE SODIUM 40 MG: 40 TABLET, DELAYED RELEASE ORAL at 08:03

## 2021-02-12 RX ADMIN — INSULIN LISPRO 6 UNITS: 100 INJECTION, SOLUTION INTRAVENOUS; SUBCUTANEOUS at 11:18

## 2021-02-12 RX ADMIN — INSULIN LISPRO 6 UNITS: 100 INJECTION, SOLUTION INTRAVENOUS; SUBCUTANEOUS at 22:12

## 2021-02-12 RX ADMIN — MELATONIN 2000 UNITS: at 08:03

## 2021-02-12 RX ADMIN — ROSUVASTATIN 10 MG: 10 TABLET, FILM COATED ORAL at 22:10

## 2021-02-12 RX ADMIN — RDII 250 MG CAPSULE 250 MG: at 08:03

## 2021-02-12 NOTE — PROGRESS NOTES
ACUTE PHYSICAL THERAPY GOALS:  (Developed with and agreed upon by patient and/or caregiver. )  LTG:  (1.)Mr. Rito Saleem will move from supine to sit and sit to supine , scoot up and down and roll side to side in bed with MINIMAL ASSIST within 7 treatment day(s). (2.)Mr. Rito Saleem will transfer from bed to chair and chair to bed with MINIMAL ASSIST using the least restrictive device within 7 treatment day(s). (3.)Mr. Rito Saleem will ambulate with MINIMAL ASSIST for 100 feet with the least restrictive device within 7 treatment day(s). (4.)Mr. Rito Saleem will tolerate at least 30 min of dynamic standing activity to assist standing ADLs with the least restrictive device within 7 treatment days. PHYSICAL THERAPY: Daily Note and PM Treatment Day # 2    Paula Gaviria is a 77 y.o. male   PRIMARY DIAGNOSIS: Acute metabolic encephalopathy  Acute metabolic encephalopathy [O50.86]         ASSESSMENT:     REHAB RECOMMENDATIONS: CURRENT LEVEL OF FUNCTION:  (Most Recently Demonstrated)   Recommendation to date pending progress:  Setting:   Short-term Rehab  Equipment:    To Be Determined Bed Mobility:   Maximal Assistance x 2  Sit to Stand:   Unable to perform  Transfers:   Unable to perform  Gait/Mobility:   Unable to perform     ASSESSMENT:  Mr. Rito Saleem presents in supine, verbally perseverating and confused. Command following changes abruptly from none to 3-step commands being followed. He moves very slowly w/ constant cueing to EOB w/ max Ax2. He refuses standing. He requires max Ax2 to return to supine. Limited progress today due to mentation. Will continue to follow. SUBJECTIVE:   Mr. Rito Saleem states, \"You're the doctor! You're the doctor! \"    SOCIAL HISTORY/ LIVING ENVIRONMENT:      OBJECTIVE:     PAIN: VITAL SIGNS: LINES/DRAINS:   Pre Treatment:  0  Post Treatment: 0   IV  O2 Device: Room air     MOBILITY: I Mod I S SBA CGA Min Mod Max Total  NT x2 Comments:   Bed Mobility    Rolling [] [] [] [] [] [] [] [x] [] [] [x] Supine to Sit [] [] [] [] [] [] [] [x] [] [] [x]    Scooting [] [] [] [] [] [] [] [x] [] [] [x]    Sit to Supine [] [] [] [] [] [] [] [x] [] [] [x]    Transfers    Sit to Stand [] [] [] [] [] [] [] [] [] [x] []    Bed to Chair [] [] [] [] [] [] [] [] [] [x] []    Stand to Sit [] [] [] [] [] [] [] [] [] [x] []    I=Independent, Mod I=Modified Independent, S=Supervision, SBA=Standby Assistance, CGA=Contact Guard Assistance,   Min=Minimal Assistance, Mod=Moderate Assistance, Max=Maximal Assistance, Total=Total Assistance, NT=Not Tested    GAIT: I Mod I S SBA CGA Min Mod Max Total  NT x2 Comments:   Level of Assistance [] [] [] [] [] [] [] [] [] [x] []    Distance n/a    DME N/A    Gait Quality n/a    Weightbearing  Status N/A     I=Independent, Mod I=Modified Independent, S=Supervision, SBA=Standby Assistance, CGA=Contact Guard Assistance,   Min=Minimal Assistance, Mod=Moderate Assistance, Max=Maximal Assistance, Total=Total Assistance, NT=Not Tested    PLAN:   FREQUENCY/DURATION: PT Plan of Care: 3 times/week for duration of hospital stay or until stated goals are met, whichever comes first.  TREATMENT:     TREATMENT:   ($$ Therapeutic Activity: 23-37 mins    )  Therapeutic Activity (27 Minutes): Therapeutic activity included Rolling, Supine to Sit, Sit to Supine, Scooting and Lateral Scooting to improve functional Mobility, Strength, ROM and Activity tolerance.     AFTER TREATMENT POSITION/PRECAUTIONS:  Bed, Needs within reach and RN notified    INTERDISCIPLINARY COLLABORATION:  RN/PCT and PT/PTA    TOTAL TREATMENT DURATION:  PT Patient Time In/Time Out  Time In: 1355  Time Out: [address]

## 2021-02-12 NOTE — PROGRESS NOTES
ACUTE OT GOALS:  (Developed with and agreed upon by patient and/or caregiver.)    1) Patient will complete lower body bathing and dressing with setup and adaptive equipment as needed. 2) Patient will complete toileting with supervision. 3) Patient will complete functional transfers with supervision and adaptive equipment as needed. 4) Patient will tolerate at least 30 minutes of OT activity with as needed rest breaks while maintaining O2 sats >90%. 5) Patient will verbalize at least 3 energy conservation technique to utilize during ADL/IADL. Timeframe: 7 visits   OCCUPATIONAL THERAPY: Daily Note OT Treatment Day # 2    Michael Alvarado is a 77 y.o. male   PRIMARY DIAGNOSIS: Acute metabolic encephalopathy  Acute metabolic encephalopathy [S54.56]       [place] / Product Type: Medicare /   ASSESSMENT:     REHAB RECOMMENDATIONS: CURRENT LEVEL OF FUNCTION:  (Most Recently Demonstrated)   Recommendation to date pending progress:  Setting:   Short-term Rehab  Equipment:    To Be Determined Bathing:   Not tested  Dressing:   Not tested  Feeding/Grooming:   Not tested  Toileting:   Not tested  Functional Mobility:   Not tested     ASSESSMENT:  Mr. Gale Dunn is not able to follow commands well today. Pt attempted to perform UE exercises but was slow with responses.      SUBJECTIVE:   Mr. Gale Dunn states, \"my wife is better looking than me\"    SOCIAL HISTORY/LIVING ENVIRONMENT:        OBJECTIVE:     PAIN: VITAL SIGNS: LINES/DRAINS:   Pre Treatment: Pain Screen  Pain Scale 1: Numeric (0 - 10)  Pain Intensity 1: 0  Post Treatment: 0   Lomeli Catheter  O2 Device: Room air     ACTIVITIES OF DAILY LIVING: I Mod I S SBA CGA Min Mod Max Total NT Comments   BASIC ADLs:              Bathing/ Showering [] [] [] [] [] [] [] [] [] [x]    Toileting [] [] [] [] [] [] [] [] [] [x]    Dressing [] [] [] [] [] [] [] [] [] [x]    Feeding [] [] [] [] [] [] [] [] [] [x]    Grooming [] [] [] [] [] [] [] [] [] [x]    Personal Device Care [] [] [] [] [] [] [] [] [] [x]    Functional Mobility [] [] [] [] [] [] [] [] [] [x]    I=Independent, Mod I=Modified Independent, S=Supervision, SBA=Standby Assistance, CGA=Contact Guard Assistance,   Min=Minimal Assistance, Mod=Moderate Assistance, Max=Maximal Assistance, Total=Total Assistance, NT=Not Tested    MOBILITY: I Mod I S SBA CGA Min Mod Max Total  NT x2 Comments:   Supine to sit [] [] [] [] [] [] [] [] [] [x] []    Sit to supine [] [] [] [] [] [] [] [] [] [x] []    Sit to stand [] [] [] [] [] [] [] [] [] [x] []    Bed to chair [] [] [] [] [] [] [] [] [] [x] []    I=Independent, Mod I=Modified Independent, S=Supervision, SBA=Standby Assistance, CGA=Contact Guard Assistance,   Min=Minimal Assistance, Mod=Moderate Assistance, Max=Maximal Assistance, Total=Total Assistance, NT=Not Tested    PLAN:   FREQUENCY/DURATION: OT Plan of Care: 3 times/week for duration of hospital stay or until stated goals are met, whichever comes first.    TREATMENT:   TREATMENT:   ( $$ Therapeutic Activity: 8-22 mins   )  Therapeutic Activity (15 Minutes): Therapeutic activity included Command following; UE exercises to improve functional Mobility, Strength and Activity tolerance.     AFTER TREATMENT POSITION/PRECAUTIONS:  Bed, Needs within reach and RN notified    INTERDISCIPLINARY COLLABORATION:  RN/PCT and OT/ELIZONDO    TOTAL TREATMENT DURATION:  OT Patient Time In/Time Out  Time In: 1535  Time Out: [address] [facility] [employer] [facility], \[facility]\""

## 2021-02-12 NOTE — PROGRESS NOTES
SPEECH LANGUAGE PATHOLOGY: DYSPHAGIA- Initial Assessment and Discharge    NAME/AGE/GENDER: Amy Gavin is a 77 y.o. male  DATE: 2/12/2021  PRIMARY DIAGNOSIS: Acute metabolic encephalopathy [S21.50]      ICD-10: Treatment Diagnosis: R13.11 Dysphagia, Oral Phase    RECOMMENDATIONS   DIET:    PO:  Regular   Liquids:  regular thin    MEDICATIONS: With liquid     ASPIRATION PRECAUTIONS  · Slow rate of intake  · Small bites/sips  · Upright at 90 degrees during meal     COMPENSATORY STRATEGIES/MODIFICATIONS  · Small sips and bites  · Assistance with all intake     RECOMMENDATIONS for CONTINUED SPEECH THERAPY:   YES: Anticipate need for ongoing speech therapy at next level of care. ASSESSMENT   Patient presents with functional oropharyngeal swallow. No overt s/sx airway compromise. Recommend continue regular diet/thin liquids. meds with liquid rinse. Patient will need 1:1 assistance due to AMS. No acute dysphagia treatment indicated. Patient continues to demonstrate altered mental status with confusion, impaired ability to answer basic questions with notable thought organization/word finding difficulty, and remains disoriented to time/situation. If AMS persists, consider SLP consult for cognitive linguistic evaluation at next level of care. CONTINUATION OF SKILLED SERVICES/MEDICAL NECESSITY:   Recommend ST for cognitive intervention at next level of care if mentation does not improve with tx of acute infection. EDUCATION:  · Recommendations discussed with Patient  REHABILITATION POTENTIAL FOR STATED GOALS: n/a    PLAN    FREQUENCY/DURATION: no acute dysphagia needs. SUBJECTIVE   Upright in bed. Alert and agreeable to participate.      Oxygen Device: room air  Pain: Pain Scale 1: Numeric (0 - 10)  Pain Intensity 1: 0    History of Present Injury/Illness: Mr. Alvaro Gutierrez  has a past medical history of Age-related cognitive decline (2020), Arthritis, COVID-19 (2/9/2021), Diabetes ([facility] [address] Dizziness (06/09/2020), Gastroesophageal reflux disease without esophagitis (6/9/2020), GERD (gastroesophageal reflux disease), Gout, Heart failure ([facility] [address] Hip pain, bilateral, Hypertension, Morbid obesity ([address] Neuropathy, Obstructive sleep apnea (adult) (pediatric) (08/26/2014), Pacemaker, Psychiatric disorder, Routine eye exam (2020), Spinal stenosis of lumbar region, and Type 2 diabetes mellitus with hyperglycemia, with long-term current use of insulin ([facility] [address] (9/23/2020). He also has no past medical history of Dementia or PVD (peripheral vascular disease) ([facility] [address] . He also  has a past surgical history that includes pr cardiac surg procedure unlist (cath); hx appendectomy; hx tonsillectomy; hx orthopaedic; hx orthopaedic; hx pacemaker; and hx colonoscopy. PRECAUTIONS/ALLERGIES: Patient has no known allergies. Problem List:  (Impairments causing functional limitations):  1. Oropharyngeal dysphagia- No symptoms identified      Previous Dysphagia: NONE REPORTED  Diet Prior to Evaluation: regular/thin liquids    Orientation:  Person   At end of session oriented to hospital     Cognitive-Linguistic Screening:   Speech Production:   o WFL   Expressive Language:  o Impaired; impaired fluency with seemingly impaired thought organization and word finding.  Receptive Language:  o Follows basic 1 step commands. Impaired ability to answer yes/no questions    Cognition:   o Impaired  Prior Level of Function:independent  Recommendations: Given results of screening, patient appears to be functioning below independent baseline due to AMS secondary to acute infection. Suspect will resolve, however if persists, consider SLP consult for cognitive linguistic evaluation at next level of care. OBJECTIVE   Oral Motor:   · Labial: No impairment  · Dentition: Intact  · Oral Hygiene: Dry  · Lingual: No impairment    Swallow evaluation:   Patient presented with thin liquid via cup and straw, puree, mixed, and solid consistencies.  mildly increased time for prep of regular solids, but functional. Timely swallow initiation, and single swallows upon palpation. Adequate oral clearing. No overt signs or symptoms of airway compromise observed with liquid or solid textures. Delayed cough >1 min after trials. Some grimacting but denies any discomfort. Tool Used: Dysphagia Outcome and Severity Scale (JM)    Score Comments   Normal Diet  [] 7 With no strategies or extra time needed   Functional Swallow  [] 6 May have mild oral or pharyngeal delay   Mild Dysphagia  [] 5 Which may require one diet consistency restricted    Mild-Moderate Dysphagia  [] 4 With 1-2 diet consistencies restricted   Moderate Dysphagia  [] 3 With 2 or more diet consistencies restricted   Moderate-Severe Dysphagia  [] 2 With partial PO strategies (trials with ST only)   Severe Dysphagia  [] 1 With inability to tolerate any PO safely      Score:  Initial: 6 Most Recent: 6 (Date 02/12/21 )   Interpretation of Tool: The Dysphagia Outcome and Severity Scale (JM) is a simple, easy-to-use, 7-point scale developed to systematically rate the functional severity of dysphagia based on objective assessment and make recommendations for diet level, independence level, and type of nutrition. Current Medications:   No current facility-administered medications on file prior to encounter. Current Outpatient Medications on File Prior to Encounter   Medication Sig Dispense Refill    nitroglycerin (NITROSTAT) 0.4 mg SL tablet 1 Tab by SubLINGual route every five (5) minutes as needed for Chest Pain. Up to 3 doses. 1 Bottle 5    DISABLED PLACARD (DISABLED PLACARD) DMV Handicap placard 1 Each 0    ZINC ACETATE PO Take  by mouth.  ascorbic acid (VITAMIN C PO) Take  by mouth.  insulin degludec Qamar Andniesha FlexTouch U-200) 200 unit/mL (3 mL) inpn 50 Units by SubCUTAneous route daily.  rosuvastatin (Crestor) 10 mg tablet Take 1 Tab by mouth nightly.  90 Tab 2    valsartan-hydroCHLOROthiazide (DIOVAN-HCT) 320-25 mg per tablet Take 1 Tab by mouth daily.  amitriptyline (ELAVIL) 10 mg tablet Take 20 mg by mouth nightly.  cpap machine kit by Does Not Apply route. 8 cm      sitaGLIPtin-metFORMIN (JANUMET) 50-1,000 mg per tablet Take 1 Tab by mouth two (2) times daily (with meals).  indomethacin (INDOCIN) 25 mg capsule Take  by mouth three (3) times daily. As needed      allopurinol (ZYLOPRIM) 100 mg tablet Take  by mouth daily.  cyanocobalamin (VITAMIN B-12) 1,000 mcg sublingual tablet Take 1,000 mcg by mouth daily.  cholecalciferol, vitamin D3, (VITAMIN D3) 2,000 unit tab Take  by mouth.  Bifidobacterium Infantis (ALIGN) 4 mg cap Take 1 Tab by mouth. Every other day      omeprazole (PRILOSEC) 20 mg capsule Take 20 mg by mouth daily.  spironolactone (ALDACTONE) 25 mg tablet Take 12.5 mg by mouth daily.  furosemide (LASIX) 20 mg tablet Take 20 mg by mouth every Monday, Wednesday, Friday.  carvedilol (COREG) 25 mg tablet Take 25 mg by mouth two (2) times daily (with meals).  aspirin 81 mg chewable tablet take 81 mg by mouth every morning.  MELOXICAM PO take 15 mg by mouth every morning. Pt states unable to stopped , pt to talk with dr. Naldo Schilling ION/MULTIVITAMINS (MULTI VIT-FLUORIDE PO) take  by mouth daily. Stopped 6/22/09       gabapentin (NEURONTIN) 300 mg Tab Take 300 mg by mouth nightly.  One po every am and 2 po at bedtime          INTERDISCIPLINARY COLLABORATION: n/a  After treatment position/precautions:  · Upright in bed  · call light within reach    Total Treatment Duration:   Time In: 0948  Time Out: [address] CCC-SLP

## 2021-02-12 NOTE — PROGRESS NOTES
Hospitalist Progress Note    2021  Admit Date: 2021  9:53 AM   NAME: Anton Carter   :  1945   MRN:  [id number]   Attending: Sandra Kim DO  PCP:  Drea Andres MD    SUBJECTIVE:   Patient is a 78 y/o M with PMH of HTN, CKD III, DM, GERD, HFrEF, that presented in the setting of altered mental status and generalized weakness. He was recently tested positive for COVID on 2021; he received his covid vaccine on . Wife found him confused in the garage unable to follow commands. CT head on admission was unremarkable for acute findings. UA was also negative. ABG, ammonia, B12 all wnl. CXR shows patchy lung opacities bilaterally, right greater than  left, regressed from before. His metabolic encephalopathy was suspicious for 2/2 covid infection. He remains non-hypoxic during hospitalization. Pt was alert to self and place this AM but not to time. Did tell me current president was Trump. Seems to be slower and more confused this AM but able to follows basic commands. Denies fever, chills, SOB, chest pain abdominal pain, peripheral edema, N/V, diarrhea or constipation. Review of Systems negative with exception of pertinent positives noted above  PHYSICAL EXAM     Visit Vitals  BP (!) 161/85 (BP 1 Location: Left upper arm, BP Patient Position: At rest)   Pulse 94   Temp 98.8 °F (37.1 °C)   Resp 20   Ht 5' 10\" (1.778 m)   Wt 108.9 kg (240 lb)   SpO2 95%   BMI 34.44 kg/m²      Temp (24hrs), Av.3 °F (36.8 °C), Min:97.8 °F (36.6 °C), Max:98.8 °F (37.1 °C)    Oxygen Therapy  O2 Sat (%): 95 % (21 1100)  Pulse via Oximetry: 102 beats per minute (21 0928)  O2 Device: Room air (21 0928)  No intake or output data in the 24 hours ending 21 1416   General: No acute distress. Lungs:  CTA Bilaterally.    Heart:  Regular rate and rhythm,  No murmur, rub, or gallop  Abdomen: Soft, Non distended, Non tender, Positive bowel sounds  Extremities: No cyanosis, clubbing or edema  Neurologic:  No focal deficits. Alert and oriented x2. Follows commands intermittently. CT HEAD WO CONT   Final Result   1. No acute intracranial abnormality. 2. Increased sinus disease findings now at the sphenoid sinus. XR CHEST PORT   Final Result   There are now patchy lung opacities bilaterally, right greater than   left, regressed from before. This is a nonspecific pattern but with a history   probably represents multifocal Covid 19 viral pneumonitis changes. ASSESSMENT      Active Hospital Problems    Diagnosis Date Noted    Acute metabolic encephalopathy 53/72/1605    DANIELLA (acute kidney injury) ([facility] [address] 02/09/2021    COVID-19 02/09/2021    Type 2 diabetes mellitus with hyperglycemia, with long-term current use of insulin ([facility] [address] 09/23/2020    Gastroesophageal reflux disease without esophagitis 06/09/2020    Class 2 severe obesity with serious comorbidity and body mass index (BMI) of 39.0 to 39.9 in adult [facility]) 06/09/2020    Stage 3 chronic kidney disease 06/09/2020    Chronic systolic CHF (congestive heart failure) ([facility] [address] 06/25/2014     EF 20% by echo September 2011  Echo October 2012: EF improved to 35-40%, no significant valve disease  Jan 2014: images so poor even with Definity contrast an EF could not be estimated, only \"probably moderately depressed\". Mar 2014 - MUGA - EF 33%  Mar 2015 - even with contrast, difficult to see, EF estimated 45-50%   Oct 2016 - EF 43%, aortic root 3.8  Sep 2017 - 50-55% with distal apical dyskinesis, mild to moderate MR, AI, and PI  Oct 2018 - 54% with apical wma, impaired relaxation, no significant valvular regurgitation  Oct 2019- Echo EF normal, cannot assess regional wma d/t poor endocardial border definition, declined contrast.  No TR envelope to assess RVSP.   Abnormal DF  Jan 2021- vasodilator perfusion study inferior fixed defect, no ischemia, EF 49%       Plan:    Acute metabolic encephalopathy likely in the setting of covid infection (positive 2/5/21)  -CT head without acute intracranial abnormality. ABG w/o acidosis of hypercapnia. -UA negative for UTI  -Avoid sedatives: Holding home amitriptyline and gabapentin  -Delirium precautions   -Ammonia, B12, TSH-unremarkable  -Alert but seems to be more confused this AM. WBC elevated could be 2/2 covid infection. Will obtain vBG.      COVID-19 infection with mild bibasilar infiltrates  -Currently not hypoxic. Procal negative  -Hold off on steroids  -Vitamin C, vitamin D, zinc  -Not candidate for remdesivir or plasma since not hypoxic   -Symptomatic management  -D-dimer stable    CKD III, mild azotemia  -S/p gentle hydration with IV fluid hydration  -Avoid nephrotoxic agents  -Monitor renal function   -Cr back to baseline      Insulin-dependent diabetes mellitus type 2  -Lantus, sliding scale insulin  -BS ACHS     Hypertension  Continue carvedilol. Resume home spironolactone.     HFrEF  -NM stress test in 1/21/21 showing EF 49%  -Not on acute exacerbation  -Holding lasix due to acute kidney injury initially  -Resume home spironolactone low dose. Monitor renal function and BP, if tolerates can resume home valsartan and also Lasix every other day.     GERD  -Continue PPI    FANTA  -Cont home CPAP    DVT Prophylaxis: Heparin SQ  Dispo: PT/OT recommended SNF. Pending.     Signed By: Faiza Andres DO     February 12, 2021

## 2021-02-12 NOTE — PROGRESS NOTES
Chart screened by CM for d/c planning. Pt is on RA. Per progress notes his mentation has improved. A/O x3 and able to follow commands. Dx of Acute Metabolic Encephalopathy likely in the setting of COVID-19 infection. PT and OT recommend STR upon d/c.  Pt's spouse told CM that she wants pt to \"get whatever he needs to get better. He is stubborn and may demand to come home. \"  She has also tested positive for COVID-19 and is ill at home. CM will continue to follow and remain available if any needs arise.

## 2021-02-12 NOTE — PROGRESS NOTES
's visit via telephone call attempted. The call was unanswered. Chaplains remain available follow-up.      Cody Lomax MDIV, [address] [facility]

## 2021-02-13 ENCOUNTER — APPOINTMENT (OUTPATIENT)
Dept: CT IMAGING | Age: 76
DRG: 871 | End: 2021-02-13
Attending: FAMILY MEDICINE
Payer: MEDICARE

## 2021-02-13 PROBLEM — J01.30 ACUTE SPHENOIDAL SINUSITIS: Status: ACTIVE | Noted: 2021-02-13

## 2021-02-13 PROBLEM — J18.9 CAP (COMMUNITY ACQUIRED PNEUMONIA): Status: ACTIVE | Noted: 2021-02-13

## 2021-02-13 PROBLEM — J01.30 ACUTE SPHENOIDAL SINUSITIS: Status: RESOLVED | Noted: 2021-02-13 | Resolved: 2021-02-13

## 2021-02-13 PROBLEM — A41.9 SEPSIS (HCC): Status: ACTIVE | Noted: 2021-02-13

## 2021-02-13 LAB
ALBUMIN SERPL-MCNC: 2.5 G/DL (ref 3.2–4.6)
ALBUMIN/GLOB SERPL: 0.4 {RATIO} (ref 1.2–3.5)
ALP SERPL-CCNC: 106 U/L (ref 50–136)
ALT SERPL-CCNC: 43 U/L (ref 12–65)
ANION GAP SERPL CALC-SCNC: 8 MMOL/L (ref 7–16)
AST SERPL-CCNC: 27 U/L (ref 15–37)
BASOPHILS # BLD: 0 K/UL (ref 0–0.2)
BASOPHILS NFR BLD: 0 % (ref 0–2)
BILIRUB SERPL-MCNC: 0.7 MG/DL (ref 0.2–1.1)
BUN SERPL-MCNC: 39 MG/DL (ref 8–23)
CALCIUM SERPL-MCNC: 10 MG/DL (ref 8.3–10.4)
CHLORIDE SERPL-SCNC: 110 MMOL/L (ref 98–107)
CO2 SERPL-SCNC: 22 MMOL/L (ref 21–32)
CREAT SERPL-MCNC: 1.39 MG/DL (ref 0.8–1.5)
DIFFERENTIAL METHOD BLD: ABNORMAL
EOSINOPHIL # BLD: 0 K/UL (ref 0–0.8)
EOSINOPHIL NFR BLD: 0 % (ref 0.5–7.8)
ERYTHROCYTE [DISTWIDTH] IN BLOOD BY AUTOMATED COUNT: 14 % (ref 11.9–14.6)
GLOBULIN SER CALC-MCNC: 5.8 G/DL (ref 2.3–3.5)
GLUCOSE BLD STRIP.AUTO-MCNC: 166 MG/DL (ref 65–100)
GLUCOSE BLD STRIP.AUTO-MCNC: 173 MG/DL (ref 65–100)
GLUCOSE BLD STRIP.AUTO-MCNC: 203 MG/DL (ref 65–100)
GLUCOSE BLD STRIP.AUTO-MCNC: 217 MG/DL (ref 65–100)
GLUCOSE SERPL-MCNC: 222 MG/DL (ref 65–100)
HCT VFR BLD AUTO: 43.6 % (ref 41.1–50.3)
HGB BLD-MCNC: 13.7 G/DL (ref 13.6–17.2)
IMM GRANULOCYTES # BLD AUTO: 0.1 K/UL (ref 0–0.5)
IMM GRANULOCYTES NFR BLD AUTO: 1 % (ref 0–5)
LACTATE SERPL-SCNC: 1.2 MMOL/L (ref 0.4–2)
LYMPHOCYTES # BLD: 1.3 K/UL (ref 0.5–4.6)
LYMPHOCYTES NFR BLD: 11 % (ref 13–44)
MCH RBC QN AUTO: 27.5 PG (ref 26.1–32.9)
MCHC RBC AUTO-ENTMCNC: 31.4 G/DL (ref 31.4–35)
MCV RBC AUTO: 87.4 FL (ref 79.6–97.8)
MONOCYTES # BLD: 1.4 K/UL (ref 0.1–1.3)
MONOCYTES NFR BLD: 12 % (ref 4–12)
NEUTS SEG # BLD: 8.6 K/UL (ref 1.7–8.2)
NEUTS SEG NFR BLD: 76 % (ref 43–78)
NRBC # BLD: 0 K/UL (ref 0–0.2)
PLATELET # BLD AUTO: 297 K/UL (ref 150–450)
PMV BLD AUTO: 10.9 FL (ref 9.4–12.3)
POTASSIUM SERPL-SCNC: 4.4 MMOL/L (ref 3.5–5.1)
PROCALCITONIN SERPL-MCNC: 0.37 NG/ML
PROT SERPL-MCNC: 8.3 G/DL (ref 6.3–8.2)
RBC # BLD AUTO: 4.99 M/UL (ref 4.23–5.6)
SODIUM SERPL-SCNC: 140 MMOL/L (ref 138–145)
WBC # BLD AUTO: 11.3 K/UL (ref 4.3–11.1)

## 2021-02-13 PROCEDURE — 74011250636 HC RX REV CODE- 250/636: Performed by: FAMILY MEDICINE

## 2021-02-13 PROCEDURE — 74011250637 HC RX REV CODE- 250/637: Performed by: HOSPITALIST

## 2021-02-13 PROCEDURE — 85025 COMPLETE CBC W/AUTO DIFF WBC: CPT

## 2021-02-13 PROCEDURE — 82962 GLUCOSE BLOOD TEST: CPT

## 2021-02-13 PROCEDURE — 74011250636 HC RX REV CODE- 250/636: Performed by: HOSPITALIST

## 2021-02-13 PROCEDURE — 80053 COMPREHEN METABOLIC PANEL: CPT

## 2021-02-13 PROCEDURE — 83605 ASSAY OF LACTIC ACID: CPT

## 2021-02-13 PROCEDURE — 74011250637 HC RX REV CODE- 250/637: Performed by: FAMILY MEDICINE

## 2021-02-13 PROCEDURE — 74011636637 HC RX REV CODE- 636/637: Performed by: HOSPITALIST

## 2021-02-13 PROCEDURE — 99222 1ST HOSP IP/OBS MODERATE 55: CPT | Performed by: NURSE PRACTITIONER

## 2021-02-13 PROCEDURE — 70450 CT HEAD/BRAIN W/O DYE: CPT

## 2021-02-13 PROCEDURE — 36415 COLL VENOUS BLD VENIPUNCTURE: CPT

## 2021-02-13 PROCEDURE — 65660000000 HC RM CCU STEPDOWN

## 2021-02-13 PROCEDURE — 84145 PROCALCITONIN (PCT): CPT

## 2021-02-13 PROCEDURE — 2709999900 HC NON-CHARGEABLE SUPPLY

## 2021-02-13 PROCEDURE — 74011250637 HC RX REV CODE- 250/637: Performed by: NURSE PRACTITIONER

## 2021-02-13 PROCEDURE — 74011000258 HC RX REV CODE- 258: Performed by: FAMILY MEDICINE

## 2021-02-13 PROCEDURE — 87040 BLOOD CULTURE FOR BACTERIA: CPT

## 2021-02-13 RX ORDER — FLUTICASONE PROPIONATE 50 MCG
2 SPRAY, SUSPENSION (ML) NASAL DAILY
Status: DISCONTINUED | OUTPATIENT
Start: 2021-02-13 | End: 2021-02-19 | Stop reason: HOSPADM

## 2021-02-13 RX ORDER — DOXYCYCLINE 100 MG/1
100 CAPSULE ORAL EVERY 12 HOURS
Status: DISCONTINUED | OUTPATIENT
Start: 2021-02-13 | End: 2021-02-16

## 2021-02-13 RX ORDER — GABAPENTIN 100 MG/1
100 CAPSULE ORAL 3 TIMES DAILY
Status: DISCONTINUED | OUTPATIENT
Start: 2021-02-13 | End: 2021-02-15

## 2021-02-13 RX ADMIN — CARVEDILOL 25 MG: 25 TABLET, FILM COATED ORAL at 17:28

## 2021-02-13 RX ADMIN — PANTOPRAZOLE SODIUM 40 MG: 40 TABLET, DELAYED RELEASE ORAL at 07:50

## 2021-02-13 RX ADMIN — Medication 10 ML: at 14:21

## 2021-02-13 RX ADMIN — Medication 1000 MG: at 08:14

## 2021-02-13 RX ADMIN — ROSUVASTATIN 10 MG: 10 TABLET, FILM COATED ORAL at 21:34

## 2021-02-13 RX ADMIN — MELATONIN 2000 UNITS: at 08:14

## 2021-02-13 RX ADMIN — RDII 250 MG CAPSULE 250 MG: at 08:15

## 2021-02-13 RX ADMIN — INSULIN LISPRO 4 UNITS: 100 INJECTION, SOLUTION INTRAVENOUS; SUBCUTANEOUS at 08:13

## 2021-02-13 RX ADMIN — ZINC SULFATE 220 MG (50 MG) CAPSULE 1 CAPSULE: CAPSULE at 08:15

## 2021-02-13 RX ADMIN — DOXYCYCLINE HYCLATE 100 MG: 100 CAPSULE ORAL at 21:34

## 2021-02-13 RX ADMIN — GABAPENTIN 100 MG: 100 CAPSULE ORAL at 21:34

## 2021-02-13 RX ADMIN — ASPIRIN 81 MG 81 MG: 81 TABLET ORAL at 06:15

## 2021-02-13 RX ADMIN — INSULIN GLARGINE 20 UNITS: 100 INJECTION, SOLUTION SUBCUTANEOUS at 08:14

## 2021-02-13 RX ADMIN — INSULIN LISPRO 2 UNITS: 100 INJECTION, SOLUTION INTRAVENOUS; SUBCUTANEOUS at 21:36

## 2021-02-13 RX ADMIN — CYANOCOBALAMIN TAB 1000 MCG 1000 MCG: 1000 TAB at 08:14

## 2021-02-13 RX ADMIN — SPIRONOLACTONE 12.5 MG: 25 TABLET ORAL at 08:14

## 2021-02-13 RX ADMIN — DOXYCYCLINE HYCLATE 100 MG: 100 CAPSULE ORAL at 10:16

## 2021-02-13 RX ADMIN — HEPARIN SODIUM 5000 UNITS: 5000 INJECTION INTRAVENOUS; SUBCUTANEOUS at 06:15

## 2021-02-13 RX ADMIN — Medication 10 ML: at 06:15

## 2021-02-13 RX ADMIN — INSULIN LISPRO 2 UNITS: 100 INJECTION, SOLUTION INTRAVENOUS; SUBCUTANEOUS at 17:27

## 2021-02-13 RX ADMIN — HEPARIN SODIUM 5000 UNITS: 5000 INJECTION INTRAVENOUS; SUBCUTANEOUS at 21:34

## 2021-02-13 RX ADMIN — CARVEDILOL 25 MG: 25 TABLET, FILM COATED ORAL at 08:15

## 2021-02-13 RX ADMIN — GABAPENTIN 100 MG: 100 CAPSULE ORAL at 14:20

## 2021-02-13 RX ADMIN — HEPARIN SODIUM 5000 UNITS: 5000 INJECTION INTRAVENOUS; SUBCUTANEOUS at 14:20

## 2021-02-13 RX ADMIN — B-COMPLEX W/ C & FOLIC ACID TAB 1 TABLET: TAB at 08:15

## 2021-02-13 RX ADMIN — Medication 10 ML: at 21:33

## 2021-02-13 RX ADMIN — FLUTICASONE PROPIONATE 2 SPRAY: 50 SPRAY, METERED NASAL at 10:18

## 2021-02-13 RX ADMIN — CEFTRIAXONE SODIUM 1 G: 1 INJECTION, POWDER, FOR SOLUTION INTRAMUSCULAR; INTRAVENOUS at 15:13

## 2021-02-13 RX ADMIN — RDII 250 MG CAPSULE 250 MG: at 17:28

## 2021-02-13 RX ADMIN — INSULIN LISPRO 4 UNITS: 100 INJECTION, SOLUTION INTRAVENOUS; SUBCUTANEOUS at 12:07

## 2021-02-13 NOTE — PROGRESS NOTES
Patient not eating much of meals, maybe two bites, but is drinking plenty and prefers cold drinks. Ordered glucerna shakes with all of his meals.

## 2021-02-13 NOTE — PROGRESS NOTES
Shift Assessment. Pt is alert and oriented to person only. Pt cardiac rhythm is regular. Pt lungs are diminished. Bowel sounds present. Pt denies pain or nausea. Pt is resting in bed with the bed alarm on and the call light within reach.

## 2021-02-13 NOTE — PROGRESS NOTES
Problem: Falls - Risk of  Goal: *Absence of Falls  Description: Document Dee Peña Fall Risk and appropriate interventions in the flowsheet. Outcome: Progressing Towards Goal  Note: Fall Risk Interventions:  Mobility Interventions: Patient to call before getting OOB    Mentation Interventions: Bed/chair exit alarm    Medication Interventions: Bed/chair exit alarm    Elimination Interventions: Bed/chair exit alarm              Problem: Patient Education: Go to Patient Education Activity  Goal: Patient/Family Education  Outcome: Progressing Towards Goal     Problem: Pressure Injury - Risk of  Goal: *Prevention of pressure injury  Description: Document Benny Scale and appropriate interventions in the flowsheet.   Outcome: Progressing Towards Goal  Note: Pressure Injury Interventions:  Sensory Interventions: Assess changes in LOC    Moisture Interventions: Apply protective barrier, creams and emollients, Absorbent underpads, Check for incontinence Q2 hours and as needed    Activity Interventions: Pressure redistribution bed/mattress(bed type)    Mobility Interventions: Pressure redistribution bed/mattress (bed type)    Nutrition Interventions: Document food/fluid/supplement intake    Friction and Shear Interventions: Apply protective barrier, creams and emollients                Problem: Patient Education: Go to Patient Education Activity  Goal: Patient/Family Education  Outcome: Progressing Towards Goal     Problem: Patient Education: Go to Patient Education Activity  Goal: Patient/Family Education  Outcome: Progressing Towards Goal     Problem: Patient Education: Go to Patient Education Activity  Goal: Patient/Family Education  Outcome: Progressing Towards Goal

## 2021-02-13 NOTE — PROGRESS NOTES
Spoke with Ellie Blanco regarding Brain MRI. She informed me that due to his ICD device and implanted leads, it is unsafe for the MRI to be performed.

## 2021-02-13 NOTE — PROGRESS NOTES
Called [facility] to confirm patient's ICD device and leads implanted. Patient has a [facility] [employer] [id number] with 3 SJM leads and one Medtronic lead. As implanted it is not MRI Conditional and patient may not have an MRI. RN notified. Ordering physician sent perfect serve message.

## 2021-02-13 NOTE — PROGRESS NOTES
Hospitalist Progress Note    2021  Admit Date: 2021  9:53 AM   NAME: Michael Alvarado   :  1945   MRN:  [id number]   Attending: Mateusz Epstein DO  PCP:  Kristen Diaz MD    SUBJECTIVE:   Patient is a 78 y/o M with PMH of HTN, CKD III, DM, GERD, HFrEF, that presented in the setting of altered mental status and generalized weakness. He was recently tested positive for COVID on 2021; he received his covid vaccine on . Wife found him confused in the garage unable to follow commands. CT head on admission was unremarkable for acute findings. UA was also negative. ABG, ammonia, B12 all wnl. CXR shows patchy lung opacities bilaterally, right greater than left, regressed from before. His metabolic encephalopathy was suspicious for 2/2 covid infection. He remains non-hypoxic during hospitalization. This Am pt was slower in response, oriented to self only and response not making much sense at times. Follows very basic one step command and was unable to participate in neuro exam. Moved his R leg when asked but said he couldn't move his L leg. Denies fever, chills, SOB, chest pain abdominal pain. Review of Systems negative with exception of pertinent positives noted above  PHYSICAL EXAM     Visit Vitals  /78 (BP 1 Location: Left upper arm, BP Patient Position: At rest;Lying)   Pulse 88   Temp 97.9 °F (36.6 °C)   Resp 20   Ht 5' 10\" (1.778 m)   Wt 104.9 kg (231 lb 3.2 oz)   SpO2 97%   BMI 33.17 kg/m²      Temp (24hrs), Av.3 °F (36.8 °C), Min:97.7 °F (36.5 °C), Max:98.9 °F (37.2 °C)    Oxygen Therapy  O2 Sat (%): 97 % (21 1100)  Pulse via Oximetry: 102 beats per minute (21 0928)  O2 Device: Room air (21 0831)  No intake or output data in the 24 hours ending 21 1428   General: No acute distress. Lungs:  CTA Bilaterally.    Heart:  Regular rate and rhythm,  No murmur, rub, or gallop  Abdomen: Soft, Non distended, Non tender, Positive bowel sounds  Extremities: No cyanosis, clubbing or edema  Neurologic:  Unable to assess d/t pt only following very basic commands, oriented to self and slow in response. Sensation intact b/l.     CT HEAD WO CONT   Final Result      1. No acute intracranial abnormality. CPT code 75231            CT HEAD WO CONT   Final Result   1. No acute intracranial abnormality. 2. Increased sinus disease findings now at the sphenoid sinus. XR CHEST PORT   Final Result   There are now patchy lung opacities bilaterally, right greater than   left, regressed from before. This is a nonspecific pattern but with a history   probably represents multifocal Covid 19 viral pneumonitis changes. ASSESSMENT      Active Hospital Problems    Diagnosis Date Noted    CAP (community acquired pneumonia) 02/13/2021    Sepsis ([facility] [address] 02/13/2021    Acute metabolic encephalopathy 43/28/5187    DANIELLA (acute kidney injury) ([facility] [address] 02/09/2021    COVID-19 02/09/2021    Type 2 diabetes mellitus with hyperglycemia, with long-term current use of insulin ([facility] [address] 09/23/2020    Gastroesophageal reflux disease without esophagitis 06/09/2020    Class 2 severe obesity with serious comorbidity and body mass index (BMI) of 39.0 to 39.9 in adult [facility]) 06/09/2020    Stage 3 chronic kidney disease 06/09/2020    Chronic systolic CHF (congestive heart failure) ([facility] [address] 06/25/2014     EF 20% by echo September 2011  Echo October 2012: EF improved to 35-40%, no significant valve disease  Jan 2014: images so poor even with Definity contrast an EF could not be estimated, only \"probably moderately depressed\".     Mar 2014 - MUGA - EF 33%  Mar 2015 - even with contrast, difficult to see, EF estimated 45-50%   Oct 2016 - EF 43%, aortic root 3.8  Sep 2017 - 50-55% with distal apical dyskinesis, mild to moderate MR, AI, and PI  Oct 2018 - 54% with apical wma, impaired relaxation, no significant valvular regurgitation  Oct 2019- Echo EF normal, cannot assess regional wma d/t poor endocardial border definition, declined contrast.  No TR envelope to assess RVSP. Abnormal DF  Jan 2021- vasodilator perfusion study inferior fixed defect, no ischemia, EF 49%       Plan:    Acute metabolic encephalopathy likely in the setting of sepsis and covid infection (positive 2/5/21)  -CT head without acute intracranial abnormality. ABG w/o acidosis of hypercapnia. -UA negative for UTI  -Avoid sedatives: Holding home amitriptyline and gabapentin  -Delirium precautions   -Ammonia, B12, TSH-unremarkable  -Alert but more confused this morning. Repeat CT head. Unable to obtain MRI d/t defibrillator device. Consult neuro.     Sepsis 2/2  CAP  COVID-19 infection with mild bibasilar infiltrates  Meets sepsis criteria with RR>20 and WBC >12K with procal 0.37 on 2/12. Covid positive on 2/5/21. CXR shows b/l patchy lung opacities. Currently not hypoxic. Procal negative initially now positive 0.37  -Hold off on steroids  -Not candidate for remdesivir or plasma since not hypoxic   -Vitamin C, vitamin D, zinc  -Abx: Doxycycline/Rocephine (2/13-. ..)    CKD III, mild azotemia  -S/p gentle hydration with IV fluid hydration  -Avoid nephrotoxic agents  -Monitor renal function   -Cr back to baseline      Insulin-dependent diabetes mellitus type 2  -Lantus, sliding scale insulin  -BS ACHS     Hypertension  Continue carvedilol. Resume home spironolactone.     HFrEF  -NM stress test in 1/21/21 showing EF 49%  -Not on acute exacerbation  -Holding lasix due to acute kidney injury initially  -Resume home spironolactone low dose. Monitor renal function and BP, if tolerates can resume home valsartan and also Lasix every other day.     GERD  -Continue PPI    FANTA  -Cont home CPAP    DVT Prophylaxis: Heparin SQ  Dispo: PT/OT recommended SNF. Pending.     Signed By: Aurora Garibay DO     February 13, 2021

## 2021-02-13 NOTE — CONSULTS
Consult    Patient: Jasper Velazquez MRN: [id number]     YOB: 1945  Age: 77 y.o. Sex: male      Subjective:      Jasper Velazquez is a 77 y.o. male who is being seen for AMS. PMH of HTN, CKD III, DM type II, GERD, HFrEF, ICD,  FANTA on CPAP, lumbar spinal stenosis, neuropathy. He is unable to provide HPI. HPI obtained from medical record. \"Patient is a 28-year-old male brought to the ER from home because of generalized weakness, altered mental status. Patient's wife states that he has been having increasing confusion over the last few days. He tested positive for Covid On 2/5. Patient received Covid vaccine on 1/26\"    CT head unremarkable for acute findings. UA negative. ABG, ammonia, TSH wnl, elevated B12 >2,000. CXR shows patchy lung opacities bilaterally, right greater than left. He has remain non-hypoxic during hospitalization. Today, he is alert to person only. Able to follow 1 step commands. Endorses pain in lower back and BLE. Able to wiggle toes bilaterally. He was previously on gabapentin 300 mg po AM and 600 mg po qHS for his neuropathy, this has been held since admission.      Past Medical History:   Diagnosis Date    Age-related cognitive decline 2020    30 out of 30 MMSE    Arthritis     DJD- shoulder, knees, hips    COVID-19 2/9/2021    Diabetes ([facility] [address]     idd x 6 yrs, avg am sqbs 100    Dizziness 06/09/2020    normal carotid ultrasound    Gastroesophageal reflux disease without esophagitis 6/9/2020    GERD (gastroesophageal reflux disease)     Gout     right foot    Heart failure (HCC)     Hip pain, bilateral     POA    Hypertension     x 10 yrs    Morbid obesity ([facility] [address]     Neuropathy     feet and legs    Obstructive sleep apnea (adult) (pediatric) 08/26/2014    txed with CPAP- Dr. Jose Contreras Pacemaker     Psychiatric disorder     DEPRESSION    Routine eye exam 2020    no diabetic retinopathy- Sheron Dandy    Spinal stenosis of lumbar region     Type 2 diabetes mellitus with hyperglycemia, with long-term current use of insulin ([facility] [address] 9/23/2020     Past Surgical History:   Procedure Laterality Date    HX APPENDECTOMY      HX COLONOSCOPY      Dr. Scott Baum      right achilles tendon repair    HX ORTHOPAEDIC      knee    HX PACEMAKER      pacemaker/defibrillator placed 6/25/14     HX TONSILLECTOMY      TN CARDIAC SURG PROCEDURE UNLIST  cath      Family History   Problem Relation Age of Onset    Heart Attack Father     Cancer Mother      Social History     Tobacco Use    Smoking status: Never Smoker    Smokeless tobacco: Never Used   Substance Use Topics    Alcohol use: No      Current Facility-Administered Medications   Medication Dose Route Frequency Provider Last Rate Last Admin    doxycycline (VIBRAMYCIN) capsule 100 mg  100 mg Oral Q12H Elise, Thu, DO   100 mg at 02/13/21 1016    fluticasone propionate (FLONASE) 50 mcg/actuation nasal spray 2 Spray  2 Spray Both Nostrils DAILY Elise, Thu, DO   2 Spray at 02/13/21 1018    spironolactone (ALDACTONE) tablet 12.5 mg  12.5 mg Oral DAILY Elise, Thu, DO   12.5 mg at 02/13/21 5548    sodium chloride (NS) flush 5-40 mL  5-40 mL IntraVENous Q8H Pablo Saab MD   10 mL at 02/13/21 0615    sodium chloride (NS) flush 5-40 mL  5-40 mL IntraVENous PRN Pablo Saab MD        acetaminophen (TYLENOL) tablet 650 mg  650 mg Oral Q6H PRN Pablo Saab MD        Or    acetaminophen (TYLENOL) suppository 650 mg  650 mg Rectal Q6H PRN Pablo Saab MD        polyethylene glycol (MIRALAX) packet 17 g  17 g Oral DAILY PRN Pablo Saab MD        promethazine (PHENERGAN) tablet 12.5 mg  12.5 mg Oral Q6H PRN Pablo Saab MD        Or    ondansetron (ZOFRAN) injection 4 mg  4 mg IntraVENous Q6H PRN Pablo Saab MD        heparin (porcine) injection 5,000 Units  5,000 Units SubCUTAneous Q8H Kelle Saab MD   5,000 Units at 02/13/21 0615    guaiFENesin-dextromethorphan (ROBITUSSIN DM) 100-10 mg/5 mL syrup 5 mL 5 mL Oral Q4H PRN Pippa Saab MD       Tye Loser [Held by provider] amitriptyline (ELAVIL) tablet 20 mg  20 mg Oral QHS Kelle Saab MD   20 mg at 02/10/21 2100    ascorbic acid (vitamin C) (VITAMIN C) tablet 1,000 mg  1,000 mg Oral DAILY Kelle Saab MD   1,000 mg at 02/13/21 8556    aspirin chewable tablet 81 mg  81 mg Oral 7am Kelle Saab MD   81 mg at 02/13/21 5629    Saccharomyces boulardii (FLORASTOR) capsule 250 mg  250 mg Oral BID Kelle Saab MD   250 mg at 02/13/21 0815    carvediloL (COREG) tablet 25 mg  25 mg Oral BID WITH MEALS Kelle Saab MD   25 mg at 02/13/21 0815    cholecalciferol (VITAMIN D3) (1000 Units /25 mcg) tablet 2,000 Units  2,000 Units Oral DAILY Pippa Saab MD   2,000 Units at 02/13/21 3323    cyanocobalamin tablet 1,000 mcg  1,000 mcg Oral DAILY Pippa Saab MD   1,000 mcg at 02/13/21 2191    multivitamin, stress formula (STRESS TAB) tablet 1 Tab  1 Tab Oral DAILY Pippa Saab MD   1 Tab at 02/13/21 0815    [Held by provider] gabapentin (NEURONTIN) capsule 300 mg  300 mg Oral DAILY Kelle Saab MD   Stopped at 02/12/21 0900    pantoprazole (PROTONIX) tablet 40 mg  40 mg Oral ACB Kelle Saab MD   40 mg at 02/13/21 0750    rosuvastatin (CRESTOR) tablet 10 mg  10 mg Oral QHS Kelle Saab MD   10 mg at 02/12/21 2210    zinc sulfate (ZINCATE) 220 (50) mg capsule 1 Cap  1 Cap Oral DAILY Kelle Saab MD   1 Cap at 02/13/21 0815    insulin glargine (LANTUS) injection 20 Units  20 Units SubCUTAneous DAILY Pippa Saab MD   20 Units at 02/13/21 0814    insulin lispro (HUMALOG) injection   SubCUTAneous AC&HS Kelle Saab MD   4 Units at 02/13/21 1207    [Held by provider] gabapentin (NEURONTIN) capsule 600 mg  600 mg Oral QHS Kelle Saab MD   600 mg at 02/10/21 2100        No Known Allergies    Review of Systems:  Unable to assess due to AMS      Objective:     Vitals:    02/13/21 0730 02/13/21 0814 02/13/21 1100 02/13/21 1200   BP: (!) 144/78 (!) 141/76 139/78    Pulse: 91 93 86 88 Resp: 20  20    Temp: 98.2 °F (36.8 °C)  97.9 °F (36.6 °C)    SpO2: 97%  97%    Weight:       Height:            Physical Exam:  General - ill appearing, obese, in no apparent distress. Confused. HEENT - Normocephalic, atraumatic. Conjunctiva, tympanic membranes, and oropharynx are clear. Neck - Supple without masses, no bruits   Cardiovascular - Regular rate and rhythm. Normal S1, S2 without murmurs, rubs, or gallops. Lungs - Clear to auscultation. Abdomen - Soft, nontender with normal bowel sounds. Extremities - Peripheral pulses intact. No edema and no rashes. Neurological examination - Alert, oriented to person only. Able to follow 1 step commands. Comprehension, attention, memory and reasoning are impaired. Language and speech are normal.   On cranial nerve examination, (II, III, IV, VI) pupils are equal, round, and reactive to light. Visual acuity is adequate. Visual fields are full to finger confrontation. Extraocular motility is normal. (V, VII) Face is symmetric and sensation is intact to light touch. (VIII) Hearing is intact. (IX, X) Palate and uvula elevate symmetrically. Voice is normal. (XI) Shoulder shrug is strong and equal bilaterally. (XII)Tongue is midline. Motor examination - There is normal muscle tone and bulk. Moves all extremities spontaneously, BLE weakness and pain with PROM. Muscle stretch reflexes are 1+ brachial, areflexic bilateral LE. Plantar response is flexor bilaterally. Sensation is intact to light touch, pinprick in all extremities. Cerebellar examination is normal finger/nose. Unable to assess gait/stance.         Lab Results   Component Value Date/Time    Cholesterol, total 139 09/23/2020 12:07 PM    HDL Cholesterol 38 (L) 09/23/2020 12:07 PM    LDL, calculated 68 09/23/2020 12:07 PM    VLDL, calculated 33 09/23/2020 12:07 PM    Triglyceride 197 (H) 09/23/2020 12:07 PM        Lab Results   Component Value Date/Time    Hemoglobin A1c 7.7 (H) 09/23/2020 12:07 PM CT Results (most recent):  Results from Hospital Encounter encounter on 02/09/21   CT HEAD WO CONT    Narrative Exam: CT HEAD WO CONT on 2/13/2021 12:27 PM    Clinical History: The Male patient is 77years old  presenting for alteration of  consciousness/awareness. Technique: Thin slice axial CT images through the brain were obtained. All CT scans at this facility are performed using dose reduction/dose modulation  techniques, as appropriate the performed exam, including the following:   Automated Exposure Control; Adjustment of the mA and/or kV according to patient  size (this includes techniques or standardized protocols for targeted exams  where dose is matched to indication/reason for exam); and Use of Iterative  Reconstruction Technique. Radiation Exposure Indices:  Reference Air Kerma (Romana Jews) = 1167 mGy-cm    Comparison:  Head CT 2/9/2021. Findings:      Cerebrum: Table age-related cortical involutional changes are seen. There is  normal gray-white matter differentiation. No evidence of intracranial  hemorrhage, mass, or other space-occupying lesion is seen. There are no abnormal  extra-axial fluid collections. There is cavernous carotid atherosclerotic  disease. Cerebellum: Normal cerebellar morphology is demonstrated. CSF spaces: The ventricular system is within normal limits. The basilar cisterns  are unremarkable. Brainstem: No evidence of ischemia, hemorrhage, or mass. Extracranial tissues: Visualized orbits and extracranial soft tissues are  unremarkable. Paranasal sinuses/Mastoids: Well-pneumatized and aerated. .    Calvarium: No acute osseous abnormality. Impression 1. No acute intracranial abnormality.     CPT code 43540           Results for orders placed or performed during the hospital encounter of 02/09/21   EKG, 12 LEAD, INITIAL   Result Value Ref Range    Ventricular Rate 105 BPM    Atrial Rate 105 BPM    P-R Interval 162 ms    QRS Duration 152 ms    Q-T Interval 380 ms    QTC Calculation (Bezet) 502 ms    Calculated P Axis 76 degrees    Calculated R Axis 151 degrees    Calculated T Axis 12 degrees    Diagnosis       !! AGE AND GENDER SPECIFIC ECG ANALYSIS !! Sinus tachycardia  Electronic ventricular pacemaker  Confirmed by [facility]  MD ()BRIAN ([id number]) on 2/9/2021 11:42:10 AM          MRI Results (most recent):   No results found for this or any previous visit. Most recent CTA:      Most recent MRA:  No results found for this or any previous visit. Most recent Echo:  No results found for this visit on 02/09/21. Assessment:     77year old male with acute metabolic encephalopathy with superimposed acute delirium likely in the setting of COVID 19 v. Gabapentin withdrawl. CT of head negative for acute infarct or hemorrhage. Unable to have MRI due to ICD. Given increased pain in lower back and BLE, start gabapentin 100 po every 8 hours; dosage adjusted based on CrCl. Plan:     Start gabapentin 100 po every 8 hours; dosage adjusted based on CrCl. Management of Delirium     Non-pharmacological intervention  · Reorient the patient throughout the day  · Window open and lights on during the day. Lights off, television off, noises down during the night. If able, decrease nursing checks at night  · Therapies as often as possible  · Avoid restraints to the best of your ability   · Avoid sensory deprivation by using glasses and hearing aids, if applicable       Pharmacological intervention  · Replete electrolyte abnormalities and correct metabolic abnormalities  · Limit benzodiazepines, antihistamines, narcotics, anticholinergics. Preference towards Precedex for sedation over fentanyl and benzodiazepines/GABAa agonists.    · For dangerous behavior/aggression to self or others can consider Zyprexa or Seroquel if benefits outweigh risk  · For persistent insomnia can use melatonin four hours prior to bedtime or Seroquel 25 mg at bedtime      Signed By: Malinda Lundborg, KOSTA     February 13, 2021      Neurology attending    Situation discussed with me and suggested low-dose gabapentin as some of the issues being encountered may be related to gabapentin withdrawal will examine in a.m.

## 2021-02-14 LAB
ALBUMIN SERPL-MCNC: 2.4 G/DL (ref 3.2–4.6)
ALBUMIN/GLOB SERPL: 0.4 {RATIO} (ref 1.2–3.5)
ALP SERPL-CCNC: 106 U/L (ref 50–136)
ALT SERPL-CCNC: 47 U/L (ref 12–65)
ANION GAP SERPL CALC-SCNC: 9 MMOL/L (ref 7–16)
AST SERPL-CCNC: 34 U/L (ref 15–37)
BASOPHILS # BLD: 0 K/UL (ref 0–0.2)
BASOPHILS NFR BLD: 0 % (ref 0–2)
BILIRUB SERPL-MCNC: 0.5 MG/DL (ref 0.2–1.1)
BUN SERPL-MCNC: 39 MG/DL (ref 8–23)
CALCIUM SERPL-MCNC: 9.9 MG/DL (ref 8.3–10.4)
CHLORIDE SERPL-SCNC: 108 MMOL/L (ref 98–107)
CO2 SERPL-SCNC: 23 MMOL/L (ref 21–32)
CREAT SERPL-MCNC: 1.25 MG/DL (ref 0.8–1.5)
DIFFERENTIAL METHOD BLD: ABNORMAL
EOSINOPHIL # BLD: 0 K/UL (ref 0–0.8)
EOSINOPHIL NFR BLD: 0 % (ref 0.5–7.8)
ERYTHROCYTE [DISTWIDTH] IN BLOOD BY AUTOMATED COUNT: 14 % (ref 11.9–14.6)
GLOBULIN SER CALC-MCNC: 5.9 G/DL (ref 2.3–3.5)
GLUCOSE BLD STRIP.AUTO-MCNC: 100 MG/DL (ref 65–100)
GLUCOSE BLD STRIP.AUTO-MCNC: 161 MG/DL (ref 65–100)
GLUCOSE BLD STRIP.AUTO-MCNC: 171 MG/DL (ref 65–100)
GLUCOSE BLD STRIP.AUTO-MCNC: 216 MG/DL (ref 65–100)
GLUCOSE SERPL-MCNC: 164 MG/DL (ref 65–100)
HCT VFR BLD AUTO: 41.9 % (ref 41.1–50.3)
HGB BLD-MCNC: 13.7 G/DL (ref 13.6–17.2)
IMM GRANULOCYTES # BLD AUTO: 0 K/UL (ref 0–0.5)
IMM GRANULOCYTES NFR BLD AUTO: 0 % (ref 0–5)
LYMPHOCYTES # BLD: 1.4 K/UL (ref 0.5–4.6)
LYMPHOCYTES NFR BLD: 13 % (ref 13–44)
MCH RBC QN AUTO: 28 PG (ref 26.1–32.9)
MCHC RBC AUTO-ENTMCNC: 32.7 G/DL (ref 31.4–35)
MCV RBC AUTO: 85.5 FL (ref 79.6–97.8)
MONOCYTES # BLD: 1.3 K/UL (ref 0.1–1.3)
MONOCYTES NFR BLD: 12 % (ref 4–12)
NEUTS SEG # BLD: 8.3 K/UL (ref 1.7–8.2)
NEUTS SEG NFR BLD: 75 % (ref 43–78)
NRBC # BLD: 0 K/UL (ref 0–0.2)
PLATELET # BLD AUTO: 372 K/UL (ref 150–450)
PMV BLD AUTO: 11.3 FL (ref 9.4–12.3)
POTASSIUM SERPL-SCNC: 4.2 MMOL/L (ref 3.5–5.1)
PROT SERPL-MCNC: 8.3 G/DL (ref 6.3–8.2)
RBC # BLD AUTO: 4.9 M/UL (ref 4.23–5.6)
SODIUM SERPL-SCNC: 140 MMOL/L (ref 136–145)
WBC # BLD AUTO: 11.1 K/UL (ref 4.3–11.1)

## 2021-02-14 PROCEDURE — 74011000258 HC RX REV CODE- 258: Performed by: FAMILY MEDICINE

## 2021-02-14 PROCEDURE — 74011250637 HC RX REV CODE- 250/637: Performed by: FAMILY MEDICINE

## 2021-02-14 PROCEDURE — 74011250636 HC RX REV CODE- 250/636: Performed by: FAMILY MEDICINE

## 2021-02-14 PROCEDURE — APPSS30 APP SPLIT SHARED TIME 16-30 MINUTES: Performed by: NURSE PRACTITIONER

## 2021-02-14 PROCEDURE — 99232 SBSQ HOSP IP/OBS MODERATE 35: CPT | Performed by: PSYCHIATRY & NEUROLOGY

## 2021-02-14 PROCEDURE — 82962 GLUCOSE BLOOD TEST: CPT

## 2021-02-14 PROCEDURE — 36415 COLL VENOUS BLD VENIPUNCTURE: CPT

## 2021-02-14 PROCEDURE — 65660000000 HC RM CCU STEPDOWN

## 2021-02-14 PROCEDURE — 74011250636 HC RX REV CODE- 250/636: Performed by: HOSPITALIST

## 2021-02-14 PROCEDURE — 74011250637 HC RX REV CODE- 250/637: Performed by: HOSPITALIST

## 2021-02-14 PROCEDURE — 85025 COMPLETE CBC W/AUTO DIFF WBC: CPT

## 2021-02-14 PROCEDURE — 80053 COMPREHEN METABOLIC PANEL: CPT

## 2021-02-14 PROCEDURE — 74011636637 HC RX REV CODE- 636/637: Performed by: HOSPITALIST

## 2021-02-14 PROCEDURE — 74011250637 HC RX REV CODE- 250/637: Performed by: NURSE PRACTITIONER

## 2021-02-14 RX ORDER — AMITRIPTYLINE HYDROCHLORIDE 10 MG/1
10 TABLET, FILM COATED ORAL
Status: DISCONTINUED | OUTPATIENT
Start: 2021-02-14 | End: 2021-02-19 | Stop reason: HOSPADM

## 2021-02-14 RX ADMIN — FLUTICASONE PROPIONATE 2 SPRAY: 50 SPRAY, METERED NASAL at 09:00

## 2021-02-14 RX ADMIN — ZINC SULFATE 220 MG (50 MG) CAPSULE 1 CAPSULE: CAPSULE at 11:50

## 2021-02-14 RX ADMIN — HEPARIN SODIUM 5000 UNITS: 5000 INJECTION INTRAVENOUS; SUBCUTANEOUS at 06:08

## 2021-02-14 RX ADMIN — CEFTRIAXONE SODIUM 1 G: 1 INJECTION, POWDER, FOR SOLUTION INTRAMUSCULAR; INTRAVENOUS at 14:19

## 2021-02-14 RX ADMIN — HEPARIN SODIUM 5000 UNITS: 5000 INJECTION INTRAVENOUS; SUBCUTANEOUS at 14:19

## 2021-02-14 RX ADMIN — PANTOPRAZOLE SODIUM 40 MG: 40 TABLET, DELAYED RELEASE ORAL at 06:32

## 2021-02-14 RX ADMIN — CARVEDILOL 25 MG: 25 TABLET, FILM COATED ORAL at 08:00

## 2021-02-14 RX ADMIN — HEPARIN SODIUM 5000 UNITS: 5000 INJECTION INTRAVENOUS; SUBCUTANEOUS at 22:06

## 2021-02-14 RX ADMIN — Medication 1000 MG: at 08:00

## 2021-02-14 RX ADMIN — RDII 250 MG CAPSULE 250 MG: at 17:41

## 2021-02-14 RX ADMIN — CYANOCOBALAMIN TAB 1000 MCG 1000 MCG: 1000 TAB at 08:01

## 2021-02-14 RX ADMIN — ASPIRIN 81 MG 81 MG: 81 TABLET ORAL at 06:08

## 2021-02-14 RX ADMIN — GABAPENTIN 100 MG: 100 CAPSULE ORAL at 22:05

## 2021-02-14 RX ADMIN — INSULIN GLARGINE 20 UNITS: 100 INJECTION, SOLUTION SUBCUTANEOUS at 08:00

## 2021-02-14 RX ADMIN — SPIRONOLACTONE 12.5 MG: 25 TABLET ORAL at 08:01

## 2021-02-14 RX ADMIN — DOXYCYCLINE HYCLATE 100 MG: 100 CAPSULE ORAL at 08:01

## 2021-02-14 RX ADMIN — INSULIN LISPRO 2 UNITS: 100 INJECTION, SOLUTION INTRAVENOUS; SUBCUTANEOUS at 11:50

## 2021-02-14 RX ADMIN — Medication 10 ML: at 14:19

## 2021-02-14 RX ADMIN — Medication 10 ML: at 22:09

## 2021-02-14 RX ADMIN — RDII 250 MG CAPSULE 250 MG: at 08:01

## 2021-02-14 RX ADMIN — Medication 10 ML: at 14:20

## 2021-02-14 RX ADMIN — DOXYCYCLINE HYCLATE 100 MG: 100 CAPSULE ORAL at 22:05

## 2021-02-14 RX ADMIN — GABAPENTIN 100 MG: 100 CAPSULE ORAL at 08:01

## 2021-02-14 RX ADMIN — GABAPENTIN 100 MG: 100 CAPSULE ORAL at 16:40

## 2021-02-14 RX ADMIN — CARVEDILOL 25 MG: 25 TABLET, FILM COATED ORAL at 16:41

## 2021-02-14 RX ADMIN — Medication 10 ML: at 06:27

## 2021-02-14 RX ADMIN — INSULIN LISPRO 4 UNITS: 100 INJECTION, SOLUTION INTRAVENOUS; SUBCUTANEOUS at 22:07

## 2021-02-14 RX ADMIN — ROSUVASTATIN 10 MG: 10 TABLET, FILM COATED ORAL at 22:05

## 2021-02-14 RX ADMIN — INSULIN LISPRO 2 UNITS: 100 INJECTION, SOLUTION INTRAVENOUS; SUBCUTANEOUS at 07:43

## 2021-02-14 RX ADMIN — B-COMPLEX W/ C & FOLIC ACID TAB 1 TABLET: TAB at 08:01

## 2021-02-14 RX ADMIN — MELATONIN 2000 UNITS: at 08:01

## 2021-02-14 RX ADMIN — AMITRIPTYLINE HYDROCHLORIDE 10 MG: 10 TABLET, FILM COATED ORAL at 22:05

## 2021-02-14 NOTE — PROGRESS NOTES
Problem: Falls - Risk of  Goal: *Absence of Falls  Description: Document Dee Peña Fall Risk and appropriate interventions in the flowsheet. Outcome: Progressing Towards Goal  Note: Fall Risk Interventions:  Mobility Interventions: Patient to call before getting OOB    Mentation Interventions: Bed/chair exit alarm    Medication Interventions: Bed/chair exit alarm    Elimination Interventions: Bed/chair exit alarm              Problem: Patient Education: Go to Patient Education Activity  Goal: Patient/Family Education  Outcome: Progressing Towards Goal     Problem: Pressure Injury - Risk of  Goal: *Prevention of pressure injury  Description: Document Benny Scale and appropriate interventions in the flowsheet.   Outcome: Progressing Towards Goal  Note: Pressure Injury Interventions:  Sensory Interventions: Assess changes in LOC, Discuss PT/OT consult with provider, Keep linens dry and wrinkle-free    Moisture Interventions: Absorbent underpads, Check for incontinence Q2 hours and as needed    Activity Interventions: Pressure redistribution bed/mattress(bed type)    Mobility Interventions: Pressure redistribution bed/mattress (bed type)    Nutrition Interventions: Offer support with meals,snacks and hydration    Friction and Shear Interventions: Apply protective barrier, creams and emollients                Problem: Patient Education: Go to Patient Education Activity  Goal: Patient/Family Education  Outcome: Progressing Towards Goal     Problem: Patient Education: Go to Patient Education Activity  Goal: Patient/Family Education  Outcome: Progressing Towards Goal     Problem: Patient Education: Go to Patient Education Activity  Goal: Patient/Family Education  Outcome: Progressing Towards Goal

## 2021-02-14 NOTE — PROGRESS NOTES
Neurology Daily Progress Note     Assessment:     77year old male with acute metabolic encephalopathy with superimposed acute delirium likely in the setting of COVID 19 and gabapentin withdrawl. CT of head negative for acute infarct or hemorrhage. Unable to have MRI due to ICD. Continue gabapentin. No additional neurological workup is needed at this time. Will sign off. Plan:     Continue gabapentin 100 po every 8 hours.      Management of Delirium      Non-pharmacological intervention  · Reorient the patient throughout the day  · Window open and lights on during the day. Lights off, television off, noises down during the night. If able, decrease nursing checks at night  · Therapies as often as possible  · Avoid restraints to the best of your ability   · Avoid sensory deprivation by using glasses and hearing aids, if applicable        Pharmacological intervention  · Replete electrolyte abnormalities and correct metabolic abnormalities  · Limit benzodiazepines, antihistamines, narcotics, anticholinergics. Preference towards Precedex for sedation over fentanyl and benzodiazepines/GABAa agonists. · For dangerous behavior/aggression to self or others can consider Zyprexa or Seroquel if benefits outweigh risk  · For persistent insomnia can use melatonin four hours prior to bedtime or Seroquel 25 mg at bedtime       Subjective: Interval history:    Alert and oriented to person, age,  knows he is currently in the hospital. Continues to endorse hip, knee pain. Able to follow commands. Per nursing, he was able to recall who she was this morning. History:    Florecita Marie is a 77 y.o. male who is being seen for AMS. Review of systems negative with exception of pertinent positives and negatives noted above.        Objective:     Vitals:    02/14/21 0339 02/14/21 0729 02/14/21 0800 02/14/21 1048   BP: 123/64 (!) 154/94 (!) 150/83 (!) 145/79   Pulse: 88 96 87 88   Resp: 20 20  18   Temp: 96.9 °F (36.1 °C) 97.4 °F (36.3 °C)  97.5 °F (36.4 °C)   SpO2: 96% 96%  95%   Weight:       Height:              Current Facility-Administered Medications:     doxycycline (VIBRAMYCIN) capsule 100 mg, 100 mg, Oral, Q12H, Elise, Thu, DO, 100 mg at 02/14/21 0801    fluticasone propionate (FLONASE) 50 mcg/actuation nasal spray 2 Spray, 2 Spray, Both Nostrils, DAILY, Elise, Thu, DO, 2 Spray at 02/14/21 0900    gabapentin (NEURONTIN) capsule 100 mg, 100 mg, Oral, TID, Rice, Roopa L, NP, 100 mg at 02/14/21 0801    cefTRIAXone (ROCEPHIN) 1 g in 0.9% sodium chloride (MBP/ADV) 50 mL MBP, 1 g, IntraVENous, Q24H, Elise, Thu, DO, Last Rate: 100 mL/hr at 02/13/21 1513, 1 g at 02/13/21 1513    spironolactone (ALDACTONE) tablet 12.5 mg, 12.5 mg, Oral, DAILY, Elise, Thu, DO, 12.5 mg at 02/14/21 0801    sodium chloride (NS) flush 5-40 mL, 5-40 mL, IntraVENous, Q8H, Dayana Saab MD, 10 mL at 02/14/21 5763    sodium chloride (NS) flush 5-40 mL, 5-40 mL, IntraVENous, PRN, Dayana Saab MD    acetaminophen (TYLENOL) tablet 650 mg, 650 mg, Oral, Q6H PRN **OR** acetaminophen (TYLENOL) suppository 650 mg, 650 mg, Rectal, Q6H PRN, Dayana Saab MD    polyethylene glycol (MIRALAX) packet 17 g, 17 g, Oral, DAILY PRN, Dayana Saab MD    promethazine (PHENERGAN) tablet 12.5 mg, 12.5 mg, Oral, Q6H PRN **OR** ondansetron (ZOFRAN) injection 4 mg, 4 mg, IntraVENous, Q6H PRN, Dayana Saab MD    heparin (porcine) injection 5,000 Units, 5,000 Units, SubCUTAneous, Q8H, Dayana Saab MD, 5,000 Units at 02/14/21 0608    guaiFENesin-dextromethorphan (ROBITUSSIN DM) 100-10 mg/5 mL syrup 5 mL, 5 mL, Oral, Q4H PRN, Dayana Saab MD    [Held by provider] amitriptyline (ELAVIL) tablet 20 mg, 20 mg, Oral, QHS, Kelle Saab MD, 20 mg at 02/10/21 2100    ascorbic acid (vitamin C) (VITAMIN C) tablet 1,000 mg, 1,000 mg, Oral, DAILY, Kelle Saab MD, 1,000 mg at 02/14/21 0800    aspirin chewable tablet 81 mg, 81 mg, Oral, 7am, Kelle Saab MD, 81 mg at 02/14/21 0608   Saccharomyces boulardii (FLORASTOR) capsule 250 mg, 250 mg, Oral, BID, Kierra Saab MD, 250 mg at 02/14/21 0801    carvediloL (COREG) tablet 25 mg, 25 mg, Oral, BID WITH MEALS, Kierra Saab MD, 25 mg at 02/14/21 0800    cholecalciferol (VITAMIN D3) (1000 Units /25 mcg) tablet 2,000 Units, 2,000 Units, Oral, DAILY, Kierra Saab MD, 2,000 Units at 02/14/21 0801    cyanocobalamin tablet 1,000 mcg, 1,000 mcg, Oral, DAILY, Kierra Saab MD, 1,000 mcg at 02/14/21 0801    multivitamin, stress formula (STRESS TAB) tablet 1 Tab, 1 Tab, Oral, DAILY, Kierra Saab MD, 1 Tab at 02/14/21 0801    pantoprazole (PROTONIX) tablet 40 mg, 40 mg, Oral, ACB, Kierra Saab MD, 40 mg at 02/14/21 3840    rosuvastatin (CRESTOR) tablet 10 mg, 10 mg, Oral, QHS, Kierra Saab MD, 10 mg at 02/13/21 2134    zinc sulfate (ZINCATE) 220 (50) mg capsule 1 Cap, 1 Cap, Oral, DAILY, Kierra Saab MD, 1 Cap at 02/13/21 0815    insulin glargine (LANTUS) injection 20 Units, 20 Units, SubCUTAneous, DAILY, Kierra Saab MD, 20 Units at 02/14/21 0800    insulin lispro (HUMALOG) injection, , SubCUTAneous, AC&HS, Kierra Saab MD, 2 Units at 02/14/21 0743    Recent Results (from the past 12 hour(s))   CBC WITH AUTOMATED DIFF    Collection Time: 02/14/21  7:31 AM   Result Value Ref Range    WBC 11.1 4.3 - 11.1 K/uL    RBC 4.90 4.23 - 5.6 M/uL    HGB 13.7 13.6 - 17.2 g/dL    HCT 41.9 41.1 - 50.3 %    MCV 85.5 79.6 - 97.8 FL    MCH 28.0 26.1 - 32.9 PG    MCHC 32.7 31.4 - 35.0 g/dL    RDW 14.0 11.9 - 14.6 %    PLATELET 380 193 - 732 K/uL    MPV 11.3 9.4 - 12.3 FL    ABSOLUTE NRBC 0.00 0.0 - 0.2 K/uL    DF AUTOMATED      NEUTROPHILS 75 43 - 78 %    LYMPHOCYTES 13 13 - 44 %    MONOCYTES 12 4.0 - 12.0 %    EOSINOPHILS 0 (L) 0.5 - 7.8 %    BASOPHILS 0 0.0 - 2.0 %    IMMATURE GRANULOCYTES 0 0.0 - 5.0 %    ABS. NEUTROPHILS 8.3 (H) 1.7 - 8.2 K/UL    ABS. LYMPHOCYTES 1.4 0.5 - 4.6 K/UL    ABS. MONOCYTES 1.3 0.1 - 1.3 K/UL    ABS. EOSINOPHILS 0.0 0.0 - 0.8 K/UL    ABS. BASOPHILS 0.0 0.0 - 0.2 K/UL    ABS. IMM. GRANS. 0.0 0.0 - 0.5 K/UL   METABOLIC PANEL, COMPREHENSIVE    Collection Time: 02/14/21  7:31 AM   Result Value Ref Range    Sodium 140 136 - 145 mmol/L    Potassium 4.2 3.5 - 5.1 mmol/L    Chloride 108 (H) 98 - 107 mmol/L    CO2 23 21 - 32 mmol/L    Anion gap 9 7 - 16 mmol/L    Glucose 164 (H) 65 - 100 mg/dL    BUN 39 (H) 8 - 23 MG/DL    Creatinine 1.25 0.8 - 1.5 MG/DL    GFR est AA >60 >60 ml/min/1.73m2    GFR est non-AA >60 >60 ml/min/1.73m2    Calcium 9.9 8.3 - 10.4 MG/DL    Bilirubin, total 0.5 0.2 - 1.1 MG/DL    ALT (SGPT) 47 12 - 65 U/L    AST (SGOT) 34 15 - 37 U/L    Alk. phosphatase 106 50 - 136 U/L    Protein, total 8.3 (H) 6.3 - 8.2 g/dL    Albumin 2.4 (L) 3.2 - 4.6 g/dL    Globulin 5.9 (H) 2.3 - 3.5 g/dL    A-G Ratio 0.4 (L) 1.2 - 3.5     GLUCOSE, POC    Collection Time: 02/14/21  7:36 AM   Result Value Ref Range    Glucose (POC) 161 (H) 65 - 100 mg/dL         Physical Exam:  General - ill appearing, obese, in no apparent distress. Confused. HEENT - Normocephalic, atraumatic. Conjunctiva, tympanic membranes, and oropharynx are clear. Neck - Supple without masses, no bruits   Cardiovascular - Regular rate and rhythm. Normal S1, S2 without murmurs, rubs, or gallops. Lungs - Clear to auscultation. Abdomen - Soft, nontender with normal bowel sounds. Extremities - Peripheral pulses intact. No edema and no rashes.      Neurological examination -  Comprehension, attention, memory and reasoning are impaired. Language and speech are normal.   On cranial nerve examination, (II, III, IV, VI) pupils are equal, round, and reactive to light. Visual acuity is adequate. Visual fields are full to finger confrontation. Extraocular motility is normal. (V, VII) Face is symmetric and sensation is intact to light touch. (VIII) Hearing is intact. (IX, X) Palate and uvula elevate symmetrically.  Voice is normal. (XI) Shoulder shrug is strong and equal bilaterally. (XII)Tongue is midline. Motor examination - There is normal muscle tone and bulk. Moves all extremities spontaneously,4/5 RLE, 3/5 LLE weakness and pain with PROM. Muscle stretch reflexes are 1+ brachial, areflexic bilateral LE. Plantar response is flexor bilaterally. Sensation is intact to light touch, pinprick in all extremities. Cerebellar examination is normal finger/nose. Unable to assess gait/stance. Signed By: Dayla Bamberger, NP     February 14, 2021      Neurology attending note    Patient seen and examined. Degree of allodynia with reference to the lower extremities is clearly significantly less than when seen by Ms. Roach yesterday. Confusion is relatively moderate and not associated with agitation at the present time. Gabapentin withdrawal suspected in terms of part of the etiology however there may be a significant issue with reference to Covid encephalopathy. No evidence of seizures and I doubt that an EEG will add a great deal prognostically.     Really no specific interventions are available for this problem according to the most recent literature which we have reviewed

## 2021-02-14 NOTE — PROGRESS NOTES
Hospitalist Progress Note    2021  Admit Date: 2021  9:53 AM   NAME: Jasper Velazquez   :  1945   MRN:  [id number]   Attending: Nadir Murry DO  PCP:  Zita Christianson MD    SUBJECTIVE:   Patient is a 78 y/o M with PMH of HTN, CKD III, DM, GERD, HFrEF, that presented in the setting of altered mental status and generalized weakness. He was recently tested positive for COVID on 2021; he received his covid vaccine on . Wife found him confused in the garage unable to follow commands. CT head on admission was unremarkable for acute findings. UA was also negative. ABG, ammonia, B12 all wnl. CXR shows patchy lung opacities bilaterally, right greater than  left, regressed from before. His metabolic encephalopathy was suspicious for 2/2 covid infection. He remains non-hypoxic during hospitalization. His home sedating meds including gabapentin and amitrityline were held initially. He was found to have increase in confusion and CT head was repeated which shows no acute abnormalities. He was unable to have an MRI brain done due to his defibrillator. Neurology was consulted and recommended that his encephalopathy was most likely secondary to covid infection and gabapentin withdrawals. This Am pt was found without a gown on in bed complaining that he was cold and to put some clothes on him. After his gown was placed on him, he complained of being hot. He was alert to person and place this morning but not time. Still confused but able to follows commands better than he did yesterday. Denies fever, chills, SOB, chest pain abdominal pain.     Review of Systems negative with exception of pertinent positives noted above  PHYSICAL EXAM     Visit Vitals  BP (!) 145/79 (BP 1 Location: Left arm, BP Patient Position: At rest)   Pulse 88   Temp 97.5 °F (36.4 °C)   Resp 18   Ht 5' 10\" (1.778 m)   Wt 104.9 kg (231 lb 3.2 oz)   SpO2 95%   BMI 33.17 kg/m²      Temp (24hrs), Av.7 °F (36.5 °C), Min:96.9 °F (36.1 °C), Max:98.7 °F (37.1 °C)    Oxygen Therapy  O2 Sat (%): 95 % (02/14/21 1048)  Pulse via Oximetry: 102 beats per minute (02/12/21 0928)  O2 Device: Room air (02/14/21 0815)    Intake/Output Summary (Last 24 hours) at 2/14/2021 1127  Last data filed at 2/13/2021 1726  Gross per 24 hour   Intake 100 ml   Output    Net 100 ml      General: No acute distress. Lungs:  CTA Bilaterally. Heart:  Regular rate and rhythm,  No murmur, rub, or gallop  Abdomen: Soft, Non distended, Non tender, Positive bowel sounds  Extremities: No cyanosis, clubbing or edema  Neurologic:  Unable to assess d/t pt only following basic commands, oriented to self and place and knows his birth year. LLE pain so unable to move. Sensation intact. CT HEAD WO CONT   Final Result      1. No acute intracranial abnormality. CPT code 39997            CT HEAD WO CONT   Final Result   1. No acute intracranial abnormality. 2. Increased sinus disease findings now at the sphenoid sinus. XR CHEST PORT   Final Result   There are now patchy lung opacities bilaterally, right greater than   left, regressed from before. This is a nonspecific pattern but with a history   probably represents multifocal Covid 19 viral pneumonitis changes.                       ASSESSMENT      Active Hospital Problems    Diagnosis Date Noted    CAP (community acquired pneumonia) 02/13/2021    Sepsis ([facility] [address] 02/13/2021    Acute metabolic encephalopathy 78/59/1972    DANIELLA (acute kidney injury) ([facility] [address] 02/09/2021    COVID-19 02/09/2021    Type 2 diabetes mellitus with hyperglycemia, with long-term current use of insulin ([facility] [address] 09/23/2020    Gastroesophageal reflux disease without esophagitis 06/09/2020    Class 2 severe obesity with serious comorbidity and body mass index (BMI) of 39.0 to 39.9 in adult [facility]) 06/09/2020    Stage 3 chronic kidney disease 06/09/2020    Chronic systolic CHF (congestive heart failure) ([facility] [address] 06/25/2014     EF 20% by echo September 2011  Echo October 2012: EF improved to 35-40%, no significant valve disease  Jan 2014: images so poor even with Definity contrast an EF could not be estimated, only \"probably moderately depressed\". Mar 2014 - MUGA - EF 33%  Mar 2015 - even with contrast, difficult to see, EF estimated 45-50%   Oct 2016 - EF 43%, aortic root 3.8  Sep 2017 - 50-55% with distal apical dyskinesis, mild to moderate MR, AI, and PI  Oct 2018 - 54% with apical wma, impaired relaxation, no significant valvular regurgitation  Oct 2019- Echo EF normal, cannot assess regional wma d/t poor endocardial border definition, declined contrast.  No TR envelope to assess RVSP. Abnormal DF  Jan 2021- vasodilator perfusion study inferior fixed defect, no ischemia, EF 49%       Plan:    Acute metabolic encephalopathy likely in the setting of covid infection (positive 2/5/21)  -Per note on admission pt was awake but oriented x0. CT head without acute intracranial abnormality. ABG w/o acidosis of hypercapnia. -UA negative for UTI  -Avoid sedating meds  -Fall precautions  -Delirium precautions   -Ammonia, B12, TSH-unremarkable  -Repeat CT head 2/13 unremarkable. Unable to obtain MRI brain d/t defibrillator   -Neurology consulted and recommended that this is most likely delirium 2/2 superimposed covid infection and gabapentin withdrawals. S/o.   -Restarted home gabapentin at low dose 100mg q8h. -Resume home amitriptyline at low dose  -CTM     Sepsis 2/2  CAP  COVID-19 infection with mild bibasilar infiltrates  Meets sepsis criteria with RR>20 and WBC >12K with procal 0.37 on 2/12. Covid positive on 2/5/21. CXR shows b/l patchy lung opacities. Currently not hypoxic. Procal negative initially but now positive 0.37  -Hold off on steroids  -Not candidate for remdesivir or plasma since not hypoxic   -Vitamin C, vitamin D, zinc  -Abx: Doxycycline/Rocephine (2/13-. ..)    CKD III, mild azotemia, resolved  -S/p gentle hydration with IV fluid hydration  -Avoid nephrotoxic agents  -Cr back to baseline      Insulin-dependent diabetes mellitus type 2  -Lantus, sliding scale insulin  -BS ACHS     Hypertension  Continue carvedilol. Resume home spironolactone.     HFrEF  -NM stress test in 1/21/21 showing EF 49%  -Not on acute exacerbation  -Holding lasix due to acute kidney injury initially  -Resume home spironolactone low dose. Monitor renal function and BP, if tolerates can resume home valsartan and also Lasix every other day.     GERD  -Continue PPI    FANTA  -Cont home CPAP    DVT Prophylaxis: Heparin SQ  Dispo: PT/OT recommended SNF. Pending.     Signed By: Nikolas Odonnell DO     February 14, 2021

## 2021-02-14 NOTE — PROGRESS NOTES
Shift assessment completed at time. Alert and oriented x 2 with periods of confusion. NAD distress noted on room air. Denies pain and other needs. Bed alarm on. bed in lowest position and locked. Call light within reach and pt is instructed to call for assistance.

## 2021-02-14 NOTE — PROGRESS NOTES
Patient complains of 'constant leg pain' when I entered the room. After hanging IV medications, he said the pain went away. Will continue to monitor

## 2021-02-15 LAB
ALBUMIN SERPL-MCNC: 2.2 G/DL (ref 3.2–4.6)
ALBUMIN/GLOB SERPL: 0.4 {RATIO} (ref 1.2–3.5)
ALP SERPL-CCNC: 118 U/L (ref 50–136)
ALT SERPL-CCNC: 76 U/L (ref 12–65)
ANION GAP SERPL CALC-SCNC: 7 MMOL/L (ref 7–16)
AST SERPL-CCNC: 57 U/L (ref 15–37)
BASOPHILS # BLD: 0 K/UL (ref 0–0.2)
BASOPHILS NFR BLD: 0 % (ref 0–2)
BILIRUB SERPL-MCNC: 0.5 MG/DL (ref 0.2–1.1)
BUN SERPL-MCNC: 38 MG/DL (ref 8–23)
CALCIUM SERPL-MCNC: 9.3 MG/DL (ref 8.3–10.4)
CHLORIDE SERPL-SCNC: 107 MMOL/L (ref 98–107)
CO2 SERPL-SCNC: 23 MMOL/L (ref 21–32)
CREAT SERPL-MCNC: 1.23 MG/DL (ref 0.8–1.5)
DIFFERENTIAL METHOD BLD: ABNORMAL
EOSINOPHIL # BLD: 0.2 K/UL (ref 0–0.8)
EOSINOPHIL NFR BLD: 2 % (ref 0.5–7.8)
ERYTHROCYTE [DISTWIDTH] IN BLOOD BY AUTOMATED COUNT: 14 % (ref 11.9–14.6)
GLOBULIN SER CALC-MCNC: 5.7 G/DL (ref 2.3–3.5)
GLUCOSE BLD STRIP.AUTO-MCNC: 145 MG/DL (ref 65–100)
GLUCOSE BLD STRIP.AUTO-MCNC: 175 MG/DL (ref 65–100)
GLUCOSE BLD STRIP.AUTO-MCNC: 227 MG/DL (ref 65–100)
GLUCOSE BLD STRIP.AUTO-MCNC: 241 MG/DL (ref 65–100)
GLUCOSE SERPL-MCNC: 208 MG/DL (ref 65–100)
HCT VFR BLD AUTO: 41.3 % (ref 41.1–50.3)
HGB BLD-MCNC: 13.2 G/DL (ref 13.6–17.2)
IMM GRANULOCYTES # BLD AUTO: 0 K/UL (ref 0–0.5)
IMM GRANULOCYTES NFR BLD AUTO: 0 % (ref 0–5)
LYMPHOCYTES # BLD: 1.3 K/UL (ref 0.5–4.6)
LYMPHOCYTES NFR BLD: 17 % (ref 13–44)
MAGNESIUM SERPL-MCNC: 2 MG/DL (ref 1.8–2.4)
MCH RBC QN AUTO: 27.1 PG (ref 26.1–32.9)
MCHC RBC AUTO-ENTMCNC: 32 G/DL (ref 31.4–35)
MCV RBC AUTO: 84.8 FL (ref 79.6–97.8)
MONOCYTES # BLD: 1.1 K/UL (ref 0.1–1.3)
MONOCYTES NFR BLD: 14 % (ref 4–12)
NEUTS SEG # BLD: 5.1 K/UL (ref 1.7–8.2)
NEUTS SEG NFR BLD: 67 % (ref 43–78)
NRBC # BLD: 0 K/UL (ref 0–0.2)
PLATELET # BLD AUTO: 324 K/UL (ref 150–450)
PLATELET COMMENTS,PCOM: ADEQUATE
PMV BLD AUTO: 11 FL (ref 9.4–12.3)
POTASSIUM SERPL-SCNC: 4.3 MMOL/L (ref 3.5–5.1)
PROT SERPL-MCNC: 7.9 G/DL (ref 6.3–8.2)
RBC # BLD AUTO: 4.87 M/UL (ref 4.23–5.6)
RBC MORPH BLD: ABNORMAL
SODIUM SERPL-SCNC: 137 MMOL/L (ref 138–145)
WBC # BLD AUTO: 7.7 K/UL (ref 4.3–11.1)
WBC MORPH BLD: ABNORMAL

## 2021-02-15 PROCEDURE — 82962 GLUCOSE BLOOD TEST: CPT

## 2021-02-15 PROCEDURE — 74011250637 HC RX REV CODE- 250/637: Performed by: NURSE PRACTITIONER

## 2021-02-15 PROCEDURE — 36415 COLL VENOUS BLD VENIPUNCTURE: CPT

## 2021-02-15 PROCEDURE — 74011000258 HC RX REV CODE- 258: Performed by: FAMILY MEDICINE

## 2021-02-15 PROCEDURE — 74011250636 HC RX REV CODE- 250/636: Performed by: HOSPITALIST

## 2021-02-15 PROCEDURE — 80053 COMPREHEN METABOLIC PANEL: CPT

## 2021-02-15 PROCEDURE — 74011636637 HC RX REV CODE- 636/637: Performed by: HOSPITALIST

## 2021-02-15 PROCEDURE — 74011250637 HC RX REV CODE- 250/637: Performed by: HOSPITALIST

## 2021-02-15 PROCEDURE — 74011250637 HC RX REV CODE- 250/637: Performed by: FAMILY MEDICINE

## 2021-02-15 PROCEDURE — 97530 THERAPEUTIC ACTIVITIES: CPT | Performed by: PHYSICAL THERAPIST

## 2021-02-15 PROCEDURE — 65660000000 HC RM CCU STEPDOWN

## 2021-02-15 PROCEDURE — 74011250636 HC RX REV CODE- 250/636: Performed by: FAMILY MEDICINE

## 2021-02-15 PROCEDURE — 83735 ASSAY OF MAGNESIUM: CPT

## 2021-02-15 PROCEDURE — 85025 COMPLETE CBC W/AUTO DIFF WBC: CPT

## 2021-02-15 RX ORDER — GABAPENTIN 300 MG/1
300 CAPSULE ORAL 3 TIMES DAILY
Status: DISCONTINUED | OUTPATIENT
Start: 2021-02-15 | End: 2021-02-19 | Stop reason: HOSPADM

## 2021-02-15 RX ADMIN — ZINC SULFATE 220 MG (50 MG) CAPSULE 1 CAPSULE: CAPSULE at 14:21

## 2021-02-15 RX ADMIN — SPIRONOLACTONE 12.5 MG: 25 TABLET ORAL at 08:20

## 2021-02-15 RX ADMIN — AMITRIPTYLINE HYDROCHLORIDE 10 MG: 10 TABLET, FILM COATED ORAL at 21:19

## 2021-02-15 RX ADMIN — HEPARIN SODIUM 5000 UNITS: 5000 INJECTION INTRAVENOUS; SUBCUTANEOUS at 14:21

## 2021-02-15 RX ADMIN — INSULIN LISPRO 4 UNITS: 100 INJECTION, SOLUTION INTRAVENOUS; SUBCUTANEOUS at 17:07

## 2021-02-15 RX ADMIN — INSULIN GLARGINE 20 UNITS: 100 INJECTION, SOLUTION SUBCUTANEOUS at 08:24

## 2021-02-15 RX ADMIN — CEFTRIAXONE SODIUM 1 G: 1 INJECTION, POWDER, FOR SOLUTION INTRAMUSCULAR; INTRAVENOUS at 14:21

## 2021-02-15 RX ADMIN — INSULIN LISPRO 4 UNITS: 100 INJECTION, SOLUTION INTRAVENOUS; SUBCUTANEOUS at 12:08

## 2021-02-15 RX ADMIN — Medication 10 ML: at 05:39

## 2021-02-15 RX ADMIN — Medication 10 ML: at 14:00

## 2021-02-15 RX ADMIN — ROSUVASTATIN 10 MG: 10 TABLET, FILM COATED ORAL at 21:19

## 2021-02-15 RX ADMIN — HEPARIN SODIUM 5000 UNITS: 5000 INJECTION INTRAVENOUS; SUBCUTANEOUS at 05:38

## 2021-02-15 RX ADMIN — MELATONIN 2000 UNITS: at 08:21

## 2021-02-15 RX ADMIN — GABAPENTIN 100 MG: 100 CAPSULE ORAL at 08:20

## 2021-02-15 RX ADMIN — RDII 250 MG CAPSULE 250 MG: at 08:20

## 2021-02-15 RX ADMIN — CARVEDILOL 25 MG: 25 TABLET, FILM COATED ORAL at 08:21

## 2021-02-15 RX ADMIN — PANTOPRAZOLE SODIUM 40 MG: 40 TABLET, DELAYED RELEASE ORAL at 05:41

## 2021-02-15 RX ADMIN — CARVEDILOL 25 MG: 25 TABLET, FILM COATED ORAL at 17:07

## 2021-02-15 RX ADMIN — DOXYCYCLINE HYCLATE 100 MG: 100 CAPSULE ORAL at 21:20

## 2021-02-15 RX ADMIN — B-COMPLEX W/ C & FOLIC ACID TAB 1 TABLET: TAB at 08:20

## 2021-02-15 RX ADMIN — GABAPENTIN 300 MG: 300 CAPSULE ORAL at 17:07

## 2021-02-15 RX ADMIN — FLUTICASONE PROPIONATE 2 SPRAY: 50 SPRAY, METERED NASAL at 09:00

## 2021-02-15 RX ADMIN — GABAPENTIN 300 MG: 300 CAPSULE ORAL at 21:19

## 2021-02-15 RX ADMIN — Medication 1000 MG: at 08:20

## 2021-02-15 RX ADMIN — ASPIRIN 81 MG 81 MG: 81 TABLET ORAL at 05:38

## 2021-02-15 RX ADMIN — Medication 10 ML: at 21:32

## 2021-02-15 RX ADMIN — DOXYCYCLINE HYCLATE 100 MG: 100 CAPSULE ORAL at 08:21

## 2021-02-15 RX ADMIN — RDII 250 MG CAPSULE 250 MG: at 17:07

## 2021-02-15 RX ADMIN — HEPARIN SODIUM 5000 UNITS: 5000 INJECTION INTRAVENOUS; SUBCUTANEOUS at 21:19

## 2021-02-15 RX ADMIN — CYANOCOBALAMIN TAB 1000 MCG 1000 MCG: 1000 TAB at 08:20

## 2021-02-15 RX ADMIN — INSULIN LISPRO 2 UNITS: 100 INJECTION, SOLUTION INTRAVENOUS; SUBCUTANEOUS at 08:24

## 2021-02-15 NOTE — PROGRESS NOTES
Hospitalist Progress Note    2/15/2021  Admit Date: 2021  9:53 AM   NAME: Giorgio Shi   :  1945   MRN:  [id number]   Attending: Sunita Garcia DO  PCP:  Mar Hilario MD    SUBJECTIVE:   Patient is a 76 y/o M with PMH of HTN, CKD III, DM, GERD, HFrEF, that presented in the setting of altered mental status and generalized weakness. He was recently tested positive for COVID on 2021; he received his covid vaccine on . Wife found him confused in the garage unable to follow commands. CT head on admission was unremarkable for acute findings. UA was also negative. ABG, ammonia, B12 all wnl. CXR shows patchy lung opacities bilaterally, right greater than  left, regressed from before. His metabolic encephalopathy was suspicious for 2/2 covid infection. He remains non-hypoxic during hospitalization. His home sedating meds including gabapentin and amitrityline were held initially. He was found to have increase in confusion and CT head was repeated which shows no acute abnormalities. He was unable to have an MRI brain done due to his defibrillator. Neurology was consulted and recommended that his encephalopathy was most likely secondary to covid infection and gabapentin withdrawals. This AM mentation seems to be slowly improving. He was alert, knew he was in the hospital and year but could not recall his home address. Remembers his wife's and pet's names. Intermittent confusion still but follows commands better today. Denies fever, chills, SOB, chest pain abdominal pain.     Review of Systems negative with exception of pertinent positives noted above  PHYSICAL EXAM     Visit Vitals  BP (!) 152/80   Pulse 89   Temp 99 °F (37.2 °C)   Resp 18   Ht 5' 10\" (1.778 m)   Wt 106.8 kg (235 lb 8 oz)   SpO2 98%   BMI 33.79 kg/m²      Temp (24hrs), Av.6 °F (37 °C), Min:97.5 °F (36.4 °C), Max:99.9 °F (37.7 °C)    Oxygen Therapy  O2 Sat (%): 98 % (02/15/21 0739)  Pulse via Oximetry: 102 beats per minute (02/12/21 6779)  O2 Device: Room air (02/15/21 0820)    Intake/Output Summary (Last 24 hours) at 2/15/2021 0932  Last data filed at 2/14/2021 1231  Gross per 24 hour   Intake 818 ml   Output    Net 818 ml      General: No acute distress. Lungs:  CTA Bilaterally. Heart:  Regular rate and rhythm,  No murmur, rub, or gallop  Abdomen: Soft, Non distended, Non tender, Positive bowel sounds  Extremities: No cyanosis, clubbing or edema  Neurologic:  No focal deficits. Difficulty moving his b/l LE's d/t pain. Follows basic commands well today but still with intermittent confusion. CT HEAD WO CONT   Final Result      1. No acute intracranial abnormality. CPT code 69314            CT HEAD WO CONT   Final Result   1. No acute intracranial abnormality. 2. Increased sinus disease findings now at the sphenoid sinus. XR CHEST PORT   Final Result   There are now patchy lung opacities bilaterally, right greater than   left, regressed from before. This is a nonspecific pattern but with a history   probably represents multifocal Covid 19 viral pneumonitis changes.                       ASSESSMENT      Active Hospital Problems    Diagnosis Date Noted    CAP (community acquired pneumonia) 02/13/2021    Sepsis ([facility] [address] 02/13/2021    Acute metabolic encephalopathy 19/29/5882    DANIELLA (acute kidney injury) ([facility] [address] 02/09/2021    COVID-19 02/09/2021    Type 2 diabetes mellitus with hyperglycemia, with long-term current use of insulin ([facility] [address] 09/23/2020    Gastroesophageal reflux disease without esophagitis 06/09/2020    Class 2 severe obesity with serious comorbidity and body mass index (BMI) of 39.0 to 39.9 in adult [facility]) 06/09/2020    Stage 3 chronic kidney disease 06/09/2020    Chronic systolic CHF (congestive heart failure) ([facility] [address] 06/25/2014     EF 20% by echo September 2011  Echo October 2012: EF improved to 35-40%, no significant valve disease  Jan 2014: images so poor even with Definity contrast an EF could not be estimated, only \"probably moderately depressed\". Mar 2014 - MUGA - EF 33%  Mar 2015 - even with contrast, difficult to see, EF estimated 45-50%   Oct 2016 - EF 43%, aortic root 3.8  Sep 2017 - 50-55% with distal apical dyskinesis, mild to moderate MR, AI, and PI  Oct 2018 - 54% with apical wma, impaired relaxation, no significant valvular regurgitation  Oct 2019- Echo EF normal, cannot assess regional wma d/t poor endocardial border definition, declined contrast.  No TR envelope to assess RVSP. Abnormal DF  Jan 2021- vasodilator perfusion study inferior fixed defect, no ischemia, EF 49%       Plan:    Acute metabolic encephalopathy likely in the setting of covid infection (positive 2/5/21)  -Per note on admission pt was awake but oriented x0. CT head without acute intracranial abnormality. ABG w/o acidosis of hypercapnia. -UA negative for UTI  -Avoid sedating meds  -Fall precautions  -Delirium precautions   -Ammonia, B12, TSH-unremarkable  -Repeat CT head 2/13 unremarkable. Unable to obtain MRI brain d/t defibrillator   -Neurology consulted and recommended that this is most likely delirium 2/2 superimposed covid infection and gabapentin withdrawals. S/o.   -Resumed home gabapentin at adjusted dose  -Resumed home amitriptyline at low dose  -CTM. Anticipate SNF on discharge.      Sepsis 2/2  CAP  COVID-19 infection with mild bibasilar infiltrates  Meets sepsis criteria with RR>20 and WBC >12K with procal 0.37 on 2/12. Covid positive on 2/5/21. CXR shows b/l patchy lung opacities. Currently not hypoxic. Procal negative initially but now positive 0.37  -Hold off on steroids  -Not candidate for remdesivir or plasma since not hypoxic   -Vitamin C, vitamin D, zinc  -Abx: Doxycycline/Rocephine (2/13-. ..)  -Improving    CKD III, mild azotemia, resolved  -S/p gentle hydration with IV fluid hydration  -Avoid nephrotoxic agents  -Cr back to baseline      Insulin-dependent diabetes mellitus type 2  -Lantus, sliding scale insulin  -BS ACHS     Hypertension  Continue carvedilol. Resume home spironolactone.     HFrEF  -NM stress test in 1/21/21 showing EF 49%  -Not on acute exacerbation  -Holding lasix due to acute kidney injury initially  -Resume home spironolactone low dose. Monitor renal function and BP, if tolerates can resume home valsartan and also Lasix every other day.     GERD  -Continue PPI    FANTA  -Cont home CPAP    DVT Prophylaxis: Heparin SQ  Dispo: PT/OT recommended SNF. Pending.     Signed By: Andrew Madrid DO     February 15, 2021

## 2021-02-15 NOTE — PROGRESS NOTES
ACUTE PHYSICAL THERAPY GOALS:  (Developed with and agreed upon by patient and/or caregiver. )  LTG:  (1.)Mr. Lissette Stephens will move from supine to sit and sit to supine , scoot up and down and roll side to side in bed with MINIMAL ASSIST within 7 treatment day(s). (2.)Mr. Lissette Stephens will transfer from bed to chair and chair to bed with MINIMAL ASSIST using the least restrictive device within 7 treatment day(s). (3.)Mr. Lissette Stephens will ambulate with MINIMAL ASSIST for 100 feet with the least restrictive device within 7 treatment day(s). (4.)Mr. Lissette Stephens will tolerate at least 30 min of dynamic standing activity to assist standing ADLs with the least restrictive device within 7 treatment days. PHYSICAL THERAPY: Daily Note and PM Treatment Day # 3    Zaid Chavira is a 77 y.o. male   PRIMARY DIAGNOSIS: Acute metabolic encephalopathy  Acute metabolic encephalopathy [F66.08]         ASSESSMENT:     REHAB RECOMMENDATIONS: CURRENT LEVEL OF FUNCTION:  (Most Recently Demonstrated)   Recommendation to date pending progress:  Setting:   Short-term Rehab  Equipment:    To Be Determined Bed Mobility:   Maximal Assistance x 2  Sit to Stand:   Maximal Assistance x 2  Transfers:   Unable to perform  Gait/Mobility:   Unable to perform     ASSESSMENT:  Mr. Lissette Stephens presents in supine, A&Ox2. He moves to EOB w/ max Ax2 w/ maximal multi-modal cues for trunk control. He stands once w/ max Ax2 and RW, but is unable to stand fully a second time (was able to partially unweight buttocks). He then rolled w/max Ax2 for pericare. At end of session, he is supine in bed with all needs within reach, RN notified, alarm on. SUBJECTIVE:   Mr. Lissette Stephens states, \"Will you hold my hand? \"    SOCIAL HISTORY/ LIVING ENVIRONMENT:      OBJECTIVE:     PAIN: VITAL SIGNS: LINES/DRAINS:   Pre Treatment:  0  Post Treatment: 0   IV  O2 Device: Room air     MOBILITY: I Mod I S SBA CGA Min Mod Max Total  NT x2 Comments:   Bed Mobility    Rolling [] [] [] [] [] [] [] [x] [] [] [x]    Supine to Sit [] [] [] [] [] [] [] [x] [] [] [x]    Scooting [] [] [] [] [] [] [] [x] [] [] [x]    Sit to Supine [] [] [] [] [] [] [] [x] [] [] [x]    Transfers    Sit to Stand [] [] [] [] [] [] [] [x] [] [x] [x]    Bed to Chair [] [] [] [] [] [] [] [] [] [x] []    Stand to Sit [] [] [] [] [] [] [] [x] [] [x] [x]    I=Independent, Mod I=Modified Independent, S=Supervision, SBA=Standby Assistance, CGA=Contact Guard Assistance,   Min=Minimal Assistance, Mod=Moderate Assistance, Max=Maximal Assistance, Total=Total Assistance, NT=Not Tested    GAIT: I Mod I S SBA CGA Min Mod Max Total  NT x2 Comments:   Level of Assistance [] [] [] [] [] [] [] [] [] [x] []    Distance n/a    DME N/A    Gait Quality n/a    Weightbearing  Status N/A     I=Independent, Mod I=Modified Independent, S=Supervision, SBA=Standby Assistance, CGA=Contact Guard Assistance,   Min=Minimal Assistance, Mod=Moderate Assistance, Max=Maximal Assistance, Total=Total Assistance, NT=Not Tested    PLAN:   FREQUENCY/DURATION: PT Plan of Care: 3 times/week for duration of hospital stay or until stated goals are met, whichever comes first.  TREATMENT:     TREATMENT:   ($$ Therapeutic Activity: 23-37 mins    )  Therapeutic Activity (30 Minutes): Therapeutic activity included Rolling, Supine to Sit, Sit to Supine, Scooting, Lateral Scooting, Transfer Training and Standing balance to improve functional Mobility, Strength, ROM and Activity tolerance.     AFTER TREATMENT POSITION/PRECAUTIONS:  Bed, Needs within reach and RN notified    INTERDISCIPLINARY COLLABORATION:  RN/PCT and PT/PTA    TOTAL TREATMENT DURATION:  PT Patient Time In/Time Out  Time In: [address]  Time Out: [address]

## 2021-02-15 NOTE — PROGRESS NOTES
CM made contact with spouse and discuss PT recommendation for patient. CM provided spouse with list of [facility] rehab facility. Spouse will provided facilities after she review and discuss list with sister. CM will continue to monitor.

## 2021-02-15 NOTE — PROGRESS NOTES
Pt was observed resting in bed quietly with no acute distress. Denies pain and other needs. Periodic confusion noted at times. Will monitor.

## 2021-02-16 ENCOUNTER — APPOINTMENT (OUTPATIENT)
Dept: ULTRASOUND IMAGING | Age: 76
DRG: 871 | End: 2021-02-16
Attending: FAMILY MEDICINE
Payer: MEDICARE

## 2021-02-16 PROBLEM — M79.605 LEFT LEG PAIN: Status: ACTIVE | Noted: 2021-02-16

## 2021-02-16 LAB
ALBUMIN SERPL-MCNC: 2.3 G/DL (ref 3.2–4.6)
ALBUMIN/GLOB SERPL: 0.4 {RATIO} (ref 1.2–3.5)
ALP SERPL-CCNC: 122 U/L (ref 50–136)
ALT SERPL-CCNC: 79 U/L (ref 12–65)
ANION GAP SERPL CALC-SCNC: 6 MMOL/L (ref 7–16)
AST SERPL-CCNC: 57 U/L (ref 15–37)
BASOPHILS # BLD: 0 K/UL (ref 0–0.2)
BASOPHILS NFR BLD: 1 % (ref 0–2)
BILIRUB SERPL-MCNC: 0.5 MG/DL (ref 0.2–1.1)
BUN SERPL-MCNC: 33 MG/DL (ref 8–23)
CALCIUM SERPL-MCNC: 9.5 MG/DL (ref 8.3–10.4)
CHLORIDE SERPL-SCNC: 108 MMOL/L (ref 98–107)
CO2 SERPL-SCNC: 23 MMOL/L (ref 21–32)
CREAT SERPL-MCNC: 1.25 MG/DL (ref 0.8–1.5)
DIFFERENTIAL METHOD BLD: ABNORMAL
EOSINOPHIL # BLD: 0.1 K/UL (ref 0–0.8)
EOSINOPHIL NFR BLD: 1 % (ref 0.5–7.8)
ERYTHROCYTE [DISTWIDTH] IN BLOOD BY AUTOMATED COUNT: 14.1 % (ref 11.9–14.6)
GLOBULIN SER CALC-MCNC: 5.9 G/DL (ref 2.3–3.5)
GLUCOSE BLD STRIP.AUTO-MCNC: 159 MG/DL (ref 65–100)
GLUCOSE BLD STRIP.AUTO-MCNC: 194 MG/DL (ref 65–100)
GLUCOSE BLD STRIP.AUTO-MCNC: 197 MG/DL (ref 65–100)
GLUCOSE BLD STRIP.AUTO-MCNC: 255 MG/DL (ref 65–100)
GLUCOSE SERPL-MCNC: 228 MG/DL (ref 65–100)
HCT VFR BLD AUTO: 46.8 % (ref 41.1–50.3)
HGB BLD-MCNC: 14.4 G/DL (ref 13.6–17.2)
IMM GRANULOCYTES # BLD AUTO: 0.1 K/UL (ref 0–0.5)
IMM GRANULOCYTES NFR BLD AUTO: 1 % (ref 0–5)
LYMPHOCYTES # BLD: 1.3 K/UL (ref 0.5–4.6)
LYMPHOCYTES NFR BLD: 22 % (ref 13–44)
MCH RBC QN AUTO: 27.6 PG (ref 26.1–32.9)
MCHC RBC AUTO-ENTMCNC: 30.8 G/DL (ref 31.4–35)
MCV RBC AUTO: 89.7 FL (ref 79.6–97.8)
MONOCYTES # BLD: 0.9 K/UL (ref 0.1–1.3)
MONOCYTES NFR BLD: 15 % (ref 4–12)
NEUTS SEG # BLD: 3.6 K/UL (ref 1.7–8.2)
NEUTS SEG NFR BLD: 60 % (ref 43–78)
NRBC # BLD: 0 K/UL (ref 0–0.2)
PLATELET # BLD AUTO: 260 K/UL (ref 150–450)
PMV BLD AUTO: 10.9 FL (ref 9.4–12.3)
POTASSIUM SERPL-SCNC: 4.4 MMOL/L (ref 3.5–5.1)
PROT SERPL-MCNC: 8.2 G/DL (ref 6.3–8.2)
RBC # BLD AUTO: 5.22 M/UL (ref 4.23–5.6)
SODIUM SERPL-SCNC: 137 MMOL/L (ref 136–145)
WBC # BLD AUTO: 6 K/UL (ref 4.3–11.1)

## 2021-02-16 PROCEDURE — 93971 EXTREMITY STUDY: CPT

## 2021-02-16 PROCEDURE — 74011250637 HC RX REV CODE- 250/637: Performed by: HOSPITALIST

## 2021-02-16 PROCEDURE — 80053 COMPREHEN METABOLIC PANEL: CPT

## 2021-02-16 PROCEDURE — 2709999900 HC NON-CHARGEABLE SUPPLY

## 2021-02-16 PROCEDURE — 74011250636 HC RX REV CODE- 250/636: Performed by: HOSPITALIST

## 2021-02-16 PROCEDURE — 97530 THERAPEUTIC ACTIVITIES: CPT | Performed by: PHYSICAL THERAPIST

## 2021-02-16 PROCEDURE — 65660000000 HC RM CCU STEPDOWN

## 2021-02-16 PROCEDURE — 82962 GLUCOSE BLOOD TEST: CPT

## 2021-02-16 PROCEDURE — 36415 COLL VENOUS BLD VENIPUNCTURE: CPT

## 2021-02-16 PROCEDURE — 74011250637 HC RX REV CODE- 250/637: Performed by: FAMILY MEDICINE

## 2021-02-16 PROCEDURE — 97535 SELF CARE MNGMENT TRAINING: CPT

## 2021-02-16 PROCEDURE — 74011636637 HC RX REV CODE- 636/637: Performed by: HOSPITALIST

## 2021-02-16 PROCEDURE — 85025 COMPLETE CBC W/AUTO DIFF WBC: CPT

## 2021-02-16 RX ORDER — DOXYCYCLINE 100 MG/1
100 CAPSULE ORAL EVERY 12 HOURS
Status: COMPLETED | OUTPATIENT
Start: 2021-02-16 | End: 2021-02-18

## 2021-02-16 RX ORDER — HYDROCHLOROTHIAZIDE 12.5 MG/1
25 CAPSULE ORAL DAILY
Status: DISCONTINUED | OUTPATIENT
Start: 2021-02-16 | End: 2021-02-19 | Stop reason: HOSPADM

## 2021-02-16 RX ORDER — CEFPODOXIME PROXETIL 200 MG/1
200 TABLET, FILM COATED ORAL EVERY 12 HOURS
Status: COMPLETED | OUTPATIENT
Start: 2021-02-16 | End: 2021-02-17

## 2021-02-16 RX ORDER — VALSARTAN 320 MG/1
320 TABLET ORAL DAILY
Status: DISCONTINUED | OUTPATIENT
Start: 2021-02-16 | End: 2021-02-19 | Stop reason: HOSPADM

## 2021-02-16 RX ADMIN — FLUTICASONE PROPIONATE 2 SPRAY: 50 SPRAY, METERED NASAL at 09:00

## 2021-02-16 RX ADMIN — HEPARIN SODIUM 5000 UNITS: 5000 INJECTION INTRAVENOUS; SUBCUTANEOUS at 05:42

## 2021-02-16 RX ADMIN — ROSUVASTATIN 10 MG: 10 TABLET, FILM COATED ORAL at 21:38

## 2021-02-16 RX ADMIN — ZINC SULFATE 220 MG (50 MG) CAPSULE 1 CAPSULE: CAPSULE at 12:29

## 2021-02-16 RX ADMIN — VALSARTAN 320 MG: 320 TABLET, FILM COATED ORAL at 12:29

## 2021-02-16 RX ADMIN — Medication 10 ML: at 17:25

## 2021-02-16 RX ADMIN — INSULIN LISPRO 6 UNITS: 100 INJECTION, SOLUTION INTRAVENOUS; SUBCUTANEOUS at 12:30

## 2021-02-16 RX ADMIN — Medication 1000 MG: at 08:49

## 2021-02-16 RX ADMIN — CARVEDILOL 25 MG: 25 TABLET, FILM COATED ORAL at 17:24

## 2021-02-16 RX ADMIN — HEPARIN SODIUM 5000 UNITS: 5000 INJECTION INTRAVENOUS; SUBCUTANEOUS at 12:30

## 2021-02-16 RX ADMIN — Medication 10 ML: at 21:43

## 2021-02-16 RX ADMIN — INSULIN LISPRO 2 UNITS: 100 INJECTION, SOLUTION INTRAVENOUS; SUBCUTANEOUS at 21:34

## 2021-02-16 RX ADMIN — CARVEDILOL 25 MG: 25 TABLET, FILM COATED ORAL at 08:50

## 2021-02-16 RX ADMIN — HEPARIN SODIUM 5000 UNITS: 5000 INJECTION INTRAVENOUS; SUBCUTANEOUS at 21:38

## 2021-02-16 RX ADMIN — ASPIRIN 81 MG 81 MG: 81 TABLET ORAL at 05:44

## 2021-02-16 RX ADMIN — MELATONIN 2000 UNITS: at 08:49

## 2021-02-16 RX ADMIN — INSULIN LISPRO 2 UNITS: 100 INJECTION, SOLUTION INTRAVENOUS; SUBCUTANEOUS at 08:50

## 2021-02-16 RX ADMIN — INSULIN LISPRO 2 UNITS: 100 INJECTION, SOLUTION INTRAVENOUS; SUBCUTANEOUS at 17:08

## 2021-02-16 RX ADMIN — HYDROCHLOROTHIAZIDE 25 MG: 12.5 CAPSULE ORAL at 12:28

## 2021-02-16 RX ADMIN — GABAPENTIN 300 MG: 300 CAPSULE ORAL at 17:25

## 2021-02-16 RX ADMIN — B-COMPLEX W/ C & FOLIC ACID TAB 1 TABLET: TAB at 08:49

## 2021-02-16 RX ADMIN — DOXYCYCLINE HYCLATE 100 MG: 100 CAPSULE ORAL at 08:49

## 2021-02-16 RX ADMIN — GABAPENTIN 300 MG: 300 CAPSULE ORAL at 21:38

## 2021-02-16 RX ADMIN — PANTOPRAZOLE SODIUM 40 MG: 40 TABLET, DELAYED RELEASE ORAL at 05:44

## 2021-02-16 RX ADMIN — CYANOCOBALAMIN TAB 1000 MCG 1000 MCG: 1000 TAB at 08:49

## 2021-02-16 RX ADMIN — DOXYCYCLINE HYCLATE 100 MG: 100 CAPSULE ORAL at 21:38

## 2021-02-16 RX ADMIN — Medication 10 ML: at 05:31

## 2021-02-16 RX ADMIN — RDII 250 MG CAPSULE 250 MG: at 17:25

## 2021-02-16 RX ADMIN — CEFPODOXIME PROXETIL 200 MG: 200 TABLET, FILM COATED ORAL at 21:38

## 2021-02-16 RX ADMIN — RDII 250 MG CAPSULE 250 MG: at 08:49

## 2021-02-16 RX ADMIN — SPIRONOLACTONE 12.5 MG: 25 TABLET ORAL at 08:49

## 2021-02-16 RX ADMIN — CEFPODOXIME PROXETIL 200 MG: 200 TABLET, FILM COATED ORAL at 12:29

## 2021-02-16 RX ADMIN — INSULIN GLARGINE 20 UNITS: 100 INJECTION, SOLUTION SUBCUTANEOUS at 08:51

## 2021-02-16 RX ADMIN — GABAPENTIN 300 MG: 300 CAPSULE ORAL at 08:50

## 2021-02-16 RX ADMIN — AMITRIPTYLINE HYDROCHLORIDE 10 MG: 10 TABLET, FILM COATED ORAL at 21:38

## 2021-02-16 NOTE — PROGRESS NOTES
Pt is resting in bed but c/o being tired laying in bed. Pt was repositioned in bed. No acute distress noted at this time. Pt is on room air. Will continue to monitor.

## 2021-02-16 NOTE — PROGRESS NOTES
ACUTE PHYSICAL THERAPY GOALS:  (Developed with and agreed upon by patient and/or caregiver. )  LTG:  (1.)Mr. Alvaro Gutierrez will move from supine to sit and sit to supine , scoot up and down and roll side to side in bed with MINIMAL ASSIST within 7 treatment day(s). (2.)Mr. Alvaro Gutierrez will transfer from bed to chair and chair to bed with MINIMAL ASSIST using the least restrictive device within 7 treatment day(s). (3.)Mr. Alvaro Gutierrez will ambulate with MINIMAL ASSIST for 100 feet with the least restrictive device within 7 treatment day(s). (4.)Mr. Alvaro Gutierrez will tolerate at least 30 min of dynamic standing activity to assist standing ADLs with the least restrictive device within 7 treatment days. PHYSICAL THERAPY: Daily Note and PM Treatment Day # 4    Amy Gavin is a 77 y.o. male   PRIMARY DIAGNOSIS: Acute metabolic encephalopathy  Acute metabolic encephalopathy [I84.88]         ASSESSMENT:     REHAB RECOMMENDATIONS: CURRENT LEVEL OF FUNCTION:  (Most Recently Demonstrated)   Recommendation to date pending progress:  Setting:   Short-term Rehab  Equipment:    To Be Determined Bed Mobility:   Maximal Assistance x 2  Sit to Stand:   Maximal Assistance x 2  Transfers:   Moderate Assistance x 2  Gait/Mobility:   Unable to perform     ASSESSMENT:  Mr. Alvaro Gutierrez presents in supine, A&O2-3. He moves to EOB faster today, w/ max Ax2. He stands twice w/ max Ax2 and RW, holding a standing position for about 10 seconds each (brief change done), before returning to sitting w/ mod Ax2. Lateral scooting is performed w/ mod-max Ax2 before returning supine, all needs within reach, RN notified.       SUBJECTIVE:   Mr. Alvaro Gutierrez states, \"I need to pee\"    SOCIAL HISTORY/ LIVING ENVIRONMENT:      OBJECTIVE:     PAIN: VITAL SIGNS: LINES/DRAINS:   Pre Treatment: Pain Screen  Pain Scale 1: Numeric (0 - 10)  Pain Intensity 1: 0  Post Treatment: 0   IV  O2 Device: Room air     MOBILITY: I Mod I S SBA CGA Min Mod Max Total  NT x2 Comments:   Bed Mobility Rolling [] [] [] [] [] [] [] [x] [] [] [x]    Supine to Sit [] [] [] [] [] [] [] [x] [] [] [x]    Scooting [] [] [] [] [] [] [x] [x] [] [] [x]    Sit to Supine [] [] [] [] [] [] [] [x] [] [] [x]    Transfers    Sit to Stand [] [] [] [] [] [] [] [x] [] [x] [x]    Bed to Chair [] [] [] [] [] [] [] [] [] [x] []    Stand to Sit [] [] [] [] [] [] [] [x] [] [x] [x]    I=Independent, Mod I=Modified Independent, S=Supervision, SBA=Standby Assistance, CGA=Contact Guard Assistance,   Min=Minimal Assistance, Mod=Moderate Assistance, Max=Maximal Assistance, Total=Total Assistance, NT=Not Tested    GAIT: I Mod I S SBA CGA Min Mod Max Total  NT x2 Comments:   Level of Assistance [] [] [] [] [] [] [] [] [] [x] []    Distance n/a    DME N/A    Gait Quality n/a    Weightbearing  Status N/A     I=Independent, Mod I=Modified Independent, S=Supervision, SBA=Standby Assistance, CGA=Contact Guard Assistance,   Min=Minimal Assistance, Mod=Moderate Assistance, Max=Maximal Assistance, Total=Total Assistance, NT=Not Tested    PLAN:   FREQUENCY/DURATION: PT Plan of Care: 3 times/week for duration of hospital stay or until stated goals are met, whichever comes first.  TREATMENT:     TREATMENT:   ($$ Therapeutic Activity: 23-37 mins    )  Therapeutic Activity (28 Minutes): Therapeutic activity included Rolling, Supine to Sit, Sit to Supine, Scooting, Lateral Scooting, Transfer Training and Standing balance to improve functional Mobility, Strength, ROM and Activity tolerance.     AFTER TREATMENT POSITION/PRECAUTIONS:  Bed, Needs within reach and RN notified    INTERDISCIPLINARY COLLABORATION:  RN/PCT and PT/PTA    TOTAL TREATMENT DURATION:  PT Patient Time In/Time Out  Time In: 1330  Time Out: 1800 [address]

## 2021-02-16 NOTE — PROGRESS NOTES
Hospitalist Progress Note    2021  Admit Date: 2021  9:53 AM   NAME: Florecita Marie   :  1945   MRN:  [id number]   Attending: Raji Stuart DO  PCP:  Leigha Marr MD    SUBJECTIVE:   Patient is a 76 y/o M with PMH of HTN, CKD III, DM, GERD, HFrEF, that presented in the setting of altered mental status and generalized weakness. He was recently tested positive for COVID on 2021; he received his covid vaccine on . Wife found him confused in the garage unable to follow commands. CT head on admission was unremarkable for acute findings. UA was also negative. ABG, ammonia, B12 all wnl. CXR shows patchy lung opacities bilaterally, right greater than  left, regressed from before. His metabolic encephalopathy was suspicious for 2/2 covid infection. He remains non-hypoxic during hospitalization. His home sedating meds including gabapentin and amitrityline were held initially. He was found to have increase in confusion and CT head was repeated which shows no acute abnormalities. He was unable to have an MRI brain done due to his defibrillator. Neurology was consulted and recommended that his encephalopathy was most likely secondary to covid infection and gabapentin withdrawals. This AM pt follows commands well and alert, knew he was in the hospital but thought he was in [place], knew year. Able to recall home address and family member's names correctly. Knows his wife has covid. Mentation much improved. Complained of L leg pain. Denies fever, chills, SOB, chest pain abdominal pain.     Review of Systems negative with exception of pertinent positives noted above  PHYSICAL EXAM     Visit Vitals  BP (!) 156/58   Pulse 89   Temp 98.9 °F (37.2 °C)   Resp 20   Ht 5' 10\" (1.778 m)   Wt 106.8 kg (235 lb 8 oz)   SpO2 96%   BMI 33.79 kg/m²      Temp (24hrs), Av.3 °F (36.8 °C), Min:97.5 °F (36.4 °C), Max:98.9 °F (37.2 °C)    Oxygen Therapy  O2 Sat (%): 96 % (21 0730)  Pulse via Oximetry: 102 beats per minute (02/12/21 0928)  O2 Device: Room air (02/15/21 0820)  No intake or output data in the 24 hours ending 02/16/21 1006   General: No acute distress. Lungs:  CTA Bilaterally. Heart:  Regular rate and rhythm,  No murmur, rub, or gallop  Abdomen: Soft, Non distended, Non tender, Positive bowel sounds  Extremities: No cyanosis, clubbing or edema. L Calf pain with squeezing  Neurologic:  No focal deficits. Difficulty moving his b/l LE's d/t pain. Follows basic commands well today but still with intermittent confusion. CT HEAD WO CONT   Final Result      1. No acute intracranial abnormality. CPT code 53260            CT HEAD WO CONT   Final Result   1. No acute intracranial abnormality. 2. Increased sinus disease findings now at the sphenoid sinus. XR CHEST PORT   Final Result   There are now patchy lung opacities bilaterally, right greater than   left, regressed from before. This is a nonspecific pattern but with a history   probably represents multifocal Covid 19 viral pneumonitis changes.                 DUPLEX LOWER EXT VENOUS LEFT    (Results Pending)         ASSESSMENT      Active Hospital Problems    Diagnosis Date Noted    CAP (community acquired pneumonia) 02/13/2021    Sepsis ([facility] [address] 02/13/2021    Acute metabolic encephalopathy 81/95/5747    DANIELLA (acute kidney injury) ([facility] [address] 02/09/2021    COVID-19 02/09/2021    Type 2 diabetes mellitus with hyperglycemia, with long-term current use of insulin ([address] 09/23/2020    Gastroesophageal reflux disease without esophagitis 06/09/2020    Class 2 severe obesity with serious comorbidity and body mass index (BMI) of 39.0 to 39.9 in adult [facility]) 06/09/2020    Stage 3 chronic kidney disease 06/09/2020    Chronic systolic CHF (congestive heart failure) ([facility] [address] 06/25/2014     EF 20% by echo September 2011  Echo October 2012: EF improved to 35-40%, no significant valve disease  Jan 2014: images so poor even with Definity contrast an EF could not be estimated, only \"probably moderately depressed\". Mar 2014 - MUGA - EF 33%  Mar 2015 - even with contrast, difficult to see, EF estimated 45-50%   Oct 2016 - EF 43%, aortic root 3.8  Sep 2017 - 50-55% with distal apical dyskinesis, mild to moderate MR, AI, and PI  Oct 2018 - 54% with apical wma, impaired relaxation, no significant valvular regurgitation  Oct 2019- Echo EF normal, cannot assess regional wma d/t poor endocardial border definition, declined contrast.  No TR envelope to assess RVSP. Abnormal DF  Jan 2021- vasodilator perfusion study inferior fixed defect, no ischemia, EF 49%       Plan:    Acute metabolic encephalopathy likely in the setting of covid infection (positive 2/5/21), improved  -Per note on admission pt was awake but oriented x0. CT head without acute intracranial abnormality. ABG w/o acidosis of hypercapnia. -UA negative for UTI  -Avoid sedating meds  -Fall precautions  -Delirium precautions   -Ammonia, B12, TSH-unremarkable  -Repeat CT head 2/13 unremarkable. Unable to obtain MRI brain d/t defibrillator   -Neurology consulted and recommended that this is most likely delirium 2/2 superimposed covid infection and gabapentin withdrawals. S/o.   -Resumed home gabapentin at adjusted dose  -Resumed home amitriptyline   -Improved. SNF pending.     Sepsis 2/2  CAP  COVID-19 infection with mild bibasilar infiltrates  Meets sepsis criteria with RR>20 and WBC >12K with procal 0.37 on 2/12. Covid positive on 2/5/21. CXR shows b/l patchy lung opacities. Currently not hypoxic. Procal negative initially but now positive 0.37  -Hold off on steroids  -Not candidate for remdesivir or plasma since not hypoxic   -Vitamin C, vitamin D, zinc  -Abx: Doxycycline (2/13-2/18) Rocephine (2/13-2/16). Vantin (2/16-2/18).   -Improving, changed IV Rocephin to po to finish 5 day course for CAP    CKD III, mild azotemia, resolved  -S/p gentle hydration with IV fluid hydration  -Avoid nephrotoxic agents  -Cr back to baseline    L leg pain  Pt stated chronic, could be 2/2 neuropathy  W/ tenderness to L calf squeeze, will order LLE venous US to r/o DVT      Insulin-dependent diabetes mellitus type 2  -Lantus, sliding scale insulin  -BS ACHS     Hypertension  Continue carvedilol. Resume home spironolactone and Valsartan/Hctz.     HFrEF  -NM stress test in 1/21/21 showing EF 49%  -Not on acute exacerbation  -Holding lasix due to acute kidney injury initially  -Resume home spironolactone and Valsartan/hctz. Resume Lasix if BP tolerates.     GERD  -Continue PPI    FANTA  -Cont home CPAP    DVT Prophylaxis: Heparin SQ  Dispo: PT/OT recommended SNF. Pending.     Signed By: Ursula Arboleda DO     February 16, 2021

## 2021-02-16 NOTE — PROGRESS NOTES
ACUTE OT GOALS:  (Developed with and agreed upon by patient and/or caregiver.)    1) Patient will complete lower body bathing and dressing with setup and adaptive equipment as needed. 2) Patient will complete toileting with supervision. 3) Patient will complete functional transfers with supervision and adaptive equipment as needed. 4) Patient will tolerate at least 30 minutes of OT activity with as needed rest breaks while maintaining O2 sats >90%. 5) Patient will verbalize at least 3 energy conservation technique to utilize during ADL/IADL. Timeframe: 7 visits   OCCUPATIONAL THERAPY: Daily Note OT Treatment Day # 3    Deangelo Burns is a 77 y.o. male   PRIMARY DIAGNOSIS: Acute metabolic encephalopathy  Acute metabolic encephalopathy [Q92.20]       [place] / [place] / Product Type: Medicare /   ASSESSMENT:     REHAB RECOMMENDATIONS: CURRENT LEVEL OF FUNCTION:  (Most Recently Demonstrated)   Recommendation to date pending progress:  Setting:   Short-term Rehab  Equipment:    To Be Determined Bathing:   Not tested  Dressing:   Not tested  Feeding/Grooming:   Not tested  Toileting:   Maximal Assistance  Functional Mobility:   Not tested     ASSESSMENT:  Mr. Caesar Drake is doing a little better with command following today. Pt was able to sit EOB and use urinal. Pt required max assist to perform due to mentation and body habitus. Pt was able to stand x2 for brief change as well. Minimal but good progress made today. Will continue to benefit from skilled OT during stay.      SUBJECTIVE:   Mr. Caesar Drake states, \"I didn't know my whole family was in here\"    SOCIAL HISTORY/LIVING ENVIRONMENT:        OBJECTIVE:     PAIN: VITAL SIGNS: LINES/DRAINS:   Pre Treatment: Pain Screen  Pain Scale 1: Numeric (0 - 10)  Pain Intensity 1: 0  Post Treatment: 0   N/A  O2 Device: Room air     ACTIVITIES OF DAILY LIVING: I Mod I S SBA CGA Min Mod Max Total NT Comments   BASIC ADLs: Bathing/ Showering [] [] [] [] [] [] [] [] [] [x]    Toileting [] [] [] [] [] [] [] [x] [] []    Dressing [] [] [] [] [] [] [] [] [] [x]    Feeding [] [] [] [] [] [] [] [] [] [x]    Grooming [] [] [] [] [] [] [] [] [] [x]    Personal Device Care [] [] [] [] [] [] [] [] [] [x]    Functional Mobility [] [] [] [] [] [] [] [] [] [x]    I=Independent, Mod I=Modified Independent, S=Supervision, SBA=Standby Assistance, CGA=Contact Guard Assistance,   Min=Minimal Assistance, Mod=Moderate Assistance, Max=Maximal Assistance, Total=Total Assistance, NT=Not Tested    MOBILITY: I Mod I S SBA CGA Min Mod Max Total  NT x2 Comments:   Supine to sit [] [] [] [] [] [] [x] [] [] [] [x]    Sit to supine [] [] [] [] [] [] [x] [] [] [] [x]    Sit to stand [] [] [] [] [] [] [x] [] [] [] [x]    Bed to chair [] [] [] [] [] [] [] [] [] [x] []    I=Independent, Mod I=Modified Independent, S=Supervision, SBA=Standby Assistance, CGA=Contact Guard Assistance,   Min=Minimal Assistance, Mod=Moderate Assistance, Max=Maximal Assistance, Total=Total Assistance, NT=Not Tested    PLAN:   FREQUENCY/DURATION: OT Plan of Care: 3 times/week for duration of hospital stay or until stated goals are met, whichever comes first.    TREATMENT:   TREATMENT:   ($$ Self Care/Home Management: 23-37 mins    )  Self Care (25 Minutes): Self care including Toileting and Lower Body Dressing to increase independence and decrease level of assistance required.     AFTER TREATMENT POSITION/PRECAUTIONS:  Bed, Needs within reach and RN notified    INTERDISCIPLINARY COLLABORATION:  RN/PCT, PT/PTA and OT/ELIZONDO    TOTAL TREATMENT DURATION:  OT Patient Time In/Time Out  Time In: 6020  Time Out: [address], ROGER

## 2021-02-17 ENCOUNTER — APPOINTMENT (OUTPATIENT)
Dept: GENERAL RADIOLOGY | Age: 76
DRG: 871 | End: 2021-02-17
Attending: FAMILY MEDICINE
Payer: MEDICARE

## 2021-02-17 LAB
ALBUMIN SERPL-MCNC: 2.2 G/DL (ref 3.2–4.6)
ALBUMIN/GLOB SERPL: 0.4 {RATIO} (ref 1.2–3.5)
ALP SERPL-CCNC: 117 U/L (ref 50–136)
ALT SERPL-CCNC: 81 U/L (ref 12–65)
ANION GAP SERPL CALC-SCNC: 8 MMOL/L (ref 7–16)
AST SERPL-CCNC: 52 U/L (ref 15–37)
BASOPHILS # BLD: 0 K/UL (ref 0–0.2)
BASOPHILS NFR BLD: 0 % (ref 0–2)
BILIRUB SERPL-MCNC: 0.5 MG/DL (ref 0.2–1.1)
BUN SERPL-MCNC: 38 MG/DL (ref 8–23)
CALCIUM SERPL-MCNC: 9.5 MG/DL (ref 8.3–10.4)
CHLORIDE SERPL-SCNC: 106 MMOL/L (ref 98–107)
CO2 SERPL-SCNC: 24 MMOL/L (ref 21–32)
CREAT SERPL-MCNC: 1.28 MG/DL (ref 0.8–1.5)
DIFFERENTIAL METHOD BLD: ABNORMAL
EOSINOPHIL # BLD: 0.1 K/UL (ref 0–0.8)
EOSINOPHIL NFR BLD: 2 % (ref 0.5–7.8)
ERYTHROCYTE [DISTWIDTH] IN BLOOD BY AUTOMATED COUNT: 13.6 % (ref 11.9–14.6)
GLOBULIN SER CALC-MCNC: 5.4 G/DL (ref 2.3–3.5)
GLUCOSE BLD STRIP.AUTO-MCNC: 192 MG/DL (ref 65–100)
GLUCOSE BLD STRIP.AUTO-MCNC: 235 MG/DL (ref 65–100)
GLUCOSE BLD STRIP.AUTO-MCNC: 238 MG/DL (ref 65–100)
GLUCOSE BLD STRIP.AUTO-MCNC: 290 MG/DL (ref 65–100)
GLUCOSE SERPL-MCNC: 196 MG/DL (ref 65–100)
HCT VFR BLD AUTO: 43.6 % (ref 41.1–50.3)
HGB BLD-MCNC: 13.8 G/DL (ref 13.6–17.2)
IMM GRANULOCYTES # BLD AUTO: 0 K/UL (ref 0–0.5)
IMM GRANULOCYTES NFR BLD AUTO: 0 % (ref 0–5)
LYMPHOCYTES # BLD: 1.5 K/UL (ref 0.5–4.6)
LYMPHOCYTES NFR BLD: 21 % (ref 13–44)
MCH RBC QN AUTO: 27 PG (ref 26.1–32.9)
MCHC RBC AUTO-ENTMCNC: 31.7 G/DL (ref 31.4–35)
MCV RBC AUTO: 85.2 FL (ref 79.6–97.8)
MONOCYTES # BLD: 1.1 K/UL (ref 0.1–1.3)
MONOCYTES NFR BLD: 16 % (ref 4–12)
NEUTS SEG # BLD: 4.2 K/UL (ref 1.7–8.2)
NEUTS SEG NFR BLD: 60 % (ref 43–78)
NRBC # BLD: 0 K/UL (ref 0–0.2)
PLATELET # BLD AUTO: 309 K/UL (ref 150–450)
PMV BLD AUTO: 11.3 FL (ref 9.4–12.3)
POTASSIUM SERPL-SCNC: 4.3 MMOL/L (ref 3.5–5.1)
PROT SERPL-MCNC: 7.6 G/DL (ref 6.3–8.2)
RBC # BLD AUTO: 5.12 M/UL (ref 4.23–5.6)
SODIUM SERPL-SCNC: 138 MMOL/L (ref 138–145)
WBC # BLD AUTO: 6.9 K/UL (ref 4.3–11.1)

## 2021-02-17 PROCEDURE — 65660000000 HC RM CCU STEPDOWN

## 2021-02-17 PROCEDURE — 97530 THERAPEUTIC ACTIVITIES: CPT

## 2021-02-17 PROCEDURE — 2709999900 HC NON-CHARGEABLE SUPPLY

## 2021-02-17 PROCEDURE — 97535 SELF CARE MNGMENT TRAINING: CPT

## 2021-02-17 PROCEDURE — 85025 COMPLETE CBC W/AUTO DIFF WBC: CPT

## 2021-02-17 PROCEDURE — 74011636637 HC RX REV CODE- 636/637: Performed by: HOSPITALIST

## 2021-02-17 PROCEDURE — 74011250637 HC RX REV CODE- 250/637: Performed by: FAMILY MEDICINE

## 2021-02-17 PROCEDURE — 80053 COMPREHEN METABOLIC PANEL: CPT

## 2021-02-17 PROCEDURE — 74011250637 HC RX REV CODE- 250/637: Performed by: HOSPITALIST

## 2021-02-17 PROCEDURE — 36415 COLL VENOUS BLD VENIPUNCTURE: CPT

## 2021-02-17 PROCEDURE — 73502 X-RAY EXAM HIP UNI 2-3 VIEWS: CPT

## 2021-02-17 PROCEDURE — 82962 GLUCOSE BLOOD TEST: CPT

## 2021-02-17 PROCEDURE — 74011250636 HC RX REV CODE- 250/636: Performed by: HOSPITALIST

## 2021-02-17 RX ADMIN — INSULIN GLARGINE 20 UNITS: 100 INJECTION, SOLUTION SUBCUTANEOUS at 11:04

## 2021-02-17 RX ADMIN — FLUTICASONE PROPIONATE 2 SPRAY: 50 SPRAY, METERED NASAL at 09:00

## 2021-02-17 RX ADMIN — Medication 10 ML: at 21:08

## 2021-02-17 RX ADMIN — INSULIN LISPRO 6 UNITS: 100 INJECTION, SOLUTION INTRAVENOUS; SUBCUTANEOUS at 16:30

## 2021-02-17 RX ADMIN — VALSARTAN 320 MG: 320 TABLET, FILM COATED ORAL at 10:54

## 2021-02-17 RX ADMIN — PANTOPRAZOLE SODIUM 40 MG: 40 TABLET, DELAYED RELEASE ORAL at 09:01

## 2021-02-17 RX ADMIN — DOXYCYCLINE HYCLATE 100 MG: 100 CAPSULE ORAL at 21:08

## 2021-02-17 RX ADMIN — RDII 250 MG CAPSULE 250 MG: at 10:54

## 2021-02-17 RX ADMIN — ROSUVASTATIN 10 MG: 10 TABLET, FILM COATED ORAL at 21:08

## 2021-02-17 RX ADMIN — INSULIN LISPRO 4 UNITS: 100 INJECTION, SOLUTION INTRAVENOUS; SUBCUTANEOUS at 21:08

## 2021-02-17 RX ADMIN — HEPARIN SODIUM 5000 UNITS: 5000 INJECTION INTRAVENOUS; SUBCUTANEOUS at 05:53

## 2021-02-17 RX ADMIN — INSULIN LISPRO 2 UNITS: 100 INJECTION, SOLUTION INTRAVENOUS; SUBCUTANEOUS at 09:01

## 2021-02-17 RX ADMIN — INSULIN LISPRO 4 UNITS: 100 INJECTION, SOLUTION INTRAVENOUS; SUBCUTANEOUS at 12:31

## 2021-02-17 RX ADMIN — CEFPODOXIME PROXETIL 200 MG: 200 TABLET, FILM COATED ORAL at 10:55

## 2021-02-17 RX ADMIN — ASPIRIN 81 MG 81 MG: 81 TABLET ORAL at 09:01

## 2021-02-17 RX ADMIN — CEFPODOXIME PROXETIL 200 MG: 200 TABLET, FILM COATED ORAL at 21:07

## 2021-02-17 RX ADMIN — DOXYCYCLINE HYCLATE 100 MG: 100 CAPSULE ORAL at 10:55

## 2021-02-17 RX ADMIN — HEPARIN SODIUM 5000 UNITS: 5000 INJECTION INTRAVENOUS; SUBCUTANEOUS at 17:22

## 2021-02-17 RX ADMIN — GABAPENTIN 300 MG: 300 CAPSULE ORAL at 10:55

## 2021-02-17 RX ADMIN — Medication 10 ML: at 05:53

## 2021-02-17 RX ADMIN — HYDROCHLOROTHIAZIDE 25 MG: 12.5 CAPSULE ORAL at 10:54

## 2021-02-17 RX ADMIN — GABAPENTIN 300 MG: 300 CAPSULE ORAL at 17:22

## 2021-02-17 RX ADMIN — CYANOCOBALAMIN TAB 1000 MCG 1000 MCG: 1000 TAB at 10:55

## 2021-02-17 RX ADMIN — AMITRIPTYLINE HYDROCHLORIDE 10 MG: 10 TABLET, FILM COATED ORAL at 21:07

## 2021-02-17 RX ADMIN — GABAPENTIN 300 MG: 300 CAPSULE ORAL at 21:07

## 2021-02-17 RX ADMIN — SPIRONOLACTONE 12.5 MG: 25 TABLET ORAL at 10:54

## 2021-02-17 RX ADMIN — ZINC SULFATE 220 MG (50 MG) CAPSULE 1 CAPSULE: CAPSULE at 12:32

## 2021-02-17 RX ADMIN — CARVEDILOL 25 MG: 25 TABLET, FILM COATED ORAL at 09:01

## 2021-02-17 RX ADMIN — Medication 10 ML: at 14:00

## 2021-02-17 RX ADMIN — Medication 1000 MG: at 10:53

## 2021-02-17 RX ADMIN — B-COMPLEX W/ C & FOLIC ACID TAB 1 TABLET: TAB at 10:54

## 2021-02-17 RX ADMIN — MELATONIN 2000 UNITS: at 10:55

## 2021-02-17 RX ADMIN — HEPARIN SODIUM 5000 UNITS: 5000 INJECTION INTRAVENOUS; SUBCUTANEOUS at 21:07

## 2021-02-17 NOTE — PROGRESS NOTES
Received BSR from [address]; patient lying awake in bed; no s/s of distress/discomfort; no complaints/requests at this time.

## 2021-02-17 NOTE — PROGRESS NOTES
Hospitalist Progress Note    2021  Admit Date: 2021  9:53 AM   NAME: Yenifer Paniter   :  1945   MRN:  [id number]   Attending: Rich Mccarty DO  PCP:  Robbie Estrella MD    SUBJECTIVE:   Patient is a 78 y/o M with PMH of HTN, CKD III, DM, GERD, HFrEF, that presented in the setting of altered mental status and generalized weakness. He was recently tested positive for COVID on 2021; he received his covid vaccine on . Wife found him confused in the garage unable to follow commands. CT head on admission was unremarkable for acute findings. UA was also negative. ABG, ammonia, B12 all wnl. CXR shows patchy lung opacities bilaterally, right greater than  left, regressed from before. His metabolic encephalopathy was suspicious for 2/2 covid infection. He remains non-hypoxic during hospitalization. His home sedating meds including gabapentin and amitrityline were held initially. He was found to have increase in confusion and CT head was repeated which shows no acute abnormalities. He was unable to have an MRI brain done due to his defibrillator. Neurology was consulted and recommended that his encephalopathy was most likely secondary to covid infection and gabapentin withdrawals. Home amitriptyline and gabapentin restarted. Mentation slowly improving. SNF pending. This AM pt was alert, oriented to person and year. St[place]\" and then thought he was at home and asked about his dog. Able to recall pet's name and family members' names but slow. Follows commands well. Denies fever, chills, SOB, chest pain abdominal pain.     Review of Systems negative with exception of pertinent positives noted above  PHYSICAL EXAM     Visit Vitals  /72   Pulse 86   Temp 97.8 °F (36.6 °C)   Resp 16   Ht 5' 10\" (1.778 m)   Wt 106.8 kg (235 lb 8 oz)   SpO2 94%   BMI 33.79 kg/m²      Temp (24hrs), Av °F (36.7 °C), Min:97.5 °F (36.4 °C), Max:98.2 °F (36.8 °C)    Oxygen Therapy  O2 Sat (%): 94 % (02/17/21 0902)  Pulse via Oximetry: 102 beats per minute (02/12/21 0928)  O2 Device: Room air (02/15/21 0820)  No intake or output data in the 24 hours ending 02/17/21 1214   General: No acute distress. Lungs:  CTA Bilaterally. Heart:  Regular rate and rhythm,  No murmur, rub, or gallop  Abdomen: Soft, Non distended, Non tender, Positive bowel sounds  Extremities: No cyanosis, clubbing or edema. L Calf pain with squeezing  Neurologic:  No focal deficits. Difficulty moving his b/l LE's d/t pain. Follows basic commands well today but still with intermittent confusion. DUPLEX LOWER EXT VENOUS LEFT   Final Result   1. No evidence for left lower extremity DVT. CT HEAD WO CONT   Final Result      1. No acute intracranial abnormality. CPT code 86254            CT HEAD WO CONT   Final Result   1. No acute intracranial abnormality. 2. Increased sinus disease findings now at the sphenoid sinus. XR CHEST PORT   Final Result   There are now patchy lung opacities bilaterally, right greater than   left, regressed from before. This is a nonspecific pattern but with a history   probably represents multifocal Covid 19 viral pneumonitis changes.                       ASSESSMENT      Active Hospital Problems    Diagnosis Date Noted    Left leg pain 02/16/2021    CAP (community acquired pneumonia) 02/13/2021    Sepsis ([facility] [address] 02/13/2021    Acute metabolic encephalopathy 53/23/1833    DANIELLA (acute kidney injury) ([facility] [address] 02/09/2021    COVID-19 02/09/2021    Type 2 diabetes mellitus with hyperglycemia, with long-term current use of insulin ([facility] [address] 09/23/2020    Gastroesophageal reflux disease without esophagitis 06/09/2020    Class 2 severe obesity with serious comorbidity and body mass index (BMI) of 39.0 to 39.9 in adult [facility]) 06/09/2020    Stage 3 chronic kidney disease 06/09/2020    Chronic systolic CHF (congestive heart failure) ([facility] [address] 06/25/2014     EF 20% by echo September 2011  Echo October 2012: EF improved to 35-40%, no significant valve disease  Jan 2014: images so poor even with Definity contrast an EF could not be estimated, only \"probably moderately depressed\". Mar 2014 - MUGA - EF 33%  Mar 2015 - even with contrast, difficult to see, EF estimated 45-50%   Oct 2016 - EF 43%, aortic root 3.8  Sep 2017 - 50-55% with distal apical dyskinesis, mild to moderate MR, AI, and PI  Oct 2018 - 54% with apical wma, impaired relaxation, no significant valvular regurgitation  Oct 2019- Echo EF normal, cannot assess regional wma d/t poor endocardial border definition, declined contrast.  No TR envelope to assess RVSP. Abnormal DF  Jan 2021- vasodilator perfusion study inferior fixed defect, no ischemia, EF 49%       Plan:    Acute metabolic encephalopathy likely in the setting of covid infection (positive 2/5/21), improved  -Per note on admission pt was awake but oriented x0. CT head without acute intracranial abnormality. ABG w/o acidosis of hypercapnia. UA negative for UTI. Ammonia, B12, TSH-unremarkable. Repeat CT head 2/13 unremarkable. Unable to obtain MRI brain d/t defibrillator   -Avoid sedating meds  -Fall precautions  -Delirium precautions   -Neurology consulted and recommended that this is most likely delirium 2/2 superimposed covid infection and gabapentin withdrawals. S/o.   -Resumed home gabapentin at adjusted dose  -Resumed home amitriptyline   -Improved. SNF pending at this point.     Sepsis 2/2  CAP  COVID-19 infection with mild bibasilar infiltrates  Meets sepsis criteria with RR>20 and WBC >12K with procal 0.37 on 2/12. Covid positive on 2/5/21. CXR shows b/l patchy lung opacities. Currently not hypoxic. Procal negative initially but now positive 0.37  -Hold off on steroids  -Not candidate for remdesivir or plasma since not hypoxic   -Vitamin C, vitamin D, zinc  -Abx: Doxycycline (2/13-2/18) Rocephine (2/13-2/16). Vantin (2/16-2/18).   -Improving, changed IV Rocephin to po to finish 5 day course for CAP    CKD III, mild azotemia, resolved  -S/p gentle hydration with IV fluid hydration  -Avoid nephrotoxic agents  -Cr back to baseline    L leg pain  Pt stated chronic, could be 2/2 neuropathy  LLE venous US NEG for DVT   Obtain XR L hip as he was found down in garage on admission. Insulin-dependent diabetes mellitus type 2  -Lantus, sliding scale insulin  -BS ACHS     Hypertension  Continue carvedilol. Resumed home spironolactone and Valsartan/Hctz.     HFrEF  -NM stress test in 1/21/21 showing EF 49%  -Not on acute exacerbation  -Holding lasix due to acute kidney injury initially  -Resume home spironolactone and Valsartan/hctz. Resume Lasix if BP tolerates.     GERD  -Continue PPI    FANTA  -Cont home CPAP    DVT Prophylaxis: Heparin SQ  Dispo: PT/OT recommended SNF. Pending.     Signed By: Roselyn Juarez DO     February 17, 2021

## 2021-02-17 NOTE — PROGRESS NOTES
Chart screened by CM for d/c planning. Pt currently on RA. Mentation is improving. CM attempted to call pt but no answer. CM spoke with pt's spouse (who is now hospitalized as well) to discuss STR for pt. She states she will have her step-daughter look into the facilities and call CM with her choices. Per spouse's request CM provided her with CM's contact info. CM will continue to follow and remain available if any needs arise.

## 2021-02-17 NOTE — PROGRESS NOTES
02/16/21 2115   Incentive Spirometry Treatment   Level of Service Independent   Predicted Volume (ml) 1000 ml   Actual Volume (ml) 1000 ml   % Predicted Volume (ml) 1   Treatment Tolerance Well

## 2021-02-17 NOTE — PROGRESS NOTES
Problem: Falls - Risk of  Goal: *Absence of Falls  Description: Document Aime Cease Fall Risk and appropriate interventions in the flowsheet. Outcome: Progressing Towards Goal  Note: Fall Risk Interventions:  Mobility Interventions: Bed/chair exit alarm    Mentation Interventions: Bed/chair exit alarm    Medication Interventions: Bed/chair exit alarm    Elimination Interventions: Bed/chair exit alarm    Problem: Patient Education: Go to Patient Education Activity  Goal: Patient/Family Education  Outcome: Progressing Towards Goal     Problem: Pressure Injury - Risk of  Goal: *Prevention of pressure injury  Description: Document Benny Scale and appropriate interventions in the flowsheet.   Outcome: Progressing Towards Goal  Note: Pressure Injury Interventions:  Sensory Interventions: Assess changes in LOC    Moisture Interventions: Absorbent underpads    Activity Interventions: Increase time out of bed    Mobility Interventions: Pressure redistribution bed/mattress (bed type)    Nutrition Interventions: Document food/fluid/supplement intake    Friction and Shear Interventions: Minimize layers    Problem: Patient Education: Go to Patient Education Activity  Goal: Patient/Family Education  Outcome: Progressing Towards Goal     Problem: Patient Education: Go to Patient Education Activity  Goal: Patient/Family Education  Outcome: Progressing Towards Goal     Problem: Patient Education: Go to Patient Education Activity  Goal: Patient/Family Education  Outcome: Progressing Towards Goal     Problem: Airway Clearance - Ineffective  Goal: Achieve or maintain patent airway  Outcome: Progressing Towards Goal     Problem: Gas Exchange - Impaired  Goal: Absence of hypoxia  Outcome: Progressing Towards Goal  Goal: Promote optimal lung function  Outcome: Progressing Towards Goal     Problem: Breathing Pattern - Ineffective  Goal: Ability to achieve and maintain a regular respiratory rate  Outcome: Progressing Towards Goal Problem:  Body Temperature -  Risk of, Imbalanced  Goal: Ability to maintain a body temperature within defined limits  Outcome: Progressing Towards Goal  Goal: Will regain or maintain usual level of consciousness  Outcome: Progressing Towards Goal  Goal: Complications related to the disease process, condition or treatment will be avoided or minimized  Outcome: Progressing Towards Goal     Problem: Isolation Precautions - Risk of Spread of Infection  Goal: Prevent transmission of infectious organism to others  Outcome: Progressing Towards Goal     Problem: Nutrition Deficits  Goal: Optimize nutrtional status  Outcome: Progressing Towards Goal     Problem: Risk for Fluid Volume Deficit  Goal: Maintain normal heart rhythm  Outcome: Progressing Towards Goal  Goal: Maintain absence of muscle cramping  Outcome: Progressing Towards Goal  Goal: Maintain normal serum potassium, sodium, calcium, phosphorus, and pH  Outcome: Progressing Towards Goal     Problem: Loneliness or Risk for Loneliness  Goal: Demonstrate positive use of time alone when socialization is not possible  Outcome: Progressing Towards Goal     Problem: Fatigue  Goal: Verbalize increase energy and improved vitality  Outcome: Progressing Towards Goal     Problem: Patient Education: Go to Patient Education Activity  Goal: Patient/Family Education  Outcome: Progressing Towards Goal

## 2021-02-17 NOTE — PROGRESS NOTES
ACUTE PHYSICAL THERAPY GOALS:  (Developed with and agreed upon by patient and/or caregiver. )  LTG:  (1.)Mr. Abdullahi Harry will move from supine to sit and sit to supine , scoot up and down and roll side to side in bed with MINIMAL ASSIST within 7 treatment day(s). (2.)Mr. Abdullahi Harry will transfer from bed to chair and chair to bed with MINIMAL ASSIST using the least restrictive device within 7 treatment day(s). (3.)Mr. Abdullahi Harry will ambulate with MINIMAL ASSIST for 100 feet with the least restrictive device within 7 treatment day(s). (4.)Mr. Abdullahi Harry will tolerate at least 30 min of dynamic standing activity to assist standing ADLs with the least restrictive device within 7 treatment days. PHYSICAL THERAPY: Daily Note and PM Treatment Day # 5    Ruchi Lyon is a 77 y.o. male   PRIMARY DIAGNOSIS: Acute metabolic encephalopathy  Acute metabolic encephalopathy [R16.62]         ASSESSMENT:     REHAB RECOMMENDATIONS: CURRENT LEVEL OF FUNCTION:  (Most Recently Demonstrated)   Recommendation to date pending progress:  Setting:   Short-term Rehab  Equipment:    To Be Determined Bed Mobility:   Maximal Assistance x 2  Sit to Stand:   Moderate Assistance x 2  Transfers:   Moderate Assistance x 2  Gait/Mobility:   Moderate Assistance x 2     ASSESSMENT:  Mr. Abdullahi Harry presents with decreased strength, impaired balance, decreased range of motion and decreased cognition which are impacting his ability to perform ambulation, transfers and bed mobility at their baseline level of mobility. He currently requires moderate assist x2 to complete ambulation/transfers 4' with rolling walker. Patient presents with good rehab potential secondary to significant improvement in mobility since last therapy session. Ruchi Lyon is currently functioning below his baseline and would benefit from skilled PT during acute care stay to maximize safety and independence with functional mobility.      SUBJECTIVE:   Mr. Abdullahi Harry states, \"I am just so tired.\"    SOCIAL HISTORY/ LIVING ENVIRONMENT:      OBJECTIVE:     PAIN: VITAL SIGNS: LINES/DRAINS:   Pre Treatment: Pain Screen  Pain Scale 1: Numeric (0 - 10)  Pain Intensity 1: 0  Post Treatment: 0     O2 Device: Room air     MOBILITY: I Mod I S SBA CGA Min Mod Max Total  NT x2 Comments:   Bed Mobility    Rolling [] [] [] [] [] [] [] [x] [] [] [x]    Supine to Sit [] [] [] [] [] [] [] [x] [] [] [x]    Scooting [] [] [] [] [] [] [] [x] [] [] [x]    Sit to Supine [] [] [] [] [] [] [] [x] [] [] [x]    Transfers    Sit to Stand [] [] [] [] [] [] [x] [] [] [] [x]    Bed to Chair [] [] [] [] [] [] [] [] [] [] []    Stand to Sit [] [] [] [] [] [] [x] [] [] [] [x]    I=Independent, Mod I=Modified Independent, S=Supervision, SBA=Standby Assistance, CGA=Contact Guard Assistance,   Min=Minimal Assistance, Mod=Moderate Assistance, Max=Maximal Assistance, Total=Total Assistance, NT=Not Tested    GAIT: I Mod I S SBA CGA Min Mod Max Total  NT x2 Comments:   Level of Assistance [] [] [] [] [] [] [x] [] [] [] [x]    Distance 4'    DME Rolling Walker and Gait Belt    Gait Quality Forward cervical flexion, increased trunk sway, shuffled steps    Weightbearing  Status N/A     I=Independent, Mod I=Modified Independent, S=Supervision, SBA=Standby Assistance, CGA=Contact Guard Assistance,   Min=Minimal Assistance, Mod=Moderate Assistance, Max=Maximal Assistance, Total=Total Assistance, NT=Not Tested    PLAN:   FREQUENCY/DURATION: PT Plan of Care: 3 times/week for duration of hospital stay or until stated goals are met, whichever comes first.  TREATMENT:     TREATMENT:   ($$ Therapeutic Activity: 23-37 mins    )  Therapeutic Activity (23 Minutes): Therapeutic activity included Rolling, Supine to Sit, Sit to Supine, Scooting, Lateral Scooting, Transfer Training, Ambulation on level ground and Standing balance to improve functional Mobility, Strength, ROM and Activity tolerance.     AFTER TREATMENT POSITION/PRECAUTIONS:  Needs within reach, RN notified and on stretcher going off the floor with transport technician    INTERDISCIPLINARY COLLABORATION:  RN/PCT, PT/PTA and OT/ELIZONDO    TOTAL TREATMENT DURATION:  PT Patient Time In/Time Out  Time In: 1340  Time Out: 7601 Puneet May PT, DPT

## 2021-02-17 NOTE — PROGRESS NOTES
Update on patient's status given to Tapan Cooley, daughter ([phone]) Opportunity given to clarify information and ask questions. Will continue to monitor.

## 2021-02-17 NOTE — PROGRESS NOTES
ACUTE OT GOALS:  (Developed with and agreed upon by patient and/or caregiver.)    1) Patient will complete lower body bathing and dressing with setup and adaptive equipment as needed. 2) Patient will complete toileting with supervision. 3) Patient will complete functional transfers with supervision and adaptive equipment as needed. 4) Patient will tolerate at least 30 minutes of OT activity with as needed rest breaks while maintaining O2 sats >90%. 5) Patient will verbalize at least 3 energy conservation technique to utilize during ADL/IADL. Timeframe: 7 visits   OCCUPATIONAL THERAPY: Daily Note OT Treatment Day # 4    Savannah Mckenna is a 77 y.o. male   PRIMARY DIAGNOSIS: Acute metabolic encephalopathy  Acute metabolic encephalopathy [V42.16]       [place] / Product Type: Medicare /   ASSESSMENT:     REHAB RECOMMENDATIONS: CURRENT LEVEL OF FUNCTION:  (Most Recently Demonstrated)   Recommendation to date pending progress:  Setting:   Short-term Rehab  Equipment:    To Be Determined Bathing:   Not tested  Dressing:   Not tested  Feeding/Grooming:   Maximal Assistance for shaving using safety razor  Toileting:   Not tested  Functional Mobility:   Maximal Assistance to transfer to chair with sliding board     ASSESSMENT:  Mr. Vivian Kussmaul is doing better today. Pt was able to follow commands better and have decent conversation this session. Pt assisted with shaving today. Pt was able to reach EOB with mod/max assist and additional time. Pt was able to transfer over to chair with max assist and use of the sliding board. Pt was later assisted back to bed with same amount of assistance. Good progress made today. Will continue to benefit from skilled OT during stay.      SUBJECTIVE:   Mr. Vivian Kussmaul states, \"I got to find out where my wife's car is\"    SOCIAL HISTORY/LIVING ENVIRONMENT:        OBJECTIVE:     PAIN: VITAL SIGNS: LINES/DRAINS:   Pre Treatment: Pain Screen  Pain Scale 1: Numeric (0 - 10)  Pain Intensity 1: 0  Post Treatment: 0   N/A  O2 Device: Room air     ACTIVITIES OF DAILY LIVING: I Mod I S SBA CGA Min Mod Max Total NT Comments   BASIC ADLs:              Bathing/ Showering [] [] [] [] [] [] [] [] [] [x]    Toileting [] [] [] [] [] [] [] [] [] [x]    Dressing [] [] [] [] [] [] [] [] [] [x]    Feeding [] [] [] [] [] [] [] [] [] [x]    Grooming [] [] [] [] [] [] [] [x] [] []    Personal Device Care [] [] [] [] [] [] [] [] [] [x]    Functional Mobility [] [] [] [] [] [] [] [] [] [x]    I=Independent, Mod I=Modified Independent, S=Supervision, SBA=Standby Assistance, CGA=Contact Guard Assistance,   Min=Minimal Assistance, Mod=Moderate Assistance, Max=Maximal Assistance, Total=Total Assistance, NT=Not Tested    MOBILITY: I Mod I S SBA CGA Min Mod Max Total  NT x2 Comments:   Supine to sit [] [] [] [] [] [] [x] [x] [] [] [] Additional time   Sit to supine [] [] [] [] [] [] [x] [] [] [] [x]    Sit to stand [] [] [] [] [] [] [] [] [] [x] []    Bed to chair [] [] [] [] [] [] [] [x] [] [] [] Sliding board   I=Independent, Mod I=Modified Independent, S=Supervision, SBA=Standby Assistance, CGA=Contact Guard Assistance,   Min=Minimal Assistance, Mod=Moderate Assistance, Max=Maximal Assistance, Total=Total Assistance, NT=Not Tested    PLAN:   FREQUENCY/DURATION: OT Plan of Care: 3 times/week for duration of hospital stay or until stated goals are met, whichever comes first.    TREATMENT:   TREATMENT:   ($$ Self Care/Home Management: 8-22 mins$$ Therapeutic Activity: 23-37 mins   )  Therapeutic Activity (20 Minutes): Therapeutic activity included Supine to Sit, Lateral Scooting, Transfer Training and Sitting balance  to improve functional Mobility, Strength and Activity tolerance. Self Care (25 Minutes): Self care including Grooming to increase independence and decrease level of assistance required.     AFTER TREATMENT POSITION/PRECAUTIONS:  Bed, Needs within reach and RN notified    INTERDISCIPLINARY COLLABORATION:  RN/PCT and OT/ELIZONDO    TOTAL TREATMENT DURATION:  OT Patient Time In/Time Out  Time In: 1010  Time Out: [address]

## 2021-02-18 LAB
ALBUMIN SERPL-MCNC: 2.1 G/DL (ref 3.2–4.6)
ALBUMIN/GLOB SERPL: 0.4 {RATIO} (ref 1.2–3.5)
ALP SERPL-CCNC: 106 U/L (ref 50–136)
ALT SERPL-CCNC: 71 U/L (ref 12–65)
ANION GAP SERPL CALC-SCNC: 10 MMOL/L (ref 7–16)
AST SERPL-CCNC: 44 U/L (ref 15–37)
BACTERIA SPEC CULT: NORMAL
BACTERIA SPEC CULT: NORMAL
BASOPHILS # BLD: 0 K/UL (ref 0–0.2)
BASOPHILS NFR BLD: 0 % (ref 0–2)
BILIRUB SERPL-MCNC: 0.4 MG/DL (ref 0.2–1.1)
BUN SERPL-MCNC: 57 MG/DL (ref 8–23)
CALCIUM SERPL-MCNC: 9.6 MG/DL (ref 8.3–10.4)
CHLORIDE SERPL-SCNC: 107 MMOL/L (ref 98–107)
CO2 SERPL-SCNC: 22 MMOL/L (ref 21–32)
CREAT SERPL-MCNC: 2.17 MG/DL (ref 0.8–1.5)
DIFFERENTIAL METHOD BLD: ABNORMAL
EOSINOPHIL # BLD: 0.1 K/UL (ref 0–0.8)
EOSINOPHIL NFR BLD: 2 % (ref 0.5–7.8)
ERYTHROCYTE [DISTWIDTH] IN BLOOD BY AUTOMATED COUNT: 13.8 % (ref 11.9–14.6)
GLOBULIN SER CALC-MCNC: 5.2 G/DL (ref 2.3–3.5)
GLUCOSE BLD STRIP.AUTO-MCNC: 194 MG/DL (ref 65–100)
GLUCOSE BLD STRIP.AUTO-MCNC: 210 MG/DL (ref 65–100)
GLUCOSE BLD STRIP.AUTO-MCNC: 253 MG/DL (ref 65–100)
GLUCOSE BLD STRIP.AUTO-MCNC: 256 MG/DL (ref 65–100)
GLUCOSE SERPL-MCNC: 179 MG/DL (ref 65–100)
HCT VFR BLD AUTO: 39 % (ref 41.1–50.3)
HGB BLD-MCNC: 12.4 G/DL (ref 13.6–17.2)
IMM GRANULOCYTES # BLD AUTO: 0 K/UL (ref 0–0.5)
IMM GRANULOCYTES NFR BLD AUTO: 1 % (ref 0–5)
LYMPHOCYTES # BLD: 2.3 K/UL (ref 0.5–4.6)
LYMPHOCYTES NFR BLD: 26 % (ref 13–44)
MCH RBC QN AUTO: 27.4 PG (ref 26.1–32.9)
MCHC RBC AUTO-ENTMCNC: 31.8 G/DL (ref 31.4–35)
MCV RBC AUTO: 86.3 FL (ref 79.6–97.8)
MONOCYTES # BLD: 1.3 K/UL (ref 0.1–1.3)
MONOCYTES NFR BLD: 15 % (ref 4–12)
NEUTS SEG # BLD: 5 K/UL (ref 1.7–8.2)
NEUTS SEG NFR BLD: 57 % (ref 43–78)
NRBC # BLD: 0 K/UL (ref 0–0.2)
PLATELET # BLD AUTO: 322 K/UL (ref 150–450)
PMV BLD AUTO: 11.3 FL (ref 9.4–12.3)
POTASSIUM SERPL-SCNC: 4.3 MMOL/L (ref 3.5–5.1)
PROT SERPL-MCNC: 7.3 G/DL (ref 6.3–8.2)
RBC # BLD AUTO: 4.52 M/UL (ref 4.23–5.6)
SERVICE CMNT-IMP: NORMAL
SERVICE CMNT-IMP: NORMAL
SODIUM SERPL-SCNC: 139 MMOL/L (ref 136–145)
WBC # BLD AUTO: 8.8 K/UL (ref 4.3–11.1)

## 2021-02-18 PROCEDURE — 74011250636 HC RX REV CODE- 250/636: Performed by: HOSPITALIST

## 2021-02-18 PROCEDURE — 80053 COMPREHEN METABOLIC PANEL: CPT

## 2021-02-18 PROCEDURE — 74011250637 HC RX REV CODE- 250/637: Performed by: FAMILY MEDICINE

## 2021-02-18 PROCEDURE — 74011636637 HC RX REV CODE- 636/637: Performed by: INTERNAL MEDICINE

## 2021-02-18 PROCEDURE — 74011636637 HC RX REV CODE- 636/637: Performed by: HOSPITALIST

## 2021-02-18 PROCEDURE — 65660000000 HC RM CCU STEPDOWN

## 2021-02-18 PROCEDURE — 82962 GLUCOSE BLOOD TEST: CPT

## 2021-02-18 PROCEDURE — 74011250637 HC RX REV CODE- 250/637: Performed by: HOSPITALIST

## 2021-02-18 PROCEDURE — 85025 COMPLETE CBC W/AUTO DIFF WBC: CPT

## 2021-02-18 RX ORDER — INSULIN GLARGINE 100 [IU]/ML
22 INJECTION, SOLUTION SUBCUTANEOUS DAILY
Status: DISCONTINUED | OUTPATIENT
Start: 2021-02-19 | End: 2021-02-19 | Stop reason: HOSPADM

## 2021-02-18 RX ORDER — INSULIN LISPRO 100 [IU]/ML
3 INJECTION, SOLUTION INTRAVENOUS; SUBCUTANEOUS
Status: DISCONTINUED | OUTPATIENT
Start: 2021-02-18 | End: 2021-02-19 | Stop reason: HOSPADM

## 2021-02-18 RX ADMIN — MELATONIN 2000 UNITS: at 09:00

## 2021-02-18 RX ADMIN — INSULIN LISPRO 3 UNITS: 100 INJECTION, SOLUTION INTRAVENOUS; SUBCUTANEOUS at 17:28

## 2021-02-18 RX ADMIN — ZINC SULFATE 220 MG (50 MG) CAPSULE 1 CAPSULE: CAPSULE at 12:00

## 2021-02-18 RX ADMIN — PANTOPRAZOLE SODIUM 40 MG: 40 TABLET, DELAYED RELEASE ORAL at 07:30

## 2021-02-18 RX ADMIN — CYANOCOBALAMIN TAB 1000 MCG 1000 MCG: 1000 TAB at 09:00

## 2021-02-18 RX ADMIN — INSULIN GLARGINE 20 UNITS: 100 INJECTION, SOLUTION SUBCUTANEOUS at 09:00

## 2021-02-18 RX ADMIN — SPIRONOLACTONE 12.5 MG: 25 TABLET ORAL at 09:00

## 2021-02-18 RX ADMIN — ROSUVASTATIN 10 MG: 10 TABLET, FILM COATED ORAL at 21:47

## 2021-02-18 RX ADMIN — Medication 1000 MG: at 09:00

## 2021-02-18 RX ADMIN — INSULIN LISPRO 6 UNITS: 100 INJECTION, SOLUTION INTRAVENOUS; SUBCUTANEOUS at 17:27

## 2021-02-18 RX ADMIN — Medication 10 ML: at 21:48

## 2021-02-18 RX ADMIN — HEPARIN SODIUM 5000 UNITS: 5000 INJECTION INTRAVENOUS; SUBCUTANEOUS at 21:47

## 2021-02-18 RX ADMIN — Medication 10 ML: at 05:17

## 2021-02-18 RX ADMIN — INSULIN LISPRO 3 UNITS: 100 INJECTION, SOLUTION INTRAVENOUS; SUBCUTANEOUS at 13:24

## 2021-02-18 RX ADMIN — GABAPENTIN 300 MG: 300 CAPSULE ORAL at 21:47

## 2021-02-18 RX ADMIN — INSULIN LISPRO 4 UNITS: 100 INJECTION, SOLUTION INTRAVENOUS; SUBCUTANEOUS at 21:47

## 2021-02-18 RX ADMIN — Medication 10 ML: at 14:00

## 2021-02-18 RX ADMIN — HEPARIN SODIUM 5000 UNITS: 5000 INJECTION INTRAVENOUS; SUBCUTANEOUS at 05:17

## 2021-02-18 RX ADMIN — INSULIN LISPRO 2 UNITS: 100 INJECTION, SOLUTION INTRAVENOUS; SUBCUTANEOUS at 08:40

## 2021-02-18 RX ADMIN — VALSARTAN 320 MG: 320 TABLET, FILM COATED ORAL at 09:00

## 2021-02-18 RX ADMIN — AMITRIPTYLINE HYDROCHLORIDE 10 MG: 10 TABLET, FILM COATED ORAL at 21:47

## 2021-02-18 RX ADMIN — DOXYCYCLINE HYCLATE 100 MG: 100 CAPSULE ORAL at 09:00

## 2021-02-18 RX ADMIN — ASPIRIN 81 MG 81 MG: 81 TABLET ORAL at 07:00

## 2021-02-18 RX ADMIN — GABAPENTIN 300 MG: 300 CAPSULE ORAL at 09:00

## 2021-02-18 RX ADMIN — FLUTICASONE PROPIONATE 2 SPRAY: 50 SPRAY, METERED NASAL at 09:00

## 2021-02-18 RX ADMIN — RDII 250 MG CAPSULE 250 MG: at 09:00

## 2021-02-18 RX ADMIN — B-COMPLEX W/ C & FOLIC ACID TAB 1 TABLET: TAB at 09:00

## 2021-02-18 RX ADMIN — HEPARIN SODIUM 5000 UNITS: 5000 INJECTION INTRAVENOUS; SUBCUTANEOUS at 13:30

## 2021-02-18 RX ADMIN — RDII 250 MG CAPSULE 250 MG: at 17:28

## 2021-02-18 RX ADMIN — INSULIN LISPRO 6 UNITS: 100 INJECTION, SOLUTION INTRAVENOUS; SUBCUTANEOUS at 13:23

## 2021-02-18 RX ADMIN — GABAPENTIN 300 MG: 300 CAPSULE ORAL at 17:28

## 2021-02-18 NOTE — PROGRESS NOTES
02/17/21 2105   Incentive Spirometry Treatment   Level of Service Independent   Predicted Volume (ml) 1000 ml   Actual Volume (ml) 1000 ml   % Predicted Volume (ml) 1   Treatment Tolerance Well

## 2021-02-18 NOTE — PROGRESS NOTES
CM received a call from pt's sister-in-law Linda Elizalde [phone] to discuss STR options. Pt's spouse is also hospitalized and unable to contact SNFs to ask questions so Krystin Sosabianka is assisting. Per family's request CM will make a referral to [address] [place] [address] for STR. Krystin Linares stated she is available for any needs with which she can assist.  CM will follow up with referral.    1344 - CM received a bed offer from [address] [place] [address]. 36 - Pt has been accepted for STR at [address] [place] [address]. DANIEL notified Dr. Vu Mccall via [employer] and the current plan is for pt to d/c tomorrow. CM updated pt's sister-in-law.

## 2021-02-18 NOTE — PROGRESS NOTES
Problem: Falls - Risk of  Goal: *Absence of Falls  Description: Document Earl Julio César Fall Risk and appropriate interventions in the flowsheet. Outcome: Progressing Towards Goal  Note: Fall Risk Interventions:  Mobility Interventions: Bed/chair exit alarm, Communicate number of staff needed for ambulation/transfer, Patient to call before getting OOB    Mentation Interventions: Adequate sleep, hydration, pain control, Bed/chair exit alarm, Room close to nurse's station, Reorient patient    Medication Interventions: Bed/chair exit alarm, Patient to call before getting OOB    Elimination Interventions: Call light in reach, Bed/chair exit alarm, Toilet paper/wipes in reach, Toileting schedule/hourly rounds      Problem: Patient Education: Go to Patient Education Activity  Goal: Patient/Family Education  Outcome: Progressing Towards Goal     Problem: Pressure Injury - Risk of  Goal: *Prevention of pressure injury  Description: Document Benny Scale and appropriate interventions in the flowsheet.   Outcome: Progressing Towards Goal  Note: Pressure Injury Interventions:  Sensory Interventions: Assess changes in LOC, Keep linens dry and wrinkle-free, Pressure redistribution bed/mattress (bed type)    Moisture Interventions: Apply protective barrier, creams and emollients, Check for incontinence Q2 hours and as needed, Absorbent underpads    Activity Interventions: Pressure redistribution bed/mattress(bed type)    Mobility Interventions: Pressure redistribution bed/mattress (bed type), HOB 30 degrees or less    Nutrition Interventions: Offer support with meals,snacks and hydration    Friction and Shear Interventions: HOB 30 degrees or less, Apply protective barrier, creams and emollients    Problem: Patient Education: Go to Patient Education Activity  Goal: Patient/Family Education  Outcome: Progressing Towards Goal     Problem: Patient Education: Go to Patient Education Activity  Goal: Patient/Family Education  Outcome: Progressing Towards Goal     Problem: Patient Education: Go to Patient Education Activity  Goal: Patient/Family Education  Outcome: Progressing Towards Goal     Problem: Airway Clearance - Ineffective  Goal: Achieve or maintain patent airway  Outcome: Progressing Towards Goal     Problem: Gas Exchange - Impaired  Goal: Absence of hypoxia  Outcome: Progressing Towards Goal  Goal: Promote optimal lung function  Outcome: Progressing Towards Goal     Problem: Breathing Pattern - Ineffective  Goal: Ability to achieve and maintain a regular respiratory rate  Outcome: Progressing Towards Goal     Problem:  Body Temperature -  Risk of, Imbalanced  Goal: Ability to maintain a body temperature within defined limits  Outcome: Progressing Towards Goal  Goal: Will regain or maintain usual level of consciousness  Outcome: Progressing Towards Goal  Goal: Complications related to the disease process, condition or treatment will be avoided or minimized  Outcome: Progressing Towards Goal     Problem: Isolation Precautions - Risk of Spread of Infection  Goal: Prevent transmission of infectious organism to others  Outcome: Progressing Towards Goal     Problem: Nutrition Deficits  Goal: Optimize nutrtional status  Outcome: Progressing Towards Goal     Problem: Risk for Fluid Volume Deficit  Goal: Maintain normal heart rhythm  Outcome: Progressing Towards Goal  Goal: Maintain absence of muscle cramping  Outcome: Progressing Towards Goal  Goal: Maintain normal serum potassium, sodium, calcium, phosphorus, and pH  Outcome: Progressing Towards Goal     Problem: Loneliness or Risk for Loneliness  Goal: Demonstrate positive use of time alone when socialization is not possible  Outcome: Progressing Towards Goal     Problem: Fatigue  Goal: Verbalize increase energy and improved vitality  Outcome: Progressing Towards Goal     Problem: Patient Education: Go to Patient Education Activity  Goal: Patient/Family Education  Outcome: Progressing Towards Goal

## 2021-02-18 NOTE — PROGRESS NOTES
Received BSR from [facility]; patient lying in bed, resting quietly; staff present in the room; no s/s of distress/ discomfort; no complaints/requests at this time.

## 2021-02-18 NOTE — PROGRESS NOTES
Attempted to return call to Malathi Clarke, brother ([phone]) to give update. Message left on voice mail to return call to [phone].

## 2021-02-18 NOTE — DIABETES MGMT
Accessed chart related to glycemic control to see if patient would benefit from diabetes management services. Patient glucose yesterday ranged 192-290 with patient receiving Lantus 20 units and Humalog 16 units. Blood glucose this morning was 194 and 256 at lunch. Patient would likely benefit from a small increase in basal insulin and initiation of a small dose of prandial insulin. Provider updated via [employer] regarding patient glycemic control and recommendations.

## 2021-02-18 NOTE — PROGRESS NOTES
[facility] Hospitalist Service Progress Note      Assessment / Plan:    Acute metabolic encephalopathy suspected to be secondary to a positive COVID-19-  infection as well as not being on his gabapentin and amitriptyline. Continue current medical management. Continue current medications. It is resolving slowly. -    Sepsis secondary to community-acquired pneumonia and COVID-19 infection-   Sepsis has resolved. He has completed doxycycline and Rocephin and will complete Vantin today. Chronic kidney disease stage III with mild azotemia-the mild azotemia was resolved. Creatinine trending up today. Will monitor. We will continue to avoid nephrotoxins and dose medications for his renal function    Left lower leg pain-chronic in nature. Likely secondary to neuropathy. Dopplers were negative for DVT. Hip x-rays with no evidence of fracture personally reviewed by me    Insulin-dependent diabetes type 2continue the Lantus and sliding scale we will add standing Premeal insulin at 3 units. Diabetic management input appreciated. Hypertensioncontinue Coreg, spironolactone valsartan and HCTZ    Heart failure with reduced EFwe will continue current medical management. He is not in acute exacerbation. Last known EF was 49% on a nuclear medicine stress test done on January 21, 2021. He is not being getting Lasix secondary to DANIELLA. We will continue to monitor both. We will also place him on daily weight to monitor when he will need reinstatement of his diuretics. GERDcontinue PPI    FANTAcontinue home CPAP    Further work-up and management based on his clinical course    He is awaiting placement to a SNF    Chief Complaint : Positive For Covid-19      Subjective:  As per prior documentation:    \"anh is a 76 y/o M with PMH of HTN, CKD III, DM, GERD, HFrEF, that presented in the setting of altered mental status and generalized weakness.  He was recently tested positive for COVID on 2/5/2021; he received his covid vaccine on 1/26. Wife found him confused in the garage unable to follow commands. CT head on admission was unremarkable for acute findings. UA was also negative. ABG, ammonia, B12 all wnl. CXR shows patchy lung opacities bilaterally, right greater than  left, regressed from before. His metabolic encephalopathy was suspicious for 2/2 covid infection. He remains non-hypoxic during hospitalization. His home sedating meds including gabapentin and amitrityline were held initially. He was found to have increase in confusion and CT head was repeated which shows no acute abnormalities. He was unable to have an MRI brain done due to his defibrillator. Neurology was consulted and recommended that his encephalopathy was most likely secondary to covid infection and gabapentin withdrawals. Home amitriptyline and gabapentin restarted. Mentation slowly improving. SNF pending.     This AM pt was alert, oriented to person and year. Stated he was in \"the nut house\" and then thought he was at home and asked about his dog. Able to recall pet's name and family members' names but slow. Follows commands well. Denies fever, chills, SOB, chest pain abdominal pain. \"      February 18, 2021patient seen and evaluated. No adverse overnight events reported no fevers no chills no nausea or vomiting. He would like to know when he can leave and when he can see his family. He is concerned that he has had no update on how they are doing with the COVID-19.   He has some mild confusion with the date but is easily reoriented    Objective:  Visit Vitals  BP (!) 104/57 (BP 1 Location: Left upper arm, BP Patient Position: At rest)   Pulse 89   Temp 98 °F (36.7 °C)   Resp 18   Ht 5' 10\" (1.778 m)   Wt 106.8 kg (235 lb 8 oz)   SpO2 96%   BMI 33.79 kg/m²                 Physical Exam:  General: No acute distress, speaking in full sentences, no use of accessory muscles   HEENT: Pupils equal and reactive to light and accommodation, oropharynx is clear Neck: Supple, no lymphadenopathy, no JVD   Lungs: Clear to auscultation bilaterally   Cardiovascular: Regular rate and rhythm with normal S1 and S2   Abdomen: Soft, nontender, nondistended, normoactive bowel sounds   Extremities: No cyanosis clubbing or edema   Neuro: Nonfocal, A&O x3   Psych: Normal affect     Intake and Output:  Date 02/17/21 0700 - 02/18/21 0659 02/18/21 0700 - 02/19/21 0659   Shift 0700-1859 1900-0659 24 Hour Total 0700-1859 1900-0659 24 Hour Total   INTAKE   Shift Total(mL/kg)         OUTPUT   Urine(mL/kg/hr)  175(0.1) 175(0.1)        Urine Voided  175 175        Urine Occurrence(s) 1 x 1 x 2 x      Emesis/NG output           Emesis Occurrence(s) 0 x  0 x      Stool           Stool Occurrence(s) 0 x  0 x      Shift Total(mL/kg)  175(1.6) 175(1.6)      NET  -175 -175      Weight (kg) 106.8 106.8 106.8 106.8 106.8 106.8       LAB:  No results displayed because visit has over 200 results. IMAGING:  Xr Hip Lt W Or Wo Pelv 2-3 Vws    Result Date: 2/17/2021  Unremarkable left hip radiographs. Ct Head Wo Cont    Result Date: 2/13/2021  1. No acute intracranial abnormality. CPT code 64660     Duplex Lower Ext Venous Left    Result Date: 2/16/2021  1. No evidence for left lower extremity DVT. EKG:  No results found for this or any previous visit. Davina Calles MD  2/18/2021 7:45 AM      This note was created with the help of Dragon voice recognition software. Although the note was reviewed for errors and corrected were noted, additional errors and inconsistencies may remain in this note secondary to the use of voice recognition software.

## 2021-02-19 ENCOUNTER — APPOINTMENT (OUTPATIENT)
Dept: GENERAL RADIOLOGY | Age: 76
DRG: 871 | End: 2021-02-19
Attending: INTERNAL MEDICINE
Payer: MEDICARE

## 2021-02-19 VITALS
HEIGHT: 70 IN | WEIGHT: 235.1 LBS | SYSTOLIC BLOOD PRESSURE: 121 MMHG | DIASTOLIC BLOOD PRESSURE: 73 MMHG | OXYGEN SATURATION: 98 % | RESPIRATION RATE: 20 BRPM | BODY MASS INDEX: 33.66 KG/M2 | HEART RATE: 93 BPM | TEMPERATURE: 98.4 F

## 2021-02-19 LAB
ALBUMIN SERPL-MCNC: 2.2 G/DL (ref 3.2–4.6)
ALBUMIN/GLOB SERPL: 0.4 {RATIO} (ref 1.2–3.5)
ALP SERPL-CCNC: 110 U/L (ref 50–136)
ALT SERPL-CCNC: 82 U/L (ref 12–65)
ANION GAP SERPL CALC-SCNC: 8 MMOL/L (ref 7–16)
AST SERPL-CCNC: 48 U/L (ref 15–37)
BASOPHILS # BLD: 0 K/UL (ref 0–0.2)
BASOPHILS NFR BLD: 0 % (ref 0–2)
BILIRUB SERPL-MCNC: 0.5 MG/DL (ref 0.2–1.1)
BUN SERPL-MCNC: 66 MG/DL (ref 8–23)
CALCIUM SERPL-MCNC: 9.4 MG/DL (ref 8.3–10.4)
CHLORIDE SERPL-SCNC: 105 MMOL/L (ref 98–107)
CO2 SERPL-SCNC: 22 MMOL/L (ref 21–32)
CREAT SERPL-MCNC: 2.04 MG/DL (ref 0.8–1.5)
DIFFERENTIAL METHOD BLD: ABNORMAL
EOSINOPHIL # BLD: 0.2 K/UL (ref 0–0.8)
EOSINOPHIL NFR BLD: 2 % (ref 0.5–7.8)
ERYTHROCYTE [DISTWIDTH] IN BLOOD BY AUTOMATED COUNT: 14 % (ref 11.9–14.6)
GLOBULIN SER CALC-MCNC: 5 G/DL (ref 2.3–3.5)
GLUCOSE BLD STRIP.AUTO-MCNC: 214 MG/DL (ref 65–100)
GLUCOSE BLD STRIP.AUTO-MCNC: 255 MG/DL (ref 65–100)
GLUCOSE BLD STRIP.AUTO-MCNC: 266 MG/DL (ref 65–100)
GLUCOSE SERPL-MCNC: 188 MG/DL (ref 65–100)
HCT VFR BLD AUTO: 38.8 % (ref 41.1–50.3)
HGB BLD-MCNC: 12.3 G/DL (ref 13.6–17.2)
IMM GRANULOCYTES # BLD AUTO: 0 K/UL (ref 0–0.5)
IMM GRANULOCYTES NFR BLD AUTO: 0 % (ref 0–5)
LYMPHOCYTES # BLD: 2.2 K/UL (ref 0.5–4.6)
LYMPHOCYTES NFR BLD: 23 % (ref 13–44)
MCH RBC QN AUTO: 27.2 PG (ref 26.1–32.9)
MCHC RBC AUTO-ENTMCNC: 31.7 G/DL (ref 31.4–35)
MCV RBC AUTO: 85.7 FL (ref 79.6–97.8)
MONOCYTES # BLD: 1.3 K/UL (ref 0.1–1.3)
MONOCYTES NFR BLD: 14 % (ref 4–12)
NEUTS SEG # BLD: 5.9 K/UL (ref 1.7–8.2)
NEUTS SEG NFR BLD: 61 % (ref 43–78)
NRBC # BLD: 0 K/UL (ref 0–0.2)
PLATELET # BLD AUTO: 310 K/UL (ref 150–450)
PMV BLD AUTO: 10.6 FL (ref 9.4–12.3)
POTASSIUM SERPL-SCNC: 4.7 MMOL/L (ref 3.5–5.1)
PROT SERPL-MCNC: 7.2 G/DL (ref 6.3–8.2)
RBC # BLD AUTO: 4.53 M/UL (ref 4.23–5.6)
SODIUM SERPL-SCNC: 135 MMOL/L (ref 138–145)
WBC # BLD AUTO: 9.7 K/UL (ref 4.3–11.1)

## 2021-02-19 PROCEDURE — 74011250637 HC RX REV CODE- 250/637: Performed by: FAMILY MEDICINE

## 2021-02-19 PROCEDURE — 97530 THERAPEUTIC ACTIVITIES: CPT

## 2021-02-19 PROCEDURE — 97535 SELF CARE MNGMENT TRAINING: CPT

## 2021-02-19 PROCEDURE — 74011636637 HC RX REV CODE- 636/637: Performed by: HOSPITALIST

## 2021-02-19 PROCEDURE — 74011250636 HC RX REV CODE- 250/636: Performed by: INTERNAL MEDICINE

## 2021-02-19 PROCEDURE — 71045 X-RAY EXAM CHEST 1 VIEW: CPT

## 2021-02-19 PROCEDURE — 74011636637 HC RX REV CODE- 636/637: Performed by: INTERNAL MEDICINE

## 2021-02-19 PROCEDURE — 2709999900 HC NON-CHARGEABLE SUPPLY

## 2021-02-19 PROCEDURE — 80053 COMPREHEN METABOLIC PANEL: CPT

## 2021-02-19 PROCEDURE — 85025 COMPLETE CBC W/AUTO DIFF WBC: CPT

## 2021-02-19 PROCEDURE — 74011250637 HC RX REV CODE- 250/637: Performed by: HOSPITALIST

## 2021-02-19 PROCEDURE — 82962 GLUCOSE BLOOD TEST: CPT

## 2021-02-19 PROCEDURE — 74011250636 HC RX REV CODE- 250/636: Performed by: HOSPITALIST

## 2021-02-19 RX ORDER — CARVEDILOL 3.12 MG/1
25 TABLET ORAL 2 TIMES DAILY WITH MEALS
Qty: 60 TAB | Refills: 0 | Status: SHIPPED
Start: 2021-02-19 | End: 2021-02-19 | Stop reason: SDUPTHER

## 2021-02-19 RX ORDER — GUAIFENESIN/DEXTROMETHORPHAN 100-10MG/5
5 SYRUP ORAL
Qty: 118 ML | Refills: 0 | Status: SHIPPED | OUTPATIENT
Start: 2021-02-19 | End: 2021-03-01

## 2021-02-19 RX ORDER — VALSARTAN 80 MG/1
80 TABLET ORAL DAILY
Qty: 30 TAB | Refills: 0 | Status: SHIPPED | OUTPATIENT
Start: 2021-02-19 | End: 2021-02-19

## 2021-02-19 RX ORDER — FUROSEMIDE 10 MG/ML
20 INJECTION INTRAMUSCULAR; INTRAVENOUS ONCE
Status: COMPLETED | OUTPATIENT
Start: 2021-02-19 | End: 2021-02-19

## 2021-02-19 RX ORDER — INSULIN LISPRO 100 [IU]/ML
3 INJECTION, SOLUTION INTRAVENOUS; SUBCUTANEOUS
Qty: 1 VIAL | Refills: 0 | Status: SHIPPED
Start: 2021-02-19 | End: 2021-03-09 | Stop reason: ALTCHOICE

## 2021-02-19 RX ORDER — FUROSEMIDE 20 MG/1
20 TABLET ORAL
Qty: 15 TAB | Refills: 0 | Status: SHIPPED
Start: 2021-02-19 | End: 2021-03-09 | Stop reason: SDUPTHER

## 2021-02-19 RX ORDER — FLUTICASONE PROPIONATE 50 MCG
2 SPRAY, SUSPENSION (ML) NASAL DAILY
Qty: 1 BOTTLE | Refills: 0 | Status: SHIPPED
Start: 2021-02-19 | End: 2021-05-25

## 2021-02-19 RX ORDER — HYDROCHLOROTHIAZIDE 12.5 MG/1
25 CAPSULE ORAL DAILY
Qty: 30 CAP | Refills: 0 | Status: SHIPPED
Start: 2021-02-19 | End: 2021-03-09 | Stop reason: ALTCHOICE

## 2021-02-19 RX ORDER — INSULIN LISPRO 100 [IU]/ML
INJECTION, SOLUTION INTRAVENOUS; SUBCUTANEOUS
Qty: 1 VIAL | Refills: 0 | Status: SHIPPED
Start: 2021-02-19 | End: 2021-05-25 | Stop reason: ALTCHOICE

## 2021-02-19 RX ORDER — INSULIN GLARGINE 100 [IU]/ML
22 INJECTION, SOLUTION SUBCUTANEOUS
Qty: 1 VIAL | Refills: 0 | Status: SHIPPED
Start: 2021-02-19 | End: 2021-03-09 | Stop reason: ALTCHOICE

## 2021-02-19 RX ORDER — CARVEDILOL 12.5 MG/1
12.5 TABLET ORAL 2 TIMES DAILY WITH MEALS
Qty: 60 TAB | Refills: 0 | Status: SHIPPED
Start: 2021-02-19 | End: 2021-03-09 | Stop reason: DRUGHIGH

## 2021-02-19 RX ORDER — FUROSEMIDE 20 MG/1
20 TABLET ORAL
Qty: 30 TAB | Refills: 0 | Status: SHIPPED
Start: 2021-02-19 | End: 2021-03-09 | Stop reason: SDUPTHER

## 2021-02-19 RX ORDER — FUROSEMIDE 20 MG/1
20 TABLET ORAL
Qty: 30 TAB | Refills: 0 | Status: SHIPPED
Start: 2021-02-19 | End: 2021-02-19 | Stop reason: SDUPTHER

## 2021-02-19 RX ORDER — AMITRIPTYLINE HYDROCHLORIDE 10 MG/1
10 TABLET, FILM COATED ORAL
Qty: 30 TAB | Refills: 0 | Status: SHIPPED | OUTPATIENT
Start: 2021-02-19 | End: 2021-03-09 | Stop reason: ALTCHOICE

## 2021-02-19 RX ADMIN — Medication 10 ML: at 14:00

## 2021-02-19 RX ADMIN — FLUTICASONE PROPIONATE 2 SPRAY: 50 SPRAY, METERED NASAL at 09:00

## 2021-02-19 RX ADMIN — MELATONIN 2000 UNITS: at 10:26

## 2021-02-19 RX ADMIN — HEPARIN SODIUM 5000 UNITS: 5000 INJECTION INTRAVENOUS; SUBCUTANEOUS at 17:08

## 2021-02-19 RX ADMIN — INSULIN LISPRO 3 UNITS: 100 INJECTION, SOLUTION INTRAVENOUS; SUBCUTANEOUS at 17:10

## 2021-02-19 RX ADMIN — CYANOCOBALAMIN TAB 1000 MCG 1000 MCG: 1000 TAB at 10:26

## 2021-02-19 RX ADMIN — HEPARIN SODIUM 5000 UNITS: 5000 INJECTION INTRAVENOUS; SUBCUTANEOUS at 05:14

## 2021-02-19 RX ADMIN — INSULIN LISPRO 4 UNITS: 100 INJECTION, SOLUTION INTRAVENOUS; SUBCUTANEOUS at 08:46

## 2021-02-19 RX ADMIN — RDII 250 MG CAPSULE 250 MG: at 17:08

## 2021-02-19 RX ADMIN — B-COMPLEX W/ C & FOLIC ACID TAB 1 TABLET: TAB at 10:26

## 2021-02-19 RX ADMIN — PANTOPRAZOLE SODIUM 40 MG: 40 TABLET, DELAYED RELEASE ORAL at 08:47

## 2021-02-19 RX ADMIN — POLYETHYLENE GLYCOL 3350 17 G: 17 POWDER, FOR SOLUTION ORAL at 10:27

## 2021-02-19 RX ADMIN — SPIRONOLACTONE 12.5 MG: 25 TABLET ORAL at 10:27

## 2021-02-19 RX ADMIN — Medication 10 ML: at 05:14

## 2021-02-19 RX ADMIN — INSULIN LISPRO 6 UNITS: 100 INJECTION, SOLUTION INTRAVENOUS; SUBCUTANEOUS at 12:28

## 2021-02-19 RX ADMIN — ZINC SULFATE 220 MG (50 MG) CAPSULE 1 CAPSULE: CAPSULE at 12:28

## 2021-02-19 RX ADMIN — RDII 250 MG CAPSULE 250 MG: at 10:38

## 2021-02-19 RX ADMIN — ASPIRIN 81 MG 81 MG: 81 TABLET ORAL at 08:47

## 2021-02-19 RX ADMIN — GABAPENTIN 300 MG: 300 CAPSULE ORAL at 17:08

## 2021-02-19 RX ADMIN — GABAPENTIN 300 MG: 300 CAPSULE ORAL at 10:39

## 2021-02-19 RX ADMIN — Medication 1000 MG: at 10:27

## 2021-02-19 RX ADMIN — INSULIN GLARGINE 22 UNITS: 100 INJECTION, SOLUTION SUBCUTANEOUS at 10:36

## 2021-02-19 RX ADMIN — INSULIN LISPRO 3 UNITS: 100 INJECTION, SOLUTION INTRAVENOUS; SUBCUTANEOUS at 12:29

## 2021-02-19 RX ADMIN — VALSARTAN 320 MG: 320 TABLET, FILM COATED ORAL at 10:26

## 2021-02-19 RX ADMIN — INSULIN LISPRO 6 UNITS: 100 INJECTION, SOLUTION INTRAVENOUS; SUBCUTANEOUS at 17:09

## 2021-02-19 RX ADMIN — FUROSEMIDE 20 MG: 10 INJECTION, SOLUTION INTRAMUSCULAR; INTRAVENOUS at 12:28

## 2021-02-19 RX ADMIN — INSULIN LISPRO 3 UNITS: 100 INJECTION, SOLUTION INTRAVENOUS; SUBCUTANEOUS at 08:47

## 2021-02-19 NOTE — PROGRESS NOTES
ACUTE OT GOALS:  (Developed with and agreed upon by patient and/or caregiver.)    1) Patient will complete lower body bathing and dressing with setup and adaptive equipment as needed.   2) Patient will complete toileting with supervision.   3) Patient will complete functional transfers with supervision and adaptive equipment as needed.   4) Patient will tolerate at least 30 minutes of OT activity with as needed rest breaks while maintaining O2 sats >90%.   5) Patient will verbalize at least 3 energy conservation technique to utilize during ADL/IADL.   Timeframe: 7 visits   OCCUPATIONAL THERAPY: Daily Note OT Treatment Day # 5    Radhames Kohler is a 75 y.o. male   PRIMARY DIAGNOSIS: Acute metabolic encephalopathy  Acute metabolic encephalopathy [G93.41]       [place] / Product Type: Medicare /   ASSESSMENT:     REHAB RECOMMENDATIONS: CURRENT LEVEL OF FUNCTION:  (Most Recently Demonstrated)   Recommendation to date pending progress:  Setting:  • Short-term Rehab  Equipment:   • To Be Determined Bathing:  • Not tested  Dressing:  • Not tested  Feeding/Grooming:  • Minimal Assistance for washing hair with shampoo cap  Toileting:  • Total Assistance for bowel hygiene  Functional Mobility:  • Moderate Assistance to transfer over to BS     ASSESSMENT:  Mr. Kohler is doing better today. Pt was able to follow commands better and performing mobility better today. Pt required mod assist with transfers to and from BSC. Dependent for bowel hygiene. Pt assisted with washing his hair with shampoo cap as well. Pt returned to bed and left with all needs in reach. Good progress made today. Will continue to benefit from skilled OT during stay.     SUBJECTIVE:   Mr. Kohler states, \"I am so glad to see you\"    SOCIAL HISTORY/LIVING ENVIRONMENT:        OBJECTIVE:     PAIN: VITAL SIGNS: LINES/DRAINS:   Pre Treatment: Pain Screen  Pain Scale 1: Numeric (0 - 10)  Pain Intensity 1: 0  Post  Treatment: 0   N/A  O2 Device: Room air     ACTIVITIES OF DAILY LIVING: I Mod I S SBA CGA Min Mod Max Total NT Comments   BASIC ADLs:              Bathing/ Showering [] [] [] [] [] [] [] [] [] [x]    Toileting [] [] [] [] [] [] [] [] [x] [] For bowel hygiene   Dressing [] [] [] [] [] [] [] [] [] [x]    Feeding [] [] [] [] [] [] [] [] [] [x]    Grooming [] [] [] [] [] [x] [] [] [] []    Personal Device Care [] [] [] [] [] [] [] [] [] [x]    Functional Mobility [] [] [] [] [] [x] [x] [] [] []    I=Independent, Mod I=Modified Independent, S=Supervision, SBA=Standby Assistance, CGA=Contact Guard Assistance,   Min=Minimal Assistance, Mod=Moderate Assistance, Max=Maximal Assistance, Total=Total Assistance, NT=Not Tested    MOBILITY: I Mod I S SBA CGA Min Mod Max Total  NT x2 Comments:   Supine to sit [] [] [] [] [] [x] [x] [] [] [] []    Sit to supine [] [] [] [] [] [x] [] [] [] [] []    Sit to stand [] [] [] [] [] [x] [x] [] [] [] []    Bed to chair [] [] [] [] [] [] [] [] [] [x] []    I=Independent, Mod I=Modified Independent, S=Supervision, SBA=Standby Assistance, CGA=Contact Guard Assistance,   Min=Minimal Assistance, Mod=Moderate Assistance, Max=Maximal Assistance, Total=Total Assistance, NT=Not Tested    PLAN:   FREQUENCY/DURATION: OT Plan of Care: 3 times/week for duration of hospital stay or until stated goals are met, whichever comes first.    TREATMENT:   TREATMENT:   ($$ Self Care/Home Management: 23-37 mins$$ Therapeutic Activity: 8-22 mins   )  Therapeutic Activity (15 Minutes): Therapeutic activity included Rolling, Supine to Sit, Sit to Supine, Lateral Scooting, Transfer Training, Sitting balance  and Standing balance to improve functional Mobility, Strength and Activity tolerance. Self Care (25 Minutes): Self care including Toileting and Grooming to increase independence and decrease level of assistance required.     AFTER TREATMENT POSITION/PRECAUTIONS:  Bed, Needs within reach and RN notified    INTERDISCIPLINARY COLLABORATION:  RN/PCT and OT/ELIZONDO    TOTAL TREATMENT DURATION:  OT Patient Time In/Time Out  Time In: 1135  Time Out: [address]

## 2021-02-19 NOTE — PROGRESS NOTES
Pt to d/c today to [address] [place] [address] for STR. Room: [facility]. Transport: [phone]. Transport scheduled for 1700 with [employer]. CM spoke with Caroline Nunes at [facility]. CM left a voicemail for pt's sister-in-law Linden Journey and spoke with pt's spouse Nguyen Montesinos. No further community care needs identified. CM will continue to follow plan of care. Care Management Interventions  PCP Verified by CM: Yes(Ara Franco MD)  Mode of Transport at Discharge: BLS([facility])  Transition of Care Consult (CM Consult): Discharge Planning, SNF([facility])  Partner SNF: Yes  Discharge Durable Medical Equipment: No  Physical Therapy Consult: Yes  Occupational Therapy Consult: Yes  Speech Therapy Consult: Yes  Current Support Network: Lives with Spouse, Own Home  Confirm Follow Up Transport: Other (see comment)([facility])  The Plan for Transition of Care is Related to the Following Treatment Goals : Pt requires STR to return to functional baseline in the community.   The Patient and/or Patient Representative was Provided with a Choice of Provider and Agrees with the Discharge Plan?: Yes  Freedom of Choice List was Provided with Basic Dialogue that Supports the Patient's Individualized Plan of Care/Goals, Treatment Preferences and Shares the Quality Data Associated with the Providers?: Yes  [place] Resource Information Provided?: No  Discharge Location  Discharge Placement: Skilled nursing facility([facility])

## 2021-02-19 NOTE — PROGRESS NOTES
Verbal report given to  Cecille Duverney LPN at [place] [address] on Margret Raphael for routine progression of care. Report consisted of patients Situation, Background, Assessment    Information from the following report(s)Kardex was reviewed with the receiving nurse. Opportunity given to clarify info and ask questions. Aamir Copeland

## 2021-02-19 NOTE — PROGRESS NOTES
Problem: Gas Exchange - Impaired  Goal: Absence of hypoxia  Outcome: Progressing Towards Goal  Goal: Promote optimal lung function  Outcome: Progressing Towards Goal

## 2021-02-19 NOTE — DISCHARGE SUMMARY
Hospitalist Discharge Summary     Admit Date:  2021  9:53 AM   Name:  Florian Dumont   Age:  77 y.o.  :  1945   MRN:  [id number]   PCP:  Margo Patel MD  Treatment Team: Attending Provider: Arturo Chi MD; Care Manager: Agueda Lyles; Utilization Review: Ivanna Sanchez; Physical Therapist: Ximena Munguia PT, DPT; Occupational Therapy Assistant: ROGER Patel    Problem List for this Hospitalization:  Hospital Problems as of 2021 Date Reviewed: 2021          Codes Class Noted - Resolved POA    Left leg pain ICD-10-CM: M79.605  ICD-9-CM: 729.5  2021 - Present Yes        CAP (community acquired pneumonia) ICD-10-CM: J18.9  ICD-9-CM: 486  2021 - Present Yes        Sepsis ([facility] ICD-10-CM: A41.9  ICD-9-CM: 038.9, 995.91  2021 - Present Yes        * (Principal) Acute metabolic encephalopathy EYN-51-DF: G93.41  ICD-9-CM: 348.31  2021 - Present Yes        DANIELLA (acute kidney injury) ([facility] ICD-10-CM: N17.9  ICD-9-CM: 584.9  2021 - Present Yes        COVID-19 ICD-10-CM: U07.1  ICD-9-CM: 079.89  2021 - Present Yes        Type 2 diabetes mellitus with hyperglycemia, with long-term current use of insulin ([facility] ICD-10-CM: E11.65, Z79.4  ICD-9-CM: 250.00, 790.29, V58.67  2020 - Present Yes        Gastroesophageal reflux disease without esophagitis ICD-10-CM: K21.9  ICD-9-CM: 530.81  2020 - Present Yes        Class 2 severe obesity with serious comorbidity and body mass index (BMI) of 39.0 to 39.9 in adult [facility]) ICD-10-CM: E66.01, Z68.39  ICD-9-CM: 278.01, V85.39  2020 - Present Yes        Stage 3 chronic kidney disease ICD-10-CM: N18.30  ICD-9-CM: 585.3  2020 - Present Yes        Chronic systolic CHF (congestive heart failure) ([facility] ICD-10-CM: I50.22  ICD-9-CM: 428.22, 428.0  2014 - Present Yes    Overview Addendum 2021  7:38 AM by David Collins MD     EF 20% by echo 2011  Echo 2012: EF improved to 35-40%, no significant valve disease  Jan 2014: images so poor even with Definity contrast an EF could not be estimated, only \"probably moderately depressed\". Mar 2014 - MUGA - EF 33%  Mar 2015 - even with contrast, difficult to see, EF estimated 45-50%   Oct 2016 - EF 43%, aortic root 3.8  Sep 2017 - 50-55% with distal apical dyskinesis, mild to moderate MR, AI, and PI  Oct 2018 - 54% with apical wma, impaired relaxation, no significant valvular regurgitation  Oct 2019- Echo EF normal, cannot assess regional wma d/t poor endocardial border definition, declined contrast.  No TR envelope to assess RVSP. Abnormal DF  Jan 2021- vasodilator perfusion study inferior fixed defect, no ischemia, EF 49%             RESOLVED: Acute sphenoidal sinusitis ICD-10-CM: J01.30  ICD-9-CM: 461.3  2/13/2021 - 2/13/2021 Yes              Discharge Diagnosis: Acute metabolic encephalopathy secondary to COVID-19 infection as well as not getting his gabapentin and amitriptyline. Discharge Disposition: Rehab facility  Follow up Instructions: Please follow-up with your primary care in 1 week. Repeat BMP tomorrow 02/20/20201 and in 1 week  Did Patient have Sepsis (YES OR NO): Yes     Hospital Course: As per prior documentation:  77 y.o. male with a PMH of HTN, CKD III, DM, GERD, HFrEF, that presented in the setting of altered mental status and generalized weakness. He was recently tested positive for COVID on 2/5/2021; he received his covid vaccine on 1/26. Wife found him confused in the garage unable to follow commands. CT head on admission was unremarkable for acute findings. UA was also negative. ABG, ammonia, B12 all wnl. CXR shows patchy lung opacities bilaterally, right greater than  left, regressed from before. His metabolic encephalopathy was suspicious for 2/2 covid infection. He remains non-hypoxic during hospitalization. His home sedating meds including gabapentin and amitrityline were held initially.  He was found to have increase in confusion and CT head was repeated which shows no acute abnormalities. He was unable to have an MRI brain done due to his defibrillator. Neurology was consulted and recommended that his encephalopathy was most likely secondary to covid infection and gabapentin withdrawals. Home amitriptyline and gabapentin restarted. Mentation slowly improving. SNF pending.     This AM pt was alert, oriented to person and year. Stated he was in \"the nut house\" and then thought he was at home and asked about his dog. Able to recall pet's name and family members' names but slow. Follows commands well.  Denies fever, chills, SOB, chest pain abdominal pain. \"     Assessment and Plan:     Acute metabolic encephalopathy suspected to be secondary to a positive COVID-19-  infection as well as not being on his gabapentin and amitriptyline.  Continue current medical management and supportive care. It is resolving slowly.     Sepsis secondary to community-acquired pneumonia and COVID-19 infection-  Sepsis has resolved.  He has completed a course of doxycycline and Rocephin and Vantin.     Chronic kidney disease stage III with mild azotemia- the mild azotemia was resolved.  Creatinine trending up slightly.   Continue to monitor and  continue to avoid nephrotoxins and dose medications for his renal function.      Left lower leg pain-chronic in nature.  Likely secondary to neuropathy.  Dopplers were negative for DVT.   Hip x-rays with no evidence of fracture personally reviewed by me.     Insulin-dependent diabetes type 2 continue the Lantus and sliding scale continue standing Premeal insulin at 3 units.        Hypertension  continue Coreg, spironolactone valsartan and hctz. Valsartan has been decreased in setting of cole. Can increase back to 320mg as needed/tolerated. Also coreg decreased to 12.5 po bid from 25 mg po bid.  Can increase back up as needed or tolerated.      Heart failure with reduced EF we will continue current medical management.  He is not in acute exacerbation.  Last known EF was 49% on a nuclear medicine stress test done on January 21, 2021.  He is not being getting Lasix secondary to DANIELLA.  But appears to need it for proper renal function and to avoid pulmonary edema. He will get an iv dose today and will re-instate his home medications. Daily weights.      GERD  continue PPI     FANTA  continue home CPAP        Diagnostic Imaging/Tests:   Ct Head Wo Cont    Result Date: 2/9/2021  CT Brain Without Contrast 2/9/2021 11:12 AM Indication: Altered mental status. Recent positive Covid 19 status-confirmed 2/5/2021. Comparison: CT brain 11/3/2016 Technique:  Multiple contiguous axial images are obtained encompassing the brain from the skull base to the vertex. For this CT scanner at least one of the following techniques is utilized to decrease patient radiation dose: Automatic exposure control, KVP and mA modulation based on patient weight, and iterative reconstruction. Findings: The ventricles are midline and of appropriate size and configuration. The midline structures and posterior fossa are intact. There is no finding of an acute cortical stroke, mass lesion, or acute bleed. No extra-axial fluid collection. The skull is intact, no discreet abnormality. The sphenoid sinus is significantly opacified with nonfluid like material now-this is a change from the prior. The visualized orbits and mastoid air-cells are patent. 1. No acute intracranial abnormality. 2. Increased sinus disease findings now at the sphenoid sinus. Xr Chest Port    Result Date: 2/9/2021  1 View portable chest x-ray 2/9/2021 9:57 AM Indication: Patient has Covid 19 positive status. Comparison: 12/21/2020 Findings: This portable sitting upright AP chest of 09 42 shows the cardiomediastinal silhouette stable. Left-sided permanent AICD unit appearance is stable. Pulmonary inflation similar to before.  There are now however well evident bilateral patchy lung opacities, progressed from before. Probably more pronounced on the right than the left. No significant effusion or pneumothorax. There is not overt pulmonary edema. There are now patchy lung opacities bilaterally, right greater than left, regressed from before. This is a nonspecific pattern but with a history probably represents multifocal Covid 19 viral pneumonitis changes. Echocardiogram results:  No results found for this visit on 02/09/21.       All Micro Results     Procedure Component Value Units Date/Time    CULTURE, BLOOD [[id number]] Collected: 02/13/21 1559    Order Status: Completed Specimen: Blood Updated: 02/18/21 1130     Special Requests: --        LEFT  HAND       Culture result: NO GROWTH 5 DAYS       CULTURE, BLOOD [[id number]] Collected: 02/13/21 1151    Order Status: Completed Specimen: Blood Updated: 02/18/21 1130     Special Requests: --        LEFT  HAND       Culture result: NO GROWTH 5 DAYS             Labs: Results:       BMP, Mg, Phos Recent Labs     02/19/21 0459 02/18/21  0437 02/17/21  0801   * 139 138   K 4.7 4.3 4.3    107 106   CO2 22 22 24   AGAP 8 10 8   BUN 66* 57* 38*   CREA 2.04* 2.17* 1.28   CA 9.4 9.6 9.5   * 179* 196*      CBC Recent Labs     02/19/21 0459 02/18/21  0437 02/17/21  0801   WBC 9.7 8.8 6.9   RBC 4.53 4.52 5.12   HGB 12.3* 12.4* 13.8   HCT 38.8* 39.0* 43.6    322 309   GRANS 61 57 60   LYMPH 23 26 21   EOS 2 2 2   MONOS 14* 15* 16*   BASOS 0 0 0   IG 0 1 0   ANEU 5.9 5.0 4.2   ABL 2.2 2.3 1.5   ANA 0.2 0.1 0.1   ABM 1.3 1.3 1.1   ABB 0.0 0.0 0.0   AIG 0.0 0.0 0.0      LFT Recent Labs     02/19/21 0459 02/18/21  0437 02/17/21  0801   ALT 82* 71* 81*    106 117   TP 7.2 7.3 7.6   ALB 2.2* 2.1* 2.2*   GLOB 5.0* 5.2* 5.4*   AGRAT 0.4* 0.4* 0.4*      Cardiac Testing Lab Results   Component Value Date/Time     06/26/2014 04:50 AM      Coagulation Tests Lab Results   Component Value Date/Time    Prothrombin time 15.6 (H) 02/10/2021 12:35 PM    Prothrombin time 10.8 06/25/2014 10:25 AM    Prothrombin time 10.9 05/14/2014 12:17 PM    INR 1.2 02/10/2021 12:35 PM    INR 1.0 06/25/2014 10:25 AM    INR 1.0 05/14/2014 12:17 PM    aPTT 28.0 02/10/2021 12:35 PM    aPTT 24.7 05/14/2014 12:17 PM      A1c Lab Results   Component Value Date/Time    Hemoglobin A1c 7.7 (H) 09/23/2020 12:07 PM      Lipid Panel Lab Results   Component Value Date/Time    Cholesterol, total 139 09/23/2020 12:07 PM    HDL Cholesterol 38 (L) 09/23/2020 12:07 PM    LDL, calculated 68 09/23/2020 12:07 PM    VLDL, calculated 33 09/23/2020 12:07 PM    Triglyceride 197 (H) 09/23/2020 12:07 PM      Thyroid Panel Lab Results   Component Value Date/Time    TSH 0.812 02/12/2021 07:33 AM        Most Recent UA No results found for: COLOR, APPRN, REFSG, ANTWAN, PROTU, GLUCU, KETU, BILU, BLDU, UROU, DELL, LEUKU     No Known Allergies  Immunization History   Administered Date(s) Administered    Covid-19, MODERNA, Mrna, Lnp-s, Pf, 100mcg/0.5mL 01/27/2021    Influenza Vaccine 12/09/2012, 01/06/2014    Influenza, Quadrivalent, Adjuvanted (>65 Yrs FLUAD QUAD [id number]) 09/23/2020    Pneumococcal Conjugate (PCV-13) 01/22/2018    Pneumococcal Polysaccharide (PPSV-23) 03/02/2020    Tdap 01/22/2018    Zoster Recombinant 03/07/2018, 07/17/2018    Zoster Vaccine, Live 01/10/2013       All Labs from Last 24 Hrs:  Recent Results (from the past 24 hour(s))   GLUCOSE, POC    Collection Time: 02/18/21  4:17 PM   Result Value Ref Range    Glucose (POC) 253 (H) 65 - 100 mg/dL   GLUCOSE, POC    Collection Time: 02/18/21  8:54 PM   Result Value Ref Range    Glucose (POC) 210 (H) 65 - 100 mg/dL   CBC WITH AUTOMATED DIFF    Collection Time: 02/19/21  4:59 AM   Result Value Ref Range    WBC 9.7 4.3 - 11.1 K/uL    RBC 4.53 4.23 - 5.6 M/uL    HGB 12.3 (L) 13.6 - 17.2 g/dL    HCT 38.8 (L) 41.1 - 50.3 %    MCV 85.7 79.6 - 97.8 FL    MCH 27.2 26.1 - 32.9 PG    MCHC 31.7 31.4 - 35.0 g/dL    RDW 14.0 11.9 - 14.6 %    PLATELET 157 097 - 736 K/uL    MPV 10.6 9.4 - 12.3 FL    ABSOLUTE NRBC 0.00 0.0 - 0.2 K/uL    DF AUTOMATED      NEUTROPHILS 61 43 - 78 %    LYMPHOCYTES 23 13 - 44 %    MONOCYTES 14 (H) 4.0 - 12.0 %    EOSINOPHILS 2 0.5 - 7.8 %    BASOPHILS 0 0.0 - 2.0 %    IMMATURE GRANULOCYTES 0 0.0 - 5.0 %    ABS. NEUTROPHILS 5.9 1.7 - 8.2 K/UL    ABS. LYMPHOCYTES 2.2 0.5 - 4.6 K/UL    ABS. MONOCYTES 1.3 0.1 - 1.3 K/UL    ABS. EOSINOPHILS 0.2 0.0 - 0.8 K/UL    ABS. BASOPHILS 0.0 0.0 - 0.2 K/UL    ABS. IMM. GRANS. 0.0 0.0 - 0.5 K/UL   METABOLIC PANEL, COMPREHENSIVE    Collection Time: 02/19/21  4:59 AM   Result Value Ref Range    Sodium 135 (L) 138 - 145 mmol/L    Potassium 4.7 3.5 - 5.1 mmol/L    Chloride 105 98 - 107 mmol/L    CO2 22 21 - 32 mmol/L    Anion gap 8 7 - 16 mmol/L    Glucose 188 (H) 65 - 100 mg/dL    BUN 66 (H) 8 - 23 MG/DL    Creatinine 2.04 (H) 0.8 - 1.5 MG/DL    GFR est AA 41 (L) >60 ml/min/1.73m2    GFR est non-AA 34 (L) >60 ml/min/1.73m2    Calcium 9.4 8.3 - 10.4 MG/DL    Bilirubin, total 0.5 0.2 - 1.1 MG/DL    ALT (SGPT) 82 (H) 12 - 65 U/L    AST (SGOT) 48 (H) 15 - 37 U/L    Alk.  phosphatase 110 50 - 136 U/L    Protein, total 7.2 6.3 - 8.2 g/dL    Albumin 2.2 (L) 3.2 - 4.6 g/dL    Globulin 5.0 (H) 2.3 - 3.5 g/dL    A-G Ratio 0.4 (L) 1.2 - 3.5     GLUCOSE, POC    Collection Time: 02/19/21  7:40 AM   Result Value Ref Range    Glucose (POC) 214 (H) 65 - 100 mg/dL   GLUCOSE, POC    Collection Time: 02/19/21 11:30 AM   Result Value Ref Range    Glucose (POC) 266 (H) 65 - 100 mg/dL       Discharge Exam:  Patient Vitals for the past 24 hrs:   Temp Pulse Resp BP SpO2   02/19/21 0817     94 %   02/19/21 0700 98.1 °F (36.7 °C) 85 18 131/65 100 %   02/19/21 0344 96.8 °F (36 °C) 80 18 (!) 108/59 94 %   02/18/21 2339 97.6 °F (36.4 °C) 89 20 103/69 92 %   02/18/21 1935 97.6 °F (36.4 °C) 93 18 118/72 94 %   02/18/21 1638 97.9 °F (36.6 °C) 88 18 116/64 96 %     Oxygen Therapy  O2 Sat (%): 94 % (02/19/21 0817)  Pulse via Oximetry: 86 beats per minute (02/19/21 0817)  O2 Device: Room air (02/19/21 0817)  No intake or output data in the 24 hours ending 02/19/21 1206    General:    Well nourished. Alert. No distress. Eyes:   Normal sclera. Extraocular movements intact. ENT:  Normocephalic, atraumatic. Moist mucous membranes  CV:   Regular rate and rhythm. No murmur, rub, or gallop. Lungs:  Clear to auscultation bilaterally. No wheezing, rhonchi, or rales. Abdomen: Soft, nontender, nondistended. Bowel sounds normal.   Extremities: Warm and dry. No cyanosis or edema. Neurologic: CN II-XII grossly intact. Sensation intact. Skin:     No rashes or jaundice. Psych:  Normal mood and affect. Discharge Info:   Current Discharge Medication List      START taking these medications    Details   insulin glargine (LANTUS) 100 unit/mL injection 22 Units by SubCUTAneous route nightly for 45 days. Qty: 1 Vial, Refills: 0      !! insulin lispro (HUMALOG) 100 unit/mL injection For Blood Sugar (mg/dL) of:     Less than 150 =   0 units           150 -199 =   2 units  200 -249 =   4 units  250 -299 =   6 units  300 -349 =   8 units  350 and above = 10 units and Call Physician     Initiate Hypoglycemia protocol if blood glucose is <70 mg/dL Fast Acting - Administer Immediately - or within 15 minutes of start of meal, if mealtime coverage. Qty: 1 Vial, Refills: 0      !! insulin lispro (HUMALOG) 100 unit/mL injection 3 Units by SubCUTAneous route Before breakfast, lunch, and dinner. Qty: 1 Vial, Refills: 0      guaiFENesin-dextromethorphan (ROBITUSSIN DM) 100-10 mg/5 mL syrup Take 5 mL by mouth every four (4) hours as needed for Cough for up to 10 days. Qty: 118 mL, Refills: 0      fluticasone propionate (FLONASE) 50 mcg/actuation nasal spray 2 Sprays by Both Nostrils route daily. Qty: 1 Bottle, Refills: 0      hydroCHLOROthiazide (MICROZIDE) 12.5 mg capsule Take 2 Caps by mouth daily.   Qty: 30 Cap, Refills: 0       !! - Potential duplicate medications found. Please discuss with provider. CONTINUE these medications which have CHANGED    Details   amitriptyline (ELAVIL) 10 mg tablet Take 1 Tab by mouth nightly. Qty: 30 Tab, Refills: 0      gabapentin (NEURONTIN) 300 mg tablet Take 1 Tab by mouth three (3) times daily. One po every am and 2 po at bedtime  Qty: 90 Tab, Refills: 0      carvediloL (COREG) 12.5 mg tablet Take 1 Tab by mouth two (2) times daily (with meals). Qty: 60 Tab, Refills: 0      !! furosemide (Lasix) 20 mg tablet Take 1 Tab by mouth every Monday, Wednesday, Friday. Qty: 30 Tab, Refills: 0      !! furosemide (LASIX) 20 mg tablet Take 1 Tab by mouth daily as needed for Other (Weight gain leg swelling and edema). Qty: 15 Tab, Refills: 0       !! - Potential duplicate medications found. Please discuss with provider. CONTINUE these medications which have NOT CHANGED    Details   nitroglycerin (NITROSTAT) 0.4 mg SL tablet 1 Tab by SubLINGual route every five (5) minutes as needed for Chest Pain. Up to 3 doses. Qty: 1 Bottle, Refills: 5      DISABLED PLACARD (DISABLED PLACARD) DMV Handicap placard  Qty: 1 Each, Refills: 0    Associated Diagnoses: Chronic systolic CHF (congestive heart failure) (HCC)      ZINC ACETATE PO Take  by mouth. ascorbic acid (VITAMIN C PO) Take  by mouth. rosuvastatin (Crestor) 10 mg tablet Take 1 Tab by mouth nightly. Qty: 90 Tab, Refills: 2      cpap machine kit by Does Not Apply route. 8 cm      indomethacin (INDOCIN) 25 mg capsule Take  by mouth three (3) times daily. As needed      allopurinol (ZYLOPRIM) 100 mg tablet Take  by mouth daily. cyanocobalamin (VITAMIN B-12) 1,000 mcg sublingual tablet Take 1,000 mcg by mouth daily. cholecalciferol, vitamin D3, (VITAMIN D3) 2,000 unit tab Take  by mouth. Bifidobacterium Infantis (ALIGN) 4 mg cap Take 1 Tab by mouth.  Every other day      omeprazole (PRILOSEC) 20 mg capsule Take 20 mg by mouth daily. spironolactone (ALDACTONE) 25 mg tablet Take 12.5 mg by mouth daily. aspirin 81 mg chewable tablet take 81 mg by mouth every morning. FLUORIDE ION/MULTIVITAMINS (MULTI VIT-FLUORIDE PO) take  by mouth daily. Stopped 6/22/09          STOP taking these medications       insulin degludec Soraida Pencil FlexTouch U-200) 200 unit/mL (3 mL) inpn Comments:   Reason for Stopping:         valsartan-hydroCHLOROthiazide (DIOVAN-HCT) 320-25 mg per tablet Comments:   Reason for Stopping:         sitaGLIPtin-metFORMIN (JANUMET) 50-1,000 mg per tablet Comments:   Reason for Stopping:         MELOXICAM PO Comments:   Reason for Stopping:                 Disposition: Rehab facility   Activity: As tolerated  Diet: DIET DIABETIC CONSISTENT CARB Regular  DIET NUTRITIONAL SUPPLEMENTS All Meals; Glucerna Shake ( )     Repeat BMP on February 20, 2021 and 1 week  Daily weights    Follow-up Appointments   Procedures    FOLLOW UP VISIT Appointment in: One Week     Standing Status:   Standing     Number of Occurrences:   1     Order Specific Question:   Appointment in     Answer: One Week         Follow-up Information     Follow up With Specialties Details [address] [facility]   [address] [address]  Kiera Ocasio MD Internal Medicine In 1 week  [address]  [address] [address]  [phone]            Time spent in patient discharge planning and coordination 35 minutes.     Signed:  Yves Kerr MD

## 2021-02-19 NOTE — PROGRESS NOTES
Problem: Falls - Risk of  Goal: *Absence of Falls  Description: Document Tia Pascal Fall Risk and appropriate interventions in the flowsheet. Outcome: Progressing Towards Goal  Note: Fall Risk Interventions:  Mobility Interventions: Bed/chair exit alarm, Communicate number of staff needed for ambulation/transfer, Patient to call before getting OOB    Mentation Interventions: Bed/chair exit alarm, Adequate sleep, hydration, pain control, Reorient patient, Toileting rounds    Medication Interventions: Bed/chair exit alarm, Patient to call before getting OOB    Elimination Interventions: Bed/chair exit alarm, Call light in reach, Patient to call for help with toileting needs, Toilet paper/wipes in reach    Problem: Patient Education: Go to Patient Education Activity  Goal: Patient/Family Education  Outcome: Progressing Towards Goal     Problem: Pressure Injury - Risk of  Goal: *Prevention of pressure injury  Description: Document Benny Scale and appropriate interventions in the flowsheet.   Outcome: Progressing Towards Goal  Note: Pressure Injury Interventions:  Sensory Interventions: Assess changes in LOC, Check visual cues for pain, Pressure redistribution bed/mattress (bed type)    Moisture Interventions: Absorbent underpads, Check for incontinence Q2 hours and as needed    Activity Interventions: Pressure redistribution bed/mattress(bed type)    Mobility Interventions: Pressure redistribution bed/mattress (bed type), HOB 30 degrees or less    Nutrition Interventions: Offer support with meals,snacks and hydration    Friction and Shear Interventions: HOB 30 degrees or less, Foam dressings/transparent film/skin sealants                Problem: Patient Education: Go to Patient Education Activity  Goal: Patient/Family Education  Outcome: Progressing Towards Goal     Problem: Patient Education: Go to Patient Education Activity  Goal: Patient/Family Education  Outcome: Progressing Towards Goal     Problem: Patient Education: Go to Patient Education Activity  Goal: Patient/Family Education  Outcome: Progressing Towards Goal     Problem: Airway Clearance - Ineffective  Goal: Achieve or maintain patent airway  Outcome: Progressing Towards Goal     Problem: Gas Exchange - Impaired  Goal: Absence of hypoxia  Outcome: Progressing Towards Goal  Goal: Promote optimal lung function  Outcome: Progressing Towards Goal     Problem: Breathing Pattern - Ineffective  Goal: Ability to achieve and maintain a regular respiratory rate  Outcome: Progressing Towards Goal     Problem:  Body Temperature -  Risk of, Imbalanced  Goal: Ability to maintain a body temperature within defined limits  Outcome: Progressing Towards Goal  Goal: Will regain or maintain usual level of consciousness  Outcome: Progressing Towards Goal  Goal: Complications related to the disease process, condition or treatment will be avoided or minimized  Outcome: Progressing Towards Goal     Problem: Isolation Precautions - Risk of Spread of Infection  Goal: Prevent transmission of infectious organism to others  Outcome: Progressing Towards Goal     Problem: Nutrition Deficits  Goal: Optimize nutrtional status  Outcome: Progressing Towards Goal     Problem: Risk for Fluid Volume Deficit  Goal: Maintain normal heart rhythm  Outcome: Progressing Towards Goal  Goal: Maintain absence of muscle cramping  Outcome: Progressing Towards Goal  Goal: Maintain normal serum potassium, sodium, calcium, phosphorus, and pH  Outcome: Progressing Towards Goal     Problem: Loneliness or Risk for Loneliness  Goal: Demonstrate positive use of time alone when socialization is not possible  Outcome: Progressing Towards Goal     Problem: Fatigue  Goal: Verbalize increase energy and improved vitality  Outcome: Progressing Towards Goal     Problem: Patient Education: Go to Patient Education Activity  Goal: Patient/Family Education  Outcome: Progressing Towards Goal

## 2021-02-19 NOTE — DIABETES MGMT
Patient admitted with acute metabolic encephalopathy positive for COVID-19. Blood glucose ranged 179-256 yesterday with patient receiving Lantus 20 units and Humalog 24 units. Blood glucose this morning was 214. Reviewed patient current regimen: Lantus 22 units daily, Humalog 3 units with meals, and Humalog SSI. Patient would likely benefit from an increase in basal insulin as fasting blood glucose is not at goal. Provider updated via [employer] regarding recommendations and patient glycemic control.

## 2021-02-19 NOTE — PROGRESS NOTES
02/18/21 1930   Incentive Spirometry Treatment   Level of Service Independent   Predicted Volume (ml) 1000 ml   Actual Volume (ml) 1000 ml   % Predicted Volume (ml) 1   Treatment Tolerance Well

## 2021-03-09 PROBLEM — G47.01 INSOMNIA DUE TO MEDICAL CONDITION: Status: ACTIVE | Noted: 2021-03-09

## 2021-05-25 PROBLEM — E66.9 OBESITY (BMI 30-39.9): Status: ACTIVE | Noted: 2020-06-09

## 2021-06-03 ENCOUNTER — APPOINTMENT (RX ONLY)
Dept: URBAN - METROPOLITAN AREA CLINIC 349 | Facility: CLINIC | Age: 76
Setting detail: DERMATOLOGY
End: 2021-06-03

## 2021-06-03 DIAGNOSIS — Z85.820 PERSONAL HISTORY OF MALIGNANT MELANOMA OF SKIN: ICD-10-CM

## 2021-06-03 DIAGNOSIS — Z85.828 PERSONAL HISTORY OF OTHER MALIGNANT NEOPLASM OF SKIN: ICD-10-CM

## 2021-06-03 DIAGNOSIS — D22 MELANOCYTIC NEVI: ICD-10-CM | Status: STABLE

## 2021-06-03 DIAGNOSIS — L85.3 XEROSIS CUTIS: ICD-10-CM

## 2021-06-03 DIAGNOSIS — L57.0 ACTINIC KERATOSIS: ICD-10-CM

## 2021-06-03 DIAGNOSIS — D69.2 OTHER NONTHROMBOCYTOPENIC PURPURA: ICD-10-CM

## 2021-06-03 DIAGNOSIS — L57.8 OTHER SKIN CHANGES DUE TO CHRONIC EXPOSURE TO NONIONIZING RADIATION: ICD-10-CM

## 2021-06-03 PROBLEM — D22.5 MELANOCYTIC NEVI OF TRUNK: Status: ACTIVE | Noted: 2021-06-03

## 2021-06-03 PROCEDURE — ? PRESCRIPTION MEDICATION MANAGEMENT

## 2021-06-03 PROCEDURE — 17000 DESTRUCT PREMALG LESION: CPT

## 2021-06-03 PROCEDURE — 99214 OFFICE O/P EST MOD 30 MIN: CPT | Mod: 25

## 2021-06-03 PROCEDURE — ? LIQUID NITROGEN

## 2021-06-03 PROCEDURE — ? COUNSELING

## 2021-06-03 PROCEDURE — 17003 DESTRUCT PREMALG LES 2-14: CPT

## 2021-06-03 PROCEDURE — ? MEDICAL PHOTOGRAPHY REVIEW

## 2021-06-03 PROCEDURE — ? SUNSCREEN RECOMMENDATIONS

## 2021-06-03 ASSESSMENT — LOCATION SIMPLE DESCRIPTION DERM
LOCATION SIMPLE: RIGHT UPPER BACK
LOCATION SIMPLE: LEFT FOREARM
LOCATION SIMPLE: LEFT TEMPLE
LOCATION SIMPLE: RIGHT FOREARM
LOCATION SIMPLE: LEFT HAND
LOCATION SIMPLE: LEFT EAR
LOCATION SIMPLE: LEFT CHEEK
LOCATION SIMPLE: RIGHT LOWER BACK
LOCATION SIMPLE: RIGHT CHEEK
LOCATION SIMPLE: LEFT FOREHEAD

## 2021-06-03 ASSESSMENT — LOCATION DETAILED DESCRIPTION DERM
LOCATION DETAILED: LEFT MID TEMPLE
LOCATION DETAILED: LEFT INFERIOR CENTRAL MALAR CHEEK
LOCATION DETAILED: RIGHT INFERIOR MEDIAL MALAR CHEEK
LOCATION DETAILED: RIGHT DISTAL DORSAL FOREARM
LOCATION DETAILED: RIGHT SUPERIOR LATERAL MIDBACK
LOCATION DETAILED: LEFT SUPERIOR HELIX
LOCATION DETAILED: RIGHT PROXIMAL DORSAL FOREARM
LOCATION DETAILED: LEFT INFERIOR LATERAL FOREHEAD
LOCATION DETAILED: LEFT RADIAL DORSAL HAND
LOCATION DETAILED: LEFT PROXIMAL DORSAL FOREARM
LOCATION DETAILED: RIGHT MEDIAL UPPER BACK
LOCATION DETAILED: LEFT ULNAR DORSAL HAND
LOCATION DETAILED: LEFT ANTERIOR EARLOBE

## 2021-06-03 ASSESSMENT — LOCATION ZONE DERM
LOCATION ZONE: EAR
LOCATION ZONE: FACE
LOCATION ZONE: HAND
LOCATION ZONE: TRUNK
LOCATION ZONE: ARM

## 2021-06-03 ASSESSMENT — PAIN INTENSITY VAS: HOW INTENSE IS YOUR PAIN 0 BEING NO PAIN, 10 BEING THE MOST SEVERE PAIN POSSIBLE?: 1/10 PAIN

## 2021-06-03 NOTE — PROCEDURE: PRESCRIPTION MEDICATION MANAGEMENT
Detail Level: Zone
Render In Strict Bullet Format?: No
Samples Given: Eucerin advanced repair cream with urea, apply to affected area twice daily. \\nUrea helps slough off dead skin and draw in moisture.

## 2021-06-03 NOTE — HPI: MELANOMA F/U (HISTORY OF MALIGNANT MELANOMA)
What Is The Reason For Today's Visit?: History of Melanoma
Year Excised?: 6/2014.
Breslow Depth?: 0.55
yes

## 2021-06-03 NOTE — PROCEDURE: LIQUID NITROGEN
Detail Level: Detailed
Number Of Freeze-Thaw Cycles: 2 freeze-thaw cycles
Render Post-Care Instructions In Note?: no
Duration Of Freeze Thaw-Cycle (Seconds): 3
Post-Care Instructions: I reviewed with the patient in detail post-care instructions. Patient is to wear sunprotection, and avoid picking at any of the treated lesions. Pt may apply Vaseline to crusted or scabbing areas.
Consent: The patient's consent was obtained including but not limited to risks of crusting, scabbing, blistering, scarring, darker or lighter pigmentary change, recurrence, incomplete removal and infection.

## 2021-11-29 ENCOUNTER — HOME HEALTH ADMISSION (OUTPATIENT)
Dept: HOME HEALTH SERVICES | Facility: HOME HEALTH | Age: 76
End: 2021-11-29
Payer: MEDICARE

## 2021-11-29 ENCOUNTER — HOSPITAL ENCOUNTER (OUTPATIENT)
Dept: REHABILITATION | Age: 76
Discharge: HOME | End: 2021-11-29
Attending: ORTHOPAEDIC SURGERY
Payer: MEDICARE

## 2021-11-29 ENCOUNTER — HOSPITAL ENCOUNTER (OUTPATIENT)
Dept: SURGERY | Age: 76
Discharge: HOME | End: 2021-11-29
Attending: ORTHOPAEDIC SURGERY
Payer: MEDICARE

## 2021-11-29 DIAGNOSIS — I50.22 CHRONIC SYSTOLIC CHF (CONGESTIVE HEART FAILURE) (HCC): Primary | ICD-10-CM

## 2021-11-29 LAB
ANION GAP SERPL CALC-SCNC: 2 MMOL/L (ref 7–16)
APTT PPP: 28.4 SEC (ref 24.1–35.1)
ATRIAL RATE: 80 BPM
BACTERIA SPEC CULT: NORMAL
BASOPHILS # BLD: 0.1 K/UL (ref 0–0.2)
BASOPHILS NFR BLD: 1 % (ref 0–2)
BUN SERPL-MCNC: 33 MG/DL (ref 8–23)
CALCIUM SERPL-MCNC: 10.1 MG/DL (ref 8.3–10.4)
CALCULATED P AXIS, ECG09: 49 DEGREES
CALCULATED R AXIS, ECG10: -178 DEGREES
CALCULATED T AXIS, ECG11: 29 DEGREES
CHLORIDE SERPL-SCNC: 105 MMOL/L (ref 98–107)
CO2 SERPL-SCNC: 29 MMOL/L (ref 21–32)
CREAT SERPL-MCNC: 1.36 MG/DL (ref 0.8–1.5)
DIAGNOSIS, 93000: NORMAL
DIFFERENTIAL METHOD BLD: NORMAL
EOSINOPHIL # BLD: 0.1 K/UL (ref 0–0.8)
EOSINOPHIL NFR BLD: 1 % (ref 0.5–7.8)
ERYTHROCYTE [DISTWIDTH] IN BLOOD BY AUTOMATED COUNT: 14.4 % (ref 11.9–14.6)
EST. AVERAGE GLUCOSE BLD GHB EST-MCNC: 169 MG/DL
GLUCOSE SERPL-MCNC: 194 MG/DL (ref 65–100)
HBA1C MFR BLD: 7.5 % (ref 4.2–6.3)
HCT VFR BLD AUTO: 45 % (ref 41.1–50.3)
HGB BLD-MCNC: 14.2 G/DL (ref 13.6–17.2)
IMM GRANULOCYTES # BLD AUTO: 0.1 K/UL (ref 0–0.5)
IMM GRANULOCYTES NFR BLD AUTO: 1 % (ref 0–5)
INR PPP: 1
LYMPHOCYTES # BLD: 2.8 K/UL (ref 0.5–4.6)
LYMPHOCYTES NFR BLD: 30 % (ref 13–44)
MCH RBC QN AUTO: 27.8 PG (ref 26.1–32.9)
MCHC RBC AUTO-ENTMCNC: 31.6 G/DL (ref 31.4–35)
MCV RBC AUTO: 88.2 FL (ref 79.6–97.8)
MONOCYTES # BLD: 0.7 K/UL (ref 0.1–1.3)
MONOCYTES NFR BLD: 8 % (ref 4–12)
NEUTS SEG # BLD: 5.8 K/UL (ref 1.7–8.2)
NEUTS SEG NFR BLD: 61 % (ref 43–78)
NRBC # BLD: 0 K/UL (ref 0–0.2)
P-R INTERVAL, ECG05: 162 MS
PLATELET # BLD AUTO: 293 K/UL (ref 150–450)
PMV BLD AUTO: 10.1 FL (ref 9.4–12.3)
POTASSIUM SERPL-SCNC: 4.6 MMOL/L (ref 3.5–5.1)
PROTHROMBIN TIME: 13.2 SEC (ref 12.6–14.5)
Q-T INTERVAL, ECG07: 438 MS
QRS DURATION, ECG06: 182 MS
QTC CALCULATION (BEZET), ECG08: 505 MS
RBC # BLD AUTO: 5.1 M/UL (ref 4.23–5.6)
SERVICE CMNT-IMP: NORMAL
SODIUM SERPL-SCNC: 136 MMOL/L (ref 136–145)
VENTRICULAR RATE, ECG03: 80 BPM
WBC # BLD AUTO: 9.5 K/UL (ref 4.3–11.1)

## 2021-11-29 PROCEDURE — 85610 PROTHROMBIN TIME: CPT

## 2021-11-29 PROCEDURE — 85025 COMPLETE CBC W/AUTO DIFF WBC: CPT

## 2021-11-29 PROCEDURE — 85730 THROMBOPLASTIN TIME PARTIAL: CPT

## 2021-11-29 PROCEDURE — 80048 BASIC METABOLIC PNL TOTAL CA: CPT

## 2021-11-29 PROCEDURE — 87641 MR-STAPH DNA AMP PROBE: CPT

## 2021-11-29 PROCEDURE — 77030027138 HC INCENT SPIROMETER -A

## 2021-11-29 PROCEDURE — 93005 ELECTROCARDIOGRAM TRACING: CPT

## 2021-11-29 PROCEDURE — 94760 N-INVAS EAR/PLS OXIMETRY 1: CPT

## 2021-11-29 PROCEDURE — 97161 PT EVAL LOW COMPLEX 20 MIN: CPT

## 2021-11-29 PROCEDURE — 83036 HEMOGLOBIN GLYCOSYLATED A1C: CPT

## 2021-11-29 PROCEDURE — 36415 COLL VENOUS BLD VENIPUNCTURE: CPT

## 2021-11-29 RX ORDER — ACETAMINOPHEN 500 MG
1000 TABLET ORAL
COMMUNITY

## 2021-11-29 RX ORDER — SITAGLIPTIN AND METFORMIN HYDROCHLORIDE 50; 1000 MG/1; MG/1
2 TABLET, FILM COATED, EXTENDED RELEASE ORAL DAILY
COMMUNITY

## 2021-11-29 NOTE — ADVANCED PRACTICE NURSE
Total Joint Surgery Preoperative Chart Review      Patient ID:  Jake Dhillon  [id number]  21 y.o.  1945  Surgeon: Dr. Hubert Gordillo  Date of Surgery: 12/21/2021  Procedure: Total Left Knee Arthroplasty  Primary Care Physician: Michael Huddleston [phone]  Specialty Physician(s):      Subjective:   Jake Dhillon is a 77 y.o. WHITE/NON- male who presents for preoperative evaluation for Total Left Knee arthroplasty. This is a preoperative chart review note based on data collected by the nurse at the surgical Pre-Assessment visit.     Past Medical History:   Diagnosis Date    Age-related cognitive decline 2020    30 out of 30 MMSE    Arthritis     DJD- shoulder, knees, hips    [facility]estive [facility]) [facility]     COVID-19 2/9/2021    Dizziness 06/09/2020    normal carotid ultrasound    GERD (gastroesophageal reflux disease)     managed with medication     Gout     managed with medication     H/O echocardiogram 10/14/2019    EF 60.1%    Hip pain, bilateral     POA    Hypertension     managed with medication     Morbid obesity ([address]     Neuropathy     feet and legs    FANTA on CPAP 08/26/2014    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 [place] pacemaker/defibrillator placed 6/25/14--0 shocks     Psychiatric disorder     DEPRESSION    Routine eye exam 2020    no diabetic retinopathy- Carlito Solar    Spinal stenosis of lumbar region     Stage 3b chronic kidney disease ([address]     Type 2 diabetes mellitus with hyperglycemia, with long-term current use of insulin ([address] 9/23/2020      Past Surgical History:   Procedure Laterality Date    HX APPENDECTOMY      HX COLONOSCOPY      Dr. Higinio Mcmanus    HX ORTHOPAEDIC      right achilles tendon repair    HX ORTHOPAEDIC      knee    HX PACEMAKER      pacemaker/defibrillator placed 6/25/14     HX TONSILLECTOMY      ID CARDIAC SURG PROCEDURE UNLIST  cath     Family History   Problem Relation Age of Onset    Heart Attack Father     Cancer Mother       Social History     Tobacco Use    Smoking status: Never Smoker    Smokeless tobacco: Never Used   Substance Use Topics    Alcohol use: No       Prior to Admission medications    Medication Sig Start Date End Date Taking? Authorizing Provider   SITagliptin-metFORMIN (Janumet XR) 50-1,000 mg TM24 Take 2 Tablets by mouth daily. Non-formulary medication. Patient instructed to bring medication to the hospital on the DOS, in the original bottle, and give to nurse. Yes Provider, Historical   acetaminophen (Tylenol Extra Strength) 500 mg tablet Take  by mouth every six (6) hours as needed for Pain. Yes Provider, Historical   glucose blood VI test strips (ASCENSIA AUTODISC VI, ONE TOUCH ULTRA TEST VI) strip Use as directed to check blood sugar twice a day. (E11.65) 10/11/21  Yes Paduano, Harles Rasher, NP   indomethacin (INDOCIN) 25 mg capsule Take 1 Capsule by mouth three (3) times daily. Indications: a type of joint disorder due to excess uric acid in the blood called gout  Patient taking differently: Take 25 mg by mouth three (3) times daily as needed. Indications: a type of joint disorder due to excess uric acid in the blood called gout 8/18/21  Yes Mar Hilario MD   rosuvastatin (CRESTOR) 10 mg tablet TAKE 1 TABLET BY MOUTH EVERY NIGHT 6/7/21  Yes Mar Hilario MD   valsartan-hydroCHLOROthiazide (DIOVAN-HCT) 320-25 mg per tablet Take 1 Tab by mouth daily. 4/30/21  Yes Mar Hilario MD   insulin degludec Radha Styles FlexTouch U-200) 200 unit/mL (3 mL) inpn 50 Units by SubCUTAneous route daily. Instructed to take 40 units, which is 80% of normal dose, on the DOS per anesthesia protocol. Yes Provider, Historical   furosemide (Lasix) 20 mg tablet Take 1 Tab by mouth every Monday, Wednesday, Friday. 3/10/21  Yes Mar Hilario MD   allopurinoL (ZYLOPRIM) 100 mg tablet Take 1 Tab by mouth daily.  3/9/21  Yes Myra Noé Whittington MD   carvediloL (COREG) 6.25 mg tablet Take 1 Tab by mouth two (2) times a day. 3/9/21  Yes Chalino Manley MD   amitriptyline (ELAVIL) 10 mg tablet Take 1 Tab by mouth nightly. Patient taking differently: Take 10-20 mg by mouth nightly. 1 to 2 tabs QHS 3/9/21  Yes Chalino Manley MD   gabapentin (NEURONTIN) 300 mg capsule One po every am and 2 po at bedtime 3/9/21  Yes Chalino Manley MD   omeprazole (PRILOSEC) 20 mg capsule Take 1 Cap by mouth daily. Indications: gastroesophageal reflux disease  Patient taking differently: Take 20 mg by mouth daily. Non-formulary medication. Patient instructed to bring medication to the hospital on the DOS, in the original bottle, and give to nurse. Indications: gastroesophageal reflux disease 3/9/21  Yes Chalino Manley MD   nitroglycerin (NITROSTAT) 0.4 mg SL tablet 1 Tab by SubLINGual route every five (5) minutes as needed for Chest Pain. Up to 3 doses. 12/22/20  Yes Maria D Hector MD   ZINC ACETATE PO Take  by mouth. Yes Provider, Historical   ascorbic acid (VITAMIN C PO) Take  by mouth. Yes Provider, Historical   cyanocobalamin (VITAMIN B-12) 1,000 mcg sublingual tablet Take 1,000 mcg by mouth daily. Yes Provider, Historical   cholecalciferol, vitamin D3, (VITAMIN D3) 2,000 unit tab Take  by mouth. Yes Provider, Historical   Bifidobacterium Infantis (Align) 4 mg cap Take 1 Tablet by mouth. Every other day    Yes Provider, Historical   aspirin 81 mg chewable tablet take 81 mg by mouth every morning. Yes Provider, Historical   FLUORIDE ION/MULTIVITAMINS (MULTI VIT-FLUORIDE PO) take  by mouth daily. Stopped 6/22/09    Yes Provider, Historical   DISABLED PLACARD (DISABLED PLACARD) DMV Handicap placard 10/21/20   Alejandra Pond NP   cpap machine kit by Does Not Apply route.  8 cm    Provider, Historical     No Known Allergies       Objective:     Physical Exam:   Patient Vitals for the past 24 hrs:   Temp Pulse Resp BP SpO2 11/29/21 1214 98.1 °F (36.7 °C) 75 18 (!) 154/77 95 %       ECG:    EKG Results     Procedure 720 Value Units Date/Time    EKG, 12 LEAD, INITIAL [[id number]]     Order Status: Sent           Data Review:   Labs:   Labs pending       Problem List:  )  Patient Active Problem List   Diagnosis Code    Chronic systolic CHF (congestive heart failure) [facility]) I50.22    LBBB (left bundle branch block) I44.7    Obesity hypoventilation syndrome ([facility]) E66.2    CHF (congestive heart failure) ([facility]) I50.9    FANTA on CPAP G47.33, Z99.89    Hypoxemia R09.02    Presence of cardiac defibrillator Z95.810    Mixed hyperlipidemia E78.2    Gastroesophageal reflux disease without esophagitis K21.9    Obesity (BMI 30-39. 9) E66.9    Dizziness R42    Stage 3 chronic kidney disease ([facility]) N18.30    Encounter for diabetic foot exam ([facility] E11.9    Gout M10.9    Type 2 diabetes mellitus with hyperglycemia, with long-term current use of insulin ([facility]) E11.65, Z79.4    Intention tremor G25.2    Acute metabolic encephalopathy U49.13    DANIELLA (acute kidney injury) ([facility] [facility] N17.9    COVID-19 U07.1    CAP (community acquired pneumonia) J18.9    Sepsis ([facility]) A41.9    Left leg pain M79.605    Insomnia due to medical condition G47.01       Total Joint Surgery Pre-Assessment Recommendations:           Patient is scheduled as SDD but is requesting to stay the night. Will forward to Dr Dale Oleary. Patient is to wear home CPAP during hospitalization.      Signed By: Lorice Rinne, NP-C    November 29, 2021

## 2021-11-29 NOTE — PERIOP NOTES
The below lab results are within anesthesia limits. Results routed via [facility] to patient's PCP per surgeon's request.     Recent Results (from the past 12 hour(s))   CBC WITH AUTOMATED DIFF    Collection Time: 11/29/21 10:32 AM   Result Value Ref Range    WBC 9.5 4.3 - 11.1 K/uL    RBC 5.10 4.23 - 5.6 M/uL    HGB 14.2 13.6 - 17.2 g/dL    HCT 45.0 41.1 - 50.3 %    MCV 88.2 79.6 - 97.8 FL    MCH 27.8 26.1 - 32.9 PG    MCHC 31.6 31.4 - 35.0 g/dL    RDW 14.4 11.9 - 14.6 %    PLATELET 094 605 - 044 K/uL    MPV 10.1 9.4 - 12.3 FL    ABSOLUTE NRBC 0.00 0.0 - 0.2 K/uL    DF AUTOMATED      NEUTROPHILS 61 43 - 78 %    LYMPHOCYTES 30 13 - 44 %    MONOCYTES 8 4.0 - 12.0 %    EOSINOPHILS 1 0.5 - 7.8 %    BASOPHILS 1 0.0 - 2.0 %    IMMATURE GRANULOCYTES 1 0.0 - 5.0 %    ABS. NEUTROPHILS 5.8 1.7 - 8.2 K/UL    ABS. LYMPHOCYTES 2.8 0.5 - 4.6 K/UL    ABS. MONOCYTES 0.7 0.1 - 1.3 K/UL    ABS. EOSINOPHILS 0.1 0.0 - 0.8 K/UL    ABS. BASOPHILS 0.1 0.0 - 0.2 K/UL    ABS. IMM.  GRANS. 0.1 0.0 - 0.5 K/UL   METABOLIC PANEL, BASIC    Collection Time: 11/29/21 10:32 AM   Result Value Ref Range    Sodium 136 136 - 145 mmol/L    Potassium 4.6 3.5 - 5.1 mmol/L    Chloride 105 98 - 107 mmol/L    CO2 29 21 - 32 mmol/L    Anion gap 2 (L) 7 - 16 mmol/L    Glucose 194 (H) 65 - 100 mg/dL    BUN 33 (H) 8 - 23 MG/DL    Creatinine 1.36 0.8 - 1.5 MG/DL    GFR est AA >60 >60 ml/min/1.73m2    GFR est non-AA 54 (L) >60 ml/min/1.73m2    Calcium 10.1 8.3 - 10.4 MG/DL   PROTHROMBIN TIME + INR    Collection Time: 11/29/21 10:32 AM   Result Value Ref Range    Prothrombin time 13.2 12.6 - 14.5 sec    INR 1.0     PTT    Collection Time: 11/29/21 10:32 AM   Result Value Ref Range    aPTT 28.4 24.1 - 35.1 SEC   HEMOGLOBIN A1C WITH EAG    Collection Time: 11/29/21 10:39 AM   Result Value Ref Range    Hemoglobin A1c 7.5 (H) 4.20 - 6.30 %    Est. average glucose 169 mg/dL   EKG, 12 LEAD, INITIAL    Collection Time: 11/29/21 12:08 PM   Result Value Ref Range Ventricular Rate 80 BPM    Atrial Rate 80 BPM    P-R Interval 162 ms    QRS Duration 182 ms    Q-T Interval 438 ms    QTC Calculation (Bezet) 505 ms    Calculated P Axis 49 degrees    Calculated R Axis -178 degrees    Calculated T Axis 29 degrees    Diagnosis       Normal sinus rhythm  Right bundle branch block  Cannot rule out Anteroseptal infarct , age undetermined  Abnormal ECG  When compared with ECG of 09-FEB-2021 10:16,  Sinus rhythm has replaced Electronic ventricular pacemaker

## 2021-11-29 NOTE — PROGRESS NOTES
[facility] Case Management note:  Patient screened in Prehab for discharge planning needs. Patient scheduled for a future total joint replacement. We discussed Home Health and equipment needed after surgery. List of Home Health providers offered. Patient w/o preference towards provider. Initial referral sent to [facility].   Has a walker and 3-1 BSC

## 2021-11-29 NOTE — PROGRESS NOTES
Bruce Burgess  : (78 y.o.) [facility] at [address], [place] [address].  [phone]       Physical Therapy Prehab Plan of Treatment and Evaluation Summary:2021    ICD-10: Treatment Diagnosis:   · Pain in Left Knee (M25.562)  · Stiffness of Left Knee, Not elsewhere classified (M25.662)  · Difficulty in walking, Not elsewhere classified (R26.2)  Precautions/Allergies:   Patient has no known allergies. MEDICAL/REFERRING DIAGNOSIS:  Unilateral primary osteoarthritis, left knee [M17.12]  Unspecified acquired deformity of left lower leg [M21.962]  REFERRING PHYSICIAN: Meg Holstein,*  DATE OF SURGERY: 21    Assessment:   Comments:  Pt. Plans to go home with wife who is here today. He reports left shoulder pain and back pain. PROBLEM LIST (Impacting functional limitations):  Mr. Ankush Galan presents with the following left lower extremity(s) problems:  1. Strength  2. Range of Motion  3. Home Exercise Program  4. Pain   INTERVENTIONS PLANNED:  1. Home Exercise Program  2. Educational Discussion      TREATMENT PLAN: Effective Dates: 2021 TO 2021. Frequency/Duration: Patient to continue to perform home exercise program at least twice per day up until his surgery. GOALS: (Goals have been discussed and agreed upon with patient.)  Discharge Goals: Time Frame: 1 Day  1. Patient will demonstrate independence with a home exercise program designed to increase strength, range of motion and pain control to minimize functional deficits and optimize patient for total joint replacement. Rehabilitation Potential For Stated Goals: Good  Regarding Nakia Hanna Nuneznn's therapy, I certify that the treatment plan above will be carried out by a therapist or under their direction.   Thank you for this referral,  Lavon Kennedy, PT               HISTORY:   Present Symptoms:  Pain Intensity 1:  (10 at worst)  Pain Location 1: Knee   History of Present Injury/Illness (Reason for Referral):  Medical/Referring Diagnosis: Unilateral primary osteoarthritis, left knee [M17.12]  Unspecified acquired deformity of left lower leg [M21.962]   Past Medical History/Comorbidities:   Mr. 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 He also has no past medical history of Dementia or PVD (peripheral vascular disease) ([facility] Mr. Victor Manuel Marie  has a past surgical history that includes pr cardiac surg procedure unlist (cath); hx appendectomy; hx tonsillectomy; hx orthopaedic; hx orthopaedic; hx pacemaker; and hx colonoscopy. Social History/Living Environment:   Home Environment: Private residence  # Steps to Enter: 1  Rails to Enter: No  One/Two Story Residence: One story  Support Systems: Spouse/Significant Other  Patient Expects to be Discharged toF Cor[de-identified]ration  Current DME Used/Available at Home: Walker, rolling; Commode, bedside; Cane, straight;  Shower chair  Tub or Shower Type: Shower    Work/Activity:  retired  Dominant Side:  LEFT  Current Medications:  See Pre-assessment nursing note   Number of Personal Factors/Comorbidities that affect the Plan of Care: 1-2: MODERATE COMPLEXITY   EXAMINATION:   ADLs (Current Functional Status):   Ambulation:  [x] Independent  [] Walk Indoors Only  [] Walk Outdoors  [] Use Assistive Device  [] Use Wheelchair Only Dressing:  [] Independent  Requires Assistance from Someone for:  [x] Sock/Shoes  [] Pants  [] Everything   Bathing/Showering:   [x] Independent  [] Requires Assistance from Someone  [] Sponge Bath Only Household Activities:  [] Routine house and yard work  [x] Light Housework Only  [] None   Observation/Orthostatic Postural Assessment:   Exceptions to WDLRounded shoulders, Genu varus right, Genu varus left, Trunk flexion  ROM/Flexibility:   Gross Assessment: Yes  AROM: Generally decreased, functional (right LE, right knee limited)                LLE Assessment  LLE Assessment (WDL): Exception to WDL  LLE AROM  L Knee Flexion: 108  L Knee Extension: 22          Strength:   Gross Assessment: Yes  Strength: Generally decreased, functional (right LE, right knee limited)       LLE Strength  L Knee Flexion: 3  L Knee Extension: 3          Functional Mobility:    Gross Assessment: Yes    Gait Description (WDL): Exceptions to WDL  Stand to Sit: Additional time, Independent  Sit to Stand: Independent, Additional time  Distance (ft): 200 Feet (ft)  Ambulation - Level of Assistance: Independent  Speed/Sonia: Delayed  Stance: Left decreased  Gait Abnormalities: Antalgic; Shuffling gait          Balance:    Sitting: Intact  Standing: Intact   Body Structures Involved:  1. Bones  2. Joints  3. Muscles  4. Ligaments Body Functions Affected:  1. Movement Related Activities and Participation Affected:  1. Mobility   Number of elements that affect the Plan of Care: 3: MODERATE COMPLEXITY   CLINICAL PRESENTATION:   Presentation: Stable and uncomplicated: LOW COMPLEXITY   CLINICAL DECISION MAKING:   Tool Used: Knee injury and Osteoarthritis Outcome Score for Joint Replacement (KOOS, JR)  Score:  Initial: 20 (Interval: 36.391) 11/29/2021 Most Recent: TBD   Interpretation of Score: The KOOS, JR contains 7 items from the original KOOS survey. Items are coded from 0 to 4, none to extreme respectively.    KOOS, JR is scored by summing the raw response (range 0-28) and then converting it to an interval score using the table provided below. The interval score ranges from 0 to 100 where 0 represents total knee disability and 100 represents perfect knee health. Medical Necessity:   · Mr. Pete Montgomery is expected to optimize his lower extremity strength and ROM in preparation for joint replacement surgery. Reason for Services/Other Comments:  · Achieve baseline assesment of musculoskeletal system, functional mobility and home environment. , educate in PT HEP in preparation for surgery, educate in hospital plan of care. Use of outcome tool(s) and clinical judgement create a POC that gives a: Clear prediction of patient's progress: LOW COMPLEXITY   TREATMENT:   Treatment/Session Assessment:  Patient was instructed in PT- HEP to increase strength and ROM in LEs. Answered all questions. · Post session pain:  Knee pain  · Compliance with Program/Exercises: compliant most of the time.   Total Treatment Duration:  PT Patient Time In/Time Out  Time In: 1100  Time Out: 10 Kole Marks., PT

## 2021-11-30 NOTE — CALL BACK NOTE
Patient chart reviewed by anesthesia and request f/u echo prior to surgery. I will put in order and await [facility] cardiology to make appointment.

## 2021-11-30 NOTE — PERIOP NOTES
Dr. Yeni Rios reviewed chart. Orders received to repeat Echo prior to surgery. Jesus Zimmer NP notified.

## 2021-12-01 VITALS
TEMPERATURE: 98.1 F | OXYGEN SATURATION: 95 % | HEIGHT: 69 IN | HEART RATE: 75 BPM | SYSTOLIC BLOOD PRESSURE: 154 MMHG | WEIGHT: 257 LBS | BODY MASS INDEX: 38.06 KG/M2 | DIASTOLIC BLOOD PRESSURE: 77 MMHG | RESPIRATION RATE: 18 BRPM

## 2021-12-01 NOTE — PROGRESS NOTES
11/29/21 1030   Oxygen Therapy   O2 Sat (%) 96 %   Pulse via Oximetry 82 beats per minute   O2 Device None (Room air)   Pre-Treatment   Breath Sounds Bilateral Diminished     Initial respiratory Assessment completed with pt. Pt was interviewed and evaluated in [facility] camp prior to surgery. Patient ID:  Christopher Ziegler  [id number]  77 y.o.  1945  Surgeon: Dr. Connor Allen  Date of Surgery: 12/21/2021  Procedure: Total Left Knee Arthroplasty  Primary Care Physician: Nidia Guzman [phone]  Specialists:     Pt taught proper COUGH technique  DIAPHRAGMATIC BREATHING EXERCISE INSTRUCTIONS GIVEN    History of smoking:   CIGARS FOR 10 YEARS                 Quit date:         Secondhand smoke:PARENTS    Past procedures with Oxygen desaturation or delayed awakening:DENIES    Past Medical History:   Diagnosis Date    Age-related cognitive decline 2020    30 out of 30 MMSE    Arthritis     DJD- shoulder, knees, hips    Chronic systolic CHF (congestive heart failure) ([address]     COVID-19 2/9/2021    Dizziness 06/09/2020    hx     GERD (gastroesophageal reflux disease)     managed with medication     Gout     managed with medication     H/O echocardiogram 10/14/2019    EF 60.1%    Hip pain, bilateral     POA    Hypertension     managed with medication     Morbid obesity ([address]     Neuropathy     feet and legs    FANTA on CPAP 08/26/2014    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 pacemaker/defibrillator placed 6/25/14--0 shocks     Psychiatric disorder     DEPRESSION    Routine eye exam 2020    no diabetic retinopathy- Jose Alfredo Must    Spinal stenosis of lumbar region     Stage 3b chronic kidney disease ([address]     Type 2 diabetes mellitus with hyperglycemia, with long-term current use of insulin ([address] 09/23/2020    managed with oral medication and Tresiba, average 's, no problems with hypoglycemia, A1c 7.2 11/29/21    COVID FEB. 65 [facility] 3 WEEKS. WITH AMS- ENCEPHALOPATHY  CAP 2/13/2021  FANTA- CPAP  Respiratory history:DENIES SOB                                                                 Respiratory meds:  DENIES    FAMILY PRESENT:            WIFE    PAST SLEEP STUDY:        YES                 9/1/2011 DIAGNOSED  HX OF FANTA:                        YES                     FANTA assessment:     HX OF CHF                                          SLEEPS ON       BACK          PHYSICAL EXAM   Body mass index is 37.95 kg/m².    Visit Vitals  BP (!) 154/77 (BP 1 Location: Left lower arm, BP Patient Position: Sitting)   Pulse 75   Temp 98.1 °F (36.7 °C)   Resp 18   Ht 5' 9\" (1.753 m)   Wt 116.6 kg (257 lb)   SpO2 95%   BMI 37.95 kg/m²     Neck circumference:    53.5  cm    Loud snoring:                                                 YES             Witnessed apnea or wakening gasping or choking:           APNEA  Awakens with headaches:                                               DENIES  Morning or daytime tiredness/ sleepiness:                             TIRED  Dry mouth or sore throat in morning:            YES                                              [place] stage:  4                                   SACS score:110  Stop Bang   STOP-BANG  Does the patient snore loudly (louder than talking or loud enough to be heard through closed doors)?: Yes  Does the patient often feel tired, fatigued, or sleepy during the daytime, even after a \"good\" night's sleep?: Yes  Has anyone ever observed the patient stop breathing during their sleep? : Yes  Does the patient have or are they being treated for high blood pressure?: Yes  Is the patient's BMI greater than 35?: Yes  Is your neck circumference greater than 17 inches (Male) or 16 inches (Female)?: Yes  Is the patient older than 48?: Yes  Is the patient male?: Yes  FANTA Score: 8  Has the patient been referred to Sleep Medicine?: No  Has the patient previously been diagnosed with Obstructive Sleep Apnea?: Yes  Treated or Untreated?: Treated                            Pt. Is positive for FANTA and uses HOME CPAP and will bring to Hospital day of surgery. PT WILL NEED ASSISTANCE PLACING CPAP ON HS DURING HOSPITALIZATION.   ASSESS Q SHIFT  ALBUTEROL Q 6 PRN                                        Referrals:    Pt. Phone Number:

## 2021-12-14 PROBLEM — I10 PRIMARY HYPERTENSION: Status: ACTIVE | Noted: 2021-12-14

## 2021-12-15 NOTE — H&P
[facility]  History and Physical Exam    Patient ID:  Scout Sepulveda  [id number]    65 y.o.  1945    Today: December 15, 2021    Vitals Signs: Reviewed as noted in medical record. Allergies: No Known Allergies    CC: Left knee pain    HPI:  Pt complains of left knee pain and difficulty ambulating. Relevant PMH:   Past Medical History:   Diagnosis Date    Age-related cognitive decline 2020    30 out of 30 MMSE    Arthritis     DJD- shoulder, knees, hips    Chronic systolic [facility]) [facility]     COVID-19 2/9/2021    Dizziness 06/09/2020    hx     GERD (gastroesophageal reflux disease)     managed with medication     Gout     managed with medication     H/O echocardiogram 10/14/2019    EF 60.1%    Hip pain, bilateral     POA    Hypertension     managed with medication     Morbid obesity ([address]     Neuropathy     feet and legs    FANTA on CPAP 08/26/2014    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 pacemaker/defibrillator placed 6/25/14--0 shocks     Psychiatric disorder     DEPRESSION    Routine eye exam 2020    no diabetic retinopathy- Lencho Whiteside    Spinal stenosis of lumbar region     Stage 3b chronic kidney disease ([facility]     Type 2 diabetes mellitus with hyperglycemia, with long-term current use of insulin ([facility] 09/23/2020    managed with oral medication and Tresiba, average 's, no problems with hypoglycemia, A1c 7.2 11/29/21       Objective:                    HEENT: NC/AT                   Lungs:  clear                   Heart:   rrr                   Abdomen: soft                   Extremities:  Pain with rom of the left knee joint    Radiographs: reveal left knee osteoarthritis with loss of joint space and bone spurs.     Assessment: Primary osteoarthritis of left knee [M17.12]  Acquired deformity of left knee [M21.962]    Plan:  Proceed with scheduled Procedure(s) (LRB):  LEFT KNEE ARTHROPLASTY TOTAL ROBOTIC ASSISTED / Sam Guy / Carlos Zendejas PT HAS PACEMAKER (Left) . The patient has failed conservative treatment including NSAIDS, and injections. Due to the amount of pain the patient is experiencing we will proceed with scheduled procedure. It is also felt that the patient is high risk for postoperative complication due to history of multiple chronic medical problems.   The patient may potentially spend 2 nights in the hospital.      Signed By: DANIEL Shahid  December 15, 2021

## 2021-12-20 ENCOUNTER — ANESTHESIA EVENT (OUTPATIENT)
Dept: SURGERY | Age: 76
End: 2021-12-20
Payer: MEDICARE

## 2021-12-21 ENCOUNTER — HOSPITAL ENCOUNTER (OUTPATIENT)
Age: 76
Setting detail: OUTPATIENT SURGERY
Discharge: HOME HEALTH | End: 2021-12-22
Attending: ORTHOPAEDIC SURGERY | Admitting: ORTHOPAEDIC SURGERY
Payer: MEDICARE

## 2021-12-21 ENCOUNTER — ANESTHESIA (OUTPATIENT)
Dept: SURGERY | Age: 76
End: 2021-12-21
Payer: MEDICARE

## 2021-12-21 DIAGNOSIS — M17.12 ARTHRITIS OF LEFT KNEE: ICD-10-CM

## 2021-12-21 DIAGNOSIS — Z96.652 STATUS POST TOTAL KNEE REPLACEMENT, LEFT: Primary | ICD-10-CM

## 2021-12-21 LAB
GLUCOSE BLD STRIP.AUTO-MCNC: 142 MG/DL (ref 65–100)
GLUCOSE BLD STRIP.AUTO-MCNC: 147 MG/DL (ref 65–100)
HGB BLD-MCNC: 13.3 G/DL (ref 13.6–17.2)
SERVICE CMNT-IMP: ABNORMAL
SERVICE CMNT-IMP: ABNORMAL

## 2021-12-21 PROCEDURE — 82962 GLUCOSE BLOOD TEST: CPT

## 2021-12-21 PROCEDURE — 74011250636 HC RX REV CODE- 250/636: Performed by: ANESTHESIOLOGY

## 2021-12-21 PROCEDURE — 36416 COLLJ CAPILLARY BLOOD SPEC: CPT

## 2021-12-21 PROCEDURE — 77030040922 HC BLNKT HYPOTHRM STRY -A: Performed by: ANESTHESIOLOGY

## 2021-12-21 PROCEDURE — 74011250637 HC RX REV CODE- 250/637: Performed by: ORTHOPAEDIC SURGERY

## 2021-12-21 PROCEDURE — 77030011208: Performed by: ORTHOPAEDIC SURGERY

## 2021-12-21 PROCEDURE — 77030002933 HC SUT MCRYL J&J -A: Performed by: ORTHOPAEDIC SURGERY

## 2021-12-21 PROCEDURE — 76942 ECHO GUIDE FOR BIOPSY: CPT | Performed by: ORTHOPAEDIC SURGERY

## 2021-12-21 PROCEDURE — 76060000035 HC ANESTHESIA 2 TO 2.5 HR: Performed by: ORTHOPAEDIC SURGERY

## 2021-12-21 PROCEDURE — 74011000250 HC RX REV CODE- 250: Performed by: ORTHOPAEDIC SURGERY

## 2021-12-21 PROCEDURE — 84132 ASSAY OF SERUM POTASSIUM: CPT

## 2021-12-21 PROCEDURE — 27447 TOTAL KNEE ARTHROPLASTY: CPT | Performed by: PHYSICIAN ASSISTANT

## 2021-12-21 PROCEDURE — 77030029820: Performed by: ORTHOPAEDIC SURGERY

## 2021-12-21 PROCEDURE — 74011250636 HC RX REV CODE- 250/636: Performed by: PHYSICIAN ASSISTANT

## 2021-12-21 PROCEDURE — 77030002966 HC SUT PDS J&J -A: Performed by: ORTHOPAEDIC SURGERY

## 2021-12-21 PROCEDURE — 94762 N-INVAS EAR/PLS OXIMTRY CONT: CPT

## 2021-12-21 PROCEDURE — 77030012935 HC DRSG AQUACEL BMS -B: Performed by: ORTHOPAEDIC SURGERY

## 2021-12-21 PROCEDURE — 77030006835 HC BLD SAW SAG STRY -B: Performed by: ORTHOPAEDIC SURGERY

## 2021-12-21 PROCEDURE — 76010010054 HC POST OP PAIN BLOCK: Performed by: ORTHOPAEDIC SURGERY

## 2021-12-21 PROCEDURE — 77030008462 HC STPLR SKN PROX J&J -A: Performed by: ORTHOPAEDIC SURGERY

## 2021-12-21 PROCEDURE — 77030039760: Performed by: ORTHOPAEDIC SURGERY

## 2021-12-21 PROCEDURE — 77030003602 HC NDL NRV BLK BBMI -B: Performed by: ANESTHESIOLOGY

## 2021-12-21 PROCEDURE — 2709999900 HC NON-CHARGEABLE SUPPLY: Performed by: ORTHOPAEDIC SURGERY

## 2021-12-21 PROCEDURE — 97161 PT EVAL LOW COMPLEX 20 MIN: CPT

## 2021-12-21 PROCEDURE — 74011250637 HC RX REV CODE- 250/637: Performed by: PHYSICIAN ASSISTANT

## 2021-12-21 PROCEDURE — 74011000250 HC RX REV CODE- 250: Performed by: ANESTHESIOLOGY

## 2021-12-21 PROCEDURE — C1776 JOINT DEVICE (IMPLANTABLE): HCPCS | Performed by: ORTHOPAEDIC SURGERY

## 2021-12-21 PROCEDURE — 2709999900 HC NON-CHARGEABLE SUPPLY

## 2021-12-21 PROCEDURE — 97165 OT EVAL LOW COMPLEX 30 MIN: CPT

## 2021-12-21 PROCEDURE — 77030029828 HC FEM TIB CKPNT KT DISP STRY -B: Performed by: ORTHOPAEDIC SURGERY

## 2021-12-21 PROCEDURE — 97535 SELF CARE MNGMENT TRAINING: CPT

## 2021-12-21 PROCEDURE — 74011000258 HC RX REV CODE- 258: Performed by: ORTHOPAEDIC SURGERY

## 2021-12-21 PROCEDURE — 20985 CPTR-ASST DIR MS PX: CPT | Performed by: ORTHOPAEDIC SURGERY

## 2021-12-21 PROCEDURE — 77030018836 HC SOL IRR NACL ICUM -A: Performed by: ORTHOPAEDIC SURGERY

## 2021-12-21 PROCEDURE — 27447 TOTAL KNEE ARTHROPLASTY: CPT | Performed by: ORTHOPAEDIC SURGERY

## 2021-12-21 PROCEDURE — 97110 THERAPEUTIC EXERCISES: CPT

## 2021-12-21 PROCEDURE — 74011250637 HC RX REV CODE- 250/637: Performed by: ANESTHESIOLOGY

## 2021-12-21 PROCEDURE — 94760 N-INVAS EAR/PLS OXIMETRY 1: CPT

## 2021-12-21 PROCEDURE — 85018 HEMOGLOBIN: CPT

## 2021-12-21 PROCEDURE — 76010000162 HC OR TIME 1.5 TO 2 HR INTENSV-TIER 1: Performed by: ORTHOPAEDIC SURGERY

## 2021-12-21 PROCEDURE — 77030019557 HC ELECTRD VES SEAL MEDT -F: Performed by: ORTHOPAEDIC SURGERY

## 2021-12-21 PROCEDURE — 74011250636 HC RX REV CODE- 250/636: Performed by: ORTHOPAEDIC SURGERY

## 2021-12-21 PROCEDURE — 77030038692 HC WND DEB SYS IRMX -B: Performed by: ORTHOPAEDIC SURGERY

## 2021-12-21 PROCEDURE — 77030007880 HC KT SPN EPDRL BBMI -B: Performed by: ANESTHESIOLOGY

## 2021-12-21 PROCEDURE — 76210000016 HC OR PH I REC 1 TO 1.5 HR: Performed by: ORTHOPAEDIC SURGERY

## 2021-12-21 PROCEDURE — 77030031139 HC SUT VCRL2 J&J -A: Performed by: ORTHOPAEDIC SURGERY

## 2021-12-21 PROCEDURE — 77030038149 HC BLD SAW SAG STRY -D: Performed by: ORTHOPAEDIC SURGERY

## 2021-12-21 PROCEDURE — C1713 ANCHOR/SCREW BN/BN,TIS/BN: HCPCS | Performed by: ORTHOPAEDIC SURGERY

## 2021-12-21 PROCEDURE — 77030040361 HC SLV COMPR DVT MDII -B

## 2021-12-21 PROCEDURE — 74011000250 HC RX REV CODE- 250: Performed by: PHYSICIAN ASSISTANT

## 2021-12-21 PROCEDURE — 97530 THERAPEUTIC ACTIVITIES: CPT

## 2021-12-21 PROCEDURE — 77030003665 HC NDL SPN BBMI -A: Performed by: ANESTHESIOLOGY

## 2021-12-21 DEVICE — CRUCIATE RETAINING FEMORAL
Type: IMPLANTABLE DEVICE | Site: KNEE | Status: FUNCTIONAL
Brand: TRIATHLON

## 2021-12-21 DEVICE — UNIVERSAL TIBIAL BASEPLATE
Type: IMPLANTABLE DEVICE | Site: KNEE | Status: FUNCTIONAL
Brand: TRIATHLON

## 2021-12-21 DEVICE — TIBIAL BEARING INSERT
Type: IMPLANTABLE DEVICE | Site: KNEE | Status: FUNCTIONAL
Brand: TRIATHLON

## 2021-12-21 DEVICE — KNEE K1 TOT HEMI STD CEM -- IMPL CAPPED K1: Type: IMPLANTABLE DEVICE | Status: FUNCTIONAL

## 2021-12-21 DEVICE — PALACOS® R IS A FAST-CURING, RADIOPAQUE, POLY(METHYL METHACRYLATE)-BASED BONE CEMENT.PALACOS ® R CONTAINS THE X-RAY CONTRAST MEDIUM ZIRCONIUM DIOXIDE. TO IMPROVE VISIBILITY IN THE SURGICAL FIELD PALACOS ® R HAS BEEN COLOURED WITH CHLOROPHYLL (E141). THE BONE CEMENT IS PREPARED DIRECTLY BEFORE USE BY MIXING A POLYMER POWDER COMPONENT WITH A LIQUID MONOMER COMPONENT. A DUCTILE DOUGH FORMS WHICH CURES WITHIN A FEW MINUTES.
Type: IMPLANTABLE DEVICE | Site: KNEE | Status: FUNCTIONAL
Brand: PALACOS®

## 2021-12-21 DEVICE — PATELLA
Type: IMPLANTABLE DEVICE | Site: KNEE | Status: FUNCTIONAL
Brand: TRIATHLON

## 2021-12-21 RX ORDER — CEFAZOLIN SODIUM/WATER 2 G/20 ML
2 SYRINGE (ML) INTRAVENOUS EVERY 8 HOURS
Status: COMPLETED | OUTPATIENT
Start: 2021-12-21 | End: 2021-12-22

## 2021-12-21 RX ORDER — OXYCODONE HYDROCHLORIDE 5 MG/1
10 TABLET ORAL
Status: COMPLETED | OUTPATIENT
Start: 2021-12-21 | End: 2021-12-21

## 2021-12-21 RX ORDER — FENTANYL CITRATE 50 UG/ML
100 INJECTION, SOLUTION INTRAMUSCULAR; INTRAVENOUS ONCE
Status: DISCONTINUED | OUTPATIENT
Start: 2021-12-21 | End: 2021-12-21 | Stop reason: HOSPADM

## 2021-12-21 RX ORDER — MIDAZOLAM HYDROCHLORIDE 1 MG/ML
INJECTION, SOLUTION INTRAMUSCULAR; INTRAVENOUS AS NEEDED
Status: DISCONTINUED | OUTPATIENT
Start: 2021-12-21 | End: 2021-12-21 | Stop reason: HOSPADM

## 2021-12-21 RX ORDER — LIDOCAINE HYDROCHLORIDE 10 MG/ML
0.1 INJECTION INFILTRATION; PERINEURAL AS NEEDED
Status: DISCONTINUED | OUTPATIENT
Start: 2021-12-21 | End: 2021-12-21 | Stop reason: HOSPADM

## 2021-12-21 RX ORDER — PROPOFOL 10 MG/ML
INJECTION, EMULSION INTRAVENOUS
Status: DISCONTINUED | OUTPATIENT
Start: 2021-12-21 | End: 2021-12-21 | Stop reason: HOSPADM

## 2021-12-21 RX ORDER — DEXAMETHASONE SODIUM PHOSPHATE 100 MG/10ML
INJECTION INTRAMUSCULAR; INTRAVENOUS AS NEEDED
Status: DISCONTINUED | OUTPATIENT
Start: 2021-12-21 | End: 2021-12-21 | Stop reason: HOSPADM

## 2021-12-21 RX ORDER — METFORMIN HYDROCHLORIDE 500 MG/1
1000 TABLET, EXTENDED RELEASE ORAL
Status: DISCONTINUED | OUTPATIENT
Start: 2021-12-21 | End: 2021-12-22 | Stop reason: HOSPADM

## 2021-12-21 RX ORDER — SODIUM CHLORIDE 0.9 % (FLUSH) 0.9 %
5-40 SYRINGE (ML) INJECTION EVERY 8 HOURS
Status: DISCONTINUED | OUTPATIENT
Start: 2021-12-21 | End: 2021-12-22 | Stop reason: HOSPADM

## 2021-12-21 RX ORDER — NALOXONE HYDROCHLORIDE 0.4 MG/ML
0.1 INJECTION, SOLUTION INTRAMUSCULAR; INTRAVENOUS; SUBCUTANEOUS AS NEEDED
Status: DISCONTINUED | OUTPATIENT
Start: 2021-12-21 | End: 2021-12-21 | Stop reason: HOSPADM

## 2021-12-21 RX ORDER — ACETAMINOPHEN 500 MG
1000 TABLET ORAL EVERY 6 HOURS
Status: DISCONTINUED | OUTPATIENT
Start: 2021-12-21 | End: 2021-12-22 | Stop reason: HOSPADM

## 2021-12-21 RX ORDER — OXYCODONE HYDROCHLORIDE 5 MG/1
5 TABLET ORAL
Status: DISCONTINUED | OUTPATIENT
Start: 2021-12-21 | End: 2021-12-21 | Stop reason: HOSPADM

## 2021-12-21 RX ORDER — MIDAZOLAM HYDROCHLORIDE 1 MG/ML
2 INJECTION, SOLUTION INTRAMUSCULAR; INTRAVENOUS
Status: DISCONTINUED | OUTPATIENT
Start: 2021-12-21 | End: 2021-12-21 | Stop reason: HOSPADM

## 2021-12-21 RX ORDER — ACETAMINOPHEN 500 MG
1000 TABLET ORAL ONCE
Status: COMPLETED | OUTPATIENT
Start: 2021-12-21 | End: 2021-12-21

## 2021-12-21 RX ORDER — ONDANSETRON 4 MG/1
4 TABLET, ORALLY DISINTEGRATING ORAL
Status: DISCONTINUED | OUTPATIENT
Start: 2021-12-21 | End: 2021-12-22 | Stop reason: HOSPADM

## 2021-12-21 RX ORDER — ACETAMINOPHEN 325 MG/1
975 TABLET ORAL ONCE
Status: DISCONTINUED | OUTPATIENT
Start: 2021-12-21 | End: 2021-12-21 | Stop reason: SDUPTHER

## 2021-12-21 RX ORDER — HYDROMORPHONE HYDROCHLORIDE 2 MG/ML
0.5 INJECTION, SOLUTION INTRAMUSCULAR; INTRAVENOUS; SUBCUTANEOUS
Status: DISCONTINUED | OUTPATIENT
Start: 2021-12-21 | End: 2021-12-21 | Stop reason: HOSPADM

## 2021-12-21 RX ORDER — VANCOMYCIN HYDROCHLORIDE 1 G/20ML
INJECTION, POWDER, LYOPHILIZED, FOR SOLUTION INTRAVENOUS AS NEEDED
Status: DISCONTINUED | OUTPATIENT
Start: 2021-12-21 | End: 2021-12-21 | Stop reason: HOSPADM

## 2021-12-21 RX ORDER — NALOXONE HYDROCHLORIDE 0.4 MG/ML
.2-.4 INJECTION, SOLUTION INTRAMUSCULAR; INTRAVENOUS; SUBCUTANEOUS
Status: DISCONTINUED | OUTPATIENT
Start: 2021-12-21 | End: 2021-12-22 | Stop reason: HOSPADM

## 2021-12-21 RX ORDER — SODIUM CHLORIDE 9 MG/ML
100 INJECTION, SOLUTION INTRAVENOUS CONTINUOUS
Status: DISCONTINUED | OUTPATIENT
Start: 2021-12-21 | End: 2021-12-22 | Stop reason: HOSPADM

## 2021-12-21 RX ORDER — OXYCODONE HYDROCHLORIDE 5 MG/1
10 TABLET ORAL
Status: DISCONTINUED | OUTPATIENT
Start: 2021-12-21 | End: 2021-12-22 | Stop reason: HOSPADM

## 2021-12-21 RX ORDER — HYDROMORPHONE HYDROCHLORIDE 1 MG/ML
1 INJECTION, SOLUTION INTRAMUSCULAR; INTRAVENOUS; SUBCUTANEOUS
Status: DISCONTINUED | OUTPATIENT
Start: 2021-12-21 | End: 2021-12-22 | Stop reason: HOSPADM

## 2021-12-21 RX ORDER — DIPHENHYDRAMINE HCL 25 MG
25 CAPSULE ORAL
Status: DISCONTINUED | OUTPATIENT
Start: 2021-12-21 | End: 2021-12-22 | Stop reason: HOSPADM

## 2021-12-21 RX ORDER — PANTOPRAZOLE SODIUM 40 MG/1
40 TABLET, DELAYED RELEASE ORAL
Status: DISCONTINUED | OUTPATIENT
Start: 2021-12-22 | End: 2021-12-22 | Stop reason: HOSPADM

## 2021-12-21 RX ORDER — DIPHENHYDRAMINE HYDROCHLORIDE 50 MG/ML
12.5 INJECTION, SOLUTION INTRAMUSCULAR; INTRAVENOUS
Status: DISCONTINUED | OUTPATIENT
Start: 2021-12-21 | End: 2021-12-21 | Stop reason: HOSPADM

## 2021-12-21 RX ORDER — ONDANSETRON 2 MG/ML
4 INJECTION INTRAMUSCULAR; INTRAVENOUS ONCE
Status: DISCONTINUED | OUTPATIENT
Start: 2021-12-21 | End: 2021-12-21 | Stop reason: HOSPADM

## 2021-12-21 RX ORDER — SODIUM CHLORIDE, SODIUM LACTATE, POTASSIUM CHLORIDE, CALCIUM CHLORIDE 600; 310; 30; 20 MG/100ML; MG/100ML; MG/100ML; MG/100ML
100 INJECTION, SOLUTION INTRAVENOUS CONTINUOUS
Status: DISCONTINUED | OUTPATIENT
Start: 2021-12-21 | End: 2021-12-21 | Stop reason: HOSPADM

## 2021-12-21 RX ORDER — GABAPENTIN 300 MG/1
300 CAPSULE ORAL 2 TIMES DAILY
Status: DISCONTINUED | OUTPATIENT
Start: 2021-12-21 | End: 2021-12-22 | Stop reason: HOSPADM

## 2021-12-21 RX ORDER — TRANEXAMIC ACID 100 MG/ML
INJECTION, SOLUTION INTRAVENOUS AS NEEDED
Status: DISCONTINUED | OUTPATIENT
Start: 2021-12-21 | End: 2021-12-21 | Stop reason: HOSPADM

## 2021-12-21 RX ORDER — ROPIVACAINE HYDROCHLORIDE 2 MG/ML
INJECTION, SOLUTION EPIDURAL; INFILTRATION; PERINEURAL AS NEEDED
Status: DISCONTINUED | OUTPATIENT
Start: 2021-12-21 | End: 2021-12-21 | Stop reason: HOSPADM

## 2021-12-21 RX ORDER — ALBUTEROL SULFATE 0.83 MG/ML
2.5 SOLUTION RESPIRATORY (INHALATION) AS NEEDED
Status: DISCONTINUED | OUTPATIENT
Start: 2021-12-21 | End: 2021-12-21 | Stop reason: HOSPADM

## 2021-12-21 RX ORDER — ROPIVACAINE HYDROCHLORIDE 2 MG/ML
INJECTION, SOLUTION EPIDURAL; INFILTRATION; PERINEURAL
Status: COMPLETED | OUTPATIENT
Start: 2021-12-21 | End: 2021-12-21

## 2021-12-21 RX ORDER — MIDAZOLAM HYDROCHLORIDE 1 MG/ML
2 INJECTION, SOLUTION INTRAMUSCULAR; INTRAVENOUS ONCE
Status: COMPLETED | OUTPATIENT
Start: 2021-12-21 | End: 2021-12-21

## 2021-12-21 RX ORDER — ASPIRIN 81 MG/1
81 TABLET ORAL EVERY 12 HOURS
Status: DISCONTINUED | OUTPATIENT
Start: 2021-12-21 | End: 2021-12-22 | Stop reason: HOSPADM

## 2021-12-21 RX ORDER — DEXAMETHASONE SODIUM PHOSPHATE 4 MG/ML
INJECTION, SOLUTION INTRA-ARTICULAR; INTRALESIONAL; INTRAMUSCULAR; INTRAVENOUS; SOFT TISSUE
Status: COMPLETED | OUTPATIENT
Start: 2021-12-21 | End: 2021-12-21

## 2021-12-21 RX ORDER — CARVEDILOL 6.25 MG/1
6.25 TABLET ORAL 2 TIMES DAILY
Status: DISCONTINUED | OUTPATIENT
Start: 2021-12-21 | End: 2021-12-22 | Stop reason: HOSPADM

## 2021-12-21 RX ORDER — CEFAZOLIN SODIUM/WATER 2 G/20 ML
2 SYRINGE (ML) INTRAVENOUS ONCE
Status: COMPLETED | OUTPATIENT
Start: 2021-12-21 | End: 2021-12-21

## 2021-12-21 RX ORDER — DEXAMETHASONE SODIUM PHOSPHATE 100 MG/10ML
10 INJECTION INTRAMUSCULAR; INTRAVENOUS ONCE
Status: DISCONTINUED | OUTPATIENT
Start: 2021-12-22 | End: 2021-12-22 | Stop reason: HOSPADM

## 2021-12-21 RX ORDER — FUROSEMIDE 20 MG/1
20 TABLET ORAL
Status: DISCONTINUED | OUTPATIENT
Start: 2021-12-22 | End: 2021-12-22 | Stop reason: HOSPADM

## 2021-12-21 RX ORDER — EPHEDRINE SULFATE/0.9% NACL/PF 50 MG/5 ML
SYRINGE (ML) INTRAVENOUS AS NEEDED
Status: DISCONTINUED | OUTPATIENT
Start: 2021-12-21 | End: 2021-12-21 | Stop reason: HOSPADM

## 2021-12-21 RX ORDER — AMITRIPTYLINE HYDROCHLORIDE 10 MG/1
10 TABLET, FILM COATED ORAL
Status: DISCONTINUED | OUTPATIENT
Start: 2021-12-21 | End: 2021-12-22 | Stop reason: HOSPADM

## 2021-12-21 RX ORDER — ONDANSETRON 2 MG/ML
INJECTION INTRAMUSCULAR; INTRAVENOUS AS NEEDED
Status: DISCONTINUED | OUTPATIENT
Start: 2021-12-21 | End: 2021-12-21 | Stop reason: HOSPADM

## 2021-12-21 RX ORDER — ALOGLIPTIN 12.5 MG/1
12.5 TABLET, FILM COATED ORAL
Status: DISCONTINUED | OUTPATIENT
Start: 2021-12-21 | End: 2021-12-22 | Stop reason: HOSPADM

## 2021-12-21 RX ORDER — AMOXICILLIN 250 MG
2 CAPSULE ORAL DAILY
Status: DISCONTINUED | OUTPATIENT
Start: 2021-12-22 | End: 2021-12-22 | Stop reason: HOSPADM

## 2021-12-21 RX ORDER — CELECOXIB 200 MG/1
200 CAPSULE ORAL ONCE
Status: COMPLETED | OUTPATIENT
Start: 2021-12-21 | End: 2021-12-21

## 2021-12-21 RX ORDER — ACETAMINOPHEN 650 MG/1
650 SUPPOSITORY RECTAL ONCE
Status: DISCONTINUED | OUTPATIENT
Start: 2021-12-21 | End: 2021-12-21 | Stop reason: SDUPTHER

## 2021-12-21 RX ORDER — INSULIN GLARGINE 100 [IU]/ML
50 INJECTION, SOLUTION SUBCUTANEOUS DAILY
Status: DISCONTINUED | OUTPATIENT
Start: 2021-12-22 | End: 2021-12-22 | Stop reason: HOSPADM

## 2021-12-21 RX ORDER — CELECOXIB 200 MG/1
200 CAPSULE ORAL EVERY 12 HOURS
Status: DISCONTINUED | OUTPATIENT
Start: 2021-12-21 | End: 2021-12-22 | Stop reason: HOSPADM

## 2021-12-21 RX ORDER — SODIUM CHLORIDE 0.9 % (FLUSH) 0.9 %
5-40 SYRINGE (ML) INJECTION AS NEEDED
Status: DISCONTINUED | OUTPATIENT
Start: 2021-12-21 | End: 2021-12-22 | Stop reason: HOSPADM

## 2021-12-21 RX ORDER — ALLOPURINOL 100 MG/1
100 TABLET ORAL DAILY
Status: DISCONTINUED | OUTPATIENT
Start: 2021-12-21 | End: 2021-12-22 | Stop reason: HOSPADM

## 2021-12-21 RX ADMIN — PHENYLEPHRINE HYDROCHLORIDE 100 MCG: 10 INJECTION INTRAVENOUS at 09:04

## 2021-12-21 RX ADMIN — MIDAZOLAM 2 MG: 1 INJECTION INTRAMUSCULAR; INTRAVENOUS at 07:44

## 2021-12-21 RX ADMIN — OXYCODONE 10 MG: 5 TABLET ORAL at 21:42

## 2021-12-21 RX ADMIN — PROPOFOL 100 MCG/KG/MIN: 10 INJECTION, EMULSION INTRAVENOUS at 08:22

## 2021-12-21 RX ADMIN — AMITRIPTYLINE HYDROCHLORIDE 10 MG: 10 TABLET, FILM COATED ORAL at 21:42

## 2021-12-21 RX ADMIN — Medication 10 MG: at 08:47

## 2021-12-21 RX ADMIN — CARVEDILOL 6.25 MG: 6.25 TABLET, FILM COATED ORAL at 17:42

## 2021-12-21 RX ADMIN — ACETAMINOPHEN 1000 MG: 500 TABLET, FILM COATED ORAL at 06:40

## 2021-12-21 RX ADMIN — Medication 2 G: at 08:06

## 2021-12-21 RX ADMIN — ACETAMINOPHEN 1000 MG: 500 TABLET, FILM COATED ORAL at 17:42

## 2021-12-21 RX ADMIN — GABAPENTIN 300 MG: 300 CAPSULE ORAL at 17:44

## 2021-12-21 RX ADMIN — OXYCODONE 10 MG: 5 TABLET ORAL at 13:59

## 2021-12-21 RX ADMIN — ROPIVACAINE HYDROCHLORIDE 40 MG: 2 INJECTION, SOLUTION EPIDURAL; INFILTRATION at 07:46

## 2021-12-21 RX ADMIN — Medication 10 MG: at 09:11

## 2021-12-21 RX ADMIN — ALOGLIPTIN 12.5 MG: 12.5 TABLET, FILM COATED ORAL at 17:42

## 2021-12-21 RX ADMIN — METFORMIN HYDROCHLORIDE 1000 MG: 500 TABLET, EXTENDED RELEASE ORAL at 17:42

## 2021-12-21 RX ADMIN — OXYCODONE 10 MG: 5 TABLET ORAL at 11:02

## 2021-12-21 RX ADMIN — MIDAZOLAM 2 MG: 1 INJECTION INTRAMUSCULAR; INTRAVENOUS at 08:17

## 2021-12-21 RX ADMIN — ONDANSETRON 4 MG: 2 INJECTION INTRAMUSCULAR; INTRAVENOUS at 08:27

## 2021-12-21 RX ADMIN — PHENYLEPHRINE HYDROCHLORIDE 100 MCG: 10 INJECTION INTRAVENOUS at 08:49

## 2021-12-21 RX ADMIN — Medication 1 AMPULE: at 17:42

## 2021-12-21 RX ADMIN — DEXAMETHASONE SODIUM PHOSPHATE 5 MG: 4 INJECTION, SOLUTION INTRAMUSCULAR; INTRAVENOUS at 07:46

## 2021-12-21 RX ADMIN — DEXAMETHASONE SODIUM PHOSPHATE 10 MG: 10 INJECTION INTRAMUSCULAR; INTRAVENOUS at 08:27

## 2021-12-21 RX ADMIN — CEFAZOLIN SODIUM 2 G: 100 INJECTION, POWDER, LYOPHILIZED, FOR SOLUTION INTRAVENOUS at 17:57

## 2021-12-21 RX ADMIN — ASPIRIN 81 MG: 81 TABLET, COATED ORAL at 21:42

## 2021-12-21 RX ADMIN — MEPIVACAINE HYDROCHLORIDE 60 MG: 20 INJECTION, SOLUTION EPIDURAL; INFILTRATION at 08:19

## 2021-12-21 RX ADMIN — Medication 3 AMPULE: at 06:44

## 2021-12-21 RX ADMIN — PHENYLEPHRINE HYDROCHLORIDE 100 MCG: 10 INJECTION INTRAVENOUS at 08:54

## 2021-12-21 RX ADMIN — TRANEXAMIC ACID 1000 MG: 100 INJECTION, SOLUTION INTRAVENOUS at 08:23

## 2021-12-21 RX ADMIN — Medication 10 ML: at 22:00

## 2021-12-21 RX ADMIN — CELECOXIB 200 MG: 200 CAPSULE ORAL at 21:41

## 2021-12-21 RX ADMIN — Medication 15 MCG/MIN: at 09:15

## 2021-12-21 RX ADMIN — CELECOXIB 200 MG: 200 CAPSULE ORAL at 06:40

## 2021-12-21 NOTE — ANESTHESIA PREPROCEDURE EVALUATION
Relevant Problems   RESPIRATORY SYSTEM   (+) CAP (community acquired pneumonia)   (+) FANTA on CPAP      CARDIOVASCULAR   (+) CHF (congestive heart failure) ([facility])   (+) LBBB (left bundle branch block)   (+) Primary hypertension      GASTROINTESTINAL   (+) Gastroesophageal reflux disease without esophagitis      RENAL FAILURE   (+) DANIELLA (acute kidney injury) ([facility])   (+) Stage 3 chronic kidney disease (HCC)      ENDOCRINE   (+) Type 2 diabetes mellitus with hyperglycemia, with long-term current use of insulin ([facility])       Anesthetic History   No history of anesthetic complications            Review of Systems / Medical History  Patient summary reviewed, nursing notes reviewed and pertinent labs reviewed    Pulmonary        Sleep apnea: CPAP           Neuro/Psych   Within defined limits           Cardiovascular    Hypertension: well controlled      CHF    Pacemaker and hyperlipidemia    Exercise tolerance: >4 METS     GI/Hepatic/Renal     GERD: well controlled           Endo/Other    Diabetes: well controlled, type 2         Other Findings              Physical Exam    Airway  Mallampati: III  TM Distance: 4 - 6 cm  Neck ROM: normal range of motion   Mouth opening: Normal     Cardiovascular  Regular rate and rhythm,  S1 and S2 normal,  no murmur, click, rub, or gallop             Dental  No notable dental hx       Pulmonary  Breath sounds clear to auscultation               Abdominal         Other Findings            Anesthetic Plan    ASA: 3  Anesthesia type: spinal      Post-op pain plan if not by surgeon: peripheral nerve block single    Induction: Intravenous  Anesthetic plan and risks discussed with: Patient

## 2021-12-21 NOTE — H&P
The patient has end stage arthritis of the left knee joint. The patient was seen and examined and there are no changes to the patient's orthopedic condition. They have tried conservative treatment for this condition; including antiinflammatories and lifestyle modifications and have failed. The necessity for the joint replacement is still present, and the H&P from the office is still current. The patient will be admitted today forProcedure(s) (LRB):  LEFT KNEE ARTHROPLASTY TOTAL ROBOTIC ASSISTED / Kasi Potter / Fanny Tom PT HAS PACEMAKER (Left) .

## 2021-12-21 NOTE — PROGRESS NOTES
Problem: Self Care Deficits Care Plan (Adult)  Goal: *Acute Goals and Plan of Care (Insert Text)  Outcome: Progressing Towards Goal  Note: GOALS:   DISCHARGE GOALS (in preparation for going home/rehab):  3 days  1. Mr. Rito Saleem will perform one lower body dressing activity with contact guard assist required to demonstrate improved functional mobility and safety. 2.  Mr. Rito Saleem will perform one lower body bathing activity with stand by assist required to demonstrate improved functional mobility and safety. 3.  Mr. Rito Saleem will perform toileting/toilet transfer with stand by assist to demonstrate improved functional mobility and safety. 4.  Mr. Rito Saleem will perform shower transfer with stand by assist to demonstrate improved functional mobility and safety. JOINT CAMP OCCUPATIONAL THERAPY TKA: Initial Assessment and Daily Note 12/21/2021  OUTPATIENT SURGERY: Hospital Day: 1  [place] / Plan[place] / Product Type: Medicare /      NAME/AGE/GENDER: Paula Gaviria is a 77 y.o. male   PRIMARY DIAGNOSIS:  Primary osteoarthritis of left knee [M17.12]  Acquired deformity of left knee [M21.962]   Procedure(s) and Anesthesia Type:     * LEFT KNEE ARTHROPLASTY TOTAL ROBOTIC ASSISTED / Sheryl Macario / Zack Colby PT HAS PACEMAKER - Spinal (Left)  ICD-10: Treatment Diagnosis:    Pain in Left Knee (M25.562)  Stiffness of Left Knee, Not elsewhere classified (S40.831)      ASSESSMENT:     Mr. Rito Saleem is s/p left TKA and presents with decreased weight bearing on L LE and decreased independence with functional mobility and activities of daily living as compared to baseline level of function and safety. Patient would benefit from skilled Occupational Therapy to maximize independence and safety with self-care task and functional mobility. Pt would also benefit from education on adaptive equipment and safety precautions in preparation for going home.       Patient able to don clothes at edge of bed with assist. Mobilized from bed to recliner using a rolling walker with assist. Should progress well with ADL's tomorrow. This section established at most recent assessment   PROBLEM LIST (Impairments causing functional limitations):  Decreased Strength  Decreased ADL/Functional Activities  Decreased Transfer Abilities  Increased Pain  Increased Fatigue  Decreased Flexibility/Joint Mobility  Decreased Knowledge of Precautions   INTERVENTIONS PLANNED: (Benefits and precautions of occupational therapy have been discussed with the patient.)  Activities of daily living training  Adaptive equipment training  Balance training  Clothing management  Donning&doffing training  Theraputic activity     TREATMENT PLAN: Frequency/Duration: Follow patient 1-2tx to address above goals. Rehabilitation Potential For Stated Goals: Good     RECOMMENDED REHABILITATION/EQUIPMENT: (at time of discharge pending progress): Continue Skilled Therapy. OCCUPATIONAL PROFILE AND HISTORY:   History of Present Injury/Illness (Reason for Referral): Pt presents this date s/p (Left) TKA. Past Medical History/Comorbidities:   Mr. Donla Dodge  has a past medical history of Age-related cognitive decline (2020), Arthritis, Chronic systolic CHF (congestive heart failure) ([address] COVID-19 (2/9/2021), Dizziness (06/09/2020), GERD (gastroesophageal reflux disease), Gout, H/O echocardiogram (10/14/2019), Hip pain, bilateral, Hypertension, Morbid obesity ([address] Neuropathy, FANTA on CPAP (08/26/2014), Presence of combination internal cardiac defibrillator (ICD) and pacemaker, Psychiatric disorder, Routine eye exam (2020), Spinal stenosis of lumbar region, Stage 3b chronic kidney disease ([address] and Type 2 diabetes mellitus with hyperglycemia, with long-term current use of insulin ([address] (09/23/2020). He has no past medical history of Dementia or PVD (peripheral vascular disease) ([address]   Mr. Donal Dodge  has a past surgical history that includes pr cardiac surg procedure unlist (cath); hx appendectomy; hx tonsillectomy; hx orthopaedic; hx orthopaedic; hx pacemaker; and hx colonoscopy. Social History/Living Environment:   Home Environment: Private residence  # Steps to Enter: 1  Rails to Enter: No  One/Two Story Residence: One story  Living Alone: No  Support Systems: Spouse/Significant Other  Patient Expects to be Discharged to[de-identified] [employer]  Current DME Used/Available at Home: None  Tub or Shower Type: Shower    Prior Level of Function/Work/Activity:  Independent prior. Number of Personal Factors/Comorbidities that affect the Plan of Care: Brief history (0):  LOW COMPLEXITY   ASSESSMENT OF OCCUPATIONAL PERFORMANCE[de-identified]   Most Recent Physical Functioning:   Balance  Sitting: Intact  Standing: Pull to stand; With support       Gross Assessment: Yes  Gross Assessment  AROM: Generally decreased, functional  Strength: Generally decreased, functional  Sensation: Intact            Coordination  Fine Motor Skills-Upper: Left Intact; Right Intact  Gross Motor Skills-Upper: Left Intact; Right Intact         Mental Status  Neurologic State: Alert  Orientation Level: Oriented X4  Cognition: Appropriate decision making  Perception: Appears intact                Basic ADLs (From Assessment) Complex ADLs (From Assessment)   Basic ADL  Bathing: Minimum assistance  Upper Body Dressing: Setup  Lower Body Dressing: Moderate assistance  Toileting: Moderate assistance     Grooming/Bathing/Dressing Activities of Daily Living                       Functional Transfers  Bathroom Mobility: Minimum assistance  Toilet Transfer :  Moderate assistance     Bed/Mat Mobility  Supine to Sit: Contact guard assistance  Sit to Stand: Contact guard assistance  Stand to Sit: Contact guard assistance  Bed to Chair: Contact guard assistance         Physical Skills Involved:  Range of Motion  Balance  Strength  Activity Tolerance Cognitive Skills Affected (resulting in the inability to perform in a timely and safe manner):  [facility] Psychosocial Skills Affected:  WFL   Number of elements that affect the Plan of Care: 1-3:  LOW COMPLEXITY   CLINICAL DECISION MAKING:   [employer] AM-PAC 6 Clicks   Daily Activity Inpatient Short Form  How much help from another person does the patient currently need. .. Total A Lot A Little None   1. Putting on and taking off regular lower body clothing? [] 1   [x] 2   [] 3   [] 4   2. Bathing (including washing, rinsing, drying)? [] 1   [x] 2   [] 3   [] 4   3. Toileting, which includes using toilet, bedpan or urinal?   [] 1   [x] 2   [] 3   [] 4   4. Putting on and taking off regular upper body clothing? [] 1   [] 2   [] 3   [x] 4   5. Taking care of personal grooming such as brushing teeth? [] 1   [] 2   [] 3   [x] 4   6. Eating meals? [] 1   [] 2   [] 3   [x] 4   © 2007, Trustees of [facility] [employer], under license to [employer]. All rights reserved     Score:  Initial: 18 Most Recent: X (Date: -- )    Interpretation of Tool:  Represents activities that are increasingly more difficult (i.e. Bed mobility, Transfers, Gait). Medical Necessity:     Skilled intervention continues to be required due to Deficits noted above. Reason for Services/Other Comments:  Patient continues to require skilled intervention due to   Dx abvoe  . Use of outcome tool(s) and clinical judgement create a POC that gives a: MODERATE COMPLEXITY            TREATMENT:   (In addition to Assessment/Re-Assessment sessions the following treatments were rendered)     Pre-treatment Symptoms/Complaints:    Pain: Initial:   Pain Intensity 1: 3  Pain Location 1: Knee  Post Session:  3     Self Care: (25): Procedure(s) (per grid) utilized to improve and/or restore self-care/home management as related to dressing, toileting, and grooming. Required minimal verbal and tactile cueing to facilitate activities of daily living skills. Initial evlauation 5 minutes.      Treatment/Session Assessment:     Response to Treatment:  Good, sitting up In recliner. Education:  [] Home Exercises  [x] Fall Precautions  [] Hip Precautions [] Going Home Video  [x] Knee/Hip Prosthesis Review  [x] Walker Management/Safety [x] Adaptive Equipment as Needed       Interdisciplinary Collaboration:   Physical Therapist  Occupational Therapist  Registered Nurse    After treatment position/precautions:   Up in chair  Bed/Chair-wheels locked  Caregiver at bedside  Call light within reach  RN notified     Compliance with Program/Exercises: Compliant all of the time, Will assess as treatment progresses. Recommendations/Intent for next treatment session:  Treatment next visit will focus on increasing Mr. Alexa Ponce independence with bed mobility, transfers, self care, functional mobility, modalities for pain, and patient education.       Total Treatment Duration:  OT Patient Time In/Time Out  Time In: 1520  Time Out: [address] [facility]

## 2021-12-21 NOTE — ANESTHESIA PROCEDURE NOTES
Spinal Block    Start time: 12/21/2021 8:14 AM  End time: 12/21/2021 8:19 AM  Performed by: Angeline Espino MD  Authorized by: Angeline Espino MD     Pre-procedure:   Indications: primary anesthetic  Preanesthetic Checklist: patient identified, risks and benefits discussed, anesthesia consent, site marked, patient being monitored and timeout performed    Timeout Time: 08:14 EST          Spinal Block:   Patient Position:  Seated  Prep Region:  Lumbar  Prep: chlorhexidine      Location:  L3-4  Technique:  Single shot    Local Dose (mL):  3    Needle:   Needle Type:  Pencan  Needle Gauge:  25 G  Attempts:  1      Events: CSF confirmed, no blood with aspiration and no paresthesia        Assessment:  Insertion:  Uncomplicated  Patient tolerance:  Patient tolerated the procedure well with no immediate complications

## 2021-12-21 NOTE — PERIOP NOTES
TRANSFER - OUT REPORT:    Verbal report given to Lizzie RN(name) on Jhon Morgan  being transferred to [facility](unit) for routine post - op       Report consisted of patients Situation, Background, Assessment and   Recommendations(SBAR). Information from the following report(s) SBAR, Kardex, OR Summary, Intake/Output, MAR and Cardiac Rhythm Paced was reviewed with the receiving nurse. Lines:   Peripheral IV 12/21/21 Left;Posterior Hand (Active)   Site Assessment Clean, dry, & intact 12/21/21 1035   Phlebitis Assessment 0 12/21/21 1035   Infiltration Assessment 0 12/21/21 1035   Dressing Status Clean, dry, & intact 12/21/21 1035   Dressing Type Transparent;Tape 12/21/21 1035   Hub Color/Line Status Green; Infusing;Patent 12/21/21 1035        Opportunity for questions and clarification was provided.       Patient transported with:   room air

## 2021-12-21 NOTE — ANESTHESIA POSTPROCEDURE EVALUATION
Procedure(s):  LEFT KNEE ARTHROPLASTY TOTAL ROBOTIC ASSISTED / Renata Jean-Baptiste / Garrick Tomlinson PT HAS PACEMAKER.    spinal, regional    Anesthesia Post Evaluation      Multimodal analgesia: multimodal analgesia used between 6 hours prior to anesthesia start to PACU discharge  Patient location during evaluation: PACU  Patient participation: complete - patient participated  Level of consciousness: awake and awake and alert  Pain management: adequate  Airway patency: patent  Anesthetic complications: no  Cardiovascular status: acceptable  Respiratory status: acceptable  Hydration status: acceptable  Post anesthesia nausea and vomiting:  controlled      INITIAL Post-op Vital signs:   Vitals Value Taken Time   /52 12/21/21 1020   Temp 37 °C (98.6 °F) 12/21/21 1010   Pulse 79 12/21/21 1020   Resp 16 12/21/21 1020   SpO2 97 % 12/21/21 1020

## 2021-12-21 NOTE — ANESTHESIA PROCEDURE NOTES
Peripheral Block    Start time: 12/21/2021 7:43 AM  End time: 12/21/2021 7:46 AM  Performed by: Aniyah Roblero MD  Authorized by: Aniyah Roblero MD       Pre-procedure: Indications: at surgeon's request and post-op pain management    Preanesthetic Checklist: patient identified, risks and benefits discussed, site marked, timeout performed, anesthesia consent given and patient being monitored    Timeout Time: 07:43 EST          Block Type:   Block Type:   Adductor canal  Laterality:  Left  Monitoring:  Standard ASA monitoring, continuous pulse ox, frequent vital sign checks, heart rate, oxygen and responsive to questions  Injection Technique:  Single shot  Procedures: ultrasound guided    Patient Position: supine  Prep: chlorhexidine    Location:  Mid thigh  Needle Type:  Stimuplex  Needle Gauge:  21 G  Needle Localization:  Ultrasound guidance and anatomical landmarks  Medication Injected:  Ropivacaine (NAROPIN) 2 mg/mL (0.2 %) injection, 40 mg  dexamethasone (DECADRON) 4 mg/mL injection, 5 mg  Med Admin Time: 12/21/2021 7:46 AM    Assessment:  Number of attempts:  1  Injection Assessment:  Incremental injection every 5 mL, local visualized surrounding nerve on ultrasound, negative aspiration for blood, no paresthesia, no intravascular symptoms and ultrasound image on chart  Patient tolerance:  Patient tolerated the procedure well with no immediate complications

## 2021-12-21 NOTE — PROGRESS NOTES
Problem: Mobility Impaired (Adult and Pediatric)  Goal: *Acute Goals and Plan of Care (Insert Text)  Outcome: Progressing Towards Goal  Note: GOALS (1-4 days):  (1.)Mr. Fritz Huang will move from supine to sit and sit to supine  in bed with INDEPENDENT. (2.)Mr. Fritz Huang will transfer from bed to chair and chair to bed with INDEPENDENT using the least restrictive device. (3.)Mr. Fritz Huang will ambulate with INDEPENDENT for 300 feet with the least restrictive device. (4.)Mr. Fritz Huang will ambulate up/down 3 steps with bilateral  railing with CONTACT GUARD ASSIST with no device. (5.)Mr. Fritz Huang will increase left knee ROM to 0°-90°.  ________________________________________________________________________________________________       PHYSICAL THERAPY JOINT CAMP TKA: Initial Assessment, Daily Note, Treatment Day: Day of Assessment and 1st, and PM 12/21/2021  OUTPATIENT SURGERY: Hospital Day: 1  [place] / [place] B / Product Type: Medicare /      NAME/AGE/GENDER: Bismark Gaviria is a 77 y.o. male   PRIMARY DIAGNOSIS:  Primary osteoarthritis of left knee [M17.12]  Acquired deformity of left knee [M21.962]   Procedure(s) and Anesthesia Type:     * LEFT KNEE ARTHROPLASTY TOTAL ROBOTIC ASSISTED / [facility] / [facility] PT HAS PACEMAKER - Spinal (Left)  ICD-10: Treatment Diagnosis:    · Pain in Left Knee (M25.562)  · Stiffness of Left Knee, Not elsewhere classified (M25.662)  · Difficulty in walking, Not elsewhere classified (R26.2)  · Other abnormalities of gait and mobility (R26.89)      ASSESSMENT:     Mr. Fritz Huang presents with decreased strength and range of motion left lower extremity and with decreased independence with functional mobility s/p left TKA. Pt will benefit from skilled PT interventions to maximize independence with functional mobility and TKA management. Pt did well with assessment. Pt performed supine to sit to stand. Pt ambulated in hallway. Pt performed exercises in bedside chair.  Pt instructed not to get up without assistance. Hope to progress mobility and exercises in the morning. Pt plans to discharge to home with continued therapy for follow up. This section established at most recent assessment   PROBLEM LIST (Impairments causing functional limitations):  1. Decreased Strength  2. Decreased ADL/Functional Activities  3. Decreased Transfer Abilities  4. Decreased Ambulation Ability/Technique  5. Decreased Flexibility/Joint Mobility  6. Edema/Girth  7. Decreased Foard with Home Exercise Program   INTERVENTIONS PLANNED: (Benefits and precautions of physical therapy have been discussed with the patient.)  1. Bed Mobility  2. Cold  3. Gait Training  4. Home Exercise Program (HEP)  5. Range of Motion (ROM)  6. Therapeutic Activites  7. Therapeutic Exercise/Strengthening  8. Transfer Training     TREATMENT PLAN: Frequency/Duration: Follow patient BID for duration of hospital stay to address above goals. Rehabilitation Potential For Stated Goals: Good     RECOMMENDED REHABILITATION/EQUIPMENT: (at time of discharge pending progress): Continue Skilled Therapy and Home Health: Physical Therapy.               HISTORY:   History of Present Injury/Illness (Reason for Referral):  Pt s/p total knee arthroplasty on 12/21  Past Medical History/Comorbidities:   Mr. 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 He has no past medical history of Dementia or PVD (peripheral vascular disease) ([facility] Mr. Sherry Grissom  has a past surgical history that includes pr cardiac surg procedure unlist (cath); hx appendectomy; hx tonsillectomy; hx orthopaedic; hx orthopaedic; hx pacemaker; and hx colonoscopy. Social History/Living Environment:   Home Environment: Private residence  One/Two Story Residence: One story  Living Alone: No  Support Systems: Spouse/Significant Other  Patient Expects to be Discharged to[de-identified] House  Current DME Used/Available at Home: None  Prior Level of Function/Work/Activity:  Pt is independent with ambulation. Number of Personal Factors/Comorbidities that affect the Plan of Care: 0: LOW COMPLEXITY   EXAMINATION:   Most Recent Physical Functioning:   Gross Assessment: Yes  Gross Assessment  AROM: Generally decreased, functional  Strength: Generally decreased, functional  Sensation: Intact                     Bed Mobility  Supine to Sit: Contact guard assistance    Transfers  Sit to Stand: Contact guard assistance  Stand to Sit: Contact guard assistance  Bed to Chair: Contact guard assistance    Balance  Sitting: Intact  Standing: Pull to stand; With support    Posture  Posture (WDL): Within defined limits         Weight Bearing Status  Left Side Weight Bearing: As tolerated  Distance (ft): 225 Feet (ft)  Ambulation - Level of Assistance: Contact guard assistance  Assistive Device: Walker, rolling        Braces/Orthotics: none           Body Structures Involved:  1. Joints  2. Muscles Body Functions Affected:  1. Movement Related Activities and Participation Affected:  1. Mobility  2.  Self Care   Number of elements that affect the Plan of Care: 4+: HIGH COMPLEXITY   CLINICAL PRESENTATION:   Presentation: Stable and uncomplicated: LOW COMPLEXITY   CLINICAL DECISION MAKING:   [employer] AM-PAC 6 Clicks   Basic Mobility Inpatient Short Form  How much difficulty does the patient currently have... Unable A Lot A Little None   1. Turning over in bed (including adjusting bedclothes, sheets and blankets)? [] 1   [] 2   [x] 3   [] 4   2. Sitting down on and standing up from a chair with arms ( e.g., wheelchair, bedside commode, etc.)   [] 1   [] 2   [x] 3   [] 4   3. Moving from lying on back to sitting on the side of the bed? [] 1   [] 2   [x] 3   [] 4   How much help from another person does the patient currently need. .. Total A Lot A Little None   4. Moving to and from a bed to a chair (including a wheelchair)? [] 1   [] 2   [x] 3   [] 4   5. Need to walk in hospital room? [] 1   [] 2   [x] 3   [] 4   6. Climbing 3-5 steps with a railing? [] 1   [] 2   [x] 3   [] 4   © 2007, Trustees of [address] [place] [address] [address] [address], under license to "[employer]. All rights reserved     Score:  Initial: 18 Most Recent: X (Date: -- )    Interpretation of Tool:  Represents activities that are increasingly more difficult (i.e. Bed mobility, Transfers, Gait). Medical Necessity:     · Patient is expected to demonstrate progress in   · strength, range of motion, and functional technique  ·  to   · decrease assistance required with functional mobility and TKA managment  · .  Reason for Services/Other Comments:  · Patient continues to require skilled intervention due to   · Inability to complete functional mobility and TKA management independently  · . Use of outcome tool(s) and clinical judgement create a POC that gives a: Clear prediction of patient's progress: LOW COMPLEXITY            TREATMENT:   (In addition to Assessment/Re-Assessment sessions the following treatments were rendered)     Pre-treatment Symptoms/Complaints:    Pain Initial:      Post Session:  no complaints     Therapeutic Activity: (    15 minutes): Therapeutic activities including Bed transfers, Chair transfers, and Ambulation on level ground to improve mobility.       Therapeutic Exercise: ( 10 minutes):  Exercises per grid below to improve mobility. Gait Training ( 0):  Gait training to improve and/or restore physical functioning as related to mobility. .    Assistive Device: Walker, rolling  Ambulation - Level of Assistance: Contact guard assistance  Distance (ft): 225 Feet (ft)    Assessment   Date:  12/21 Date:   Date:     ACTIVITY/EXERCISE AM PM AM PM AM PM     []  []  []  []  []  []   Ankle Pumps  10       Quad Sets  10       Gluteal Sets  10       Hip ABd/ADduction  10       Straight Leg Raises  10       Knee Slides  10       Short Arc Quads  10       Chair Slides  10                         B = bilateral; AA = active assistive; A = active; P = passive      Treatment/Session Assessment:     Response to Treatment:  Pt agreeable to exercise and ambulate with therapy. Education:  [x] Home Exercises  [x] Fall Precautions  [x] Use of Cold Therapy Unit [] D/C Instruction Review  [] Knee Prosthesis Review  [x] Walker Management/Safety [x] Adaptive Equipment as Needed  [x] No pillow under knee       Interdisciplinary Collaboration:   o Physical Therapist  o Occupational Therapist  o Registered Nurse    After treatment position/precautions:   o Up in chair  o Bed/Chair-wheels locked  o Bed in low position  o Caregiver at bedside  o Call light within reach  o RN notified  o Family at bedside    Compliance with Program/Exercises: Compliant most of the time, Will assess as treatment progresses. Recommendations/Intent for next treatment session:  Treatment next visit will focus on increasing Mr. Megan Fragoso independence with bed mobility, transfers, gait training, strength/ROM exercises, modalities for pain, and patient education.       Total Treatment Duration:  PT Patient Time In/Time Out  Time In: 1415  Time Out: [address], PT

## 2021-12-21 NOTE — PROGRESS NOTES
12/21/21 1538   Oxygen Therapy   O2 Sat (%) 95 %   Pulse via Oximetry 91 beats per minute   O2 Device None (Room air)   O2 Flow Rate (L/min) 0 l/min   Incentive Spirometry Treatment   Actual Volume (ml) 2250 ml   Patient encouraged to do 10 breaths every hour while awake-patient agreed and demonstrated. No shortness of breath or distress noted. BS are clear b/l. Joint Camp notes reviewed- pt has home CPAP.

## 2021-12-21 NOTE — PROGRESS NOTES
Care Management Interventions  PCP Verified by CM: Yes Cheyenne Grimm)  Mode of Transport at Discharge: Other (see comment) (Family)  Transition of Care Consult (CM Consult): [address]: Yes  Discharge Durable Medical Equipment: Yes [facility])  Physical Therapy Consult: Yes  Occupational Therapy Consult: Yes  Speech Therapy Consult: No  Support Systems: Spouse/Significant Other  Confirm Follow Up Transport: Family  The Plan for Transition of Care is Related to the Following Treatment Goals : Work with patient until back to baseline  The Patient and/or Patient Representative was Provided with a Choice of Provider and Agrees with the Discharge Plan?: Yes  Freedom of Choice List was Provided with Basic Dialogue that Supports the Patient's Individualized Plan of Care/Goals, Treatment Preferences and Shares the Quality Data Associated with the Providers?: Yes  Discharge Location  Discharge Placement: Home with home health    SW met with patient while social distancing w/appropriate PPE. Prior to hospitalization, patient was independent w/ADLs and did not require any DME. Patient states that he has a rolling walker and shower seat at home. At patient's request, 3:1 BSC provided thru [facility] (documentation submitted to Sindhu thru [employer]/fax). Patient without a preference for home health agency. Referral made to [facility]. No additional needs identified. SW will continue to follow.     LARRY New, [address] [facility]   [phone]

## 2021-12-21 NOTE — OP NOTES
[address] [place] [address] Robotic Assisted Total Knee Arthroplasty: Posterior Cruciate Substituting      Patient:Radhames Leblanc   : 1945  Medical Record [id number]      Pre-operative Diagnosis:  Primary osteoarthritis of left knee [M17.12]  Acquired deformity of left knee [M21.962]  Post-operative Diagnosis: Primary osteoarthritis of left knee [M17.12]  Acquired deformity of left knee [M21.962]  Location: [place] [address]    Date of Procedure: 2021  Surgeon: Merlyn Mcdonald MD  Assistant:  DANIEL Garrido    Anesthesia: Spinal and  nerve block  Modifier: 22   BMI:Body mass index is 38.25 kg/m². CPT- 77070- Total knee arthroplasty           61949- Other procedures on musculoskeletal system            0055T- Computer assisted surgical navigation     Procedure:  Left Cemented Posterior Stabilized Total Knee Arthroplasty     Tourniquet Time: none    EBL:  150 cc         The complexity of the total joint surgery requires the use of a first assistant for positioning, retraction and assistance in closure. This BMI for this patient is Body mass index is 38.25 kg/m². Shiva Mc BMI's between 30 and 38.9 are considered obese, while a BMI over 39 indicates the patient is morbidly obese. Obese and Morbidly obese patients make exposure and retraction extremely difficult. The patient's large size also makes implantation and proper insertion of total joint implants more difficult. Zaid Chavira was brought to the operating room and positioned on the operating table. He was anesthestized with anesthesia. IV antibiotics was administered. Prior to the incision being made a timeout was called identifying the patient, procedure ,operative side and surgeon The operative leg was prepped and draped in the usual sterile manner.   An anterior longitudinal incision was accomplished just medial to the tibial tubercle and extending approximal 6 centimeters proximal to the superior pole of the patella over the left knee. A medial parapatellar capsular incision was performed. The medial capsular flap was elevated around to the insertion of the semimembranous tendon. The patella was everted and the knee flexed and externally rotated. The medial and lateral menisci were excised. The lateral half of the fat pad excised and the patella femoral ligament was released. The anterior cruciate ligament was resect and the posterior cruciate ligament was retained. The femoral and tibial arrays were pinned in place and registered with the [employer]. The patient landmarks were collected and the tibial and femoral checkpoints were registered and verified. The preresection balancing was performed. The osteophytes were then removed as exposed on the femoral and tibial surfaces. Utilizing the [facility] robotic arm the femoral and tibial cuts were accomplished. A notch cut was accomplished. The tibia was sized. The tibial base plate was pinned into place with the appropriate external rotation and stem site prepared. A trial femoral component and poly was placed. A preliminary range of motion was accomplished with the trial components. The patient was found to obtain full extension as well as appropriate flexion. The patient's ligaments were stable in flexion and extension to medial and lateral stressing and the alignment was through the appropriate mechanical axis. Additional surgical releases were none. The patella was then everted. Osteophytes were removed. The patella articular surface was inspected and resurfaced. All trial components were removed. The real implants were opened: Sizes selected were a size 6 femoral, 7 tibial, and a 9 mm polyethylene insert. A size 35 cemented patella button was selected. The knee was irrigated. The real components were cemented into place.      Scout Sepulveda knee was placed through range of motion and noted to be stable as mentioned above with the trial components. The operative knee was injected with 60 cc of Naropin, 10 cc's of morphine and 1 cc of 30 mg of Toradol. The knee was then soaked with a irracept solution for approximately 3 min. This was then thoroughly irrigated. The capsular layer was closed using a #1 PDS suture and a #1 Vicryl. Then, 1 gram (100 mg/ml) of Transexamic Acid and 1 gram of Vancomycin was injected into the joint space. The subcutaneous layers were closed using 2-0 vicryl. The skin was closed using staples which were applied in occlusive fashion and sterile bandage applied. An Iceman cryo pad was applied on the operative leg. Sponge count and needle counts were correct. Bruce Burgess left the operating room     Implants:   Implant Name Type Inv.  Item Serial No.  Lot No. LRB No. Used Action   CEMENT BONE 40GM HI VISC PALACOS R - [id number]  CEMENT BONE 40GM HI VISC PALACOS R  [facility]_ [id number] Left 1 Implanted   INSERT TIB SZ 7 THK9MM UNIV KNEE POLYETH CNDYL STBL MANE NEUT - [id number]  INSERT TIB SZ 7 THK9MM UNIV KNEE POLYETH CNDYL STBL MANE NEUT  [employer] HOW_WD [id number] Left 1 Implanted   COMPNT FEM CR ANDRZEJ TRIATHLN 6 L -- TRIATHLON - [id number]  COMPNT FEM CR ANDRZEJ TRIATHLN 6 L -- TRIATHLON  [employer] HOW_WD [id number] Left 1 Implanted   BASEPLT TIB UNIV TRIATHLN 7 --  - [id number]  BASEPLT TIB UNIV TRIATHLN 7 --   [employer] HOW_WD [id number] Left 1 Implanted   COMPONENT PAT [id number] SUP INFERIOR ASYM TRIATHLON - [id number]  COMPONENT PAT [id number] SUP INFERIOR ASYM TRIATHLON  [employer] HOW_WD [id number] Left 1 Implanted         Signed By: Davy Bradley MD   12/21/2021,  9:35 AM

## 2021-12-22 VITALS
RESPIRATION RATE: 18 BRPM | BODY MASS INDEX: 38.36 KG/M2 | HEART RATE: 82 BPM | SYSTOLIC BLOOD PRESSURE: 129 MMHG | WEIGHT: 259 LBS | OXYGEN SATURATION: 95 % | HEIGHT: 69 IN | TEMPERATURE: 97.9 F | DIASTOLIC BLOOD PRESSURE: 67 MMHG

## 2021-12-22 LAB
HGB BLD-MCNC: 12.9 G/DL (ref 13.6–17.2)
POTASSIUM BLD-SCNC: 4.2 MMOL/L (ref 3.5–5.1)

## 2021-12-22 PROCEDURE — 74011250637 HC RX REV CODE- 250/637: Performed by: ORTHOPAEDIC SURGERY

## 2021-12-22 PROCEDURE — 74011000250 HC RX REV CODE- 250: Performed by: PHYSICIAN ASSISTANT

## 2021-12-22 PROCEDURE — 97110 THERAPEUTIC EXERCISES: CPT

## 2021-12-22 PROCEDURE — 36415 COLL VENOUS BLD VENIPUNCTURE: CPT

## 2021-12-22 PROCEDURE — 97530 THERAPEUTIC ACTIVITIES: CPT

## 2021-12-22 PROCEDURE — 74011250636 HC RX REV CODE- 250/636: Performed by: PHYSICIAN ASSISTANT

## 2021-12-22 PROCEDURE — 85018 HEMOGLOBIN: CPT

## 2021-12-22 PROCEDURE — 97535 SELF CARE MNGMENT TRAINING: CPT

## 2021-12-22 PROCEDURE — 74011250637 HC RX REV CODE- 250/637: Performed by: PHYSICIAN ASSISTANT

## 2021-12-22 RX ORDER — ONDANSETRON 4 MG/1
4 TABLET, ORALLY DISINTEGRATING ORAL
Qty: 30 TABLET | Refills: 0 | Status: SHIPPED | OUTPATIENT
Start: 2021-12-22 | End: 2022-01-10

## 2021-12-22 RX ORDER — ASPIRIN 81 MG/1
81 TABLET ORAL EVERY 12 HOURS
Qty: 70 TABLET | Refills: 0 | Status: SHIPPED | OUTPATIENT
Start: 2021-12-22 | End: 2022-01-26

## 2021-12-22 RX ORDER — OXYCODONE HYDROCHLORIDE 5 MG/1
5-10 TABLET ORAL
Qty: 60 TABLET | Refills: 0 | Status: SHIPPED | OUTPATIENT
Start: 2021-12-22 | End: 2021-12-27

## 2021-12-22 RX ADMIN — ACETAMINOPHEN 1000 MG: 500 TABLET, FILM COATED ORAL at 05:59

## 2021-12-22 RX ADMIN — OXYCODONE 10 MG: 5 TABLET ORAL at 05:59

## 2021-12-22 RX ADMIN — GABAPENTIN 300 MG: 300 CAPSULE ORAL at 08:45

## 2021-12-22 RX ADMIN — HYDROCHLOROTHIAZIDE: 25 TABLET ORAL at 08:44

## 2021-12-22 RX ADMIN — OXYCODONE 10 MG: 5 TABLET ORAL at 10:23

## 2021-12-22 RX ADMIN — DOCUSATE SODIUM 50MG AND SENNOSIDES 8.6MG 2 TABLET: 8.6; 5 TABLET, FILM COATED ORAL at 08:45

## 2021-12-22 RX ADMIN — CEFAZOLIN SODIUM 2 G: 100 INJECTION, POWDER, LYOPHILIZED, FOR SOLUTION INTRAVENOUS at 00:06

## 2021-12-22 RX ADMIN — ACETAMINOPHEN 1000 MG: 500 TABLET, FILM COATED ORAL at 00:06

## 2021-12-22 RX ADMIN — CARVEDILOL 6.25 MG: 6.25 TABLET, FILM COATED ORAL at 08:45

## 2021-12-22 RX ADMIN — CELECOXIB 200 MG: 200 CAPSULE ORAL at 08:45

## 2021-12-22 RX ADMIN — Medication 1 AMPULE: at 08:44

## 2021-12-22 RX ADMIN — ALLOPURINOL 100 MG: 100 TABLET ORAL at 08:45

## 2021-12-22 RX ADMIN — ASPIRIN 81 MG: 81 TABLET, COATED ORAL at 08:45

## 2021-12-22 NOTE — PROGRESS NOTES
12/21/21 2033   Oxygen Therapy   O2 Sat (%) 95 %   Pulse via Oximetry 85 beats per minute   O2 Device CPAP mask   FIO2 (%) 21 %   Pt on continuous monitor for HS. Alarm limits set. Pt working on IS.  Pt has home CPAP for tonight

## 2021-12-22 NOTE — PROGRESS NOTES
Problem: Self Care Deficits Care Plan (Adult)  Goal: *Acute Goals and Plan of Care (Insert Text)  Outcome: Progressing Towards Goal  Note: GOALS:   DISCHARGE GOALS (in preparation for going home/rehab):  3 days  1. Mr. Fernanda Nur will perform one lower body dressing activity with contact guard assist required to demonstrate improved functional mobility and safety. -GOAL MET 12/22/2021   2. Mr. Fernanda Nur will perform one lower body bathing activity with stand by assist required to demonstrate improved functional mobility and safety. -GOAL MET 12/22/2021   3. Mr. Fernanda Nur will perform toileting/toilet transfer with stand by assist to demonstrate improved functional mobility and safety. -GOAL MET 12/22/2021   4. Mr. Fernanda Nur will perform shower transfer with stand by assist to demonstrate improved functional mobility and safety. -GOAL MET 12/22/2021       JOINT CAMP OCCUPATIONAL THERAPY TKA: Daily Note and Discharge 12/22/2021  OUTPATIENT SURGERY: Hospital Day: 2  [place] / [place] / Product Type: Medicare /      NAME/AGE/GENDER: Kwesi Vásquez is a 77 y.o. male   PRIMARY DIAGNOSIS:  Primary osteoarthritis of left knee [M17.12]  Acquired deformity of left knee [M21.962]   Procedure(s) and Anesthesia Type:     * LEFT KNEE ARTHROPLASTY TOTAL ROBOTIC ASSISTED / Themelissa Stout / Demetrio Coyle PT HAS PACEMAKER - Spinal (Left)  ICD-10: Treatment Diagnosis:    · Pain in Left Knee (M25.562)  · Stiffness of Left Knee, Not elsewhere classified (Y37.873)      ASSESSMENT:      Mr. Fernanda Nur is s/p left TKA and presents with decreased weight bearing on L LE and decreased independence with functional mobility and activities of daily living. Patient completed shower and dressing as charted below in ADL grid and is ambulating with rolling walker and stand by assist.  Patient has met 4/4 goals and plans to return home with good family support.     Will do well at home for self cares and transfers during ADL's.  D/C OT for acute deficits. This section established at most recent assessment   PROBLEM LIST (Impairments causing functional limitations):  1. Decreased Strength  2. Decreased ADL/Functional Activities  3. Decreased Transfer Abilities  4. Increased Pain  5. Increased Fatigue  6. Decreased Flexibility/Joint Mobility  7. Decreased Knowledge of Precautions   INTERVENTIONS PLANNED: (Benefits and precautions of occupational therapy have been discussed with the patient.)  1. Activities of daily living training  2. Adaptive equipment training  3. Balance training  4. Clothing management  5. Donning&doffing training  6. Theraputic activity     TREATMENT PLAN: Frequency/Duration: Follow patient 1-2tx to address above goals. Rehabilitation Potential For Stated Goals: Good     RECOMMENDED REHABILITATION/EQUIPMENT: (at time of discharge pending progress): Continue Skilled Therapy. OCCUPATIONAL PROFILE AND HISTORY:   History of Present Injury/Illness (Reason for Referral): Pt presents this date s/p (Left) TKA. Past Medical History/Comorbidities:   Mr. 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 He has no past medical history of Dementia or PVD (peripheral vascular disease) ([address]   Mr. Dede Ferrara  has a past surgical history that includes pr cardiac surg procedure unlist (cath); hx appendectomy; hx tonsillectomy; hx orthopaedic; hx orthopaedic; hx pacemaker; and hx colonoscopy. Social History/Living Environment:   Home Environment: Private residence  # Steps to Enter: 1  Rails to Enter: No  One/Two Story Residence: One story  Living Alone: No  Support Systems: Spouse/Significant Other  Patient Expects to be Discharged to[de-identified] [employer]  Current DME Used/Available at Home: None  Tub or Shower Type: Shower    Prior Level of Function/Work/Activity:  Independent prior. Number of Personal Factors/Comorbidities that affect the Plan of Care: Brief history (0):  LOW COMPLEXITY   ASSESSMENT OF OCCUPATIONAL PERFORMANCE[de-identified]   Most Recent Physical Functioning:   Balance  Sitting: Intact  Standing: With support                    Coordination  Fine Motor Skills-Upper: Left Intact; Right Intact  Gross Motor Skills-Upper: Left Intact; Right Intact         Mental Status  Neurologic State: Alert  Orientation Level: Oriented X4  Cognition: Appropriate decision making  Perception: Appears intact                Basic ADLs (From Assessment) Complex ADLs (From Assessment)   Basic ADL  Feeding: Independent  Oral Facial Hygiene/Grooming: Supervision  Bathing: Stand-by assistance  Upper Body Dressing: Stand-by assistance  Lower Body Dressing: Minimum assistance  Toileting: Stand by assistance     Grooming/Bathing/Dressing Activities of Daily Living                       Functional Transfers  Bathroom Mobility: Stand-by assistance  Toilet Transfer : Stand-by assistance  Shower Transfer: Stand-by assistance     Bed/Mat Mobility  Sit to Stand: Stand-by assistance  Stand to Sit: Stand-by assistance  Bed to Chair: Stand-by assistance         Physical Skills Involved:  1. Range of Motion  2. Balance  3. Strength  4. Activity Tolerance Cognitive Skills Affected (resulting in the inability to perform in a timely and safe manner):  1. [facility] Psychosocial Skills Affected:  1.  WFL   Number of elements that affect the Plan of Care: 1-3:  LOW COMPLEXITY   CLINICAL DECISION MAKIN[address] [place] [address] AM-PAC 6 Clicks   Daily Activity Inpatient Short Form  How much help from another person does the patient currently need. .. Total A Lot A Little None   1. Putting on and taking off regular lower body clothing? [] 1   [] 2   [x] 3   [] 4   2. Bathing (including washing, rinsing, drying)? [] 1   [] 2   [x] 3   [] 4   3. Toileting, which includes using toilet, bedpan or urinal?   [] 1   [] 2   [x] 3   [] 4   4. Putting on and taking off regular upper body clothing? [] 1   [] 2   [] 3   [x] 4   5. Taking care of personal grooming such as brushing teeth? [] 1   [] 2   [] 3   [x] 4   6. Eating meals? [] 1   [] 2   [] 3   [x] 4   © , Trustees of [address] [place] [address], under license to [employer]. All rights reserved     Score:  Initial: 18 Most Recent: 21 (Date: 2021 )    Interpretation of Tool:  Represents activities that are increasingly more difficult (i.e. Bed mobility, Transfers, Gait). Medical Necessity:     · Skilled intervention continues to be required due to Deficits noted above. Reason for Services/Other Comments:  · Patient continues to require skilled intervention due to   · Dx abvoe  · . Use of outcome tool(s) and clinical judgement create a POC that gives a: MODERATE COMPLEXITY            TREATMENT:   (In addition to Assessment/Re-Assessment sessions the following treatments were rendered)     Pre-treatment Symptoms/Complaints:    Pain: Initial:   Pain Intensity 1: 4  Pain Location 1: Knee  Pain Orientation 1: Left  Post Session:  3     Self Care: (40): Procedure(s) (per grid) utilized to improve and/or restore self-care/home management as related to dressing, bathing, toileting and grooming. Required minimal verbal and tactile cueing to facilitate activities of daily living skills. Treatment/Session Assessment:     Response to Treatment:  Good, sitting up In recliner.     Education:  [] Home Exercises  [x] Fall Precautions  [] Hip Precautions [] Going Home Video  [x] Knee/Hip Prosthesis Review  [x] Walker Management/Safety [x] Adaptive Equipment as Needed       Interdisciplinary Collaboration:   o Physical Therapist  o Occupational Therapist  o Registered Nurse    After treatment position/precautions:   o Up in chair  o Bed/Chair-wheels locked  o Caregiver at bedside  o Call light within reach  o RN notified     Compliance with Program/Exercises: Compliant all of the time, Will assess as treatment progresses. Recommendations/Intent for next treatment session:  D/C OT for acute deficits.       Total Treatment Duration:  OT Patient Time In/Time Out  Time In: 0740  Time Out: 1200 Mathieu Marks, OT

## 2021-12-22 NOTE — PROGRESS NOTES
2021         Post Op day: 1 Day Post-Op   Admit Diagnosis: Primary osteoarthritis of left knee [M17.12]; Acquired deformity of left knee [M21.962]  LAB:    Recent Results (from the past 24 hour(s))   HEMOGLOBIN    Collection Time: 21  8:45 PM   Result Value Ref Range    HGB 13.3 (L) 13.6 - 17.2 g/dL   HEMOGLOBIN    Collection Time: 21  3:50 AM   Result Value Ref Range    HGB 12.9 (L) 13.6 - 17.2 g/dL     Vital Signs:    Patient Vitals for the past 8 hrs:   BP Temp Pulse Resp SpO2   21 0739 129/67 97.9 °F (36.6 °C) 82 18 95 %   21 0318 125/75 98 °F (36.7 °C) 88 18 92 %     Temp (24hrs), Av °F (36.7 °C), Min:97.5 °F (36.4 °C), Max:98.6 °F (37 °C)    Pain Control:   Pain Assessment  Pain Scale 1: Numeric (0 - 10)  Pain Intensity 1: 5  Pain Onset 1: post op  Pain Location 1: Knee  Pain Orientation 1: Left  Pain Description 1: Aching  Pain Intervention(s) 1: Medication (see MAR)  Subjective: Doing well, normal recovery experienced. Objective:  No Acute Distress, Alert and Oriented, Neurovascular exam is normal       Assessment:   Patient Active Problem List   Diagnosis Code    Chronic systolic CHF (congestive heart failure) ([facility]) I50.22    LBBB (left bundle branch block) I44.7    Obesity hypoventilation syndrome ([facility]) E66.2    CHF (congestive heart failure) ([facility]) I50.9    FANTA on CPAP G47.33, Z99.89    Hypoxemia R09.02    Presence of cardiac defibrillator Z95.810    Mixed hyperlipidemia E78.2    Gastroesophageal reflux disease without esophagitis K21.9    Obesity (BMI 30-39. 9) E66.9    Dizziness R42    Stage 3 chronic kidney disease ([facility]) N18.30    Encounter for diabetic foot exam ([facility] E11.9    Gout M10.9    Type 2 diabetes mellitus with hyperglycemia, with long-term current use of insulin ([facility]) E11.65, Z79.4    Intention tremor G25.2    Acute metabolic encephalopathy B07.56    DANIELLA (acute kidney injury) ([facility] N17.9    COVID-19 U07.1    CAP (community acquired pneumonia) J18.9    Sepsis ([facility] A41.9    Left leg pain M79.605    Insomnia due to medical condition G47.01    Primary hypertension I10       Status Post Procedure(s) (LRB):  LEFT KNEE ARTHROPLASTY TOTAL ROBOTIC ASSISTED / DENIA / Garrick Tomlinson PT HAS PACEMAKER (Left)        Plan: Continue Physical Therapy, discharge home anticipated today.      Signed By: Konstantin Day MD

## 2021-12-22 NOTE — PROGRESS NOTES
In to see patient. Discussed and reviewed Medicare moon letter. Questions answered and copy given to patient.

## 2021-12-22 NOTE — PROGRESS NOTES
Problem: Mobility Impaired (Adult and Pediatric)  Goal: *Acute Goals and Plan of Care (Insert Text)  Outcome: Progressing Towards Goal  Note: GOALS (1-4 days):  (1.)Mr. Shelby Olson will move from supine to sit and sit to supine  in bed with INDEPENDENT. (2.)Mr. Shelby Olson will transfer from bed to chair and chair to bed with INDEPENDENT using the least restrictive device. (3.)Mr. Shelby Olson will ambulate with INDEPENDENT for 300 feet with the least restrictive device. (4.)Mr. Shelby Olson will ambulate up/down 3 steps with bilateral  railing with CONTACT GUARD ASSIST with no device. (5.)Mr. Shelby Olson will increase left knee ROM to 0°-90°.  ________________________________________________________________________________________________       PHYSICAL THERAPY JOINT CAMP TKA: Daily Note, Treatment Day: 1st and AM 12/22/2021  OUTPATIENT SURGERY: Hospital Day: 2  [place] / Product Type: Medicare /      NAME/AGE/GENDER: Florecita Marie is a 77 y.o. male   PRIMARY DIAGNOSIS:  Primary osteoarthritis of left knee [M17.12]  Acquired deformity of left knee [M21.962]   Procedure(s) and Anesthesia Type:     * LEFT KNEE ARTHROPLASTY TOTAL ROBOTIC ASSISTED / Beryl Awan / Shelley Cardoso PT HAS PACEMAKER - Spinal (Left)  ICD-10: Treatment Diagnosis:    · Pain in Left Knee (M25.562)  · Stiffness of Left Knee, Not elsewhere classified (M25.662)  · Difficulty in walking, Not elsewhere classified (R26.2)  · Other abnormalities of gait and mobility (R26.89)      ASSESSMENT:     Mr. Shelby Olson presents with decreased strength and range of motion left lower extremity and with decreased independence with functional mobility s/p left TKA. Pt will benefit from skilled PT interventions to maximize independence with functional mobility and TKA management. Pt did well with assessment. Pt performed supine to sit to stand. Pt ambulated in hallway. Pt performed exercises in bedside chair. Pt instructed not to get up without assistance.  Hope to progress mobility and exercises in the morning. Pt plans to discharge to home with continued therapy for follow up. 12/22--Pt performed exercises in bedside chair. Pt ambulated in hallway, pt ambulated in bathroom. Pt in bedside chair with needs in reach. This section established at most recent assessment   PROBLEM LIST (Impairments causing functional limitations):  1. Decreased Strength  2. Decreased ADL/Functional Activities  3. Decreased Transfer Abilities  4. Decreased Ambulation Ability/Technique  5. Decreased Flexibility/Joint Mobility  6. Edema/Girth  7. Decreased [place] with Home Exercise Program   INTERVENTIONS PLANNED: (Benefits and precautions of physical therapy have been discussed with the patient.)  1. Bed Mobility  2. Cold  3. Gait Training  4. Home Exercise Program (HEP)  5. Range of Motion (ROM)  6. Therapeutic Activites  7. Therapeutic Exercise/Strengthening  8. Transfer Training     TREATMENT PLAN: Frequency/Duration: Follow patient BID for duration of hospital stay to address above goals. Rehabilitation Potential For Stated Goals: Good     RECOMMENDED REHABILITATION/EQUIPMENT: (at time of discharge pending progress): Continue Skilled Therapy and Home Health: Physical Therapy.               HISTORY:   History of Present Injury/Illness (Reason for Referral):  Pt s/p total knee arthroplasty on 12/21  Past Medical History/Comorbidities:   Mr. Pete Montgomery  has a past medical history of Age-related cognitive decline (2020), Arthritis, Chronic systolic CHF (congestive heart failure) ([address] COVID-19 (2/9/2021), Dizziness (06/09/2020), GERD (gastroesophageal reflux disease), Gout, H/O echocardiogram (10/14/2019), Hip pain, bilateral, Hypertension, Morbid obesity ([address] Neuropathy, FANTA on CPAP (08/26/2014), Presence of combination internal cardiac defibrillator (ICD) and pacemaker, Psychiatric disorder, Routine eye exam (2020), Spinal stenosis of lumbar region, Stage 3b chronic kidney disease [facility]), and Type 2 diabetes mellitus with hyperglycemia, with long-term current use of insulin ([facility] (09/23/2020). He has no past medical history of Dementia or PVD (peripheral vascular disease) ([facility] Mr. Alvaro Gutierrez  has a past surgical history that includes pr cardiac surg procedure unlist (cath); hx appendectomy; hx tonsillectomy; hx orthopaedic; hx orthopaedic; hx pacemaker; and hx colonoscopy. Social History/Living Environment:   Home Environment: Private residence  One/Two Story Residence: One story  Living Alone: No  Support Systems: Spouse/Significant Other  Patient Expects to be Discharged to[de-identified] House  Current DME Used/Available at Home: None  Prior Level of Function/Work/Activity:  Pt is independent with ambulation. Number of Personal Factors/Comorbidities that affect the Plan of Care: 0: LOW COMPLEXITY   EXAMINATION:   Most Recent Physical Functioning:                                 Transfers  Sit to Stand: Stand-by assistance  Stand to Sit: Stand-by assistance  Bed to Chair: Stand-by assistance    Balance  Sitting: Intact  Standing: Pull to stand; With support              Weight Bearing Status  Left Side Weight Bearing: As tolerated  Distance (ft): 300 Feet (ft)  Ambulation - Level of Assistance: Stand-by assistance  Assistive Device: Walker, rolling        Braces/Orthotics: none    Left Knee Cold  Type: Cryocuff      Body Structures Involved:  1. Joints  2. Muscles Body Functions Affected:  1. Movement Related Activities and Participation Affected:  1. Mobility  2. Self Care   Number of elements that affect the Plan of Care: 4+: HIGH COMPLEXITY   CLINICAL PRESENTATION:   Presentation: Stable and uncomplicated: LOW COMPLEXITY   CLINICAL DECISION MAKING:   [employer] AM-PAC 6 Clicks   Basic Mobility Inpatient Short Form  How much difficulty does the patient currently have. .. Unable A Lot A Little None   1. Turning over in bed (including adjusting bedclothes, sheets and blankets)?    [] 1   [] 2 [x] 3   [] 4   2. Sitting down on and standing up from a chair with arms ( e.g., wheelchair, bedside commode, etc.)   [] 1   [] 2   [x] 3   [] 4   3. Moving from lying on back to sitting on the side of the bed? [] 1   [] 2   [x] 3   [] 4   How much help from another person does the patient currently need. .. Total A Lot A Little None   4. Moving to and from a bed to a chair (including a wheelchair)? [] 1   [] 2   [x] 3   [] 4   5. Need to walk in hospital room? [] 1   [] 2   [x] 3   [] 4   6. Climbing 3-5 steps with a railing? [] 1   [] 2   [x] 3   [] 4   © 2007, Trustees of [address] [place] [address], under license to [employer]. All rights reserved     Score:  Initial: 18 Most Recent: X (Date: -- )    Interpretation of Tool:  Represents activities that are increasingly more difficult (i.e. Bed mobility, Transfers, Gait). Medical Necessity:     · Patient is expected to demonstrate progress in   · strength, range of motion, and functional technique  ·  to   · decrease assistance required with functional mobility and TKA managment  · .  Reason for Services/Other Comments:  · Patient continues to require skilled intervention due to   · Inability to complete functional mobility and TKA management independently  · . Use of outcome tool(s) and clinical judgement create a POC that gives a: Clear prediction of patient's progress: LOW COMPLEXITY            TREATMENT:   (In addition to Assessment/Re-Assessment sessions the following treatments were rendered)     Pre-treatment Symptoms/Complaints:    Pain Initial:      Post Session:  no complaints     Therapeutic Activity: (    23 minutes): Therapeutic activities including Bed transfers, Chair transfers, and Ambulation on level ground to improve mobility. Therapeutic Exercise: ( 15 minutes):  Exercises per grid below to improve mobility. Gait Training ( 0):  Gait training to improve and/or restore physical functioning as related to mobility. [facility] Assistive Device: Walker, rolling  Ambulation - Level of Assistance: Stand-by assistance  Distance (ft): 300 Feet (ft)       Date:  12/21 Date:  12/22 Date:     ACTIVITY/EXERCISE AM PM AM PM AM PM     []  []  []  []  []  []   Ankle Pumps  10 12      Quad Sets  10 12      Gluteal Sets  10 12      Hip ABd/ADduction  10 12      Straight Leg Raises  10 12      Knee Slides  10 12      Short Arc Quads  10 12      Chair Slides  10 12                        B = bilateral; AA = active assistive; A = active; P = passive      Treatment/Session Assessment:     Response to Treatment:  Pt agreeable to exercise and ambulate with therapy. Education:  [x] Home Exercises  [x] Fall Precautions  [x] Use of Cold Therapy Unit [] D/C Instruction Review  [] Knee Prosthesis Review  [x] Walker Management/Safety [x] Adaptive Equipment as Needed  [x] No pillow under knee       Interdisciplinary Collaboration:   o Physical Therapist  o Registered Nurse    After treatment position/precautions:   o Up in chair  o Bed/Chair-wheels locked  o Bed in low position  o Call light within reach  o RN notified    Compliance with Program/Exercises: Compliant most of the time, Will assess as treatment progresses. Recommendations/Intent for next treatment session:  Treatment next visit will focus on increasing Mr. Maxwell Ring independence with bed mobility, transfers, gait training, strength/ROM exercises, modalities for pain, and patient education.       Total Treatment Duration:  PT Patient Time In/Time Out  Time In: 0900  Time Out: [address], PT

## 2021-12-22 NOTE — PROGRESS NOTES
Pt discharge summary and home medication sheet reviewed with pt/wife and signed by pt. Copy given for take home use. All goals met. Pt leaving [facility] via w/c with staff member and wife.

## 2021-12-22 NOTE — DISCHARGE INSTRUCTIONS
[address] [facility]   Patient Discharge Instructions    Aj Guevara / [id number] : 1945    Admitted 2021 Discharged: 2021     IF YOU HAVE ANY PROBLEMS ONCE YOU ARE AT HOME CALL THE FOLLOWING NUMBERS:   Main office number: [phone]    Take Home Medications     · It is important that you take the medication exactly as they are prescribed. · Keep your medication in the bottles provided by the pharmacist and keep a list of the medication names, dosages, and times to be taken in your wallet. · Do not take other medications without consulting your doctor. What to do at [address] your prehospital diet. If you have excessive nausea or vomitting call your doctor's office     Home Physical Therapy is arranged. Use rolling walker when walking. Patients who have had a joint replacement should not drive until you are seen for your follow up appointment by Dr. Queen Agee. When to Call    - Call if you have a temperature greater then 101  - Unable to keep food down  - Loose control of your bladder or bowel function  - Are unable to bear any weight   - Need a pain medication refill     Patient Education        Total Knee Replacement: What to Expect at  [address] had a total knee replacement. The doctor replaced the worn ends of the bones that connect to your knee (thighbone and lower leg bone) with plastic and metal parts. When you leave the hospital, you should be able to move around with a walker or crutches. But you will need someone to help you at home until you have more energy and can move around better. You will go home with a bandage and stitches, staples, skin glue, or tape strips. Change the bandage as your doctor tells you to. If you have stitches or staples, your doctor will remove them about 2 weeks after your surgery. Glue or tape strips will fall off on their own over time.  You may still have some mild pain, and the area may be swollen for a few months after surgery. Your knee will continue to improve for up to a year. You will probably use a walker for some time after surgery. When you are ready, you can use a cane. You may be able to walk without support after a couple weeks, or when you are comfortable. You will need to do months of physical rehabilitation (rehab) after a knee replacement. Rehab will help you strengthen the muscles of the knee and help you regain movement. After you recover, your artificial knee will allow you to do normal daily activities with less pain or no pain at all. You may be able to hike, dance, or ride a bike. Talk to your doctor about whether you can do more strenuous activities. Always tell your caregivers that you have an artificial knee. How long it will take to walk on your own, return to normal activities, and go back to work depends on your health and how well your rehabilitation (rehab) program goes. The better you do with your rehab exercises, the quicker you will get your strength and movement back. This care sheet gives you a general idea about how long it will take for you to recover. But each person recovers at a different pace. Follow the steps below to get better as quickly as possible. How can you care for yourself at home? Activity    · Rest when you feel tired. You may take a nap, but don't stay in bed all day. When you sit, use a chair with arms. You can use the arms to help you stand up.     · Work with your physical therapist to find the best way to exercise. What you can do as your knee heals will depend on whether your new knee is cemented or uncemented. You may not be able to do certain things for a while if your new knee is uncemented.     · After your knee has healed enough, you can do more strenuous activities with caution. ? You can golf, but you may want to use a golf cart for some time. And don't wear shoes with spikes. ? You can bike on a flat road or on a stationary bike.  Talk to your doctor before biking uphill. ? Your doctor may suggest that you stay away from activities that put stress on your knee. These include tennis, badminton, contact sports like football, jumping (such as in basketball), jogging, and running. ? Avoid activities where you might fall.     · Do not sit for more than 1 hour at a time. Get up and walk around for a while before you sit again. If you must sit for a long time, prop up your leg with a chair or footstool. This will help you avoid swelling.     · Ask your doctor when you can drive again. It may take several weeks after knee replacement surgery before it's safe for you to drive.     · When you get into a car, sit on the edge of the seat. Then pull in your legs, and turn to face the front.     · You should be able to do many everyday activities 3 to 6 weeks after your surgery. You will probably need to take 4 to 16 weeks off from work. When you can go back to work depends on the type of work you do and how you feel.     · Ask your doctor when it is okay for you to have sex.     · For 12 weeks, do not lift anything heavier than 10 pounds and do not lift weights. Diet    · By the time you leave the hospital, you should be eating your normal diet. If your stomach is upset, try bland, low-fat foods like plain rice, broiled chicken, toast, and yogurt. Your doctor may suggest that you take iron and vitamin supplements.     · Drink plenty of fluids (unless your doctor tells you not to).   · Eat healthy foods, and watch your portion sizes. Try to stay at your ideal weight. Too much weight puts more stress on your new knee.     · You may notice that your bowel movements are not regular right after your surgery. This is common. Try to avoid constipation and straining with bowel movements. Drinking enough fluids, taking a stool softener, and eating foods that are good sources of fiber can help you avoid constipation.  If you have not had a bowel movement after a couple of days, talk to your doctor. Medicines    · Your doctor will tell you if and when you can restart your medicines. You will also get instructions about taking any new medicines.     · If you take aspirin or some other blood thinner, ask your doctor if and when to start taking it again. Make sure that you understand exactly what your doctor wants you to do.     · Your doctor may give you a blood-thinning medicine to prevent blood clots. If you take a blood thinner, be sure you get instructions about how to take your medicine safely. Blood thinners can cause serious bleeding problems. This medicine could be in pill form or as a shot (injection). If a shot is needed, your doctor will tell you how to do this.     · Be safe with medicines. Take pain medicines exactly as directed. ? If the doctor gave you a prescription medicine for pain, take it as prescribed. ? If you are not taking a prescription pain medicine, ask your doctor if you can take an over-the-counter medicine. ? Plan to take your pain medicine 30 minutes before exercises. It is easier to prevent pain before it starts than to stop it after it has started.     · If you think your pain medicine is making you sick to your stomach:  ? Take your medicine after meals (unless your doctor has told you not to). ? Ask your doctor for a different pain medicine.     · If your doctor prescribed antibiotics, take them as directed. Do not stop taking them just because you feel better. You need to take the full course of antibiotics. Incision care    · If your doctor told you how to care for your cut (incision), follow your doctor's instructions. You will have a dressing over the cut. A dressing helps the incision heal and protects it. Your doctor will tell you how to take care of this.     · If you did not get instructions, follow this general advice:  ?  If you have strips of tape on the cut the doctor made, leave the tape on for a week or until it falls off.  ? If you have stitches or staples, your doctor will tell you when to come back to have them removed. ? If you have skin glue on the cut, leave it on until it falls off. Skin glue is also called skin adhesive or liquid stitches. ? Change the bandage every day. ? Wash the area daily with warm water, and pat it dry. Don't use hydrogen peroxide or alcohol. They can slow healing. ? You may cover the area with a gauze bandage if it oozes fluid or rubs against clothing. ? You may shower 24 to 48 hours after surgery. Pat the incision dry. Don't swim or take a bath for the first 2 weeks, or until your doctor tells you it is okay. Exercise    · Your rehab program will give you a number of exercises to do to help you get back your knee's range of motion and strength. Always do them as your therapist tells you. Ice    · For pain and swelling, put ice or a cold pack on the area for 10 to 20 minutes at a time. Put a thin cloth between the ice and your skin. Other instructions    · Wear compression stockings if your doctor told you to. These may help to prevent blood clots. Your doctor will tell you how long you need to keep wearing the compression stockings.     · Carry a medical alert card that says you have an artificial joint. You have metal pieces in your knee. These may set off some airport metal detectors. Follow-up care is a key part of your treatment and safety. Be sure to make and go to all appointments, and call your doctor if you are having problems. It's also a good idea to know your test results and keep a list of the medicines you take. When should you call for help? Call 911 anytime you think you may need emergency care. For example, call if:    · You passed out (lost consciousness).     · You have severe trouble breathing.     · You have sudden chest pain and shortness of breath, or you cough up blood.    Call your doctor now or seek immediate medical care if:    · You have signs of infection, such as:  ? Increased pain, swelling, warmth, or redness. ? Red streaks leading from the incision. ? Pus draining from the incision. ? A fever.     · You have signs of a blood clot, such as:  ? Pain in your calf, back of the knee, thigh, or groin. ? Redness and swelling in your leg or groin.     · Your incision comes open and begins to bleed, or the bleeding increases.     · You have pain that does not get better after you take pain medicine. Watch closely for changes in your health, and be sure to contact your doctor if:    · You do not have a bowel movement after taking a laxative. Where can you learn more? Go to http://www.gray.com/  Enter T054 in the search box to learn more about \"Total Knee Replacement: What to Expect at Home. \"  Current as of: July 1, 2021               Content Version: 13.0  © 2006-2021 [employer]. Care instructions adapted under license by [employer] (which disclaims liability or warranty for this information). If you have questions about a medical condition or this instruction, always ask your healthcare professional. [place] [address] any warranty or liability for your use of this information. Information obtained by :  I understand that if any problems occur once I am at home I am to contact my physician. I understand and acknowledge receipt of the instructions indicated above.                                                                                                                                            Physician's or R.N.'s Signature                                                                  Date/Time                                                                                                                                              Patient or Representative Signature                                                          Date/Time

## 2021-12-23 ENCOUNTER — HOME CARE VISIT (OUTPATIENT)
Dept: SCHEDULING | Facility: HOME HEALTH | Age: 76
End: 2021-12-23
Payer: MEDICARE

## 2021-12-23 PROCEDURE — 3331090001 HH PPS REVENUE CREDIT

## 2021-12-23 PROCEDURE — 3331090002 HH PPS REVENUE DEBIT

## 2021-12-23 PROCEDURE — G0151 HHCP-SERV OF PT,EA 15 MIN: HCPCS

## 2021-12-23 PROCEDURE — 400018 HH-NO PAY CLAIM PROCEDURE

## 2021-12-23 PROCEDURE — 400013 HH SOC

## 2021-12-23 NOTE — Clinical Note
S:  Patient is a 69yo male referred for homehealth PT services s/p hospitalization for left TKA on 12/21/21. He returned home on 12/22/21 where he lives in a single story home with his wife. Step at entry/exit. Pt. requires Min-ModA with transfers Mod-MaxA for bed access and Min-ModA for gait with use of RW.     B:  PMHx includes:  Age-related cognitive decline, Arthritis, Chronic systolic CHF, XMFMD-71(0/5/1402), Depression, Diabetes, GERD, Gout, Hypertension, Morbid obesity, Neuropathy, FANTA (txed with CPAP- Dr. Katharine Bliss), Presence of combination internal cardiac defibrillator (ICD) and  pacemaker, Spinal stenosis of lumbar region   PLOF: Independent in all aspects of self care and mobility    A/R:  Pt. will benefit from continued skilled PT (1w1; 3w1; 2w2) to address deficits in strength, balance and functional mobility/safety. Patient verbalizes understanding of, and agreement with, PT POC.

## 2021-12-23 NOTE — PROCEDURE: REASSURANCE
Detail Level: Detailed
Quality 224: Stage 0-Iic Melanoma: Overutilization Of Imaging Studies For Only Stage 0-Iic Melanoma: None of the following diagnostic imaging studies ordered: chest X-ray, CT, Ultrasound, MRI, PET, or nuclear medicine scans (ML)
Quality 137: Melanoma: Continuity Of Care - Recall System: Patient information entered into a recall system that includes: target date for the next exam specified AND a process to follow up with patients regarding missed or unscheduled appointments
Do not drive or operate machinery/Showering allowed/Stairs allowed/Walking - Indoors allowed/No heavy lifting/straining/Walking - Outdoors allowed/Follow Instructions Provided by your Surgical Team

## 2021-12-23 NOTE — Clinical Note
Requesting verbal order to continue skilled PT (1w1; 3w1; 2w2) to address deficist in strength, balance, endurance and functional mobility/safety.   Please contact Jasper Sorto, PT  [phone]

## 2021-12-24 PROCEDURE — 3331090001 HH PPS REVENUE CREDIT

## 2021-12-24 PROCEDURE — 3331090002 HH PPS REVENUE DEBIT

## 2021-12-25 PROCEDURE — 3331090001 HH PPS REVENUE CREDIT

## 2021-12-25 PROCEDURE — 3331090002 HH PPS REVENUE DEBIT

## 2021-12-26 PROCEDURE — 3331090002 HH PPS REVENUE DEBIT

## 2021-12-26 PROCEDURE — 3331090001 HH PPS REVENUE CREDIT

## 2021-12-27 ENCOUNTER — HOME CARE VISIT (OUTPATIENT)
Dept: SCHEDULING | Facility: HOME HEALTH | Age: 76
End: 2021-12-27
Payer: MEDICARE

## 2021-12-27 VITALS
RESPIRATION RATE: 17 BRPM | TEMPERATURE: 98.3 F | SYSTOLIC BLOOD PRESSURE: 132 MMHG | HEART RATE: 84 BPM | DIASTOLIC BLOOD PRESSURE: 70 MMHG

## 2021-12-27 PROCEDURE — G0157 HHC PT ASSISTANT EA 15: HCPCS

## 2021-12-27 PROCEDURE — 3331090002 HH PPS REVENUE DEBIT

## 2021-12-27 PROCEDURE — 3331090001 HH PPS REVENUE CREDIT

## 2021-12-28 PROCEDURE — 3331090002 HH PPS REVENUE DEBIT

## 2021-12-28 PROCEDURE — 3331090001 HH PPS REVENUE CREDIT

## 2021-12-29 ENCOUNTER — HOME CARE VISIT (OUTPATIENT)
Dept: SCHEDULING | Facility: HOME HEALTH | Age: 76
End: 2021-12-29
Payer: MEDICARE

## 2021-12-29 VITALS
RESPIRATION RATE: 17 BRPM | HEART RATE: 77 BPM | DIASTOLIC BLOOD PRESSURE: 78 MMHG | SYSTOLIC BLOOD PRESSURE: 120 MMHG | TEMPERATURE: 97.4 F

## 2021-12-29 PROCEDURE — 3331090002 HH PPS REVENUE DEBIT

## 2021-12-29 PROCEDURE — G0157 HHC PT ASSISTANT EA 15: HCPCS

## 2021-12-29 PROCEDURE — 3331090001 HH PPS REVENUE CREDIT

## 2021-12-30 VITALS
OXYGEN SATURATION: 96 % | HEART RATE: 67 BPM | TEMPERATURE: 97.3 F | RESPIRATION RATE: 16 BRPM | SYSTOLIC BLOOD PRESSURE: 122 MMHG | DIASTOLIC BLOOD PRESSURE: 62 MMHG

## 2021-12-30 PROCEDURE — 3331090002 HH PPS REVENUE DEBIT

## 2021-12-30 PROCEDURE — 3331090001 HH PPS REVENUE CREDIT

## 2021-12-31 ENCOUNTER — HOME CARE VISIT (OUTPATIENT)
Dept: SCHEDULING | Facility: HOME HEALTH | Age: 76
End: 2021-12-31
Payer: MEDICARE

## 2021-12-31 PROCEDURE — G0157 HHC PT ASSISTANT EA 15: HCPCS

## 2021-12-31 PROCEDURE — G0151 HHCP-SERV OF PT,EA 15 MIN: HCPCS

## 2021-12-31 PROCEDURE — 3331090001 HH PPS REVENUE CREDIT

## 2021-12-31 PROCEDURE — 3331090002 HH PPS REVENUE DEBIT

## 2022-01-01 PROCEDURE — 3331090001 HH PPS REVENUE CREDIT

## 2022-01-01 PROCEDURE — 3331090002 HH PPS REVENUE DEBIT

## 2022-01-02 PROCEDURE — 3331090002 HH PPS REVENUE DEBIT

## 2022-01-02 PROCEDURE — 3331090001 HH PPS REVENUE CREDIT

## 2022-01-03 ENCOUNTER — HOME CARE VISIT (OUTPATIENT)
Dept: SCHEDULING | Facility: HOME HEALTH | Age: 77
End: 2022-01-03
Payer: MEDICARE

## 2022-01-03 VITALS
RESPIRATION RATE: 17 BRPM | OXYGEN SATURATION: 98 % | DIASTOLIC BLOOD PRESSURE: 78 MMHG | HEART RATE: 78 BPM | TEMPERATURE: 98 F | SYSTOLIC BLOOD PRESSURE: 132 MMHG

## 2022-01-03 VITALS
HEART RATE: 90 BPM | RESPIRATION RATE: 16 BRPM | TEMPERATURE: 98.3 F | SYSTOLIC BLOOD PRESSURE: 140 MMHG | DIASTOLIC BLOOD PRESSURE: 78 MMHG

## 2022-01-03 PROCEDURE — G0157 HHC PT ASSISTANT EA 15: HCPCS

## 2022-01-03 PROCEDURE — 3331090001 HH PPS REVENUE CREDIT

## 2022-01-03 PROCEDURE — 3331090002 HH PPS REVENUE DEBIT

## 2022-01-04 PROCEDURE — 3331090001 HH PPS REVENUE CREDIT

## 2022-01-04 PROCEDURE — 3331090002 HH PPS REVENUE DEBIT

## 2022-01-05 PROCEDURE — 3331090002 HH PPS REVENUE DEBIT

## 2022-01-05 PROCEDURE — 3331090001 HH PPS REVENUE CREDIT

## 2022-01-06 ENCOUNTER — HOME CARE VISIT (OUTPATIENT)
Dept: SCHEDULING | Facility: HOME HEALTH | Age: 77
End: 2022-01-06
Payer: MEDICARE

## 2022-01-06 ENCOUNTER — HOME CARE VISIT (OUTPATIENT)
Dept: HOME HEALTH SERVICES | Facility: HOME HEALTH | Age: 77
End: 2022-01-06
Payer: MEDICARE

## 2022-01-06 VITALS
HEART RATE: 78 BPM | SYSTOLIC BLOOD PRESSURE: 138 MMHG | TEMPERATURE: 98.4 F | DIASTOLIC BLOOD PRESSURE: 70 MMHG | RESPIRATION RATE: 16 BRPM

## 2022-01-06 PROCEDURE — 3331090002 HH PPS REVENUE DEBIT

## 2022-01-06 PROCEDURE — 3331090001 HH PPS REVENUE CREDIT

## 2022-01-06 PROCEDURE — G0157 HHC PT ASSISTANT EA 15: HCPCS

## 2022-01-07 PROCEDURE — 3331090001 HH PPS REVENUE CREDIT

## 2022-01-07 PROCEDURE — 3331090002 HH PPS REVENUE DEBIT

## 2022-01-08 PROCEDURE — 3331090002 HH PPS REVENUE DEBIT

## 2022-01-08 PROCEDURE — 3331090001 HH PPS REVENUE CREDIT

## 2022-01-09 PROCEDURE — 3331090002 HH PPS REVENUE DEBIT

## 2022-01-09 PROCEDURE — 3331090001 HH PPS REVENUE CREDIT

## 2022-01-10 ENCOUNTER — HOME CARE VISIT (OUTPATIENT)
Dept: SCHEDULING | Facility: HOME HEALTH | Age: 77
End: 2022-01-10
Payer: MEDICARE

## 2022-01-10 VITALS
DIASTOLIC BLOOD PRESSURE: 67 MMHG | TEMPERATURE: 98 F | SYSTOLIC BLOOD PRESSURE: 123 MMHG | OXYGEN SATURATION: 97 % | HEART RATE: 78 BPM | RESPIRATION RATE: 16 BRPM

## 2022-01-10 PROCEDURE — G0151 HHCP-SERV OF PT,EA 15 MIN: HCPCS

## 2022-01-10 PROCEDURE — 3331090001 HH PPS REVENUE CREDIT

## 2022-01-10 PROCEDURE — 3331090002 HH PPS REVENUE DEBIT

## 2022-01-11 PROCEDURE — 3331090001 HH PPS REVENUE CREDIT

## 2022-01-11 PROCEDURE — 3331090002 HH PPS REVENUE DEBIT

## 2022-01-12 ENCOUNTER — APPOINTMENT (OUTPATIENT)
Dept: PHYSICAL THERAPY | Age: 77
End: 2022-01-12
Payer: MEDICARE

## 2022-01-12 PROCEDURE — 3331090001 HH PPS REVENUE CREDIT

## 2022-01-12 PROCEDURE — 3331090002 HH PPS REVENUE DEBIT

## 2022-01-13 ENCOUNTER — HOME CARE VISIT (OUTPATIENT)
Dept: SCHEDULING | Facility: HOME HEALTH | Age: 77
End: 2022-01-13
Payer: MEDICARE

## 2022-01-13 VITALS
SYSTOLIC BLOOD PRESSURE: 140 MMHG | RESPIRATION RATE: 16 BRPM | OXYGEN SATURATION: 99 % | HEART RATE: 78 BPM | TEMPERATURE: 97.1 F | DIASTOLIC BLOOD PRESSURE: 66 MMHG

## 2022-01-13 PROCEDURE — 3331090002 HH PPS REVENUE DEBIT

## 2022-01-13 PROCEDURE — 3331090001 HH PPS REVENUE CREDIT

## 2022-01-13 PROCEDURE — G0151 HHCP-SERV OF PT,EA 15 MIN: HCPCS

## 2022-01-14 ENCOUNTER — HOSPITAL ENCOUNTER (OUTPATIENT)
Dept: PHYSICAL THERAPY | Age: 77
Discharge: HOME | End: 2022-01-14
Payer: MEDICARE

## 2022-01-14 PROCEDURE — 97016 VASOPNEUMATIC DEVICE THERAPY: CPT

## 2022-01-14 PROCEDURE — 97140 MANUAL THERAPY 1/> REGIONS: CPT

## 2022-01-14 PROCEDURE — 97162 PT EVAL MOD COMPLEX 30 MIN: CPT

## 2022-01-14 PROCEDURE — 97110 THERAPEUTIC EXERCISES: CPT

## 2022-01-14 NOTE — PROGRESS NOTES
Suri Hawkins  : 1945  [place]  [place] [address] [facility] [address]  [phone]   [phone]      OUTPATIENT PHYSICAL THERAPY: Daily Treatment Note  2022          Pain in left knee (M25.562) and Stiffness of left knee, not elsewhere classified (M25.662) and Difficulty in walking, not elsewhere classified (R26.2) and Other abnormalities of gait and mobility (R26.89)  _________________________________________________________________________  Pre-treatment Symptoms/Complaints:  L knee pain/ antalgic gait    Effective Dates: 2022 TO 2022 (90 days). Frequency/Duration: 2-3 times a week for 90 Days    GOALS: (Goals have been discussed and agreed upon with patient.)  Short-Term Functional Goals: Time Frame: 2 weeks  Patient will demonstrate independence and compliance with home exercise program for management of symptoms (aquatics)  Pt will demonstrate increase in Knee injury and Osteoarthritis Outcome Score (KOOS) by 2-3 points for improved functional mobility. Pt will demonstrate increased active range of motion for L knee  from -3 ° (extension) to 115 ° (flexion). Pt will tolerate 35 to 40 minutes of aquatic therapy with pain of 2/10 or better following intervention. Discharge Goals: Time Frame: 8 weeks  Pt will report KOOS score of 6/28 for improved functional mobility in home/ community. Pt will demonstrate active range of motion of L knee from 0 ° (extension) to 120 °(flexion). Pt will ambulate independently  >/= 1500 feet with normal gait pattern with even stance time/ step length in bilateral lower extremities over household/ community surfaces. Pt will report normalized sleep pattern of 6 to 8 hours in bed without increased pain. Pt will demonstrate I bed mobility.    Pt will demonstrate reciprocal gait up/ down 3-4 steps independently with use of unilateral handrail demonstrating good strength. Rehabilitation Potential For Stated Goals: Good  Pain: Initial: 4-5/10 L knee Post Session:  1-2/10 post game ready   Medications Last Reviewed:  1/14/2022  Updated Objective Findings:  See evaluation note from today   servation/Orthostatic Postural Assessment:          Morbid obesity  L knee incision line healed- steri strips intact from mid to distal incision line- proximal open to air  Palpation:          Diffuse edema in L knee complex  ROM:  BUE                      Date:  01/14/22 Date:   Date:     Trunk ROM 50 % limited                          LE ROM Left Right       Hip Flexion [facility]       Hip Abduction [facility]       Straight Leg Raise (SLR)                  Knee Flexion 110 ° 125 °       Knee Extension -10 ° 0 °                Ankle Dorsiflexion [facility] WFL         Strength:     Date:  01/14/22 Date:   Date:     LE MMT Left Right Left Right Left Right   Hip Flex (L1/L2) 4/5 4/5       Hip Abd (glut med) 4/5 4/5       Hip Ext 4/5 4/5       Quad    ( L3/4) -3/5 4/5       Hamstring -3/5 4/5       Anterior Tib (L4/L5)         Gastroc (S1/2)         EHL (L5)                             Special Tests:  deferred  Neurological Screen:        Sensation: Intact for light touch  Functional Mobility:         Gait/Ambulation:  Independent with antalgic gait pattern- decreased LLE stance time        Transfers:  Sit to stand to sit with use of UE and momentum        Bed Mobility:  Assist for legs on/ off plinth and supine to sit        Stairs:  Step to gait pattern  Balance:          Single Leg Stance:  R) 6-7 sec  L) 2-3 sec  KOOS at al 16/28     TREATMENT:   Therapeutic Exercise: (SEE MINUTES BELOW):  Exercises per grid below to improve mobility, strength and balance. Required minimal visual and verbal cues to promote proper body alignment and promote proper body mechanics. Progressed resistance, range and repetitions as indicated.    Aquatic Therapy (SEE MINUTES BELOW): Aquatic treatment performed per flow grid for Decreased muscle strength, Decreased range of motion and Ease of movement. Cues provided for technique. Assistance by therapist provided for progression of exercises/ activities. Patient has difficulty with L knee pain. Manual Therapy (SEE MINUTES BELOW):  Pt in long sitting for STM to L knee complex with noted sensitivity and hamstring tightness. Modalities (SEE MINUTES BELOW):  Pt in long sitting for game ready (ice/ compression) to address pain/ edema post activity. Intensity monitored throughout intervention with skin intact and WFL following modality. Date: 01/14/22 EVAL       Modalities: 15 min       Game ready To L knee                       Manual Therapy: 10 min       STM (L knee complex) To L knee in long sitting                                               Aquatics Activities:        GAIT (F/B/S/M)        SLR gait        Hamstring Curl gait         Rockettes        Toe/ Heel Raises        Squats        Hip 4 way        Lunges        Clamshells        Hip Circles        Deep well:  Bicycling        Deep well: Jumping jacks        Deep well: Scissoring        Hamstring stretch        Piriformis stretch                                Therapeutic Exercises: 15 min       Seated marching x10 R/L       Seated LAQ x10 R/L       Quad sets x15H5 LLE       Step ups x10 R? L on 6\" step       Gait training 5 min               Therapeutic Activities:                                  HEP:  Emphasis on quad sets for quad activation to facilitate knee ext    [employer] Portal  Treatment/Session Summary:  Initial assessment completed with plan to continue on land until MD follow up visit 01/19/22 at which time will provide aquatics as an option. POC reviewed with patient who verbalizes agreement. · Response to Treatment:  Good tolerance of activity.   · Communication/Consultation:  Electronic communication of eval to MD office  · Equipment provided today:  None today  · Recommendations/Intent for next treatment session: Next visit will focus on goals for flexibility, strength and normalized movement patterns.     Total Treatment Billable Duration:  55 min  PT Patient Time In/Time Out  Time In: 1330  Time Out: GENARO De Los Santos

## 2022-01-14 NOTE — THERAPY EVALUATION
Jung Steele  : 1945 [id number] [facility],[address]  [place] [place] [address]  [phone]   [phone]        OUTPATIENT PHYSICAL THERAPY:Initial Assessment 2022        ICD-10: Treatment Diagnosis: Pain in left knee (M25.562) and Stiffness of left knee, not elsewhere classified (M25.662) and Difficulty in walking, not elsewhere classified (R26.2) and Other abnormalities of gait and mobility (R26.89)    Precautions/Allergies:   Patient has no known allergies. Fall Risk Score:    Ambulatory/Rehab Services H2 Model Falls Risk Assessment    Risk Factors:       (1)  Gender [Male] Ability to Rise from Chair:       (1)  Pushes up, successful in one attempt    Falls Prevention Plan:       No modifications necessary   Total: (5 or greater = High Risk): 2     [facility]. All Rights Reserved. [facility] [employer] Patent #[id number]. Federal Law prohibits the replication, distribution or use without written permission from [address]     MD Orders: Evaluate and Treat  MEDICAL/REFERRING DIAGNOSIS:  Presence of left artificial knee joint [Z96.652]   DATE OF ONSET: Surgery Date 21  REFERRING PHYSICIAN: Alejandra Bedoya,*  RETURN PHYSICIAN APPOINTMENT: 22     INITIAL ASSESSMENT:  Mr. Brigido Pope (pt) is a 77year old white male seen for physical therapy per MD orders with complaint of left knee pain/ edema following left total knee replacement. Pt evaluated for outpatient physical therapy today with the following deficits: pain/ edema left knee, decreased flexibility and strength in left lower extremity, difficulty walking with antalgic gait pattern, decreased independence with functional mobility with decreased dynamic standing balance. Pt would benefit from physical therapy to address the above deficits in order to return to increased independence with functional mobility with decreased pain.   Pt would benefit from initially working in aquatic setting to off load left lower extremity when cleared my physician while addressing goals. As patient progresses, plan to transition to gravity challenging, land based exercises/ activities. Plan of care and goals reviewed with patient who verbalizes agreement. PROBLEM LIST (Impacting functional limitations):  1. Decreased Strength  2. Decreased ADL/Functional Activities  3. Decreased Transfer Abilities  4. Decreased Ambulation Ability/Technique  5. Decreased Balance  6. Increased Pain  7. Decreased Flexibility/Joint Mobility  8. Edema/Girth  9. Decreased [place] with Home Exercise Program INTERVENTIONS PLANNED:  1. Balance Exercise  2. Gait Training  3. Home Exercise Program (HEP)  4. Manual Therapy  5. Range of Motion (ROM)  6. Therapeutic Activites  7. Therapeutic Exercise/Strengthening  8. Transfer Training  9. aquatics   10. modalities    TREATMENT PLAN:  Effective Dates: 1/14/2022 TO 4/14/2022 (90 days). Frequency/Duration: 2-3 times a week for 90 Days    GOALS: (Goals have been discussed and agreed upon with patient.)  Short-Term Functional Goals: Time Frame: 2 weeks  Patient will demonstrate independence and compliance with home exercise program for management of symptoms (aquatics)  Pt will demonstrate increase in Knee injury and Osteoarthritis Outcome Score (KOOS) by 2-3 points for improved functional mobility. Pt will demonstrate increased active range of motion for L knee  from -3 ° (extension) to 115 ° (flexion). Pt will tolerate 35 to 40 minutes of aquatic therapy with pain of 2/10 or better following intervention. Discharge Goals: Time Frame: 8 weeks  Pt will report KOOS score of 6/28 for improved functional mobility in home/ community. Pt will demonstrate active range of motion of L knee from 0 ° (extension) to 120 °(flexion).   Pt will ambulate independently  >/= 1500 feet with normal gait pattern with even stance time/ step length in bilateral lower extremities over household/ community surfaces. Pt will report normalized sleep pattern of 6 to 8 hours in bed without increased pain. Pt will demonstrate I bed mobility. Pt will demonstrate reciprocal gait up/ down 3-4 steps independently with use of unilateral handrail demonstrating good strength. Rehabilitation Potential For Stated Goals: Good  Regarding Bret Kohler's therapy, I certify that the treatment plan above will be carried out by a therapist or under their direction. Thank you for this referral,  Jade Floyd, PT       Referring Physician Signature: Cyrus Finney,*              Date                      HISTORY:   History of Present Injury/Illness (Reason for Referral):  Pt is 76 y/o WM seen for PT per MD orders following L TKA by Dr Sarah Solano 12/21/21 with home health intervention. Pt evaluated today for outpatient therapy with c/o pain with straightening. Pt reports he struggles to get comfortable to sleep due to knee. Pt reports his goal is to walk normal.    Past Medical History/Comorbidities:   Mr. 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 He has no past medical history of Dementia or PVD (peripheral vascular disease) ([address]   Mr. Sangeeta West  has a past surgical history that includes pr cardiac surg procedure unlist (cath); hx appendectomy; hx tonsillectomy; hx orthopaedic; hx orthopaedic; hx pacemaker; and hx colonoscopy. Social History/Living Environment:     Pt lives with wife in 2 story home but main living quarters on one floor so he doesn't do/ avoids stairs. Prior Level of Function/Work/Activity:  Retired, sedentary lifestyle  Dominant Side:         RIGHT  Current Medications:    Current Outpatient Medications:     glucose blood VI test strips (OneTouch Verio test strips) strip, Use as directed to check blood sugar twice a day.   (E11.65)   Please hold rx until pt calls for them., Disp: 200 Strip, Rfl: 3    gabapentin (NEURONTIN) 300 mg capsule, TAKE 1 CAPSULE BY MOUTH EVERY MORNING AND 2 CAPSULES BY MOUTH AT BEDTIME, Disp: 270 Capsule, Rfl: 1    aspirin delayed-release 81 mg tablet, Take 1 Tablet by mouth every twelve (12) hours every twelve (12) hours for 35 days. , Disp: 70 Tablet, Rfl: 0    carvediloL (COREG) 6.25 mg tablet, Take 1 Tablet by mouth two (2) times a day., Disp: 180 Tablet, Rfl: 3    SITagliptin-metFORMIN (Janumet XR) 50-1,000 mg TM24, Take 2 Tablets by mouth daily. take in morning, Disp: , Rfl:     acetaminophen (Tylenol Extra Strength) 500 mg tablet, Take 1,000 mg by mouth every six (6) hours as needed for Pain., Disp: , Rfl:     indomethacin (INDOCIN) 25 mg capsule, Take 1 Capsule by mouth three (3) times daily. Indications: a type of joint disorder due to excess uric acid in the blood called gout (Patient taking differently: Take 25 mg by mouth three (3) times daily as needed. Indications: a type of joint disorder due to excess uric acid in the blood called gout), Disp: 15 Capsule, Rfl: 2    rosuvastatin (CRESTOR) 10 mg tablet, TAKE 1 TABLET BY MOUTH EVERY NIGHT, Disp: 90 Tablet, Rfl: 2    valsartan-hydroCHLOROthiazide (DIOVAN-HCT) 320-25 mg per tablet, Take 1 Tab by mouth daily. , Disp: 90 Tab, Rfl: 3    insulin degludec Creasie Poser FlexTouch U-200) 200 unit/mL (3 mL) inpn, 50 Units by SubCUTAneous route daily. , Disp: , Rfl:     furosemide (Lasix) 20 mg tablet, Take 1 Tab by mouth every Monday, Wednesday, Friday., Disp: 90 Tab, Rfl: 1    allopurinoL (ZYLOPRIM) 100 mg tablet, Take 1 Tab by mouth daily. , Disp: 90 Tab, Rfl: 2    amitriptyline (ELAVIL) 10 mg tablet, Take 1 Tab by mouth nightly. (Patient taking differently: Take 10-20 mg by mouth nightly. 1 to 2 tabs QHS), Disp: 90 Tab, Rfl: 1    omeprazole (PRILOSEC) 20 mg capsule, Take 1 Cap by mouth daily. Indications: gastroesophageal reflux disease (Patient taking differently: Take 20 mg by mouth daily. Non-formulary medication. Patient instructed to bring medication to the hospital on the DOS, in the original bottle, and give to nurse. Indications: gastroesophageal reflux disease), Disp: 90 Cap, Rfl: 1    nitroglycerin (NITROSTAT) 0.4 mg SL tablet, 1 Tab by SubLINGual route every five (5) minutes as needed for Chest Pain. Up to 3 doses. , Disp: 1 Bottle, Rfl: 5    DISABLED PLACARD (DISABLED PLACARD) DMV, Handicap placard, Disp: 1 Each, Rfl: 0    ZINC ACETATE PO, Take 1 Tablet by mouth daily. , Disp: , Rfl:     ascorbic acid (VITAMIN C PO), Take 1 Tablet by mouth daily. , Disp: , Rfl:     cpap machine kit, by Does Not Apply route. 8 cm, Disp: , Rfl:     cyanocobalamin (VITAMIN B-12) 1,000 mcg sublingual tablet, Take 1,000 mcg by mouth daily. , Disp: , Rfl:     cholecalciferol, vitamin D3, (VITAMIN D3) 2,000 unit tab, Take 1 Tablet by mouth daily. , Disp: , Rfl:     Bifidobacterium Infantis (Align) 4 mg cap, Take 1 Tablet by mouth., Disp: , Rfl:     FLUORIDE ION/MULTIVITAMINS (MULTI VIT-FLUORIDE PO), take  by mouth daily. Stopped 6/22/09 , Disp: , Rfl:    Date Last Reviewed:  1/14/2022   # of Personal Factors/Comorbidities that affect the Plan of Care: 1-2: MODERATE COMPLEXITY   EXAMINATION:   Observation/Orthostatic Postural Assessment:          Morbid obesity  L knee incision line healed- steri strips intact from mid to distal incision line- proximal open to air  Circumferential Measurements:   At popliteal crease:  L) 47 cm  R) 43 cm  Above malleolus:  L) 26.25  R) 24cm  Palpation:          Diffuse edema in L knee complex  ROM:  BUE                      Date:  01/14/22 Date:   Date:     Trunk ROM 50 % limited                          LE ROM Left Right       Hip Flexion [facility]       Hip Abduction [facility]       Straight Leg Raise (SLR)                  Knee Flexion 110 ° 125 °       Knee Extension -10 ° 0 °                Ankle Dorsiflexion [facility] WFL         Strength:     Date:  01/14/22 Date:   Date:     LE MMT Left Right Left Right Left Right   Hip Flex (L1/L2) 4/5 4/5       Hip Abd (glut med) 4/5 4/5       Hip Ext 4/5 4/5       Quad    ( L3/4) -3/5 4/5       Hamstring -3/5 4/5       Anterior Tib (L4/L5)         Gastroc (S1/2)         EHL (L5)                             Special Tests:  deferred  Neurological Screen:        Sensation: Intact for light touch  Functional Mobility:         Gait/Ambulation:  Independent with antalgic gait pattern- decreased LLE stance time        Transfers:  Sit to stand to sit with use of UE and momentum        Bed Mobility:  Assist for legs on/ off plinth and supine to sit        Stairs:  Step to gait pattern  Balance:          Single Leg Stance:  R) 6-7 sec  L) 2-3 sec   Body Structures Involved:  1. Nerves  2. Joints  3. Muscles  4. Ligaments Body Functions Affected:  1. Sensory/Pain  2. Neuromusculoskeletal  3. Movement Related Activities and Participation Affected:  1. General Tasks and Demands  2. Mobility  3. Self Care  4. Domestic Life  5.  Community, Social and [place]   # of elements that affect the Plan of Care: 3: MODERATE COMPLEXITY   CLINICAL PRESENTATION:   Presentation: Evolving clinical presentation with changing clinical characteristics: MODERATE COMPLEXITY   CLINICAL DECISION MAKING:   PATRICIA at EVAL:  16/28    Medical Necessity:   · Patient is expected to demonstrate progress in strength, range of motion and balance to increase independence with functional mobility with decreaesd pain. Reason for Services/Other Comments:  · Patient continues to require modification of therapeutic interventions to increase complexity of exercises.    Use of outcome tool(s) and clinical judgement create a POC that gives a: Questionable prediction of patient's progress: MODERATE COMPLEXITY       Total Treatment Duration: 55 min  PT Patient Time In/Time Out  Time In: 1330  Time Out: [address] [place], PT

## 2022-01-17 ENCOUNTER — APPOINTMENT (OUTPATIENT)
Dept: PHYSICAL THERAPY | Age: 77
End: 2022-01-17
Payer: MEDICARE

## 2022-01-18 ENCOUNTER — HOSPITAL ENCOUNTER (OUTPATIENT)
Dept: PHYSICAL THERAPY | Age: 77
Discharge: HOME | End: 2022-01-18
Payer: MEDICARE

## 2022-01-18 NOTE — PROGRESS NOTES
Patient has called to cancel this visit due to inclement weather/icy road conditions. Resume POC at next scheduled visit.

## 2022-01-19 ENCOUNTER — HOSPITAL ENCOUNTER (OUTPATIENT)
Dept: PHYSICAL THERAPY | Age: 77
Discharge: HOME | End: 2022-01-19
Payer: MEDICARE

## 2022-01-19 DIAGNOSIS — Z96.652 STATUS POST TOTAL LEFT KNEE REPLACEMENT: ICD-10-CM

## 2022-01-19 PROBLEM — F11.99 OPIOID USE, UNSPECIFIED WITH UNSPECIFIED OPIOID-INDUCED DISORDER (HCC): Status: ACTIVE | Noted: 2022-01-19

## 2022-01-19 PROCEDURE — 97016 VASOPNEUMATIC DEVICE THERAPY: CPT

## 2022-01-19 PROCEDURE — 97110 THERAPEUTIC EXERCISES: CPT

## 2022-01-19 NOTE — PROGRESS NOTES
Mikki Guy  : 1945  [place]  Secondary: [place] [address] @ [address] [place] DUN[place].  [phone]   [phone]      OUTPATIENT PHYSICAL THERAPY: Daily Treatment Note  2022          Pain in left knee (M25.562) and Stiffness of left knee, not elsewhere classified (M25.662) and Difficulty in walking, not elsewhere classified (R26.2) and Other abnormalities of gait and mobility (R26.89)  _________________________________________________________________________  Pre-treatment Symptoms/Complaints:  L knee pain/ antalgic gait    Effective Dates: 2022 TO 2022 (90 days). Frequency/Duration: 2-3 times a week for 90 Days    GOALS: (Goals have been discussed and agreed upon with patient.)  Short-Term Functional Goals: Time Frame: 2 weeks  Patient will demonstrate independence and compliance with home exercise program for management of symptoms (aquatics)  Pt will demonstrate increase in Knee injury and Osteoarthritis Outcome Score (KOOS) by 2-3 points for improved functional mobility. Pt will demonstrate increased active range of motion for L knee  from -3 ° (extension) to 115 ° (flexion). Pt will tolerate 35 to 40 minutes of aquatic therapy with pain of 2/10 or better following intervention. Discharge Goals: Time Frame: 8 weeks  Pt will report KOOS score of 6/28 for improved functional mobility in home/ community. Pt will demonstrate active range of motion of L knee from 0 ° (extension) to 120 °(flexion). Pt will ambulate independently  >/= 1500 feet with normal gait pattern with even stance time/ step length in bilateral lower extremities over household/ community surfaces. Pt will report normalized sleep pattern of 6 to 8 hours in bed without increased pain. Pt will demonstrate I bed mobility.    Pt will demonstrate reciprocal gait up/ down 3-4 steps independently with use of unilateral handrail demonstrating good strength. Rehabilitation Potential For Stated Goals: Good  Pain: Initial: 4-5/10 L knee Post Session:  1-2/10 post game ready   Medications Last Reviewed:  1/19/2022  Updated Objective Findings:     servation/Orthostatic Postural Assessment:          Morbid obesity  L knee incision line healed- steri strips intact from mid to distal incision line- proximal open to air  Palpation:          Diffuse edema in L knee complex  ROM:  BUE                      Date:  01/14/22 Date:   Date:     Trunk ROM 50 % limited                          LE ROM Left Right       Hip Flexion [facility]       Hip Abduction [facility]       Straight Leg Raise (SLR)                  Knee Flexion 110 ° 125 °       Knee Extension -10 ° 0 °                Ankle Dorsiflexion [facility] WFL         Strength:     Date:  01/14/22 Date:   Date:     LE MMT Left Right Left Right Left Right   Hip Flex (L1/L2) 4/5 4/5       Hip Abd (glut med) 4/5 4/5       Hip Ext 4/5 4/5       Quad    ( L3/4) -3/5 4/5       Hamstring -3/5 4/5       Anterior Tib (L4/L5)         Gastroc (S1/2)         EHL (L5)                             Special Tests:  deferred  Neurological Screen:        Sensation: Intact for light touch  Functional Mobility:         Gait/Ambulation:  Independent with antalgic gait pattern- decreased LLE stance time        Transfers:  Sit to stand to sit with use of UE and momentum        Bed Mobility:  Assist for legs on/ off plinth and supine to sit        Stairs:  Step to gait pattern  Balance:          Single Leg Stance:  R) 6-7 sec  L) 2-3 sec  KOOS at [facility] 16/28     TREATMENT:   Therapeutic Exercise: (SEE MINUTES BELOW):  Exercises per grid below to improve mobility, strength and balance. Required minimal visual and verbal cues to promote proper body alignment and promote proper body mechanics. Progressed resistance, range and repetitions as indicated.    Aquatic Therapy (SEE MINUTES BELOW): Aquatic treatment performed per flow grid for Decreased muscle strength, Decreased range of motion and Ease of movement. Cues provided for technique. Assistance by therapist provided for progression of exercises/ activities. Patient has difficulty with L knee pain. Manual Therapy (SEE MINUTES BELOW):  Pt in long sitting for STM to L knee complex with noted sensitivity and hamstring tightness. Modalities (SEE MINUTES BELOW):  Pt in long sitting for game ready (ice/ compression) to address pain/ edema post activity. Intensity monitored throughout intervention with skin intact and WFL following modality. Date: 01/14/22 EVAL 1/19/22 Visit 2      Modalities: 15 min 15 min      Game ready To L knee To L knee                      Manual Therapy: 10 min       STM (L knee complex) To L knee in long sitting                                               Aquatics Activities:        GAIT (F/B/S/M)        SLR gait        Hamstring Curl gait         Rockettes        Toe/ Heel Raises        Squats        Hip 4 way        Lunges        Clamshells        Hip Circles        Deep well:  Bicycling        Deep well: Jumping jacks        Deep well: Scissoring        Hamstring stretch        Piriformis stretch                                Therapeutic Exercises: 15 min 30 min      Seated marching x10 R/L 2x15 R/L      Seated LAQ x10 R/L 2x15 R/L      Quad sets x15H5 LLE 15H5 LLE      Step ups x10 R? L on 6\" step       Gait training 5 min 5 min      Seated hamstring stretch  3H15 B      Sit to stands   X 10 elevated       Therapeutic Activities:                                  HEP:  Emphasis on quad sets for quad activation to facilitate knee ext    [employer] Portal  Treatment/Session Summary:  Pt arrives 30 mins late for appt resulting in abbreviated session. He states he misunderstood the schedule. He saw the orthopaedic surgeon this AM. Good report with no notable findings.  All steri strips have been removed and there are no open areas on the incision line. Pt will initiate aquatics at next visit. Cont POC at that time. · Response to Treatment:  Good tolerance of activity. · Communication/Consultation:    · Equipment provided today:  None today  · Recommendations/Intent for next treatment session: Next visit will focus on goals for flexibility, strength and normalized movement patterns.     Total Treatment Billable Duration:  45 min  PT Patient Time In/Time Out  Time In: 0330  Time Out: 0420  Issa Starr, PTA

## 2022-01-21 ENCOUNTER — HOSPITAL ENCOUNTER (OUTPATIENT)
Dept: PHYSICAL THERAPY | Age: 77
Discharge: HOME | End: 2022-01-21
Payer: MEDICARE

## 2022-01-21 PROCEDURE — 97113 AQUATIC THERAPY/EXERCISES: CPT

## 2022-01-21 NOTE — PROGRESS NOTES
Gwendolyn Fritz  : 1945  [place]  [place] - Located within [facility] [address]  [phone]   [phone]      OUTPATIENT PHYSICAL THERAPY: Daily Treatment Note  2022          Pain in left knee (M25.562) and Stiffness of left knee, not elsewhere classified (M25.662) and Difficulty in walking, not elsewhere classified (R26.2) and Other abnormalities of gait and mobility (R26.89)  _________________________________________________________________________  Pre-treatment Symptoms/Complaints:  L knee pain/ antalgic gait    Effective Dates: 2022 TO 2022 (90 days). Frequency/Duration: 2-3 times a week for 90 Days    GOALS: (Goals have been discussed and agreed upon with patient.)  Short-Term Functional Goals: Time Frame: 2 weeks  Patient will demonstrate independence and compliance with home exercise program for management of symptoms (aquatics)  Pt will demonstrate increase in Knee injury and Osteoarthritis Outcome Score (KOOS) by 2-3 points for improved functional mobility. Pt will demonstrate increased active range of motion for L knee  from -3 ° (extension) to 115 ° (flexion). Pt will tolerate 35 to 40 minutes of aquatic therapy with pain of 2/10 or better following intervention. Discharge Goals: Time Frame: 8 weeks  Pt will report KOOS score of 6/28 for improved functional mobility in home/ community. Pt will demonstrate active range of motion of L knee from 0 ° (extension) to 120 °(flexion). Pt will ambulate independently  >/= 1500 feet with normal gait pattern with even stance time/ step length in bilateral lower extremities over household/ community surfaces. Pt will report normalized sleep pattern of 6 to 8 hours in bed without increased pain. Pt will demonstrate I bed mobility.    Pt will demonstrate reciprocal gait up/ down 3-4 steps independently with use of unilateral handrail demonstrating good strength. Rehabilitation Potential For Stated Goals: Good  Pain: Initial: 4/10 L knee  Pt reports severe pain 10/10 in L hand, reports he has called the MD who prescribed meds to help with gout flare up. Pt reports \"I only got 4 hours of sleep last night:   Post Session:  1-2/10 post game ready   Medications Last Reviewed:  1/21/2022  Updated Objective Findings:     servation/Orthostatic Postural Assessment:          Morbid obesity  L knee incision line healed- steri strips intact from mid to distal incision line- proximal open to air  Palpation:          Diffuse edema in L knee complex  ROM:  BUE                      Date:  01/14/22 Date:   Date:     Trunk ROM 50 % limited                          LE ROM Left Right       Hip Flexion [facility]       Hip Abduction [facility]       Straight Leg Raise (SLR)                  Knee Flexion 110 ° 125 °       Knee Extension -10 ° 0 °                Ankle Dorsiflexion [facility] WFL         Strength:     Date:  01/14/22 Date:   Date:     LE MMT Left Right Left Right Left Right   Hip Flex (L1/L2) 4/5 4/5       Hip Abd (glut med) 4/5 4/5       Hip Ext 4/5 4/5       Quad    ( L3/4) -3/5 4/5       Hamstring -3/5 4/5       Anterior Tib (L4/L5)         Gastroc (S1/2)         EHL (L5)                             Special Tests:  deferred  Neurological Screen:        Sensation: Intact for light touch  Functional Mobility:         Gait/Ambulation:  Independent with antalgic gait pattern- decreased LLE stance time        Transfers:  Sit to stand to sit with use of UE and momentum        Bed Mobility:  Assist for legs on/ off plinth and supine to sit        Stairs:  Step to gait pattern  Balance:          Single Leg Stance:  R) 6-7 sec  L) 2-3 sec  TOMOS at al 16/28     TREATMENT:   Therapeutic Exercise: (SEE MINUTES BELOW):  Exercises per grid below to improve mobility, strength and balance.   Required minimal visual and verbal cues to promote proper body alignment and promote proper body mechanics. Progressed resistance, range and repetitions as indicated. Aquatic Therapy (SEE MINUTES BELOW): Aquatic treatment performed per flow grid for Decreased muscle strength, Decreased range of motion and Ease of movement. Cues provided for technique. Assistance by therapist provided for progression of exercises/ activities. Patient has difficulty with L knee pain. Manual Therapy (SEE MINUTES BELOW):  Pt in long sitting for STM to L knee complex with noted sensitivity and hamstring tightness. Modalities (SEE MINUTES BELOW):  Pt in long sitting for game ready (ice/ compression) to address pain/ edema post activity. Intensity monitored throughout intervention with skin intact and WFL following modality. Date: 01/14/22 EVAL 1/19/22 Visit 2 01/21/22 visit 3     Modalities: 15 min 15 min      Game ready To L knee To L knee held                     Manual Therapy: 10 min       STM (L knee complex) To L knee in long sitting                                               Aquatics Activities:   55 min     GAIT (F/B/S/M)   x6l each     SLR gait   x4l     Hamstring Curl gait    x4l     Rockettes        Toe/ Heel Raises   x20     Squats   x20     Step ups   x15 R/L on pool step     Stairs   4 reps x 4 steps with R handrail     Hip 4 way        Lunges        Clamshells        Hip Circles        Deep well:  Bicycling        Deep well: Jumping jacks        Deep well: Scissoring        Hamstring stretch        Piriformis stretch                                Therapeutic Exercises: 15 min 30 min      Seated marching x10 R/L 2x15 R/L      Seated LAQ x10 R/L 2x15 R/L      Quad sets x15H5 LLE 15H5 LLE      Step ups x10 R? L on 6\" step       Gait training 5 min 5 min      Seated hamstring stretch  3H15 B      Sit to stands   X 10 elevated       Therapeutic Activities:                                  HEP:  Emphasis on quad sets for quad activation to facilitate knee ext    [employer] Portal  Treatment/Session Summary:  Pt struggling today with pain in L hand with observable edema likely related to gout flare up. Pt very slow and guarded with movement as initiated pool activity- only moving in direction so could use R hand for support on rail. With step up and stairs quad weakness very evident. Cont POC at that time. · Response to Treatment:  Good tolerance of activity. · Communication/Consultation:    · Equipment provided today:  None today  · Recommendations/Intent for next treatment session: Next visit will focus on goals for flexibility, strength and normalized movement patterns.     Total Treatment Billable Duration:  55 min  PT Patient Time In/Time Out  Time In: 4361  Time Out: 332 [facility],

## 2022-01-24 ENCOUNTER — HOSPITAL ENCOUNTER (OUTPATIENT)
Dept: PHYSICAL THERAPY | Age: 77
Discharge: HOME | End: 2022-01-24
Payer: MEDICARE

## 2022-01-24 PROCEDURE — 97016 VASOPNEUMATIC DEVICE THERAPY: CPT

## 2022-01-24 PROCEDURE — 97113 AQUATIC THERAPY/EXERCISES: CPT

## 2022-01-24 NOTE — PROGRESS NOTES
Porsche De La Vega  : 1945  [place]  [place] [employer] - [facility] [address] @ [address]  [phone]   [phone]      OUTPATIENT PHYSICAL THERAPY: Daily Treatment Note  2022          Pain in left knee (M25.562) and Stiffness of left knee, not elsewhere classified (M25.662) and Difficulty in walking, not elsewhere classified (R26.2) and Other abnormalities of gait and mobility (R26.89)  _________________________________________________________________________  Pre-treatment Symptoms/Complaints:  L knee pain/ antalgic gait    Effective Dates: 2022 TO 2022 (90 days). Frequency/Duration: 2-3 times a week for 90 Days    GOALS: (Goals have been discussed and agreed upon with patient.)  Short-Term Functional Goals: Time Frame: 2 weeks  Patient will demonstrate independence and compliance with home exercise program for management of symptoms (aquatics)  Pt will demonstrate increase in Knee injury and Osteoarthritis Outcome Score (KOOS) by 2-3 points for improved functional mobility. Pt will demonstrate increased active range of motion for L knee  from -3 ° (extension) to 115 ° (flexion). Pt will tolerate 35 to 40 minutes of aquatic therapy with pain of 2/10 or better following intervention. Discharge Goals: Time Frame: 8 weeks  Pt will report KOOS score of 6/28 for improved functional mobility in home/ community. Pt will demonstrate active range of motion of L knee from 0 ° (extension) to 120 °(flexion). Pt will ambulate independently  >/= 1500 feet with normal gait pattern with even stance time/ step length in bilateral lower extremities over household/ community surfaces. Pt will report normalized sleep pattern of 6 to 8 hours in bed without increased pain. Pt will demonstrate I bed mobility.    Pt will demonstrate reciprocal gait up/ down 3-4 steps independently with use of unilateral handrail demonstrating good strength. Rehabilitation Potential For Stated Goals: Good  Pain: Initial: 3/10 L knee 6/10 L hand (gout) Post Session:  1-2/10 post game ready   Medications Last Reviewed:  1/24/2022  Updated Objective Findings:     servation/Orthostatic Postural Assessment:          Morbid obesity  L knee incision line healed- steri strips intact from mid to distal incision line- proximal open to air  Palpation:          Diffuse edema in L knee complex  ROM:  BUE                      Date:  01/14/22 Date:   Date:     Trunk ROM 50 % limited                          LE ROM Left Right       Hip Flexion [facility]       Hip Abduction [facility]       Straight Leg Raise (SLR)                  Knee Flexion 110 ° 125 °       Knee Extension -10 ° 0 °                Ankle Dorsiflexion [facility] WFL         Strength:     Date:  01/14/22 Date:   Date:     LE MMT Left Right Left Right Left Right   Hip Flex (L1/L2) 4/5 4/5       Hip Abd (glut med) 4/5 4/5       Hip Ext 4/5 4/5       Quad    ( L3/4) -3/5 4/5       Hamstring -3/5 4/5       Anterior Tib (L4/L5)         Gastroc (S1/2)         EHL (L5)                             Special Tests:  deferred  Neurological Screen:        Sensation: Intact for light touch  Functional Mobility:         Gait/Ambulation:  Independent with antalgic gait pattern- decreased LLE stance time        Transfers:  Sit to stand to sit with use of UE and momentum        Bed Mobility:  Assist for legs on/ off plinth and supine to sit        Stairs:  Step to gait pattern  Balance:          Single Leg Stance:  R) 6-7 sec  L) 2-3 sec  KOOS at al 16/28     TREATMENT:   Therapeutic Exercise: (SEE MINUTES BELOW):  Exercises per grid below to improve mobility, strength and balance. Required minimal visual and verbal cues to promote proper body alignment and promote proper body mechanics. Progressed resistance, range and repetitions as indicated.    Aquatic Therapy (SEE MINUTES BELOW): Aquatic treatment performed per flow grid for Decreased muscle strength, Decreased range of motion and Ease of movement. Cues provided for technique. Assistance by therapist provided for progression of exercises/ activities. Patient has difficulty with L knee pain. Manual Therapy (SEE MINUTES BELOW):  Pt in long sitting for STM to L knee complex with noted sensitivity and hamstring tightness. Modalities (SEE MINUTES BELOW):  Pt in long sitting for game ready (ice/ compression) to address pain/ edema post activity. Intensity monitored throughout intervention with skin intact and WFL following modality. Date: 01/14/22 EVAL 1/19/22 Visit 2 01/21/22 visit 3 1/24/22 Visit 4    Modalities: 15 min 15 min  15 min    Game ready To L knee To L knee held To L knee                    Manual Therapy: 10 min       STM (L knee complex) To L knee in long sitting                                               Aquatics Activities:   55 min 55 min    GAIT (F/B/S/M)   x6l each x4L    SLR gait   x4l x4L    Hamstring Curl gait    x4l x4L    Rockettes    x4L    Toe/ Heel Raises   x20 x20    Squats   x20 x20    Step ups   x15 R/L on pool step x20 R/L    Stairs   4 reps x 4 steps with R handrail 3 reps x 4 s steps w R handrail    Hip 4 way        Lunges        Clamshells        Hip Circles        Deep well:  Bicycling        Deep well: Jumping jacks        Deep well: Scissoring        Hamstring stretch        Piriformis stretch                                Therapeutic Exercises: 15 min 30 min      Seated marching x10 R/L 2x15 R/L      Seated LAQ x10 R/L 2x15 R/L      Quad sets x15H5 LLE 15H5 LLE      Step ups x10 R? L on 6\" step       Gait training 5 min 5 min      Seated hamstring stretch  3H15 B      Sit to stands   X 10 elevated       Therapeutic Activities:                                  HEP:  Emphasis on quad sets for quad activation to facilitate knee ext    [employer] Portal  Treatment/Session Summary:  Pt states knee is okay but his L hand gout condition has not changed. He is on medication prescribed by his PCP. Pt is losing sleep due to the discomfort. L knee active motion today 110 deg flexion, -10 deg ext. Extension should continue to normalize as quad gains strength. Good effort and tolerance today. Cont POC as indicated. · Response to Treatment:  Good tolerance of activity. · Communication/Consultation:    · Equipment provided today:  None today  · Recommendations/Intent for next treatment session: Next visit will focus on goals for flexibility, strength and normalized movement patterns.     Total Treatment Billable Duration:  70 min  PT Patient Time In/Time Out  Time In: 0130  Time Out: 0245  Rosalind Starr, PTA

## 2022-01-25 ENCOUNTER — HOSPITAL ENCOUNTER (OUTPATIENT)
Dept: PHYSICAL THERAPY | Age: 77
Discharge: HOME | End: 2022-01-25
Payer: MEDICARE

## 2022-01-25 PROCEDURE — 97113 AQUATIC THERAPY/EXERCISES: CPT

## 2022-01-25 PROCEDURE — 97016 VASOPNEUMATIC DEVICE THERAPY: CPT

## 2022-01-25 NOTE — PROGRESS NOTES
Darien Munoz  : 1945  [place]  [place] - [facility] [address] @ [address]  [phone]   [phone]      OUTPATIENT PHYSICAL THERAPY: Daily Treatment Note  2022          Pain in left knee (M25.562) and Stiffness of left knee, not elsewhere classified (M25.662) and Difficulty in walking, not elsewhere classified (R26.2) and Other abnormalities of gait and mobility (R26.89)  _________________________________________________________________________  Pre-treatment Symptoms/Complaints:  L knee pain/ antalgic gait    Effective Dates: 2022 TO 2022 (90 days). Frequency/Duration: 2-3 times a week for 90 Days    GOALS: (Goals have been discussed and agreed upon with patient.)  Short-Term Functional Goals: Time Frame: 2 weeks  Patient will demonstrate independence and compliance with home exercise program for management of symptoms (aquatics)  Pt will demonstrate increase in Knee injury and Osteoarthritis Outcome Score (KOOS) by 2-3 points for improved functional mobility. Pt will demonstrate increased active range of motion for L knee  from -3 ° (extension) to 115 ° (flexion). Pt will tolerate 35 to 40 minutes of aquatic therapy with pain of 2/10 or better following intervention. Discharge Goals: Time Frame: 8 weeks  Pt will report KOOS score of 6/28 for improved functional mobility in home/ community. Pt will demonstrate active range of motion of L knee from 0 ° (extension) to 120 °(flexion). Pt will ambulate independently  >/= 1500 feet with normal gait pattern with even stance time/ step length in bilateral lower extremities over household/ community surfaces. Pt will report normalized sleep pattern of 6 to 8 hours in bed without increased pain. Pt will demonstrate I bed mobility.    Pt will demonstrate reciprocal gait up/ down 3-4 steps independently with use of unilateral handrail demonstrating good strength. Rehabilitation Potential For Stated Goals: Good  Pain: Initial: 3/10 L knee \"I was a little sore yesterday. Today my hand is doing a little better. \" Post Session:  1-2/10 post game ready   Medications Last Reviewed:  1/25/2022  Updated Objective Findings:     servation/Orthostatic Postural Assessment:          Morbid obesity  L knee incision line healed- steri strips intact from mid to distal incision line- proximal open to air  Palpation:          Diffuse edema in L knee complex  ROM:  BUE                      Date:  01/14/22 Date:   Date:     Trunk ROM 50 % limited                          LE ROM Left Right       Hip Flexion [facility]       Hip Abduction [facility]       Straight Leg Raise (SLR)                  Knee Flexion 110 ° 125 °       Knee Extension -10 ° 0 °                Ankle Dorsiflexion [facility] WFL         Strength:     Date:  01/14/22 Date:   Date:     LE MMT Left Right Left Right Left Right   Hip Flex (L1/L2) 4/5 4/5       Hip Abd (glut med) 4/5 4/5       Hip Ext 4/5 4/5       Quad    ( L3/4) -3/5 4/5       Hamstring -3/5 4/5       Anterior Tib (L4/L5)         Gastroc (S1/2)         EHL (L5)                             Special Tests:  deferred  Neurological Screen:        Sensation: Intact for light touch  Functional Mobility:         Gait/Ambulation:  Independent with antalgic gait pattern- decreased LLE stance time        Transfers:  Sit to stand to sit with use of UE and momentum        Bed Mobility:  Assist for legs on/ off plinth and supine to sit        Stairs:  Step to gait pattern  Balance:          Single Leg Stance:  R) 6-7 sec  L) 2-3 sec  KOOS at eval 16/28     TREATMENT:   Therapeutic Exercise: (SEE MINUTES BELOW):  Exercises per grid below to improve mobility, strength and balance. Required minimal visual and verbal cues to promote proper body alignment and promote proper body mechanics.   Progressed resistance, range and repetitions as indicated. Aquatic Therapy (SEE MINUTES BELOW): Aquatic treatment performed per flow grid for Decreased muscle strength, Decreased range of motion and Ease of movement. Cues provided for technique. Assistance by therapist provided for progression of exercises/ activities. Patient has difficulty with L knee pain. Manual Therapy (SEE MINUTES BELOW):  Pt in long sitting for STM to L knee complex with noted sensitivity and hamstring tightness. Modalities (SEE MINUTES BELOW):  Pt in long sitting for game ready (ice/ compression) to address pain/ edema post activity. Intensity monitored throughout intervention with skin intact and WFL following modality. Date: 01/14/22 EVAL 1/19/22 Visit 2 01/21/22 visit 3 1/24/22 Visit 4 1/25/22 Visit 5   Modalities: 15 min 15 min  15 min 15 min   Game ready To L knee To L knee held To L knee To L knee                   Manual Therapy: 10 min       STM (L knee complex) To L knee in long sitting                                               Aquatics Activities:   55 min 55 min 55 min   GAIT (F/B/S/M)   x6l each x4L x4L   SLR gait   x4l x4L x4L   Hamstring Curl gait    x4l x4L x4L   Rockettes    x4L x4L   Toe/ Heel Raises   x20 x20 x20   Squats   x20 x20 x20   Step ups   x15 R/L on pool step x20 R/L x20 R/L   Stairs   4 reps x 4 steps with R handrail 3 reps x 4 s steps w R handrail same   Hip 4 way        Lunges        Clamshells        Hip Circles        Deep well:  Bicycling        Deep well: Jumping jacks        Deep well: Scissoring        Hamstring stretch        Piriformis stretch                                Therapeutic Exercises: 15 min 30 min      Seated marching x10 R/L 2x15 R/L      Seated LAQ x10 R/L 2x15 R/L      Quad sets x15H5 LLE 15H5 LLE      Step ups x10 R? L on 6\" step       Gait training 5 min 5 min      Seated hamstring stretch  3H15 B      Sit to stands   X 10 elevated       Therapeutic Activities:                                  HEP: Emphasis on quad sets for quad activation to facilitate knee ext    [employer] Portal  Treatment/Session Summary:  No change in motion today. Pt a little tired from yesterday's session but does not report increased pain. States his hand is beginning to feel a little better. During today's exercises pt complains of fatigue. He is significantly deconditioned. Cont POC as indicated. · Response to Treatment:  Good tolerance of activity. · Communication/Consultation:    · Equipment provided today:  None today  · Recommendations/Intent for next treatment session: Next visit will focus on goals for flexibility, strength and normalized movement patterns.     Total Treatment Billable Duration:  70 min  PT Patient Time In/Time Out  Time In: 0215  Time Out: 0330  Rosalind Starr, PTA

## 2022-01-26 ENCOUNTER — APPOINTMENT (OUTPATIENT)
Dept: PHYSICAL THERAPY | Age: 77
End: 2022-01-26
Payer: MEDICARE

## 2022-01-28 ENCOUNTER — HOSPITAL ENCOUNTER (OUTPATIENT)
Dept: PHYSICAL THERAPY | Age: 77
Discharge: HOME | End: 2022-01-28
Payer: MEDICARE

## 2022-01-28 PROCEDURE — 97016 VASOPNEUMATIC DEVICE THERAPY: CPT

## 2022-01-28 PROCEDURE — 97113 AQUATIC THERAPY/EXERCISES: CPT

## 2022-01-28 NOTE — PROGRESS NOTES
Claudio Pickard  : 1945  [place]  [place] [address]  [phone]   [phone]      OUTPATIENT PHYSICAL THERAPY: Daily Treatment/ Progress Note  2022          Pain in left knee (M25.562) and Stiffness of left knee, not elsewhere classified (M25.662) and Difficulty in walking, not elsewhere classified (R26.2) and Other abnormalities of gait and mobility (R26.89)  _________________________________________________________________________  Pre-treatment Symptoms/Complaints:  L knee pain/ antalgic gait    Effective Dates: 2022 TO 2022 (90 days). Frequency/Duration: 2-3 times a week for 90 Days    GOALS: (Goals have been discussed and agreed upon with patient.)  Short-Term Functional Goals: Time Frame: 2 weeks  Patient will demonstrate independence and compliance with home exercise program for management of symptoms (aquatics)  (in progress 22)  Pt will demonstrate increase in Knee injury and Osteoarthritis Outcome Score (KOOS) by 2-3 points for improved functional mobility. (in progress 22)  Pt will demonstrate increased active range of motion for L knee  from -3 ° (extension) to 115 ° (flexion). (in progress 22)  Pt will tolerate 35 to 40 minutes of aquatic therapy with pain of 2/10 or better following intervention. (met 22)      Discharge Goals: Time Frame: 8 weeks  (in progress 22)  Pt will report KOOS score of 6/28 for improved functional mobility in home/ community. Pt will demonstrate active range of motion of L knee from 0 ° (extension) to 120 °(flexion). Pt will ambulate independently  >/= 1500 feet with normal gait pattern with even stance time/ step length in bilateral lower extremities over household/ community surfaces.    Pt will report normalized sleep pattern of 6 to 8 hours in bed without increased pain. Pt will demonstrate I bed mobility. Pt will demonstrate reciprocal gait up/ down 3-4 steps independently with use of unilateral handrail demonstrating good strength.     Rehabilitation Potential For Stated Goals: Good  Pain: Initial: 3/10 L knee Pt reports \"I went to a  and stood  It was a struggle I was leaning on every tombstone I could reach\"   Post Session:  -2/10 post game ready   Medications Last Reviewed:  2022  Updated Objective Findings:     servation/Orthostatic Postural Assessment:          Morbid obesity  L knee incision line healed- steri strips intact from mid to distal incision line- proximal open to air  Palpation:          Diffuse edema in L knee complex  ROM:  BUE                      Date:  22 Date:  22 Date:     Trunk ROM 50 % limited                          LE ROM Left Right LEFT RIGHT     Hip Flexion [facility]       Hip Abduction [facility]       Straight Leg Raise (SLR)                  Knee Flexion 110 ° 125 ° 115 °      Knee Extension -10 ° 0 ° -10 °               Ankle Dorsiflexion [facility] WFL         Strength:     Date:  22 Date:  22 Date:     LE MMT Left Right Left Right Left Right   Hip Flex (L1/L2) 4/5 4/5       Hip Abd (glut med) 4/5 4/5       Hip Ext 4/5 4/5       Quad    ( L3/4) -3/5 4/5 3/5      Hamstring -3/5 4/5 3/5      Anterior Tib (L4/L5)         Gastroc (S1/2)         EHL (L5)                             Special Tests:  deferred  Neurological Screen:        Sensation: Intact for light touch  Functional Mobility:         Gait/Ambulation:  Independent with antalgic gait pattern- decreased LLE stance time        Transfers:  Sit to stand to sit with use of UE and momentum        Bed Mobility:  Assist for legs on/ off plinth and supine to sit        Stairs:  Step to gait pattern  Balance:          Single Leg Stance:  R) 6-7 sec  L) 2-3 sec  KOOS at eval   At Progress note 22:       TREATMENT:   Therapeutic Exercise: (SEE MINUTES BELOW):  Exercises per grid below to improve mobility, strength and balance. Required minimal visual and verbal cues to promote proper body alignment and promote proper body mechanics. Progressed resistance, range and repetitions as indicated. Aquatic Therapy (SEE MINUTES BELOW): Aquatic treatment performed per flow grid for Decreased muscle strength, Decreased range of motion and Ease of movement. Cues provided for technique. Assistance by therapist provided for progression of exercises/ activities. Patient has difficulty with L knee pain. Manual Therapy (SEE MINUTES BELOW):  Pt in long sitting for STM to L knee complex with noted sensitivity and hamstring tightness. Modalities (SEE MINUTES BELOW):  Pt in long sitting for game ready (ice/ compression) to address pain/ edema post activity. Intensity monitored throughout intervention with skin intact and WFL following modality.          Date: 01/14/22 EVAL 1/19/22 Visit 2 01/21/22 visit 3 1/24/22 Visit 4 1/25/22 Visit 5 01/28/22 visit 6 PN   Modalities: 15 min 15 min  15 min 15 min 15 min   Game ready To L knee To L knee held To L knee To L knee To L knee                     Manual Therapy: 10 min        STM (L knee complex) To L knee in long sitting                                                     Aquatics Activities:   55 min 55 min 55 min 60 min   GAIT (F/B/S/M)   x6l each x4L x4L X6l each   SLR gait   x4l x4L x4L x6L   Hamstring Curl gait    x4l x4L x4L x6L   Rockettes    x4L x4L x4L   Toe/ Heel Raises   x20 x20 x20 x20   Squats   x20 x20 x20 x20   Step ups   x15 R/L on pool step x20 R/L x20 R/L X20 R/L on pool step   Stairs   4 reps x 4 steps with R handrail 3 reps x 4 s steps w R handrail same x4 steps with B rails   Hip 4 way         Lunges         Clamshells         Hip Circles         Deep well:  Bicycling         Deep well: Jumping jacks         Deep well: Scissoring         Hamstring stretch         Piriformis stretch Therapeutic Exercises: 15 min 30 min       Seated marching x10 R/L 2x15 R/L       Seated LAQ x10 R/L 2x15 R/L       Quad sets x15H5 LLE 15H5 LLE       Step ups x10 R? L on 6\" step        Gait training 5 min 5 min       Seated hamstring stretch  3H15 B       Sit to stands   X 10 elevated        Therapeutic Activities:                                      HEP:  Emphasis on quad sets for quad activation to facilitate knee ext    MedBridge Portal  Treatment/Session Summary:  Progress note written with ROM L knee - 10 ext lag to 115 ° Continued emphasis on quad sets. Prone knee ext not an option as pt doesn't tolerate prone. Pt reports he is doing focus knee extension exercise at home to activate quad however poor results in clinic indicate otherwise. He is significantly deconditioned as seen with in ability to tolerate stair sequence multiple reps. With cues for technique pt demonstrates foot placement on step and able to engage quad for full knee ext on stairs rather than relying on RLE to assist.  Progress note completed with pt struggling with knee ext plan to continue to to address STG/ LTG. Cont POC as indicated. · Response to Treatment:  Good tolerance of activity  · Communication/Consultation:    · Equipment provided today:  None today  · Recommendations/Intent for next treatment session: Next visit will focus on goals for flexibility, strength and normalized movement patterns.     Total Treatment Billable Duration:  75 min  PT Patient Time In/Time Out  Time In: 1330  Time Out: [facility],

## 2022-01-31 ENCOUNTER — HOSPITAL ENCOUNTER (OUTPATIENT)
Dept: PHYSICAL THERAPY | Age: 77
Discharge: HOME | End: 2022-01-31
Payer: MEDICARE

## 2022-01-31 PROCEDURE — 97113 AQUATIC THERAPY/EXERCISES: CPT

## 2022-01-31 NOTE — PROGRESS NOTES
Britney Tomlinson  : 1945  [place]  [place] [address] [facility] [address] @ [address]  [phone]   [phone]      OUTPATIENT PHYSICAL THERAPY: Daily Treatment Note  2022          Pain in left knee (M25.562) and Stiffness of left knee, not elsewhere classified (M25.662) and Difficulty in walking, not elsewhere classified (R26.2) and Other abnormalities of gait and mobility (R26.89)  _________________________________________________________________________  Pre-treatment Symptoms/Complaints:  L knee pain/ antalgic gait    Effective Dates: 2022 TO 2022 (90 days). Frequency/Duration: 2-3 times a week for 90 Days    GOALS: (Goals have been discussed and agreed upon with patient.)  Short-Term Functional Goals: Time Frame: 2 weeks  Patient will demonstrate independence and compliance with home exercise program for management of symptoms (aquatics)  (in progress 22)  Pt will demonstrate increase in Knee injury and Osteoarthritis Outcome Score (KOOS) by 2-3 points for improved functional mobility. (in progress 22)  Pt will demonstrate increased active range of motion for L knee  from -3 ° (extension) to 115 ° (flexion). (in progress 22)  Pt will tolerate 35 to 40 minutes of aquatic therapy with pain of 2/10 or better following intervention. (met 22)      Discharge Goals: Time Frame: 8 weeks  (in progress 22)  Pt will report KOOS score of 6/28 for improved functional mobility in home/ community. Pt will demonstrate active range of motion of L knee from 0 ° (extension) to 120 °(flexion). Pt will ambulate independently  >/= 1500 feet with normal gait pattern with even stance time/ step length in bilateral lower extremities over household/ community surfaces. Pt will report normalized sleep pattern of 6 to 8 hours in bed without increased pain. Pt will demonstrate I bed mobility. Pt will demonstrate reciprocal gait up/ down 3-4 steps independently with use of unilateral handrail demonstrating good strength. Rehabilitation Potential For Stated Goals: Good  Pain: Initial: 4/10 \"Is it normal for this thigh muscle to be so sore? That's what I'm dealing with right now. \" Post Session:  1-2/10   Medications Last Reviewed:  1/31/2022  Updated Objective Findings:     servation/Orthostatic Postural Assessment:          Morbid obesity  L knee incision line healed- steri strips intact from mid to distal incision line- proximal open to air  Palpation:          Diffuse edema in L knee complex  ROM:  BUE                      Date:  01/14/22 Date:  01/28/22 Date:     Trunk ROM 50 % limited                          LE ROM Left Right LEFT RIGHT     Hip Flexion [facility]       Hip Abduction [facility]       Straight Leg Raise (SLR)                  Knee Flexion 110 ° 125 ° 115 °      Knee Extension -10 ° 0 ° -10 °               Ankle Dorsiflexion [facility] WFL         Strength:     Date:  01/14/22 Date:  01/28/22 Date:     LE MMT Left Right Left Right Left Right   Hip Flex (L1/L2) 4/5 4/5       Hip Abd (glut med) 4/5 4/5       Hip Ext 4/5 4/5       Quad    ( L3/4) -3/5 4/5 3/5      Hamstring -3/5 4/5 3/5      Anterior Tib (L4/L5)         Gastroc (S1/2)         EHL (L5)                             Special Tests:  deferred  Neurological Screen:        Sensation: Intact for light touch  Functional Mobility:         Gait/Ambulation:  Independent with antalgic gait pattern- decreased LLE stance time        Transfers:  Sit to stand to sit with use of UE and momentum        Bed Mobility:  Assist for legs on/ off plinth and supine to sit        Stairs:  Step to gait pattern  Balance:          Single Leg Stance:  R) 6-7 sec  L) 2-3 sec  TOMOS at eval 16/28  At Progress note 01/28/22:  16/28     TREATMENT:   Therapeutic Exercise: (SEE MINUTES BELOW):  Exercises per grid below to improve mobility, strength and balance. Required minimal visual and verbal cues to promote proper body alignment and promote proper body mechanics. Progressed resistance, range and repetitions as indicated. Aquatic Therapy (SEE MINUTES BELOW): Aquatic treatment performed per flow grid for Decreased muscle strength, Decreased range of motion and Ease of movement. Cues provided for technique. Assistance by therapist provided for progression of exercises/ activities. Patient has difficulty with L knee pain. Manual Therapy (SEE MINUTES BELOW):  Pt in long sitting for STM to L knee complex with noted sensitivity and hamstring tightness. Modalities (SEE MINUTES BELOW):  Pt in long sitting for game ready (ice/ compression) to address pain/ edema post activity. Intensity monitored throughout intervention with skin intact and WFL following modality.          Date: 01/14/22 EVAL 1/19/22 Visit 2 01/21/22 visit 3 1/24/22 Visit 4 1/25/22 Visit 5 01/28/22 visit 6 PN 1/31/22 Visit 7    Modalities: 15 min 15 min  15 min 15 min 15 min     Game ready To L knee To L knee held To L knee To L knee To L knee Held per pt request                          Manual Therapy: 10 min          STM (L knee complex) To L knee in long sitting                                                                 Aquatics Activities:   55 min 55 min 55 min 60 min 60 mins    GAIT (F/B/S/M)   x6l each x4L x4L X6l each x6L    SLR gait   x4l x4L x4L x6L x6L    Hamstring Curl gait    x4l x4L x4L x6L x6L    Rockettes    x4L x4L x4L x6L    Toe/ Heel Raises   x20 x20 x20 x20 x25    Squats   x20 x20 x20 x20 x25    Step ups   x15 R/L on pool step x20 R/L x20 R/L X20 R/L on pool step x25 R/L    Stairs   4 reps x 4 steps with R handrail 3 reps x 4 s steps w R handrail same x4 steps with B rails same    Hip 4 way           Lunges           Clamshells           Hip Circles           Deep well:  Bicycling           Deep well: Jumping jacks           Deep well: Scissoring Hamstring stretch           Piriformis stretch                                            Therapeutic Exercises: 15 min 30 min         Seated marching x10 R/L 2x15 R/L         Seated LAQ x10 R/L 2x15 R/L         Quad sets x15H5 LLE 15H5 LLE         Step ups x10 R? L on 6\" step          Gait training 5 min 5 min         Seated hamstring stretch  3H15 B         Sit to stands   X 10 elevated          Therapeutic Activities:                                              HEP:  Emphasis on quad sets for quad activation to facilitate knee ext    MedBridge Portal  Treatment/Session Summary:  No change in motion. Pt complaining of L quad soreness today. He states it's been sore all weekend and he has been icing it daily. Discussed exercise induced muscle soreness/DOMS with pt. He verbalizes understanding. Good effort during ex's today with no progression. He requests to hold game ready today. Cont POC. · Response to Treatment:  Good tolerance of activity  · Communication/Consultation:    · Equipment provided today:  None today  · Recommendations/Intent for next treatment session: Next visit will focus on goals for flexibility, strength and normalized movement patterns.     Total Treatment Billable Duration:  60 min  PT Patient Time In/Time Out  Time In: 0130  Time Out: 0230  Rosalind Starr, PTA

## 2022-02-02 ENCOUNTER — HOSPITAL ENCOUNTER (OUTPATIENT)
Dept: PHYSICAL THERAPY | Age: 77
Discharge: HOME | End: 2022-02-02
Payer: MEDICARE

## 2022-02-02 PROCEDURE — 97016 VASOPNEUMATIC DEVICE THERAPY: CPT

## 2022-02-02 PROCEDURE — 97113 AQUATIC THERAPY/EXERCISES: CPT

## 2022-02-04 ENCOUNTER — HOSPITAL ENCOUNTER (OUTPATIENT)
Dept: PHYSICAL THERAPY | Age: 77
Discharge: HOME | End: 2022-02-04
Payer: MEDICARE

## 2022-02-04 PROCEDURE — 97113 AQUATIC THERAPY/EXERCISES: CPT

## 2022-02-04 PROCEDURE — 97016 VASOPNEUMATIC DEVICE THERAPY: CPT

## 2022-02-04 NOTE — PROGRESS NOTES
Anny Flores  : 1945  [place]  [place] [address]  [phone]   [phone]      OUTPATIENT PHYSICAL THERAPY: Daily Treatment Note  2022          Pain in left knee (M25.562) and Stiffness of left knee, not elsewhere classified (M25.662) and Difficulty in walking, not elsewhere classified (R26.2) and Other abnormalities of gait and mobility (R26.89)  _________________________________________________________________________  Pre-treatment Symptoms/Complaints:  L knee pain/ antalgic gait    Effective Dates: 2022 TO 2022 (90 days). Frequency/Duration: 2-3 times a week for 90 Days    GOALS: (Goals have been discussed and agreed upon with patient.)  Short-Term Functional Goals: Time Frame: 2 weeks  Patient will demonstrate independence and compliance with home exercise program for management of symptoms (aquatics)  (in progress 22)  Pt will demonstrate increase in Knee injury and Osteoarthritis Outcome Score (KOOS) by 2-3 points for improved functional mobility. (in progress 22)  Pt will demonstrate increased active range of motion for L knee  from -3 ° (extension) to 115 ° (flexion). (in progress 22)  Pt will tolerate 35 to 40 minutes of aquatic therapy with pain of 2/10 or better following intervention. (met 22)      Discharge Goals: Time Frame: 8 weeks  (in progress 22)  Pt will report KOOS score of 6/28 for improved functional mobility in home/ community. Pt will demonstrate active range of motion of L knee from 0 ° (extension) to 120 °(flexion). Pt will ambulate independently  >/= 1500 feet with normal gait pattern with even stance time/ step length in bilateral lower extremities over household/ community surfaces. Pt will report normalized sleep patterne6 to 8 hours in bed without increased pain. Pt will demonstrate I bed mobility. Pt will demonstrate reciprocal gait up/ down 3-4 steps independently with use of unilateral handrail demonstrating good strength.     Rehabilitation Potential For Stated Goals: Good  Pain: Initial: 4/10 \"My left foot feels better- I'm on day three of the gout medicine\"   Post Session:  1-2/10   Medications Last Reviewed:  2/4/2022  Updated Objective Findings:     servation/Orthostatic Postural Assessment:          Morbid obesity  L knee incision line healed- steri strips intact from mid to distal incision line- proximal open to air  Palpation:          Diffuse edema in L knee complex  ROM:  BUE                      Date:  01/14/22 Date:  01/28/22 Date:     Trunk ROM 50 % limited                          LE ROM Left Right LEFT RIGHT     Hip Flexion [facility]       Hip Abduction [facility]       Straight Leg Raise (SLR)                  Knee Flexion 110 ° 125 ° 115 °      Knee Extension -10 ° 0 ° -10 °               Ankle Dorsiflexion [facility] WFL         Strength:     Date:  01/14/22 Date:  01/28/22 Date:     LE MMT Left Right Left Right Left Right   Hip Flex (L1/L2) 4/5 4/5       Hip Abd (glut med) 4/5 4/5       Hip Ext 4/5 4/5       Quad    ( L3/4) -3/5 4/5 3/5      Hamstring -3/5 4/5 3/5      Anterior Tib (L4/L5)         Gastroc (S1/2)         EHL (L5)                             Special Tests:  deferred  Neurological Screen:        Sensation: Intact for light touch  Functional Mobility:         Gait/Ambulation:  Independent with antalgic gait pattern- decreased LLE stance time        Transfers:  Sit to stand to sit with use of UE and momentum        Bed Mobility:  Assist for legs on/ off plinth and supine to sit        Stairs:  Step to gait pattern  Balance:          Single Leg Stance:  R) 6-7 sec  L) 2-3 sec  KOOS at eval 16/28  At Progress note 01/28/22:  16/28     TREATMENT:   Therapeutic Exercise: (SEE MINUTES BELOW):  Exercises per grid below to improve mobility, strength and balance. Required minimal visual and verbal cues to promote proper body alignment and promote proper body mechanics. Progressed resistance, range and repetitions as indicated. Aquatic Therapy (SEE MINUTES BELOW): Aquatic treatment performed per flow grid for Decreased muscle strength, Decreased range of motion and Ease of movement. Cues provided for technique. Assistance by therapist provided for progression of exercises/ activities. Patient has difficulty with L knee pain. Manual Therapy (SEE MINUTES BELOW):  Pt in long sitting for STM to L knee complex with noted sensitivity and hamstring tightness. Modalities (SEE MINUTES BELOW):  Pt in long sitting for game ready (ice/ compression) to address pain/ edema post activity. Intensity monitored throughout intervention with skin intact and WFL following modality.          Date: 01/14/22 EVAL 1/19/22 Visit 2 01/21/22 visit 3 1/24/22 Visit 4 1/25/22 Visit 5 01/28/22 visit 6 PN 1/31/22 Visit 7 02/02/22 visit 8 02/04/22 visit 9   Modalities: 15 min 15 min  15 min 15 min 15 min  15 min 15 min   Game ready To L knee To L knee held To L knee To L knee To L knee Held per pt request To L knee To L knee                           Manual Therapy: 10 min           STM (L knee complex) To L knee in long sitting                                                                       Aquatics Activities:   55 min 55 min 55 min 60 min 60 mins 60 min 60 min   GAIT (F/B/S/M)   x6l each x4L x4L X6l each x6L x6l each X6l each   SLR gait   x4l x4L x4L x6L x6L x6l x6l   Hamstring Curl gait    x4l x4L x4L x6L x6L x6l x6l   Rockettes    x4L x4L x4L x6L x6l x6l   Toe/ Heel Raises   x20 x20 x20 x20 x25 Held due to pt gout x20   Squats   x20 x20 x20 x20 x25 x25 x25   Step ups   x15 R/L on pool step x20 R/L x20 R/L X20 R/L on pool step x25 R/L 2x10 R/L x25 R/L on pool step   Stairs   4 reps x 4 steps with R handrail 3 reps x 4 s steps w R handrail same x4 steps with B rails same     Hip 4 way        x20 BLE X20 BLE   [place]        x4l    Lunges            Clamshells            Hip Circles            Deep well:  Bicycling            Deep well: Jumping jacks            Deep well: Scissoring            Hamstring stretch            Piriformis stretch                                                Therapeutic Exercises: 15 min 30 min          Seated marching x10 R/L 2x15 R/L          Seated LAQ x10 R/L 2x15 R/L          Quad sets x15H5 LLE 15H5 LLE          Step ups x10 R? L on 6\" step           Gait training 5 min 5 min          Seated hamstring stretch  3H15 B          Sit to stands   X 10 elevated           Therapeutic Activities:                                                  HEP:  Emphasis on quad sets for quad activation to facilitate knee ext    MedBridge Portal  Treatment/Session Summary:  AROM L knee in supine:  -5-6 °ext lag to 115 °flexion in supine. Good effort during ex's today with cues for less reliance on rails for balance challenge. Pt requires cues for technique to avoid substitution. Game ready at end of session today to address post activity soreness. Cont POC. · Response to Treatment:  Good tolerance of activity  · Communication/Consultation:    · Equipment provided today:  None today  · Recommendations/Intent for next treatment session: Next visit will focus on goals for flexibility, strength and normalized movement patterns.     Total Treatment Billable Duration:  75 min  PT Patient Time In/Time Out  Time In: 5485  Time Out: [address], PT

## 2022-02-07 ENCOUNTER — HOSPITAL ENCOUNTER (OUTPATIENT)
Dept: PHYSICAL THERAPY | Age: 77
Discharge: HOME | End: 2022-02-07
Payer: MEDICARE

## 2022-02-07 PROCEDURE — 97016 VASOPNEUMATIC DEVICE THERAPY: CPT

## 2022-02-07 PROCEDURE — 97113 AQUATIC THERAPY/EXERCISES: CPT

## 2022-02-07 NOTE — PROGRESS NOTES
Bret Ramos  : 1945  [place]  [place] [address]  [phone]   [phone]      OUTPATIENT PHYSICAL THERAPY: Daily Treatment Note  2022          Pain in left knee (M25.562) and Stiffness of left knee, not elsewhere classified (M25.662) and Difficulty in walking, not elsewhere classified (R26.2) and Other abnormalities of gait and mobility (R26.89)  _________________________________________________________________________  Pre-treatment Symptoms/Complaints:  L knee pain/ antalgic gait    Effective Dates: 2022 TO 2022 (90 days). Frequency/Duration: 2-3 times a week for 90 Days    GOALS: (Goals have been discussed and agreed upon with patient.)  Short-Term Functional Goals: Time Frame: 2 weeks  Patient will demonstrate independence and compliance with home exercise program for management of symptoms (aquatics)  (in progress 22)  Pt will demonstrate increase in Knee injury and Osteoarthritis Outcome Score (KOOS) by 2-3 points for improved functional mobility. (in progress 22)  Pt will demonstrate increased active range of motion for L knee  from -3 ° (extension) to 115 ° (flexion). (in progress 22)  Pt will tolerate 35 to 40 minutes of aquatic therapy with pain of 2/10 or better following intervention. (met 22)      Discharge Goals: Time Frame: 8 weeks  (in progress 22)  Pt will report KOOS score of 6/28 for improved functional mobility in home/ community. Pt will demonstrate active range of motion of L knee from 0 ° (extension) to 120 °(flexion). Pt will ambulate independently  >/= 1500 feet with normal gait pattern with even stance time/ step length in bilateral lower extremities over household/ community surfaces. Pt will report normalized sleep patterne6 to 8 hours in bed without increased pain. Pt will demonstrate I bed mobility. Pt will demonstrate reciprocal gait up/ down 3-4 steps independently with use of unilateral handrail demonstrating good strength. Rehabilitation Potential For Stated Goals: Good  Pain: Initial: 3/10 \"I am working it I think. \"  Post Session:  1-2/10   Medications Last Reviewed:  2/7/2022  Updated Objective Findings:     servation/Orthostatic Postural Assessment:          Morbid obesity  L knee incision line healed- steri strips intact from mid to distal incision line- proximal open to air  Palpation:          Diffuse edema in L knee complex  ROM:  BUE                      Date:  01/14/22 Date:  01/28/22 Date:     Trunk ROM 50 % limited                          LE ROM Left Right LEFT RIGHT     Hip Flexion [facility]       Hip Abduction [facility]       Straight Leg Raise (SLR)                  Knee Flexion 110 ° 125 ° 115 °      Knee Extension -10 ° 0 ° -10 °               Ankle Dorsiflexion [facility] WFL         Strength:     Date:  01/14/22 Date:  01/28/22 Date:     LE MMT Left Right Left Right Left Right   Hip Flex (L1/L2) 4/5 4/5       Hip Abd (glut med) 4/5 4/5       Hip Ext 4/5 4/5       Quad    ( L3/4) -3/5 4/5 3/5      Hamstring -3/5 4/5 3/5      Anterior Tib (L4/L5)         Gastroc (S1/2)         EHL (L5)                             Special Tests:  deferred  Neurological Screen:        Sensation: Intact for light touch  Functional Mobility:         Gait/Ambulation:  Independent with antalgic gait pattern- decreased LLE stance time        Transfers:  Sit to stand to sit with use of UE and momentum        Bed Mobility:  Assist for legs on/ off plinth and supine to sit        Stairs:  Step to gait pattern  Balance:          Single Leg Stance:  R) 6-7 sec  L) 2-3 sec  TOMOS at eval 16/28  At Progress note 01/28/22:  16/28     TREATMENT:   Therapeutic Exercise: (SEE MINUTES BELOW):  Exercises per grid below to improve mobility, strength and balance.   Required minimal visual and verbal cues to promote proper body alignment and promote proper body mechanics. Progressed resistance, range and repetitions as indicated. Aquatic Therapy (SEE MINUTES BELOW): Aquatic treatment performed per flow grid for Decreased muscle strength, Decreased range of motion and Ease of movement. Cues provided for technique. Assistance by therapist provided for progression of exercises/ activities. Patient has difficulty with L knee pain. Manual Therapy (SEE MINUTES BELOW):  Pt in long sitting for STM to L knee complex with noted sensitivity and hamstring tightness. Modalities (SEE MINUTES BELOW):  Pt in long sitting for game ready (ice/ compression) to address pain/ edema post activity. Intensity monitored throughout intervention with skin intact and WFL following modality.          Date: 1/24/22 Visit 4 1/25/22 Visit 5 01/28/22 visit 6 PN 1/31/22 Visit 7 02/02/22 visit 8 02/04/22 visit 9 2/7/22 Visit 10    Modalities: 15 min 15 min 15 min  15 min 15 min 15 min    Game ready To L knee To L knee To L knee Held per pt request To L knee To L knee To L knee                          Manual Therapy:           STM (L knee complex)                                                                  Aquatics Activities: 55 min 55 min 60 min 60 mins 60 min 60 min 60 min    GAIT (F/B/S/M) x4L x4L X6l each x6L x6l each X6l each x6L ea    SLR gait x4L x4L x6L x6L x6l x6l x6L    Hamstring Curl gait  x4L x4L x6L x6L x6l x6l x6L    Rockettes x4L x4L x4L x6L x6l x6l x6L    Toe/ Heel Raises x20 x20 x20 x25 Held due to pt gout x20 x20    Squats x20 x20 x20 x25 x25 x25 x25    Step ups x20 R/L x20 R/L X20 R/L on pool step x25 R/L 2x10 R/L x25 R/L on pool step x25 on pool step    Stairs 3 reps x 4 s steps w R handrail same x4 steps with B rails same       Hip 4 way     x20 BLE X20 BLE x25B    [place]     x4l      Lunges           Clamshells           Hip Circles           Deep well:  Bicycling           Deep well: Jumping jacks Deep well: Scissoring           Hamstring stretch           Piriformis stretch                                            Therapeutic Exercises:           Seated marching           Seated LAQ           Quad sets           Step ups           Gait training           Seated hamstring stretch           Sit to stands            Therapeutic Activities:                                              HEP:  Emphasis on quad sets for quad activation to facilitate knee ext    MedBridge Portal  Treatment/Session Summary: No change in motion today. Good effort given and pt is compliant with all cues. Cont POC as indicated. · Response to Treatment:  Good tolerance of activity  · Communication/Consultation:    · Equipment provided today:  None today  · Recommendations/Intent for next treatment session: Next visit will focus on goals for flexibility, strength and normalized movement patterns.     Total Treatment Billable Duration:  75 min  PT Patient Time In/Time Out  Time In: 0215  Time Out: 0330  Rosalind Starr, PTA

## 2022-02-09 ENCOUNTER — APPOINTMENT (OUTPATIENT)
Dept: PHYSICAL THERAPY | Age: 77
End: 2022-02-09
Payer: MEDICARE

## 2022-02-10 ENCOUNTER — HOSPITAL ENCOUNTER (OUTPATIENT)
Dept: PHYSICAL THERAPY | Age: 77
Discharge: HOME | End: 2022-02-10
Payer: MEDICARE

## 2022-02-10 PROCEDURE — 97113 AQUATIC THERAPY/EXERCISES: CPT

## 2022-02-10 PROCEDURE — 97016 VASOPNEUMATIC DEVICE THERAPY: CPT

## 2022-02-10 NOTE — PROGRESS NOTES
Zachary Carlos  : 1945  [place]  [place] [address]  [phone]   [phone]      OUTPATIENT PHYSICAL THERAPY: Daily Treatment Note  2/10/22          Pain in left knee (M25.562) and Stiffness of left knee, not elsewhere classified (M25.662) and Difficulty in walking, not elsewhere classified (R26.2) and Other abnormalities of gait and mobility (R26.89)  _________________________________________________________________________  Pre-treatment Symptoms/Complaints:  L knee pain/ antalgic gait    Effective Dates: 2022 TO 2022 (90 days). Frequency/Duration: 2-3 times a week for 90 Days    GOALS: (Goals have been discussed and agreed upon with patient.)  Short-Term Functional Goals: Time Frame: 2 weeks  Patient will demonstrate independence and compliance with home exercise program for management of symptoms (aquatics)  (in progress 22)  Pt will demonstrate increase in Knee injury and Osteoarthritis Outcome Score (KOOS) by 2-3 points for improved functional mobility. (in progress 22)  Pt will demonstrate increased active range of motion for L knee  from -3 ° (extension) to 115 ° (flexion). (in progress 22)  Pt will tolerate 35 to 40 minutes of aquatic therapy with pain of 2/10 or better following intervention. (met 22)      Discharge Goals: Time Frame: 8 weeks  (in progress 22)  Pt will report KOOS score of 6/28 for improved functional mobility in home/ community. Pt will demonstrate active range of motion of L knee from 0 ° (extension) to 120 °(flexion). Pt will ambulate independently  >/= 1500 feet with normal gait pattern with even stance time/ step length in bilateral lower extremities over household/ community surfaces. Pt will report normalized sleep patterne6 to 8 hours in bed without increased pain.     Pt will demonstrate I bed mobility. Pt will demonstrate reciprocal gait up/ down 3-4 steps independently with use of unilateral handrail demonstrating good strength. Rehabilitation Potential For Stated Goals: Good  Pain: Initial: 3/10 \"Doing the same today. \" Post Session:  1-2/10   Medications Last Reviewed:  2/10/22  Updated Objective Findings:     servation/Orthostatic Postural Assessment:          Morbid obesity  L knee incision line healed- steri strips intact from mid to distal incision line- proximal open to air  Palpation:          Diffuse edema in L knee complex  ROM:  BUE                      Date:  01/14/22 Date:  01/28/22 Date:     Trunk ROM 50 % limited                          LE ROM Left Right LEFT RIGHT     Hip Flexion [facility]       Hip Abduction [facility]       Straight Leg Raise (SLR)                  Knee Flexion 110 ° 125 ° 115 °      Knee Extension -10 ° 0 ° -10 °               Ankle Dorsiflexion [facility] WFL         Strength:     Date:  01/14/22 Date:  01/28/22 Date:     LE MMT Left Right Left Right Left Right   Hip Flex (L1/L2) 4/5 4/5       Hip Abd (glut med) 4/5 4/5       Hip Ext 4/5 4/5       Quad    ( L3/4) -3/5 4/5 3/5      Hamstring -3/5 4/5 3/5      Anterior Tib (L4/L5)         Gastroc (S1/2)         EHL (L5)                             Special Tests:  deferred  Neurological Screen:        Sensation: Intact for light touch  Functional Mobility:         Gait/Ambulation:  Independent with antalgic gait pattern- decreased LLE stance time        Transfers:  Sit to stand to sit with use of UE and momentum        Bed Mobility:  Assist for legs on/ off plinth and supine to sit        Stairs:  Step to gait pattern  Balance:          Single Leg Stance:  R) 6-7 sec  L) 2-3 sec  TOMOS at eval 16/28  At Progress note 01/28/22:  16/28     TREATMENT:   Therapeutic Exercise: (SEE MINUTES BELOW):  Exercises per grid below to improve mobility, strength and balance.   Required minimal visual and verbal cues to promote proper body alignment and promote proper body mechanics. Progressed resistance, range and repetitions as indicated. Aquatic Therapy (SEE MINUTES BELOW): Aquatic treatment performed per flow grid for Decreased muscle strength, Decreased range of motion and Ease of movement. Cues provided for technique. Assistance by therapist provided for progression of exercises/ activities. Patient has difficulty with L knee pain. Manual Therapy (SEE MINUTES BELOW):  Pt in long sitting for STM to L knee complex with noted sensitivity and hamstring tightness. Modalities (SEE MINUTES BELOW):  Pt in long sitting for game ready (ice/ compression) to address pain/ edema post activity. Intensity monitored throughout intervention with skin intact and WFL following modality.          Date: 1/24/22 Visit 4 1/25/22 Visit 5 01/28/22 visit 6 PN 1/31/22 Visit 7 02/02/22 visit 8 02/04/22 visit 9 2/7/22 Visit 10 2/10/22 Visit 11   Modalities: 15 min 15 min 15 min  15 min 15 min 15 min 15 min   Game ready To L knee To L knee To L knee Held per pt request To L knee To L knee To L knee To L knee                         Manual Therapy:           STM (L knee complex)                                                                  Aquatics Activities: 55 min 55 min 60 min 60 mins 60 min 60 min 60 min 60 min    GAIT (F/B/S/M) x4L x4L X6l each x6L x6l each X6l each x6L ea x6L   SLR gait x4L x4L x6L x6L x6l x6l x6L x6L   Hamstring Curl gait  x4L x4L x6L x6L x6l x6l x6L x6L   Rockettes x4L x4L x4L x6L x6l x6l x6L x6L   Toe/ Heel Raises x20 x20 x20 x25 Held due to pt gout x20 x20 x25   Squats x20 x20 x20 x25 x25 x25 x25 x25   Step ups x20 R/L x20 R/L X20 R/L on pool step x25 R/L 2x10 R/L x25 R/L on pool step x25 on pool step same   Stairs 3 reps x 4 s steps w R handrail same x4 steps with B rails same       Hip 4 way     x20 BLE X20 BLE x25B x25 B   [place]     x4l      Lunges           Clamshells           Hip Circles           Deep well:  Bicycling           Deep well: Jumping jacks           Deep well: Scissoring           Hamstring stretch           Piriformis stretch                                            Therapeutic Exercises:           Seated marching           Seated LAQ           Quad sets           Step ups           Gait training           Seated hamstring stretch           Sit to stands            Therapeutic Activities:                                              HEP:  Emphasis on quad sets for quad activation to facilitate knee ext    MedBridge Portal  Treatment/Session Summary: No change in motion today. No increase in speed - mo remains slow. Antalgic gait on land observed. Cont POC. · Response to Treatment:  Good tolerance of activity  · Communication/Consultation:    · Equipment provided today:  None today  · Recommendations/Intent for next treatment session: Next visit will focus on goals for flexibility, strength and normalized movement patterns.     Total Treatment Billable Duration:  75 min  PT Patient Time In/Time Out  Time In: 0130  Time Out: 0300  Rosalind Starr, PTA

## 2022-02-11 ENCOUNTER — HOSPITAL ENCOUNTER (OUTPATIENT)
Dept: PHYSICAL THERAPY | Age: 77
Discharge: HOME | End: 2022-02-11
Payer: MEDICARE

## 2022-02-11 NOTE — PROGRESS NOTES
Pt cancelled physical therapy today due to medical issue/ accident. Will plan to resume PT at next scheduled visit to address goals/ POC.

## 2022-02-14 ENCOUNTER — HOSPITAL ENCOUNTER (OUTPATIENT)
Dept: PHYSICAL THERAPY | Age: 77
Discharge: HOME | End: 2022-02-14
Payer: MEDICARE

## 2022-02-14 PROCEDURE — 97113 AQUATIC THERAPY/EXERCISES: CPT

## 2022-02-14 PROCEDURE — 97016 VASOPNEUMATIC DEVICE THERAPY: CPT

## 2022-02-14 NOTE — PROGRESS NOTES
Soco Pérez  : 1945  [place]  [place] [address]  [phone]   [phone]      OUTPATIENT PHYSICAL THERAPY: Daily Treatment Note  22          Pain in left knee (M25.562) and Stiffness of left knee, not elsewhere classified (M25.662) and Difficulty in walking, not elsewhere classified (R26.2) and Other abnormalities of gait and mobility (R26.89)  _________________________________________________________________________  Pre-treatment Symptoms/Complaints:  L knee pain/ antalgic gait    Effective Dates: 2022 TO 2022 (90 days). Frequency/Duration: 2-3 times a week for 90 Days    GOALS: (Goals have been discussed and agreed upon with patient.)  Short-Term Functional Goals: Time Frame: 2 weeks  Patient will demonstrate independence and compliance with home exercise program for management of symptoms (aquatics)  (in progress 22)  Pt will demonstrate increase in Knee injury and Osteoarthritis Outcome Score (KOOS) by 2-3 points for improved functional mobility. (in progress 22)  Pt will demonstrate increased active range of motion for L knee  from -3 ° (extension) to 115 ° (flexion). (in progress 22)  Pt will tolerate 35 to 40 minutes of aquatic therapy with pain of 2/10 or better following intervention. (met 22)      Discharge Goals: Time Frame: 8 weeks  (in progress 22)  Pt will report KOOS score of 6/28 for improved functional mobility in home/ community. Pt will demonstrate active range of motion of L knee from 0 ° (extension) to 120 °(flexion). Pt will ambulate independently  >/= 1500 feet with normal gait pattern with even stance time/ step length in bilateral lower extremities over household/ community surfaces. Pt will report normalized sleep patterne6 to 8 hours in bed without increased pain.     Pt will demonstrate I bed mobility. Pt will demonstrate reciprocal gait up/ down 3-4 steps independently with use of unilateral handrail demonstrating good strength. Rehabilitation Potential For Stated Goals: Good  Pain: Initial: 3/10 \"Well I had a fall when the dog pulled me and it hurt my shoulder. \" Post Session:  1-2/10 \"That's okay. \"   Medications Last Reviewed:  2/14/22  Updated Objective Findings:     servation/Orthostatic Postural Assessment:          Morbid obesity  L knee incision line healed- steri strips intact from mid to distal incision line- proximal open to air  Palpation:          Diffuse edema in L knee complex  ROM:  BUE                      Date:  01/14/22 Date:  01/28/22 Date:     Trunk ROM 50 % limited                          LE ROM Left Right LEFT RIGHT     Hip Flexion [facility]       Hip Abduction [facility]       Straight Leg Raise (SLR)                  Knee Flexion 110 ° 125 ° 115 °      Knee Extension -10 ° 0 ° -10 °               Ankle Dorsiflexion [facility] WFL         Strength:     Date:  01/14/22 Date:  01/28/22 Date:     LE MMT Left Right Left Right Left Right   Hip Flex (L1/L2) 4/5 4/5       Hip Abd (glut med) 4/5 4/5       Hip Ext 4/5 4/5       Quad    ( L3/4) -3/5 4/5 3/5      Hamstring -3/5 4/5 3/5      Anterior Tib (L4/L5)         Gastroc (S1/2)         EHL (L5)                             Special Tests:  deferred  Neurological Screen:        Sensation: Intact for light touch  Functional Mobility:         Gait/Ambulation:  Independent with antalgic gait pattern- decreased LLE stance time        Transfers:  Sit to stand to sit with use of UE and momentum        Bed Mobility:  Assist for legs on/ off plinth and supine to sit        Stairs:  Step to gait pattern  Balance:          Single Leg Stance:  R) 6-7 sec  L) 2-3 sec  TOMOS at eval 16/28  At Progress note 01/28/22:  16/28     TREATMENT:   Therapeutic Exercise: (SEE MINUTES BELOW):  Exercises per grid below to improve mobility, strength and balance. Required minimal visual and verbal cues to promote proper body alignment and promote proper body mechanics. Progressed resistance, range and repetitions as indicated. Aquatic Therapy (SEE MINUTES BELOW): Aquatic treatment performed per flow grid for Decreased muscle strength, Decreased range of motion and Ease of movement. Cues provided for technique. Assistance by therapist provided for progression of exercises/ activities. Patient has difficulty with L knee pain. Manual Therapy (SEE MINUTES BELOW):  Pt in long sitting for STM to L knee complex with noted sensitivity and hamstring tightness. Modalities (SEE MINUTES BELOW):  Pt in long sitting for game ready (ice/ compression) to address pain/ edema post activity. Intensity monitored throughout intervention with skin intact and WFL following modality.          Date: 01/28/22 visit 6 PN 1/31/22 Visit 7 02/02/22 visit 8 02/04/22 visit 9 2/7/22 Visit 10 2/10/22 Visit 11 2/14/22 Visit 12    Modalities: 15 min  15 min 15 min 15 min 15 min 15 min    Game ready To L knee Held per pt request To L knee To L knee To L knee To L knee To L knee                          Manual Therapy:           STM (L knee complex)                                 Aquatics Activities: 60 min 60 mins 60 min 60 min 60 min 60 min  45 min    GAIT (F/B/S/M) X6l each x6L x6l each X6l each x6L ea x6L x6L    SLR gait x6L x6L x6l x6l x6L x6L x4L    Hamstring Curl gait  x6L x6L x6l x6l x6L x6L x4L    Rockettes x4L x6L x6l x6l x6L x6L x4L    Toe/ Heel Raises x20 x25 Held due to pt gout x20 x20 x25 x25    Squats x20 x25 x25 x25 x25 x25 x25    Step ups X20 R/L on pool step x25 R/L 2x10 R/L x25 R/L on pool step x25 on pool step same x25 on pool step    Stairs x4 steps with B rails same         Hip 4 way   x20 BLE X20 BLE x25B x25 B x25 B    [place]   x4l        Lunges           Clamshells           Hip Circles           Deep well:  Bicycling           Deep well: Jumping jacks Deep well: Scissoring           Hamstring stretch           Piriformis stretch                                            Therapeutic Exercises:           Seated marching           Seated LAQ           Quad sets           Step ups           Gait training           Seated hamstring stretch           Sit to stands            Therapeutic Activities:                                              HEP:  Emphasis on quad sets for quad activation to facilitate knee ext    Med[employer] Portal  Treatment/Session Summary: Pt returns after absence due to fall. No lasting injuries. Good attitude towards resuming PT. Decreased endurance noted. Strength and endurance are not progressing - pt likely not performing ex's outside of therapy. Cont POC with plan to write PN in next few visits  · Response to Treatment:  Good tolerance of activity  · Communication/Consultation:    · Equipment provided today:  None today  · Recommendations/Intent for next treatment session: Next visit will focus on goals for flexibility, strength and normalized movement patterns.     Total Treatment Billable Duration:  60 min  PT Patient Time In/Time Out  Time In: 0215  Time Out: 0330  Rosalind Starr, PTA

## 2022-02-14 NOTE — PROGRESS NOTES
Luksa Fierro  : 1945  [place]  [place] [address]  [phone]   [phone]      OUTPATIENT PHYSICAL THERAPY: Daily Treatment Note  2022          Pain in left knee (M25.562) and Stiffness of left knee, not elsewhere classified (M25.662) and Difficulty in walking, not elsewhere classified (R26.2) and Other abnormalities of gait and mobility (R26.89)  _________________________________________________________________________  Pre-treatment Symptoms/Complaints:  L knee pain/ antalgic gait    Effective Dates: 2022 TO 2022 (90 days). Frequency/Duration: 2-3 times a week for 90 Days    GOALS: (Goals have been discussed and agreed upon with patient.)  Short-Term Functional Goals: Time Frame: 2 weeks  Patient will demonstrate independence and compliance with home exercise program for management of symptoms (aquatics)  (in progress 22)  Pt will demonstrate increase in Knee injury and Osteoarthritis Outcome Score (KOOS) by 2-3 points for improved functional mobility. (in progress 22)  Pt will demonstrate increased active range of motion for L knee  from -3 ° (extension) to 115 ° (flexion). (in progress 22)  Pt will tolerate 35 to 40 minutes of aquatic therapy with pain of 2/10 or better following intervention. (met 22)      Discharge Goals: Time Frame: 8 weeks  (in progress 22)  Pt will report KOOS score of 6/28 for improved functional mobility in home/ community. Pt will demonstrate active range of motion of L knee from 0 ° (extension) to 120 °(flexion). Pt will ambulate independently  >/= 1500 feet with normal gait pattern with even stance time/ step length in bilateral lower extremities over household/ community surfaces. Pt will report normalized sleep patterne6 to 8 hours in bed without increased pain. Pt will demonstrate I bed mobility. Pt will demonstrate reciprocal gait up/ down 3-4 steps independently with use of unilateral handrail demonstrating good strength. Rehabilitation Potential For Stated Goals: Good  Pain: Initial: 4/10 \"I fell on a curb and got pulled by my dog. My shoulder took most of it. \" Post Session:  2/10 \"That was a lot. \"   Medications Last Reviewed:  2/14/2022  Updated Objective Findings:     servation/Orthostatic Postural Assessment:          Morbid obesity  L knee incision line healed- steri strips intact from mid to distal incision line- proximal open to air  Palpation:          Diffuse edema in L knee complex  ROM:  BUE                      Date:  01/14/22 Date:  01/28/22 Date:     Trunk ROM 50 % limited                          LE ROM Left Right LEFT RIGHT     Hip Flexion [facility]       Hip Abduction [facility]       Straight Leg Raise (SLR)                  Knee Flexion 110 ° 125 ° 115 °      Knee Extension -10 ° 0 ° -10 °               Ankle Dorsiflexion [facility] WFL         Strength:     Date:  01/14/22 Date:  01/28/22 Date:     LE MMT Left Right Left Right Left Right   Hip Flex (L1/L2) 4/5 4/5       Hip Abd (glut med) 4/5 4/5       Hip Ext 4/5 4/5       Quad    ( L3/4) -3/5 4/5 3/5      Hamstring -3/5 4/5 3/5      Anterior Tib (L4/L5)         Gastroc (S1/2)         EHL (L5)                             Special Tests:  deferred  Neurological Screen:        Sensation: Intact for light touch  Functional Mobility:         Gait/Ambulation:  Independent with antalgic gait pattern- decreased LLE stance time        Transfers:  Sit to stand to sit with use of UE and momentum        Bed Mobility:  Assist for legs on/ off plinth and supine to sit        Stairs:  Step to gait pattern  Balance:          Single Leg Stance:  R) 6-7 sec  L) 2-3 sec  TOMOS at eval 16/28  At Progress note 01/28/22:  16/28     TREATMENT:   Therapeutic Exercise: (SEE MINUTES BELOW):  Exercises per grid below to improve mobility, strength and balance. Required minimal visual and verbal cues to promote proper body alignment and promote proper body mechanics. Progressed resistance, range and repetitions as indicated. Aquatic Therapy (SEE MINUTES BELOW): Aquatic treatment performed per flow grid for Decreased muscle strength, Decreased range of motion and Ease of movement. Cues provided for technique. Assistance by therapist provided for progression of exercises/ activities. Patient has difficulty with L knee pain. Manual Therapy (SEE MINUTES BELOW):  Pt in long sitting for STM to L knee complex with noted sensitivity and hamstring tightness. Modalities (SEE MINUTES BELOW):  Pt in long sitting for game ready (ice/ compression) to address pain/ edema post activity. Intensity monitored throughout intervention with skin intact and WFL following modality.          Date: 1/24/22 Visit 4 1/25/22 Visit 5 01/28/22 visit 6 PN 1/31/22 Visit 7 02/02/22 visit 8 02/04/22 visit 9 2/7/22 Visit 10 2/14/22 Visit 11   Modalities: 15 min 15 min 15 min  15 min 15 min 15 min 15 min   Game ready To L knee To L knee To L knee Held per pt request To L knee To L knee To L knee To L knee                         Manual Therapy:           STM (L knee complex)                                                                  Aquatics Activities: 55 min 55 min 60 min 60 mins 60 min 60 min 60 min 45 min   GAIT (F/B/S/M) x4L x4L X6l each x6L x6l each X6l each x6L ea x6L   SLR gait x4L x4L x6L x6L x6l x6l x6L x4L   Hamstring Curl gait  x4L x4L x6L x6L x6l x6l x6L x4L   Rockettes x4L x4L x4L x6L x6l x6l x6L x4L   Toe/ Heel Raises x20 x20 x20 x25 Held due to pt gout x20 x20 x25   Squats x20 x20 x20 x25 x25 x25 x25 x25   Step ups x20 R/L x20 R/L X20 R/L on pool step x25 R/L 2x10 R/L x25 R/L on pool step x25 on pool step x25 on pool step   Stairs 3 reps x 4 s steps w R handrail same x4 steps with B rails same       Hip 4 way     x20 BLE X20 BLE x25B    [place] x4l      Lunges           Clamshells           Hip Circles           Deep well:  Bicycling           Deep well: Jumping jacks           Deep well: Scissoring           Hamstring stretch           Piriformis stretch                                            Therapeutic Exercises:           Seated marching           Seated LAQ           Quad sets           Step ups           Gait training           Seated hamstring stretch           Sit to stands            Therapeutic Activities:                                              HEP:  Emphasis on quad sets for quad activation to facilitate knee ext    MedBridge Portal  Treatment/Session Summary: Pt returns after absence due to fall. No lasting injuries. Good attitude towards resuming PT. Decreased endurance noted. Strength and endurance are not progressing - pt likely not performing ex's outside of therapy. Cont POC with plan to write PN in next few visits. · Response to Treatment:  Good tolerance of activity  · Communication/Consultation:    · Equipment provided today:  None today  · Recommendations/Intent for next treatment session: Next visit will focus on goals for flexibility, strength and normalized movement patterns.     Total Treatment Billable Duration:  60 min  PT Patient Time In/Time Out  Time In: 0215  Time Out: 0330  Rosalind Starr, PTA

## 2022-02-16 ENCOUNTER — HOSPITAL ENCOUNTER (OUTPATIENT)
Dept: PHYSICAL THERAPY | Age: 77
Discharge: HOME | End: 2022-02-16
Payer: MEDICARE

## 2022-02-16 PROCEDURE — 97113 AQUATIC THERAPY/EXERCISES: CPT

## 2022-02-16 PROCEDURE — 97016 VASOPNEUMATIC DEVICE THERAPY: CPT

## 2022-02-16 NOTE — PROGRESS NOTES
Drew Robins  : 1945  [place]  [place] [facility] [address] - [facility] [address] @ [address]  [phone]   [phone]      OUTPATIENT PHYSICAL THERAPY: Daily Treatment Note  22          Pain in left knee (M25.562) and Stiffness of left knee, not elsewhere classified (M25.662) and Difficulty in walking, not elsewhere classified (R26.2) and Other abnormalities of gait and mobility (R26.89)  _________________________________________________________________________  Pre-treatment Symptoms/Complaints:  L knee pain/ antalgic gait    Effective Dates: 2022 TO 2022 (90 days). Frequency/Duration: 2-3 times a week for 90 Days    GOALS: (Goals have been discussed and agreed upon with patient.)  Short-Term Functional Goals: Time Frame: 2 weeks  Patient will demonstrate independence and compliance with home exercise program for management of symptoms (aquatics)  (in progress 22)  Pt will demonstrate increase in Knee injury and Osteoarthritis Outcome Score (KOOS) by 2-3 points for improved functional mobility. (in progress 22)  Pt will demonstrate increased active range of motion for L knee  from -3 ° (extension) to 115 ° (flexion). (in progress 22)  Pt will tolerate 35 to 40 minutes of aquatic therapy with pain of 2/10 or better following intervention. (met 22)      Discharge Goals: Time Frame: 8 weeks  (in progress 22)  Pt will report KOOS score of 6/28 for improved functional mobility in home/ community. Pt will demonstrate active range of motion of L knee from 0 ° (extension) to 120 °(flexion). Pt will ambulate independently  >/= 1500 feet with normal gait pattern with even stance time/ step length in bilateral lower extremities over household/ community surfaces. Pt will report normalized sleep patterne6 to 8 hours in bed without increased pain.     Pt will demonstrate I bed mobility. Pt will demonstrate reciprocal gait up/ down 3-4 steps independently with use of unilateral handrail demonstrating good strength. Rehabilitation Potential For Stated Goals: Good  Pain: Initial: 2/10 \"Doing better today. \" Post Session:  1/10   Medications Last Reviewed:  2/16/22  Updated Objective Findings:     servation/Orthostatic Postural Assessment:          Morbid obesity  L knee incision line healed- steri strips intact from mid to distal incision line- proximal open to air  Palpation:          Diffuse edema in L knee complex  ROM:  BUE                      Date:  01/14/22 Date:  01/28/22 Date:     Trunk ROM 50 % limited                          LE ROM Left Right LEFT RIGHT     Hip Flexion [facility]       Hip Abduction [facility]       Straight Leg Raise (SLR)                  Knee Flexion 110 ° 125 ° 115 °      Knee Extension -10 ° 0 ° -10 °               Ankle Dorsiflexion [facility] WFL         Strength:     Date:  01/14/22 Date:  01/28/22 Date:     LE MMT Left Right Left Right Left Right   Hip Flex (L1/L2) 4/5 4/5       Hip Abd (glut med) 4/5 4/5       Hip Ext 4/5 4/5       Quad    ( L3/4) -3/5 4/5 3/5      Hamstring -3/5 4/5 3/5      Anterior Tib (L4/L5)         Gastroc (S1/2)         EHL (L5)                             Special Tests:  deferred  Neurological Screen:        Sensation: Intact for light touch  Functional Mobility:         Gait/Ambulation:  Independent with antalgic gait pattern- decreased LLE stance time        Transfers:  Sit to stand to sit with use of UE and momentum        Bed Mobility:  Assist for legs on/ off plinth and supine to sit        Stairs:  Step to gait pattern  Balance:          Single Leg Stance:  R) 6-7 sec  L) 2-3 sec  TOMOS at eval 16/28  At Progress note 01/28/22:  16/28     TREATMENT:   Therapeutic Exercise: (SEE MINUTES BELOW):  Exercises per grid below to improve mobility, strength and balance.   Required minimal visual and verbal cues to promote proper body alignment and promote proper body mechanics. Progressed resistance, range and repetitions as indicated. Aquatic Therapy (SEE MINUTES BELOW): Aquatic treatment performed per flow grid for Decreased muscle strength, Decreased range of motion and Ease of movement. Cues provided for technique. Assistance by therapist provided for progression of exercises/ activities. Patient has difficulty with L knee pain. Manual Therapy (SEE MINUTES BELOW):  Pt in long sitting for STM to L knee complex with noted sensitivity and hamstring tightness. Modalities (SEE MINUTES BELOW):  Pt in long sitting for game ready (ice/ compression) to address pain/ edema post activity. Intensity monitored throughout intervention with skin intact and WFL following modality.          Date: 01/28/22 visit 6 PN 1/31/22 Visit 7 02/02/22 visit 8 02/04/22 visit 9 2/7/22 Visit 10 2/10/22 Visit 11 2/14/22 Visit 12 2/16/22 Visit 13   Modalities: 15 min  15 min 15 min 15 min 15 min 15 min 15 min   Game ready To L knee Held per pt request To L knee To L knee To L knee To L knee To L knee To L knee                         Manual Therapy:           STM (L knee complex)                                 Aquatics Activities: 60 min 60 mins 60 min 60 min 60 min 60 min  45 min 55 mins   GAIT (F/B/S/M) X6l each x6L x6l each X6l each x6L ea x6L x6L x6L   SLR gait x6L x6L x6l x6l x6L x6L x4L x6L   Hamstring Curl gait  x6L x6L x6l x6l x6L x6L x4L x6L   Rockettes x4L x6L x6l x6l x6L x6L x4L x6L   Toe/ Heel Raises x20 x25 Held due to pt gout x20 x20 x25 x25 x25   Squats x20 x25 x25 x25 x25 x25 x25 x25   Step ups X20 R/L on pool step x25 R/L 2x10 R/L x25 R/L on pool step x25 on pool step same x25 on pool step x25 on pool step   Stairs x4 steps with B rails same         Hip 4 way   x20 BLE X20 BLE x25B x25 B x25 B X 25 B   [place]   x4l        Lunges           Clajlls           Hip Circles           Deep well:  Bicycling           Deep well: Jumping jacks           Deep well: Scissoring           Hamstring stretch           Piriformis stretch                                            Therapeutic Exercises:           Seated marching           Seated LAQ           Quad sets           Step ups           Gait training           Seated hamstring stretch           Sit to stands            Therapeutic Activities:                                              HEP:  Emphasis on quad sets for quad activation to facilitate knee ext    MedBridge Portal  Treatment/Session Summary: Pt L knee AROM measures 15 deg extension and 115 deg flexion prior to exercises. Poor L quad set. Pt is noncompliant with any home exercise stretching. Fair effort given during aquatic exercises - resumed normal lap volume with good tolerance. Plan to discuss transition to land based exercises to progress treatment once appropriate. Cont POC. · Response to Treatment:  Good tolerance of activity  · Communication/Consultation:    · Equipment provided today:  None today  · Recommendations/Intent for next treatment session: Next visit will focus on goals for flexibility, strength and normalized movement patterns.     Total Treatment Billable Duration:  60 min  PT Patient Time In/Time Out  Time In: 0130  Time Out: 0300  Rosalind Starr, PTA

## 2022-02-18 ENCOUNTER — HOSPITAL ENCOUNTER (OUTPATIENT)
Dept: PHYSICAL THERAPY | Age: 77
Discharge: HOME | End: 2022-02-18
Payer: MEDICARE

## 2022-02-18 PROCEDURE — 97113 AQUATIC THERAPY/EXERCISES: CPT

## 2022-02-18 PROCEDURE — 97016 VASOPNEUMATIC DEVICE THERAPY: CPT

## 2022-02-18 NOTE — PROGRESS NOTES
Gabby Moore  : 1945  [place]  [place] [address] - [facility] [address] @ [address] [address].  [phone]   [phone]      OUTPATIENT PHYSICAL THERAPY: Daily Treatment/ Progress Note  22          Pain in left knee (M25.562) and Stiffness of left knee, not elsewhere classified (M25.662) and Difficulty in walking, not elsewhere classified (R26.2) and Other abnormalities of gait and mobility (R26.89)  _________________________________________________________________________  Pre-treatment Symptoms/Complaints:  L knee pain/ antalgic gait    Effective Dates: 2022 TO 2022 (90 days). Frequency/Duration: 2-3 times a week for 90 Days    GOALS: (Goals have been discussed and agreed upon with patient.)  Short-Term Functional Goals: Time Frame: 2 weeks  Patient will demonstrate independence and compliance with home exercise program for management of symptoms (aquatics)  (met 22)  Pt will demonstrate increase in Knee injury and Osteoarthritis Outcome Score (KOOS) by 2-3 points for improved functional mobility. (met 22)  Pt will demonstrate increased active range of motion for L knee  from -3 ° (extension) to 115 ° (flexion). (in progress 22)  Pt will tolerate 35 to 40 minutes of aquatic therapy with pain of 2/10 or better following intervention. (met 22)      Discharge Goals: Time Frame: 8 weeks  (in progress 22)  Pt will report KOOS score of 6/28 for improved functional mobility in home/ community. Pt will demonstrate active range of motion of L knee from 0 ° (extension) to 120 °(flexion). Pt will ambulate independently  >/= 1500 feet with normal gait pattern with even stance time/ step length in bilateral lower extremities over household/ community surfaces. Pt will report normalized sleep patterne6 to 8 hours in bed without increased pain.     Pt will demonstrate I bed mobility. Pt will demonstrate reciprocal gait up/ down 3-4 steps independently with use of unilateral handrail demonstrating good strength. Rehabilitation Potential For Stated Goals: Good  Pain: Initial: 2/10 \"I'm an old man.   I don't thing I'm going to get much more\"  Pt reports \"I haven't been doing these exercises at home because it hurts my knee\"   Post Session:  1/10   Medications Last Reviewed:  2/16/22  Updated Objective Findings:     servation/Orthostatic Postural Assessment:          Morbid obesity  L knee incision line healed- steri strips intact from mid to distal incision line- proximal open to air  Palpation:          Diffuse edema in L knee complex  ROM:  BUE                      Date:  01/14/22 Date:  01/28/22 Date:  02/18/22   Trunk ROM 50 % limited                          LE ROM Left Right LEFT RIGHT LEFT    Hip Flexion [facility]       Hip Abduction [facility]       Straight Leg Raise (SLR)                  Knee Flexion 110 ° 125 ° 115 °  118 °    Knee Extension -10 ° 0 ° -10 °  -10 °             Ankle Dorsiflexion [facility] WFL         Strength:     Date:  01/14/22 Date:  01/28/22 Date:  02/18/22   LE MMT Left Right Left Right Left Right   Hip Flex (L1/L2) 4/5 4/5       Hip Abd (glut med) 4/5 4/5       Hip Ext 4/5 4/5       Quad    ( L3/4) -3/5 4/5 3/5      Hamstring -3/5 4/5 3/5      Anterior Tib (L4/L5)         Gastroc (S1/2)         EHL (L5)                             Special Tests:  deferred  Neurological Screen:        Sensation: Intact for light touch  Functional Mobility:         Gait/Ambulation:  Independent with antalgic gait pattern- decreased LLE stance time        Transfers:  Sit to stand to sit with use of UE and momentum        Bed Mobility:  I with sit to supine and Min to SBA for Supine to sit        Stairs:  Step to gait pattern with cues and hand rail reciprocal  Balance:          Single Leg Stance:  R) 6-7 sec  L) 2-3 sec  KOOS at eval 16/28  At Progress note 01/28/22:  16/28  At PN KOOS:  12/28   TREATMENT:   Therapeutic Exercise: (SEE MINUTES BELOW):  Exercises per grid below to improve mobility, strength and balance. Required minimal visual and verbal cues to promote proper body alignment and promote proper body mechanics. Progressed resistance, range and repetitions as indicated. Aquatic Therapy (SEE MINUTES BELOW): Aquatic treatment performed per flow grid for Decreased muscle strength, Decreased range of motion and Ease of movement. Cues provided for technique. Assistance by therapist provided for progression of exercises/ activities. Patient has difficulty with L knee pain. Manual Therapy (SEE MINUTES BELOW):  Pt in long sitting for STM to L knee complex with noted sensitivity and hamstring tightness. Modalities (SEE MINUTES BELOW):  Pt in long sitting for game ready (ice/ compression) to address pain/ edema post activity. Intensity monitored throughout intervention with skin intact and WFL following modality.          Date: 01/28/22 visit 6 PN 1/31/22 Visit 7 02/02/22 visit 8 02/04/22 visit 9 2/7/22 Visit 10 2/10/22 Visit 11 2/14/22 Visit 12 2/16/22 Visit 13 02/18/22 visit 14   Modalities: 15 min  15 min 15 min 15 min 15 min 15 min 15 min 15 min   Game ready To L knee Held per pt request To L knee To L knee To L knee To L knee To L knee To L knee To L knee in supine                           Manual Therapy:            STM (L knee complex)                                    Aquatics Activities: 60 min 60 mins 60 min 60 min 60 min 60 min  45 min 55 mins 55 min   GAIT (F/B/S/M) X6l each x6L x6l each X6l each x6L ea x6L x6L x6L x6l each   SLR gait x6L x6L x6l x6l x6L x6L x4L x6L x6l   Hamstring Curl gait  x6L x6L x6l x6l x6L x6L x4L x6L x6l   Rockettes x4L x6L x6l x6l x6L x6L x4L x6L x6l   Toe/ Heel Raises x20 x25 Held due to pt gout x20 x20 x25 x25 x25 x25   Squats x20 x25 x25 x25 x25 x25 x25 x25 x25   Step ups X20 R/L on pool step x25 R/L 2x10 R/L x25 R/L on pool step x25 on pool step same x25 on pool step x25 on pool step x25    Stairs x4 steps with B rails same          Hip 4 way   x20 BLE X20 BLE x25B x25 B x25 B X 25 B x25 BLE   [place]   x4l         Lunges            Clamshells            Hip Circles            Deep well:  Bicycling            Deep well: Jumping jacks            Deep well: Scissoring            Hamstring stretch         2H20 BLE   Piriformis stretch                                                Therapeutic Exercises:            Seated marching            Seated LAQ            Quad sets            Step ups            Gait training            Seated hamstring stretch            Sit to stands             Therapeutic Activities:                                                  HEP:  Emphasis on quad sets for quad activation to facilitate knee ext    Med[employer] Portal  Treatment/Session Summary: Pt L knee AROM measures 10 deg extension lag and 118 deg flexion prior to exercises. Poor L quad set as seen with ext lag. Pt is noncompliant with any home exercise stretching. Fair effort given during aquatic exercises. Progress note written today with STG met except ROM with plan to continue to pursue LTG of I with functional mobility. Plan to transition to land based exercises to progress treatment once appropriate. Cont POC. · Response to Treatment:  Good tolerance of activity  · Communication/Consultation:    · Equipment provided today:  None today  · Recommendations/Intent for next treatment session: Next visit will focus on goals for flexibility, strength and normalized movement patterns.     Total Treatment Billable Duration:  60 min  PT Patient Time In/Time Out  Time In: 6112  Time Out: 0704 Kole Quick, PT

## 2022-02-21 ENCOUNTER — HOSPITAL ENCOUNTER (OUTPATIENT)
Dept: PHYSICAL THERAPY | Age: 77
Discharge: HOME | End: 2022-02-21
Payer: MEDICARE

## 2022-02-21 PROCEDURE — 97110 THERAPEUTIC EXERCISES: CPT

## 2022-02-21 PROCEDURE — 97016 VASOPNEUMATIC DEVICE THERAPY: CPT

## 2022-02-21 NOTE — PROGRESS NOTES
Zachariah Dickey  : 1945  [place]  [place] [address]  [phone]   [phone]      OUTPATIENT PHYSICAL THERAPY: Daily Treatment Note  22          Pain in left knee (M25.562) and Stiffness of left knee, not elsewhere classified (M25.662) and Difficulty in walking, not elsewhere classified (R26.2) and Other abnormalities of gait and mobility (R26.89)  _________________________________________________________________________  Pre-treatment Symptoms/Complaints:  L knee pain/ antalgic gait    Effective Dates: 2022 TO 2022 (90 days). Frequency/Duration: 2-3 times a week for 90 Days    GOALS: (Goals have been discussed and agreed upon with patient.)  Short-Term Functional Goals: Time Frame: 2 weeks  Patient will demonstrate independence and compliance with home exercise program for management of symptoms (aquatics)  (met 22)  Pt will demonstrate increase in Knee injury and Osteoarthritis Outcome Score (KOOS) by 2-3 points for improved functional mobility. (met 22)  Pt will demonstrate increased active range of motion for L knee  from -3 ° (extension) to 115 ° (flexion). (in progress 22)  Pt will tolerate 35 to 40 minutes of aquatic therapy with pain of 2/10 or better following intervention. (met 22)      Discharge Goals: Time Frame: 8 weeks  (in progress 22)  Pt will report KOOS score of 6/28 for improved functional mobility in home/ community. Pt will demonstrate active range of motion of L knee from 0 ° (extension) to 120 °(flexion). Pt will ambulate independently  >/= 1500 feet with normal gait pattern with even stance time/ step length in bilateral lower extremities over household/ community surfaces. Pt will report normalized sleep patterne6 to 8 hours in bed without increased pain.     Pt will demonstrate I bed mobility. Pt will demonstrate reciprocal gait up/ down 3-4 steps independently with use of unilateral handrail demonstrating good strength. Rehabilitation Potential For Stated Goals: Good  Pain: Initial: 2/10 \"I'm an old man.   I don't thing I'm going to get much more\"  Pt reports \"I haven't been doing these exercises at home because it hurts my knee\"   Post Session:  1/10   Medications Last Reviewed:  2/21/22  Updated Objective Findings:     servation/Orthostatic Postural Assessment:          Morbid obesity  L knee incision line healed- steri strips intact from mid to distal incision line- proximal open to air  Palpation:          Diffuse edema in L knee complex  ROM:  BUE                      Date:  01/14/22 Date:  01/28/22 Date:  02/18/22   Trunk ROM 50 % limited                          LE ROM Left Right LEFT RIGHT LEFT    Hip Flexion [facility]       Hip Abduction [facility]       Straight Leg Raise (SLR)                  Knee Flexion 110 ° 125 ° 115 °  118 °    Knee Extension -10 ° 0 ° -10 °  -10 °             Ankle Dorsiflexion [facility] WFL         Strength:     Date:  01/14/22 Date:  01/28/22 Date:  02/18/22   LE MMT Left Right Left Right Left Right   Hip Flex (L1/L2) 4/5 4/5       Hip Abd (glut med) 4/5 4/5       Hip Ext 4/5 4/5       Quad    ( L3/4) -3/5 4/5 3/5      Hamstring -3/5 4/5 3/5      Anterior Tib (L4/L5)         Gastroc (S1/2)         EHL (L5)                             Special Tests:  deferred  Neurological Screen:        Sensation: Intact for light touch  Functional Mobility:         Gait/Ambulation:  Independent with antalgic gait pattern- decreased LLE stance time        Transfers:  Sit to stand to sit with use of UE and momentum        Bed Mobility:  I with sit to supine and Min to SBA for Supine to sit        Stairs:  Step to gait pattern with cues and hand rail reciprocal  Balance:          Single Leg Stance:  R) 6-7 sec  L) 2-3 sec  KOOS at eval 16/28  At Progress note 01/28/22:  16/28  At PN KOOS:  12/28   TREATMENT:   Therapeutic Exercise: (SEE MINUTES BELOW):  Exercises per grid below to improve mobility, strength and balance. Required minimal visual and verbal cues to promote proper body alignment and promote proper body mechanics. Progressed resistance, range and repetitions as indicated. Aquatic Therapy (SEE MINUTES BELOW): Aquatic treatment performed per flow grid for Decreased muscle strength, Decreased range of motion and Ease of movement. Cues provided for technique. Assistance by therapist provided for progression of exercises/ activities. Patient has difficulty with L knee pain. Manual Therapy (SEE MINUTES BELOW):  Pt in long sitting for STM to L knee complex with noted sensitivity and hamstring tightness. Modalities (SEE MINUTES BELOW):  Pt in long sitting for game ready (ice/ compression) to address pain/ edema post activity. Intensity monitored throughout intervention with skin intact and WFL following modality.          Date: 02/18/22 visit 14 PN 2/21/22 Visit 15      Modalities: 15 min 15 min      Game ready To L knee in supine To L knee in supine                      Manual Therapy:        STM (L knee complex)                        Aquatics Activities: 55 min       GAIT (F/B/S/M) x6l each       SLR gait x6l       Hamstring Curl gait  x6l       Rockettes x6l       Toe/ Heel Raises x25       Squats x25       Step ups x25        Stairs        Hip 4 way x25 BLE       B[place]        Lunges        Clamshells        Hip Circles        Deep well:  Bicycling        Deep well: Jumping jacks        Deep well: Scissoring        Hamstring stretch 2H20 BLE       Piriformis stretch        Therapeutic Exercises:  45 mins      Seated marching  2x20 B      Seated LAQ  2x20 B      Quad sets  x20 LLE      Step ups        Prone lying for ext  5 mins      Bike for knee ROM  5 mins      Gait training  Level surfaces      Seated hamstring stretch  3H15 B      Sit to stands x10      Therapeutic Activities:                                  HEP:  Emphasis on quad sets for quad activation to facilitate knee ext    Med[employer] Portal  Treatment/Session Summary: Pt L knee AROM measures 10 deg ext and 118 flex today, no change from previous visit. However, full ext in prone. Significant quad weakness that similar on the contralateral side. Worked on land today to progress strengthening exercises with decent effort given. Pt requires multiple rest breaks. Cont POC as indicated. · Response to Treatment:  Good tolerance of activity  · Communication/Consultation:    · Equipment provided today:  None today  · Recommendations/Intent for next treatment session: Next visit will focus on goals for flexibility, strength and normalized movement patterns.     Total Treatment Billable Duration:  60 min  PT Patient Time In/Time Out  Time In: 0215  Time Out: 0330  Rosalind Starr, PTA

## 2022-02-23 ENCOUNTER — HOSPITAL ENCOUNTER (OUTPATIENT)
Dept: PHYSICAL THERAPY | Age: 77
Discharge: HOME | End: 2022-02-23
Payer: MEDICARE

## 2022-02-23 PROCEDURE — 97110 THERAPEUTIC EXERCISES: CPT

## 2022-02-23 PROCEDURE — 97016 VASOPNEUMATIC DEVICE THERAPY: CPT

## 2022-02-23 NOTE — PROGRESS NOTES
Claudio Pickard  : 1945  [place]  [place] [address] - [facility] [address] @ [address] [address].  [phone]   [phone]      OUTPATIENT PHYSICAL THERAPY: Daily Treatment Note  22          Pain in left knee (M25.562) and Stiffness of left knee, not elsewhere classified (M25.662) and Difficulty in walking, not elsewhere classified (R26.2) and Other abnormalities of gait and mobility (R26.89)  _________________________________________________________________________  Pre-treatment Symptoms/Complaints:  L knee pain/ antalgic gait    Effective Dates: 2022 TO 2022 (90 days). Frequency/Duration: 2-3 times a week for 90 Days    GOALS: (Goals have been discussed and agreed upon with patient.)  Short-Term Functional Goals: Time Frame: 2 weeks  Patient will demonstrate independence and compliance with home exercise program for management of symptoms (aquatics)  (met 22)  Pt will demonstrate increase in Knee injury and Osteoarthritis Outcome Score (KOOS) by 2-3 points for improved functional mobility. (met 22)  Pt will demonstrate increased active range of motion for L knee  from -3 ° (extension) to 115 ° (flexion). (in progress 22)  Pt will tolerate 35 to 40 minutes of aquatic therapy with pain of 2/10 or better following intervention. (met 22)      Discharge Goals: Time Frame: 8 weeks  (in progress 22)  Pt will report KOOS score of 6/28 for improved functional mobility in home/ community. Pt will demonstrate active range of motion of L knee from 0 ° (extension) to 120 °(flexion). Pt will ambulate independently  >/= 1500 feet with normal gait pattern with even stance time/ step length in bilateral lower extremities over household/ community surfaces. Pt will report normalized sleep patterne6 to 8 hours in bed without increased pain.     Pt will demonstrate I bed mobility. Pt will demonstrate reciprocal gait up/ down 3-4 steps independently with use of unilateral handrail demonstrating good strength. Rehabilitation Potential For Stated Goals: Good  Pain: Initial: 2/10 \"I've been doing okay. \" Post Session:  1/10   Medications Last Reviewed:  2/23/22  Updated Objective Findings:     servation/Orthostatic Postural Assessment:          Morbid obesity  L knee incision line healed- steri strips intact from mid to distal incision line- proximal open to air  Palpation:          Diffuse edema in L knee complex  ROM:  BUE                      Date:  01/14/22 Date:  01/28/22 Date:  02/18/22   Trunk ROM 50 % limited                          LE ROM Left Right LEFT RIGHT LEFT    Hip Flexion [facility]       Hip Abduction [facility]       Straight Leg Raise (SLR)                  Knee Flexion 110 ° 125 ° 115 °  118 °    Knee Extension -10 ° 0 ° -10 °  -10 °             Ankle Dorsiflexion [facility] WFL         Strength:     Date:  01/14/22 Date:  01/28/22 Date:  02/18/22   LE MMT Left Right Left Right Left Right   Hip Flex (L1/L2) 4/5 4/5       Hip Abd (glut med) 4/5 4/5       Hip Ext 4/5 4/5       Quad    ( L3/4) -3/5 4/5 3/5      Hamstring -3/5 4/5 3/5      Anterior Tib (L4/L5)         Gastroc (S1/2)         EHL (L5)                             Special Tests:  deferred  Neurological Screen:        Sensation: Intact for light touch  Functional Mobility:         Gait/Ambulation:  Independent with antalgic gait pattern- decreased LLE stance time        Transfers:  Sit to stand to sit with use of UE and momentum        Bed Mobility:  I with sit to supine and Min to SBA for Supine to sit        Stairs:  Step to gait pattern with cues and hand rail reciprocal  Balance:          Single Leg Stance:  R) 6-7 sec  L) 2-3 sec  KOOS at eval 16/28  At Progress note 01/28/22:  16/28  At PN KOOS:  12/28   TREATMENT:   Therapeutic Exercise: (SEE MINUTES BELOW):  Exercises per grid below to improve mobility, strength and balance. Required minimal visual and verbal cues to promote proper body alignment and promote proper body mechanics. Progressed resistance, range and repetitions as indicated. Aquatic Therapy (SEE MINUTES BELOW): Aquatic treatment performed per flow grid for Decreased muscle strength, Decreased range of motion and Ease of movement. Cues provided for technique. Assistance by therapist provided for progression of exercises/ activities. Patient has difficulty with L knee pain. Manual Therapy (SEE MINUTES BELOW):  Pt in long sitting for STM to L knee complex with noted sensitivity and hamstring tightness. Modalities (SEE MINUTES BELOW):  Pt in long sitting for game ready (ice/ compression) to address pain/ edema post activity. Intensity monitored throughout intervention with skin intact and WFL following modality.          Date: 02/18/22 visit 14 PN 2/21/22 Visit 15 2/23/22 Visit 16     Modalities: 15 min 15 min 15 min     Game ready To L knee in supine To L knee in supine To L knee in supine                     Manual Therapy:        STM (L knee complex)                        Aquatics Activities: 55 min       GAIT (F/B/S/M) x6l each       SLR gait x6l       Hamstring Curl gait  x6l       Rockettes x6l       Toe/ Heel Raises x25       Squats x25       Step ups x25        Stairs        Hip 4 way x25 BLE       [place]        Lunges        Clamshells        Hip Circles        Deep well:  Bicycling        Deep well: Jumping jacks        Deep well: Scissoring        Hamstring stretch 2H20 BLE       Piriformis stretch        Therapeutic Exercises:  45 mins 45 mins     Seated marching  2x20 B 2x20 B     Seated LAQ  2x20 B 2x20 B     Quad sets  x20 LLE      Step ups        Sidelying clamshells   x20 B     SLR   x20 B     Prone lying for ext  5 mins 5 mins     Bike for knee ROM  5 mins 5 mins     Gait training  Level surfaces Level surfaces     Seated hamstring stretch  3H15 B same Sit to stands   x10 x10     Therapeutic Activities:                                  HEP:  Emphasis on quad sets for quad activation to facilitate knee ext    Med[employer] Portal  Treatment/Session Summary: Pt L knee AROM measures 10 deg ext and 120 flex today. Ext lag persists due to quad weakness. Pt able to achieve full rotation on bike. He is still not compliant with HEP outside of therapy. Plan to DC in next 1-2 visits. · Response to Treatment:  Good tolerance of activity  · Communication/Consultation:    · Equipment provided today:  None today  · Recommendations/Intent for next treatment session: Next visit will focus on goals for flexibility, strength and normalized movement patterns.     Total Treatment Billable Duration:  60 min  PT Patient Time In/Time Out  Time In: 0130  Time Out: 0230  Rosalind Starr, PTA

## 2022-02-25 ENCOUNTER — HOSPITAL ENCOUNTER (OUTPATIENT)
Dept: PHYSICAL THERAPY | Age: 77
Discharge: HOME | End: 2022-02-25
Payer: MEDICARE

## 2022-02-25 PROCEDURE — 97110 THERAPEUTIC EXERCISES: CPT

## 2022-02-25 PROCEDURE — 97016 VASOPNEUMATIC DEVICE THERAPY: CPT

## 2022-02-25 NOTE — PROGRESS NOTES
Suri Hawkins  : 1945  [place]  [place] [address] - [facility] [address]  [phone]   [phone]      OUTPATIENT PHYSICAL THERAPY: Daily Treatment Note  22          Pain in left knee (M25.562) and Stiffness of left knee, not elsewhere classified (M25.662) and Difficulty in walking, not elsewhere classified (R26.2) and Other abnormalities of gait and mobility (R26.89)  _________________________________________________________________________  Pre-treatment Symptoms/Complaints:  L knee pain/ antalgic gait    Effective Dates: 2022 TO 2022 (90 days). Frequency/Duration: 2-3 times a week for 90 Days    GOALS: (Goals have been discussed and agreed upon with patient.)  Short-Term Functional Goals: Time Frame: 2 weeks  Patient will demonstrate independence and compliance with home exercise program for management of symptoms (aquatics)  (met 22)  Pt will demonstrate increase in Knee injury and Osteoarthritis Outcome Score (KOOS) by 2-3 points for improved functional mobility. (met 22)  Pt will demonstrate increased active range of motion for L knee  from -3 ° (extension) to 115 ° (flexion). (in progress 22)  Pt will tolerate 35 to 40 minutes of aquatic therapy with pain of 2/10 or better following intervention. (met 22)      Discharge Goals: Time Frame: 8 weeks  (in progress 22)  Pt will report KOOS score of 6/28 for improved functional mobility in home/ community. Pt will demonstrate active range of motion of L knee from 0 ° (extension) to 120 °(flexion). Pt will ambulate independently  >/= 1500 feet with normal gait pattern with even stance time/ step length in bilateral lower extremities over household/ community surfaces. Pt will report normalized sleep patterne6 to 8 hours in bed without increased pain.     Pt will demonstrate I bed mobility. Pt will demonstrate reciprocal gait up/ down 3-4 steps independently with use of unilateral handrail demonstrating good strength. Rehabilitation Potential For Stated Goals: Good  Pain: Initial: 1/10 \"I am ready to be done on Monday. \" Post Session:  1/10   Medications Last Reviewed:  2/25/22  Updated Objective Findings:     servation/Orthostatic Postural Assessment:          Morbid obesity  L knee incision line healed- steri strips intact from mid to distal incision line- proximal open to air  Palpation:          Diffuse edema in L knee complex  ROM:  BUE                      Date:  01/14/22 Date:  01/28/22 Date:  02/18/22   Trunk ROM 50 % limited                          LE ROM Left Right LEFT RIGHT LEFT    Hip Flexion [facility]       Hip Abduction [facility]       Straight Leg Raise (SLR)                  Knee Flexion 110 ° 125 ° 115 °  118 °    Knee Extension -10 ° 0 ° -10 °  -10 °             Ankle Dorsiflexion [facility] WFL         Strength:     Date:  01/14/22 Date:  01/28/22 Date:  02/18/22   LE MMT Left Right Left Right Left Right   Hip Flex (L1/L2) 4/5 4/5       Hip Abd (glut med) 4/5 4/5       Hip Ext 4/5 4/5       Quad    ( L3/4) -3/5 4/5 3/5      Hamstring -3/5 4/5 3/5      Anterior Tib (L4/L5)         Gastroc (S1/2)         EHL (L5)                             Special Tests:  deferred  Neurological Screen:        Sensation: Intact for light touch  Functional Mobility:         Gait/Ambulation:  Independent with antalgic gait pattern- decreased LLE stance time        Transfers:  Sit to stand to sit with use of UE and momentum        Bed Mobility:  I with sit to supine and Min to SBA for Supine to sit        Stairs:  Step to gait pattern with cues and hand rail reciprocal  Balance:          Single Leg Stance:  R) 6-7 sec  L) 2-3 sec  KOOS at eval 16/28  At Progress note 01/28/22:  16/28  At PN KOOS:  12/28   TREATMENT:   Therapeutic Exercise: (SEE MINUTES BELOW):  Exercises per grid below to improve mobility, strength and balance. Required minimal visual and verbal cues to promote proper body alignment and promote proper body mechanics. Progressed resistance, range and repetitions as indicated. Aquatic Therapy (SEE MINUTES BELOW): Aquatic treatment performed per flow grid for Decreased muscle strength, Decreased range of motion and Ease of movement. Cues provided for technique. Assistance by therapist provided for progression of exercises/ activities. Patient has difficulty with L knee pain. Manual Therapy (SEE MINUTES BELOW):  Pt in long sitting for STM to L knee complex with noted sensitivity and hamstring tightness. Modalities (SEE MINUTES BELOW):  Pt in long sitting for game ready (ice/ compression) to address pain/ edema post activity. Intensity monitored throughout intervention with skin intact and WFL following modality.          Date: 02/18/22 visit 14 PN 2/21/22 Visit 15 2/23/22 Visit 16 2/25/22 Visit 17    Modalities: 15 min 15 min 15 min 15 min    Game ready To L knee in supine To L knee in supine To L knee in supine To L knee in supine                    Manual Therapy:        STM (L knee complex)                        Aquatics Activities: 55 min       GAIT (F/B/S/M) x6l each       SLR gait x6l       Hamstring Curl gait  x6l       Rockettes x6l       Toe/ Heel Raises x25       Squats x25       Step ups x25        Stairs        Hip 4 way x25 BLE       [place]        Lunges        Clamshells        Hip Circles        Deep well:  Bicycling        Deep well: Jumping jacks        Deep well: Scissoring        Hamstring stretch 2H20 BLE       Piriformis stretch        Therapeutic Exercises:  45 mins 45 mins 45 mins    Seated marching  2x20 B 2x20 B 2x25 B    Seated LAQ  2x20 B 2x20 B 2x25 B    Quad sets  x20 LLE      Step ups        Sidelying clamshells   x20 B x25 B    SLR   x20 B 2x15 B    Prone knee flex    2x15 B    Prone lying for ext  5 mins 5 mins 5 mins    Bike for knee ROM  5 mins 5 mins 15 mins    Gait training  Level surfaces Level surfaces     Seated hamstring stretch  3H15 B same same    Sit to stands   x10 x10 X 15    Therapeutic Activities:                                  HEP:  Emphasis on quad sets for quad activation to facilitate knee ext    MedBridge Portal  Treatment/Session Summary: Held AROM measurements as pt is likely to not gain any more due to timeframe. However pt did start on bike for warm up for 15 mins per request - he may acquire stationary bike for personal use after DC. Good tolerance to increased reps. Pt will DC at his next visit. · Response to Treatment:  Good tolerance of activity  · Communication/Consultation:    · Equipment provided today:  None today  · Recommendations/Intent for next treatment session: Next visit will focus on goals for flexibility, strength and normalized movement patterns.     Total Treatment Billable Duration:  60 min  PT Patient Time In/Time Out  Time In: 0215  Time Out: 0315  Rosalind Starr, PTA

## 2022-02-28 ENCOUNTER — HOSPITAL ENCOUNTER (OUTPATIENT)
Dept: PHYSICAL THERAPY | Age: 77
Discharge: HOME | End: 2022-02-28
Payer: MEDICARE

## 2022-02-28 PROCEDURE — 97110 THERAPEUTIC EXERCISES: CPT

## 2022-02-28 PROCEDURE — 97016 VASOPNEUMATIC DEVICE THERAPY: CPT

## 2022-02-28 NOTE — THERAPY DISCHARGE
Lukas Fierro  : 1945  [place]  [place] - [facility] [address]  [phone]   [phone]      OUTPATIENT PHYSICAL THERAPY: Daily Treatment and Discharge Note  22          Pain in left knee (M25.562) and Stiffness of left knee, not elsewhere classified (M25.662) and Difficulty in walking, not elsewhere classified (R26.2) and Other abnormalities of gait and mobility (R26.89)  _________________________________________________________________________  Pre-treatment Symptoms/Complaints:  L knee pain/ antalgic gait    Effective Dates: 2022 TO 2022 (90 days). Frequency/Duration: 2-3 times a week for 90 Days    GOALS: (Goals have been discussed and agreed upon with patient.)  Short-Term Functional Goals: Time Frame: 2 weeks  Patient will demonstrate independence and compliance with home exercise program for management of symptoms (aquatics)  (met 22)  Pt will demonstrate increase in Knee injury and Osteoarthritis Outcome Score (KOOS) by 2-3 points for improved functional mobility. (met 22)  Pt will demonstrate increased active range of motion for L knee  from -3 ° (extension) to 115 ° (flexion). (in progress 22)  Pt will tolerate 35 to 40 minutes of aquatic therapy with pain of 2/10 or better following intervention. (met 22)      Discharge Goals: Time Frame: 8 weeks    Pt will report KOOS score of 6/28 for improved functional mobility in home/ community. - Not met,  achieved   Pt will demonstrate active range of motion of L knee from 0 ° (extension) to 120 °(flexion).  - Not met, 10 deg to 120 deg achieved  Pt will ambulate independently  >/= 1500 feet with normal gait pattern with even stance time/ step length in bilateral lower extremities over household/ community surfaces. - met 22  Pt will report normalized sleep pattern 6 to 8 hours in bed without increased pain. - met 2/28/22  Pt will demonstrate I bed mobility. - met 2/28/22  Pt will demonstrate reciprocal gait up/ down 3-4 steps independently with use of unilateral handrail demonstrating good strength. - met 2/28/22    Rehabilitation Potential For Stated Goals: Good  Pain: Initial: 2/10 \"A little more sore today. \" Post Session:  1/10   Medications Last Reviewed:  2/28/22  Updated Objective Findings:     servation/Orthostatic Postural Assessment:          Morbid obesity  L knee incision line healed- steri strips intact from mid to distal incision line- proximal open to air  Palpation:          Diffuse edema in L knee complex  ROM:  BUE                      Date:  01/14/22 Date:  01/28/22 Date:  02/28/22   Trunk ROM 50 % limited                          LE ROM Left Right LEFT RIGHT LEFT    Hip Flexion [facility]       Hip Abduction [facility]       Straight Leg Raise (SLR)                  Knee Flexion 110 ° 125 ° 115 °  118 °    Knee Extension -10 ° 0 ° -10 °  -10 °             Ankle Dorsiflexion [facility] WFL         Strength:     Date:  01/14/22 Date:  01/28/22 Date:  0228/22   LE MMT Left Right Left Right Left Right   Hip Flex (L1/L2) 4/5 4/5       Hip Abd (glut med) 4/5 4/5       Hip Ext 4/5 4/5       Quad    ( L3/4) -3/5 4/5 3/5  4/5    Hamstring -3/5 4/5 3/5  4/5    Anterior Tib (L4/L5)         Gastroc (S1/2)         EHL (L5)                             Special Tests:  deferred  Neurological Screen:        Sensation: Intact for light touch  Functional Mobility:         Gait/Ambulation:  Independent with antalgic gait pattern- decreased LLE stance time        Transfers:  Sit to stand to sit with use of UE and momentum        Bed Mobility:  I with sit to supine and Min to SBA for Supine to sit        Stairs:  Step to gait pattern with cues and hand rail reciprocal  Balance:          Single Leg Stance:  R) 6-7 sec  L) 2-3 sec  KOOS at eval 16/28  At Progress note 01/28/22: 16/28  At PN KOOS:  12/28  At DC 2/28/22 KOOS: 8/28       TREATMENT:   Therapeutic Exercise: (SEE MINUTES BELOW):  Exercises per grid below to improve mobility, strength and balance. Required minimal visual and verbal cues to promote proper body alignment and promote proper body mechanics. Progressed resistance, range and repetitions as indicated. Aquatic Therapy (SEE MINUTES BELOW): Aquatic treatment performed per flow grid for Decreased muscle strength, Decreased range of motion and Ease of movement. Cues provided for technique. Assistance by therapist provided for progression of exercises/ activities. Patient has difficulty with L knee pain. Manual Therapy (SEE MINUTES BELOW):  Pt in long sitting for STM to L knee complex with noted sensitivity and hamstring tightness. Modalities (SEE MINUTES BELOW):  Pt in long sitting for game ready (ice/ compression) to address pain/ edema post activity. Intensity monitored throughout intervention with skin intact and WFL following modality.          Date: 02/18/22 visit 14 PN 2/21/22 Visit 15 2/23/22 Visit 16 2/25/22 Visit 17 2/28/22 Visit 18 DC   Modalities: 15 min 15 min 15 min 15 min 15 min   Game ready To L knee in supine To L knee in supine To L knee in supine To L knee in supine To L knee in supine                   Manual Therapy:        STM (L knee complex)                        Aquatics Activities: 55 min       GAIT (F/B/S/M) x6l each       SLR gait x6l       Hamstring Curl gait  x6l       Rockettes x6l       Toe/ Heel Raises x25       Squats x25       Step ups x25        Stairs        Hip 4 way x25 BLE       [place]        Lunges        Clamshells        Hip Circles        Deep well:  Bicycling        Deep well: Jumping jacks        Deep well: Scissoring        Hamstring stretch 2H20 BLE       Piriformis stretch        Therapeutic Exercises:  45 mins 45 mins 45 mins 45 mins   Seated marching  2x20 B 2x20 B 2x25 B 2x30 B   Seated LAQ  2x20 B 2x20 B 2x25 B 2x30 B   Quad sets  x20 LLE      Step ups     X 10 B   Heel raises     X 15    Sidelying clamshells   x20 B x25 B x30 B   SLR   x20 B 2x15 B 2x20 B   Prone knee flex    2x15 B 2x20 B   Prone lying for ext  5 mins 5 mins 5 mins 5 mins   Bike for knee ROM  5 mins 5 mins 15 mins 15 mins   Gait training  Level surfaces Level surfaces     Seated hamstring stretch  3H15 B same same same   Sit to stands   x10 x10 X 15 x20   Therapeutic Activities:                                  HEP:  Emphasis on quad sets for quad activation to facilitate knee ext    MedBridge Portal  Treatment/Session Summary: DC today per plan of care. L knee AROM 118 deg flex and 10 deg ext with full ext in prone. Therapists observes that pt's R knee does not extend to full neutral - upon measuring, R knee AROM is 130 flex and 10 deg ext. Pt begins with 10 mins of stationary bike for warmup. Good tolerance to increased reps. Reviewed HEP. Overall pt has been inconsistent with HEP but reports returning to typical activities. Pt is appropriate to DC at this time.     Total Treatment Billable Duration:  60 min  PT Patient Time In/Time Out  Time In: 0215  Time Out: 0320  Rosalind Starr, PTA

## 2022-03-02 ENCOUNTER — APPOINTMENT (OUTPATIENT)
Dept: PHYSICAL THERAPY | Age: 77
End: 2022-03-02

## 2022-03-03 ENCOUNTER — APPOINTMENT (RX ONLY)
Dept: URBAN - METROPOLITAN AREA CLINIC 329 | Facility: CLINIC | Age: 77
Setting detail: DERMATOLOGY
End: 2022-03-03

## 2022-03-03 DIAGNOSIS — Z85.828 PERSONAL HISTORY OF OTHER MALIGNANT NEOPLASM OF SKIN: ICD-10-CM

## 2022-03-03 DIAGNOSIS — L98.8 OTHER SPECIFIED DISORDERS OF THE SKIN AND SUBCUTANEOUS TISSUE: ICD-10-CM

## 2022-03-03 DIAGNOSIS — L57.0 ACTINIC KERATOSIS: ICD-10-CM

## 2022-03-03 DIAGNOSIS — Z85.820 PERSONAL HISTORY OF MALIGNANT MELANOMA OF SKIN: ICD-10-CM

## 2022-03-03 DIAGNOSIS — D22 MELANOCYTIC NEVI: ICD-10-CM | Status: STABLE

## 2022-03-03 PROBLEM — D22.5 MELANOCYTIC NEVI OF TRUNK: Status: ACTIVE | Noted: 2022-03-03

## 2022-03-03 PROCEDURE — 11301 SHAVE SKIN LESION 0.6-1.0 CM: CPT

## 2022-03-03 PROCEDURE — ? SHAVE REMOVAL

## 2022-03-03 PROCEDURE — ? COUNSELING

## 2022-03-03 PROCEDURE — 17003 DESTRUCT PREMALG LES 2-14: CPT

## 2022-03-03 PROCEDURE — ? OTHER

## 2022-03-03 PROCEDURE — ? LIQUID NITROGEN

## 2022-03-03 PROCEDURE — ? FULL BODY SKIN EXAM

## 2022-03-03 PROCEDURE — ? MEDICAL PHOTOGRAPHY REVIEW

## 2022-03-03 PROCEDURE — 17000 DESTRUCT PREMALG LESION: CPT | Mod: 59

## 2022-03-03 PROCEDURE — 99213 OFFICE O/P EST LOW 20 MIN: CPT | Mod: 25

## 2022-03-03 ASSESSMENT — LOCATION DETAILED DESCRIPTION DERM
LOCATION DETAILED: LEFT RADIAL DORSAL HAND
LOCATION DETAILED: LEFT ANTERIOR EARLOBE
LOCATION DETAILED: LEFT MEDIAL SUPERIOR CHEST
LOCATION DETAILED: RIGHT INFERIOR NASAL CHEEK
LOCATION DETAILED: INFERIOR THORACIC SPINE
LOCATION DETAILED: LEFT ULNAR DORSAL HAND
LOCATION DETAILED: 1ST WEB SPACE RIGHT HAND
LOCATION DETAILED: LEFT SUPERIOR HELIX
LOCATION DETAILED: LEFT INFERIOR VERMILION LIP

## 2022-03-03 ASSESSMENT — PAIN INTENSITY VAS: HOW INTENSE IS YOUR PAIN 0 BEING NO PAIN, 10 BEING THE MOST SEVERE PAIN POSSIBLE?: 1/10 PAIN

## 2022-03-03 ASSESSMENT — LOCATION SIMPLE DESCRIPTION DERM
LOCATION SIMPLE: RIGHT CHEEK
LOCATION SIMPLE: LEFT HAND
LOCATION SIMPLE: RIGHT THUMB
LOCATION SIMPLE: LEFT EAR
LOCATION SIMPLE: UPPER BACK
LOCATION SIMPLE: LEFT LIP
LOCATION SIMPLE: CHEST

## 2022-03-03 ASSESSMENT — LOCATION ZONE DERM
LOCATION ZONE: EAR
LOCATION ZONE: FACE
LOCATION ZONE: TRUNK
LOCATION ZONE: LIP
LOCATION ZONE: HAND
LOCATION ZONE: FINGER

## 2022-03-03 NOTE — HPI: MELANOMA F/U (HISTORY OF MALIGNANT MELANOMA)
What Stage Is The Melanoma?: Stage IB
What Is The Reason For Today's Visit?: History of Melanoma
Year Excised?: 6/2014
Breslow Depth?: 0.55

## 2022-03-03 NOTE — PROCEDURE: REASSURANCE
Quality 137: Melanoma: Continuity Of Care - Recall System: Recall system not utilized, reason not otherwise specified
When Should The Patient Follow-Up For Their Next Full-Body Skin Exam?: 6 Months
Detail Level: Detailed

## 2022-03-03 NOTE — PROCEDURE: OTHER
Other (Free Text): Patient consent was obtained to proceed with the visit and recommended plan of care after discussion of all risks and benefits, including the risks of COVID-19 exposure.
Note Text (......Xxx Chief Complaint.): This diagnosis correlates with the
Render Risk Assessment In Note?: yes
Detail Level: Generalized

## 2022-03-03 NOTE — PROCEDURE: LIQUID NITROGEN
Consent: The patient's consent was obtained including but not limited to risks of crusting, scabbing, blistering, scarring, darker or lighter pigmentary change, recurrence, incomplete removal and infection.
Show Aperture Variable?: Yes
Render Note In Bullet Format When Appropriate: No
Number Of Freeze-Thaw Cycles: 3 freeze-thaw cycles
Duration Of Freeze Thaw-Cycle (Seconds): 2
Post-Care Instructions: I reviewed with the patient in detail post-care instructions. Patient is to wear sunprotection, and avoid picking at any of the treated lesions. Pt may apply Vaseline to crusted or scabbing areas.
Detail Level: Detailed

## 2022-03-03 NOTE — PROCEDURE: SHAVE REMOVAL
Render Path Notes In Note?: No
X Size Of Lesion In Cm (Optional): 0.3
Billing Type: Third-Party Bill
Notification Instructions: Patient will be notified of pathology results. However, patient instructed to call the office if not contacted within 2 weeks.
Medical Necessity Clause: This procedure was medically necessary because the lesion that was treated was:
Hemostasis: Aluminum Chloride
Wound Care: Petrolatum
Accession #: Skin Path
Lab Facility: 0
Size Of Lesion In Cm (Required): 0.6
Biopsy Method: Dermablade
Lab: 6
Post-Care Instructions: I reviewed with the patient in detail post-care instructions. Patient is to keep the biopsy site dry overnight, and then apply bacitracin twice daily until healed. Patient may apply hydrogen peroxide soaks to remove any crusting.
Medical Necessity Information: It is in your best interest to select a reason for this procedure from the list below. All of these items fulfill various CMS LCD requirements except the new and changing color options.
Anesthesia Type: 1% lidocaine with 1:100,000 epinephrine and a 1:10 solution of 8.4% sodium bicarbonate
Detail Level: Detailed
Was A Bandage Applied: Yes
Consent was obtained from the patient. The risks and benefits to therapy were discussed in detail. Specifically, the risks of infection, scarring, bleeding, prolonged wound healing, incomplete removal, allergy to anesthesia, nerve injury and recurrence were addressed. Prior to the procedure, the treatment site was clearly identified and confirmed by the patient. All components of Universal Protocol/PAUSE Rule completed.

## 2022-03-04 ENCOUNTER — APPOINTMENT (OUTPATIENT)
Dept: PHYSICAL THERAPY | Age: 77
End: 2022-03-04

## 2022-03-07 ENCOUNTER — APPOINTMENT (OUTPATIENT)
Dept: PHYSICAL THERAPY | Age: 77
End: 2022-03-07

## 2022-03-09 ENCOUNTER — APPOINTMENT (OUTPATIENT)
Dept: PHYSICAL THERAPY | Age: 77
End: 2022-03-09

## 2022-03-11 ENCOUNTER — APPOINTMENT (OUTPATIENT)
Dept: PHYSICAL THERAPY | Age: 77
End: 2022-03-11

## 2022-03-12 ENCOUNTER — APPOINTMENT (OUTPATIENT)
Dept: PHYSICAL THERAPY | Age: 77
End: 2022-03-12

## 2022-03-18 PROBLEM — Z79.4 TYPE 2 DIABETES MELLITUS WITH HYPERGLYCEMIA, WITH LONG-TERM CURRENT USE OF INSULIN (HCC): Status: ACTIVE | Noted: 2020-09-23

## 2022-03-18 PROBLEM — G47.01 INSOMNIA DUE TO MEDICAL CONDITION: Status: ACTIVE | Noted: 2021-03-09

## 2022-03-18 PROBLEM — M10.9 GOUT: Status: ACTIVE | Noted: 2020-09-23

## 2022-03-18 PROBLEM — E11.9 ENCOUNTER FOR DIABETIC FOOT EXAM (HCC): Status: ACTIVE | Noted: 2020-06-09

## 2022-03-18 PROBLEM — E66.9 OBESITY (BMI 30-39.9): Status: ACTIVE | Noted: 2020-06-09

## 2022-03-18 PROBLEM — E11.65 TYPE 2 DIABETES MELLITUS WITH HYPERGLYCEMIA, WITH LONG-TERM CURRENT USE OF INSULIN (HCC): Status: ACTIVE | Noted: 2020-09-23

## 2022-03-19 PROBLEM — N18.30 STAGE 3 CHRONIC KIDNEY DISEASE (HCC): Status: ACTIVE | Noted: 2020-06-09

## 2022-03-19 PROBLEM — G93.41 ACUTE METABOLIC ENCEPHALOPATHY: Status: ACTIVE | Noted: 2021-02-09

## 2022-03-19 PROBLEM — A41.9 SEPSIS (HCC): Status: ACTIVE | Noted: 2021-02-13

## 2022-03-19 PROBLEM — M79.605 LEFT LEG PAIN: Status: ACTIVE | Noted: 2021-02-16

## 2022-03-19 PROBLEM — E78.2 MIXED HYPERLIPIDEMIA: Status: ACTIVE | Noted: 2020-06-09

## 2022-03-19 PROBLEM — K21.9 GASTROESOPHAGEAL REFLUX DISEASE WITHOUT ESOPHAGITIS: Status: ACTIVE | Noted: 2020-06-09

## 2022-03-19 PROBLEM — I10 PRIMARY HYPERTENSION: Status: ACTIVE | Noted: 2021-12-14

## 2022-03-19 PROBLEM — G25.2 INTENTION TREMOR: Status: ACTIVE | Noted: 2020-09-23

## 2022-03-19 PROBLEM — R42 DIZZINESS: Status: ACTIVE | Noted: 2020-06-09

## 2022-03-19 PROBLEM — F11.99 OPIOID USE, UNSPECIFIED WITH UNSPECIFIED OPIOID-INDUCED DISORDER (HCC): Status: ACTIVE | Noted: 2022-01-19

## 2022-03-19 PROBLEM — N17.9 AKI (ACUTE KIDNEY INJURY) (HCC): Status: ACTIVE | Noted: 2021-02-09

## 2022-03-19 PROBLEM — U07.1 COVID-19: Status: ACTIVE | Noted: 2021-02-09

## 2022-03-20 PROBLEM — J18.9 CAP (COMMUNITY ACQUIRED PNEUMONIA): Status: ACTIVE | Noted: 2021-02-13

## 2022-04-20 PROBLEM — C44.310 BASAL CELL CARCINOMA (BCC) OF FACE: Status: ACTIVE | Noted: 2022-04-20

## 2022-04-20 PROBLEM — R41.3 MEMORY CHANGE: Status: ACTIVE | Noted: 2022-04-20

## 2022-04-25 ENCOUNTER — APPOINTMENT (RX ONLY)
Dept: URBAN - METROPOLITAN AREA CLINIC 329 | Facility: CLINIC | Age: 77
Setting detail: DERMATOLOGY
End: 2022-04-25

## 2022-04-25 DIAGNOSIS — Z85.828 PERSONAL HISTORY OF OTHER MALIGNANT NEOPLASM OF SKIN: ICD-10-CM

## 2022-04-25 DIAGNOSIS — Z85.820 PERSONAL HISTORY OF MALIGNANT MELANOMA OF SKIN: ICD-10-CM

## 2022-04-25 PROBLEM — D48.5 NEOPLASM OF UNCERTAIN BEHAVIOR OF SKIN: Status: ACTIVE | Noted: 2022-04-25

## 2022-04-25 PROCEDURE — ? OTHER

## 2022-04-25 PROCEDURE — 11102 TANGNTL BX SKIN SINGLE LES: CPT

## 2022-04-25 PROCEDURE — ? BIOPSY BY SHAVE METHOD

## 2022-04-25 PROCEDURE — 99212 OFFICE O/P EST SF 10 MIN: CPT | Mod: 25

## 2022-04-25 PROCEDURE — ? COUNSELING

## 2022-04-25 ASSESSMENT — LOCATION SIMPLE DESCRIPTION DERM
LOCATION SIMPLE: RIGHT CHEEK
LOCATION SIMPLE: LEFT EAR
LOCATION SIMPLE: LEFT HAND

## 2022-04-25 ASSESSMENT — LOCATION ZONE DERM
LOCATION ZONE: EAR
LOCATION ZONE: HAND
LOCATION ZONE: FACE

## 2022-04-25 ASSESSMENT — LOCATION DETAILED DESCRIPTION DERM
LOCATION DETAILED: RIGHT INFERIOR NASAL CHEEK
LOCATION DETAILED: LEFT ULNAR DORSAL HAND
LOCATION DETAILED: LEFT ANTERIOR EARLOBE

## 2022-04-25 NOTE — PROCEDURE: OTHER
Render Risk Assessment In Note?: yes
Detail Level: Generalized
Other (Free Text): Patient consent was obtained to proceed with the visit and recommended plan of care after discussion of all risks and benefits, including the risks of COVID-19 exposure.
Note Text (......Xxx Chief Complaint.): This diagnosis correlates with the

## 2022-04-25 NOTE — PROCEDURE: MIPS QUALITY
Quality 47: Advance Care Plan: Advance care planning not documented, reason not otherwise specified.
Quality 110: Preventive Care And Screening: Influenza Immunization: Influenza Immunization Administered during Influenza season
Quality 111:Pneumonia Vaccination Status For Older Adults: Pneumococcal Vaccination Previously Received
Detail Level: Detailed
Quality 130: Documentation Of Current Medications In The Medical Record: Current Medications Documented
Quality 402: Tobacco Use And Help With Quitting Among Adolescents: Patient screened for tobacco and never smoked
Quality 431: Preventive Care And Screening: Unhealthy Alcohol Use - Screening: Patient not identified as an unhealthy alcohol user when screened for unhealthy alcohol use using a systematic screening method
Quality 226: Preventive Care And Screening: Tobacco Use: Screening And Cessation Intervention: Patient screened for tobacco use and is an ex/non-smoker

## 2022-04-25 NOTE — HPI: MELANOMA F/U (HISTORY OF MALIGNANT MELANOMA)
What Stage Is The Melanoma?: Stage IB
What Is The Reason For Today's Visit?: History of Melanoma
Year Excised?: 06/2014
Breslow Depth?: 0.55

## 2022-04-25 NOTE — PROCEDURE: BIOPSY BY SHAVE METHOD
Was A Bandage Applied: Yes
Additional Anesthesia Volume In Cc (Will Not Render If 0): 0
Validate Triangulation: No
Type Of Destruction Used: Curettage
Lab: 6
Silver Nitrate Text: The wound bed was treated with silver nitrate after the biopsy was performed.
Consent: Written consent was obtained and risks were reviewed including but not limited to scarring, infection, bleeding, scabbing, incomplete removal, nerve damage and allergy to anesthesia.
Wound Care: Petrolatum
Billing Type: Third-Party Bill
Electrodesiccation And Curettage Text: The wound bed was treated with electrodesiccation and curettage after the biopsy was performed.
Depth Of Biopsy: dermis
Anesthesia Volume In Cc (Will Not Render If 0): 0.5
Accession #: Skin path
Electrodesiccation Text: The wound bed was treated with electrodesiccation after the biopsy was performed.
Biopsy Method: Dermablade
Hemostasis: Aluminum Chloride
Detail Level: Detailed
Cryotherapy Text: The wound bed was treated with cryotherapy after the biopsy was performed.
Notification Instructions: Patient will be notified of biopsy results. However, patient instructed to call the office if not contacted within 2 weeks.
Curettage Text: The wound bed was treated with curettage after the biopsy was performed.
Anesthesia Type: 1% lidocaine with 1:100,000 epinephrine and a 1:10 solution of 8.4% sodium bicarbonate
Information: Selecting Yes will display possible errors in your note based on the variables you have selected. This validation is only offered as a suggestion for you. PLEASE NOTE THAT THE VALIDATION TEXT WILL BE REMOVED WHEN YOU FINALIZE YOUR NOTE. IF YOU WANT TO FAX A PRELIMINARY NOTE YOU WILL NEED TO TOGGLE THIS TO 'NO' IF YOU DO NOT WANT IT IN YOUR FAXED NOTE.
Post-Care Instructions: I reviewed with the patient in detail post-care instructions. Patient is to keep the biopsy site dry overnight, and then apply bacitracin twice daily until healed. Patient may apply hydrogen peroxide soaks to remove any crusting.
Biopsy Type: H and E
Dressing: bandage

## 2022-05-04 ENCOUNTER — RX ONLY (OUTPATIENT)
Age: 77
Setting detail: RX ONLY
End: 2022-05-04

## 2022-05-04 RX ORDER — CEPHALEXIN 500 MG/1
CAPSULE ORAL
Qty: 4 | Refills: 0 | Status: CANCELLED

## 2022-05-04 RX ORDER — CLINDAMYCIN HYDROCHLORIDE 300 MG/1
CAPSULE ORAL
Qty: 2 | Refills: 0 | Status: ERX | COMMUNITY
Start: 2022-05-04

## 2022-05-23 ENCOUNTER — APPOINTMENT (RX ONLY)
Dept: URBAN - METROPOLITAN AREA CLINIC 330 | Facility: CLINIC | Age: 77
Setting detail: DERMATOLOGY
End: 2022-05-23

## 2022-05-23 PROBLEM — C44.319 BASAL CELL CARCINOMA OF SKIN OF OTHER PARTS OF FACE: Status: ACTIVE | Noted: 2022-05-23

## 2022-05-23 PROCEDURE — ? MOHS SURGERY

## 2022-05-23 PROCEDURE — 17311 MOHS 1 STAGE H/N/HF/G: CPT

## 2022-05-23 NOTE — PROCEDURE: MOHS SURGERY
[FreeTextEntry1] : Patient returns here for followup after cardiac cath 8/22/18 with regard to management of problems which include:\par \par 1. History of a CABG x 3 September 2016.\par 2. Circumflex graft occlusion.\par 3. Left main stenting  03/2017.\par 4. 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 History of chronic hypertension\par 6. Hyperlipidemia.\par \par Home BP record AVG = 163/72 Wound Care (No Sutures): Petrolatum

## 2022-05-24 ENCOUNTER — OFFICE VISIT (OUTPATIENT)
Dept: INTERNAL MEDICINE CLINIC | Facility: CLINIC | Age: 77
End: 2022-05-24
Payer: MEDICARE

## 2022-05-24 VITALS
DIASTOLIC BLOOD PRESSURE: 82 MMHG | HEIGHT: 69 IN | WEIGHT: 255.4 LBS | SYSTOLIC BLOOD PRESSURE: 144 MMHG | BODY MASS INDEX: 37.83 KG/M2

## 2022-05-24 DIAGNOSIS — Z91.81 AT HIGH RISK FOR FALLS: Primary | ICD-10-CM

## 2022-05-24 DIAGNOSIS — I10 PRIMARY HYPERTENSION: ICD-10-CM

## 2022-05-24 DIAGNOSIS — F99 ABNORMAL MMSE: ICD-10-CM

## 2022-05-24 DIAGNOSIS — R41.3 MEMORY CHANGES: ICD-10-CM

## 2022-05-24 PROCEDURE — 3017F COLORECTAL CA SCREEN DOC REV: CPT | Performed by: INTERNAL MEDICINE

## 2022-05-24 PROCEDURE — 99214 OFFICE O/P EST MOD 30 MIN: CPT | Performed by: INTERNAL MEDICINE

## 2022-05-24 PROCEDURE — 1123F ACP DISCUSS/DSCN MKR DOCD: CPT | Performed by: INTERNAL MEDICINE

## 2022-05-24 PROCEDURE — G8417 CALC BMI ABV UP PARAM F/U: HCPCS | Performed by: INTERNAL MEDICINE

## 2022-05-24 PROCEDURE — G8427 DOCREV CUR MEDS BY ELIG CLIN: HCPCS | Performed by: INTERNAL MEDICINE

## 2022-05-24 PROCEDURE — 1036F TOBACCO NON-USER: CPT | Performed by: INTERNAL MEDICINE

## 2022-05-24 RX ORDER — DONEPEZIL HYDROCHLORIDE 5 MG/1
5 TABLET, FILM COATED ORAL NIGHTLY
Qty: 90 TABLET | Refills: 1 | Status: SHIPPED | OUTPATIENT
Start: 2022-05-24 | End: 2022-08-24 | Stop reason: SDUPTHER

## 2022-05-24 RX ORDER — GLUCOSAMINE/D3/BOSWELLIA SERRA 1500MG-400
TABLET ORAL
COMMUNITY
End: 2022-08-24 | Stop reason: ALTCHOICE

## 2022-05-24 ASSESSMENT — PATIENT HEALTH QUESTIONNAIRE - PHQ9
1. LITTLE INTEREST OR PLEASURE IN DOING THINGS: 0
SUM OF ALL RESPONSES TO PHQ QUESTIONS 1-9: 0
2. FEELING DOWN, DEPRESSED OR HOPELESS: 0
SUM OF ALL RESPONSES TO PHQ9 QUESTIONS 1 & 2: 0

## 2022-05-24 ASSESSMENT — ENCOUNTER SYMPTOMS
EYE PAIN: 0
NAUSEA: 0
SINUS PRESSURE: 0
BLOOD IN STOOL: 0
DIARRHEA: 0
CONSTIPATION: 0
ABDOMINAL DISTENTION: 0
SHORTNESS OF BREATH: 0
COLOR CHANGE: 0
BACK PAIN: 0
VOMITING: 0
SINUS PAIN: 0
WHEEZING: 0
COUGH: 0
EYE REDNESS: 0
ABDOMINAL PAIN: 0

## 2022-05-24 NOTE — PROGRESS NOTES
HPI: Kaylan Corley (: 1945)  Needs MMSE done today   Having more issues with remembering names, short term memory changes, and has had some difficulty with   Directions    Has started taking Prevagen as it is concerning him and his wife  Could be worsened by long haul Covid as well    Problem List:  Patient Active Problem List   Diagnosis    Obesity (BMI 30-39. 9)    Obesity hypoventilation syndrome ([facility]    Encounter for diabetic foot exam ([facility]    Insomnia due to medical condition    Presence of cardiac defibrillator    Gout    MICHELL on CPAP    Type 2 diabetes mellitus with hyperglycemia, with long-term current use of insulin (HCC)    Intention tremor    Gastroesophageal reflux disease without esophagitis    LBBB (left bundle branch block)    Sepsis ([facility])    COVID-19    Chronic systolic CHF (congestive heart failure) ([facility])    CHF (congestive heart failure) ([facility])    Left leg pain    Acute metabolic encephalopathy    PARKER (acute kidney injury) ([facility]    Opioid use, unspecified with unspecified opioid-induced disorder ([facility]    Mixed hyperlipidemia    Primary hypertension    Dizziness    Stage 3 chronic kidney disease ([address]    CAP (community acquired pneumonia)    Hypoxemia    Memory change    Basal cell carcinoma (BCC) of face       History:  Past Medical History:   Diagnosis Date    Age-related cognitive decline     30 out of 30 MMSE    Arthritis     DJD- shoulder, knees, hips    Chronic systolic CHF (congestive heart failure) ([facility]     COVID-19 2021    Dizziness 2020    hx     GERD (gastroesophageal reflux disease)     managed with medication     Gout     managed with medication     H/O echocardiogram 10/14/2019    EF 60.1%    Hip pain, bilateral     POA    Hypertension     managed with medication     Morbid obesity ([address]     Neuropathy     feet and legs    MICHELL on CPAP 2014    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 Raj pacemaker/defibrillator placed 6/25/14--0 shocks     Psychiatric disorder     DEPRESSION    Routine eye exam 2020    no diabetic retinopathy- [facility]    Spinal stenosis of lumbar region     Stage 3b chronic kidney disease ([facility]     Type 2 diabetes mellitus with hyperglycemia, with long-term current use of insulin [facility] 09/23/2020    managed with oral medication and Tresiba, average 's, no problems with hypoglycemia, A1c 7.2 11/29/21       Allergies:  No Known Allergies    Current Medications:  Current Outpatient Medications   Medication Sig Dispense Refill    Biotin 65408 MCG TABS Take by mouth      donepezil (ARICEPT) 5 MG tablet Take 1 tablet by mouth nightly 90 tablet 1    Zinc Acetate, Oral, (ZINC ACETATE PO) Take 1 tablet by mouth daily      acetaminophen (TYLENOL) 500 MG tablet Take 1,000 mg by mouth every 6 hours as needed      allopurinol (ZYLOPRIM) 100 MG tablet Take 100 mg by mouth daily      amitriptyline (ELAVIL) 10 MG tablet Take 10 mg by mouth      carvedilol (COREG) 6.25 MG tablet Take 6.25 mg by mouth 2 times daily      Cholecalciferol 50 MCG (2000 UT) TABS Take 1 tablet by mouth daily      Cyanocobalamin 1000 MCG SUBL Take 1,000 mcg by mouth daily      furosemide (LASIX) 20 MG tablet Take 20 mg by mouth      gabapentin (NEURONTIN) 300 MG capsule TAKE 1 CAPSULE BY MOUTH EVERY MORNING AND 2 CAPSULES BY MOUTH AT BEDTIME      indomethacin (INDOCIN) 25 MG capsule Take 25 mg by mouth 3 times daily      Insulin Degludec (TRESIBA FLEXTOUCH) 200 UNIT/ML SOPN Inject 50 Units into the skin daily      nitroGLYCERIN (NITROSTAT) 0.4 MG SL tablet Place 0.4 mg under the tongue      omeprazole (PRILOSEC) 20 MG delayed release capsule Take 20 mg by mouth daily      predniSONE (DELTASONE) 10 MG tablet Take 3 tablets on day 1, take 2 tablets on day 2 and 3, take 1 tablet on day 4 and 5, take 1/2 tablet on day 6 and 7.      Probiotic Product (ACIDOPHILUS PROBIOTIC) CAPS capsule Take 1 tablet by mouth      rosuvastatin (CRESTOR) 10 MG tablet Take 10 mg by mouth      SITagliptin-metFORMIN (JANUMET XR)  MG TB24 per extended release tablet Take 2 tablets by mouth daily      valsartan-hydroCHLOROthiazide (DIOVAN-HCT) 320-25 MG per tablet Take 1 tablet by mouth daily       No current facility-administered medications for this visit. Review of Systems:  Review of Systems   Constitutional: Negative for fatigue and fever. Negative for - weight gain  Negative for - weight loss   HENT: Negative for congestion, ear pain, hearing loss, postnasal drip, sinus pressure, sinus pain and tinnitus. Eyes: Negative for pain, redness and visual disturbance. Respiratory: Negative for cough, shortness of breath and wheezing. Cardiovascular: Negative for chest pain, palpitations and leg swelling. Gastrointestinal: Negative for abdominal distention, abdominal pain, blood in stool, constipation, diarrhea, nausea and vomiting. Negative for - reflux   Genitourinary: Negative for difficulty urinating, dysuria, flank pain, frequency, hematuria and urgency. Musculoskeletal: Negative for arthralgias, back pain, myalgias, neck pain and neck stiffness. Negative for - joint pain   Skin: Negative for color change and rash. Allergic/Immunologic: Negative for environmental allergies and food allergies. Neurological: Negative for dizziness, tremors, weakness, light-headedness, numbness and headaches. Hematological: Negative for adenopathy. Does not bruise/bleed easily. Psychiatric/Behavioral: Negative for dysphoric mood and sleep disturbance. The patient is not nervous/anxious. Memory changes       Vitals:  BP (!) 144/82   Ht 5' 9\" (1.753 m)   Wt 255 lb 6.4 oz (115.8 kg)   BMI 37.72 kg/m²     Physical Exam:  Physical Exam  Vitals reviewed. Constitutional:       Appearance: Normal appearance. HENT:      Head: Normocephalic and atraumatic. Cardiovascular:      Rate and Rhythm: Normal rate and regular rhythm. Heart sounds: Normal heart sounds. Pulmonary:      Effort: Pulmonary effort is normal.      Breath sounds: Normal breath sounds. Musculoskeletal:         General: Normal range of motion. Cervical back: Normal range of motion and neck supple. Skin:     General: Skin is warm and dry. Comments: Had removal of skin cancer left temple and bandage in place   Neurological:      General: No focal deficit present. Mental Status: He is alert and oriented to person, place, and time. Psychiatric:         Mood and Affect: Mood normal.         Behavior: Behavior normal.         Thought Content: Thought content normal.         Judgment: Judgment normal.          Assessment/Plan:   Elise Bowers was seen today for other. Diagnoses and all orders for this visit:    At high risk for falls  Tripped over dog and tripped at [facility] otherwise no other falls recently  Does not feel off balance   Abnormal MMSE  29 out of 30 was the score but pt states feels his memory is much worse  Very forgetful of names and has has some issues with directions but has not gotten lost  He did have Covid and prob has long haul Covid with memory deficiency  He has started Prevagen and is interested in starting Aricept   Will start at lower dose until his next OV then adjust if needed    Memory changes  -     donepezil (ARICEPT) 5 MG tablet; Take 1 tablet by mouth nightly      HTN- continue to monitor BP and continue medications         Current medications are therapeutic at this time; continue as prescribed.         Branda Homans, MD  On the basis of positive falls risk screening, assessment and plan is as follows: be more cautious with walking dog  Be more cautious of surroundings  Does not require use of cane or device to assist with ambulation

## 2022-05-24 NOTE — PROGRESS NOTES
[address] [place] [address] Court, [address] [facility]  [phone]    Patient Name:  Edgard Price  YOB: 1945      Office Visit 5/26/2022    CHIEF COMPLAINT:    Chief Complaint   Patient presents with    Follow-up    Sleep Apnea         HISTORY OF PRESENT ILLNESS:      Patient is seen today for follow-up of sleep apnea. He is currently on CPAP at 8 cm. He is using and benefiting from his CPAP on a daily basis and his average daily use is 8 hours and 9 minutes. His AHI is well-controlled at 1.9/hour. His median leak is 5.6 L/min and his 95th percentile leak is 29.8 L/min. He is using  nasal pillows and reports tolerating those well. He was diagnosed with sleep apnea in 2011 at which time his weight was documented to be 295 pounds. When seen last year his weight was 251 pounds. His weight today is 255 pounds. He was diagnosed with Covid in February last year. He was admitted to the hospital 10 days due to altered mental status, then transferred to post acute care. He reports that his wife was also admitted to [facility] and treated for Covid 47 days. He reports he is sleeping well throughout the night. He states on rare occasions he will doze off while sitting quietly in his recliner. He is also seen by cardiology Dr. Jake Riggs for  chronic systolic CHF management. He will need renewal for his mask and supplies and they will be sent out today.       Sleepiness Scale:     Sitting and reading 2    Watching TV 2    Sitting inactive in a public place 0    As a passenger in a car for an hour without a break 0    Lying down to rest in the afternoon when circumstances Permits 0    Sitting and talking to someone 0    Sitting quietly after lunch without alcohol 3    In a car, while stopped for a few minutes 0    Total :  7/24             Past Medical History:   Diagnosis Date    Abnormal MMSE 05/24/2022    29 out of 30 and normal clock draw    Age-related cognitive decline 2020    30 out of 30 MMSE    Arthritis     DJD- shoulder, knees, hips    Chronic systolic CHF (congestive heart failure) ([facility]     COVID-19 2/9/2021    Dizziness 06/09/2020    hx     GERD (gastroesophageal reflux disease)     managed with medication     Gout     managed with medication     H/O echocardiogram 10/14/2019    EF 60.1%    Hip pain, bilateral     POA    Hypertension     managed with medication     Morbid obesity ([address]     Neuropathy     feet and legs    MICHELL on CPAP 08/26/2014    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 pacemaker/defibrillator placed 6/25/14--0 shocks     Psychiatric disorder     DEPRESSION    Routine eye exam 2020    no diabetic retinopathy- Sheron Dandy    Spinal stenosis of lumbar region     Stage 3b chronic kidney disease ([address]     Type 2 diabetes mellitus with hyperglycemia, with long-term current use of insulin ([address] 09/23/2020    managed with oral medication and Tresiba, average 's, no problems with hypoglycemia, A1c 7.2 11/29/21         Patient Active Problem List   Diagnosis    Obesity (BMI 30-39. 9)    Obesity hypoventilation syndrome ([address]    Encounter for diabetic foot exam ([facility]    Insomnia due to medical condition    Presence of cardiac defibrillator    Gout    MICHELL on CPAP    Type 2 diabetes mellitus with hyperglycemia, with long-term current use of insulin (HCC)    Intention tremor    Gastroesophageal reflux disease without esophagitis    LBBB (left bundle branch block)    Sepsis (HCC)    COVID-19    Chronic systolic CHF (congestive heart failure) (HCC)    CHF (congestive heart failure) (HCC)    Left leg pain    Acute metabolic encephalopathy    PARKER (acute kidney injury) ([address]    Opioid use, unspecified with unspecified opioid-induced disorder ([address]    Mixed hyperlipidemia    Primary hypertension    Dizziness    Stage 3 chronic kidney disease ([address]    CAP (community acquired pneumonia)    Hypoxemia    Memory change    Basal cell carcinoma (BCC) of face          Past Surgical History:   Procedure Laterality Date   Rubbie Dec COLONOSCOPY      Dr. Lucía Short Left 05/23/2022    temple- BCC    ORTHOPEDIC SURGERY      right achilles tendon repair    ORTHOPEDIC SURGERY      knee    PACEMAKER      pacemaker/defibrillator placed 6/25/14     DE CARDIAC SURG PROCEDURE UNLIST  cath    TONSILLECTOMY         [unfilled]        Social History     Socioeconomic History    Marital status:      Spouse name: Not on file    Number of children: Not on file    Years of education: Not on file    Highest education level: Not on file   Occupational History    Not on file   Tobacco Use    Smoking status: Never Smoker    Smokeless tobacco: Never Used   Substance and Sexual Activity    Alcohol use: No    Drug use: No    Sexual activity: Not on file   Other Topics Concern    Not on file   Social History Narrative    Not on file     Social Determinants of Health     Financial Resource Strain:     Difficulty of Paying Living Expenses: Not on file   Food Insecurity:     Worried About Running Out of Food in the Last Year: Not on file    Sophia of Food in the Last Year: Not on file   Transportation Needs:     Lack of Transportation (Medical): Not on file    Lack of Transportation (Non-Medical):  Not on file   Physical Activity:     Days of Exercise per Week: Not on file    Minutes of Exercise per Session: Not on file   Stress:     Feeling of Stress : Not on file   Social Connections:     Frequency of Communication with Friends and Family: Not on file    Frequency of Social Gatherings with Friends and Family: Not on file    Attends Buddhism Services: Not on file    Active Member of Clubs or Organizations: Not on file    Attends Club or Organization Meetings: Not on file    Marital Status: Not on file   Intimate Partner Violence:     Fear of Current or Ex-Partner: Not on file    Emotionally Abused: Not on file    Physically Abused: Not on file    Sexually Abused: Not on file   Housing Stability:     Unable to Pay for Housing in the Last Year: Not on file    Number of Teo in the Last Year: Not on file    Unstable Housing in the Last Year: Not on file         Family History   Problem Relation Age of Onset    Cancer Mother     Heart Attack Father          No Known Allergies      Current Outpatient Medications   Medication Sig    Biotin 81514 MCG TABS Take by mouth    donepezil (ARICEPT) 5 MG tablet Take 1 tablet by mouth nightly    Zinc Acetate, Oral, (ZINC ACETATE PO) Take 1 tablet by mouth daily    acetaminophen (TYLENOL) 500 MG tablet Take 1,000 mg by mouth every 6 hours as needed    allopurinol (ZYLOPRIM) 100 MG tablet Take 100 mg by mouth daily    amitriptyline (ELAVIL) 10 MG tablet Take 10 mg by mouth    carvedilol (COREG) 6.25 MG tablet Take 6.25 mg by mouth 2 times daily    Cholecalciferol 50 MCG (2000 UT) TABS Take 1 tablet by mouth daily    Cyanocobalamin 1000 MCG SUBL Take 1,000 mcg by mouth daily    furosemide (LASIX) 20 MG tablet Take 20 mg by mouth    gabapentin (NEURONTIN) 300 MG capsule TAKE 1 CAPSULE BY MOUTH EVERY MORNING AND 2 CAPSULES BY MOUTH AT BEDTIME    indomethacin (INDOCIN) 25 MG capsule Take 25 mg by mouth 3 times daily    Insulin Degludec (TRESIBA FLEXTOUCH) 200 UNIT/ML SOPN Inject 50 Units into the skin daily    nitroGLYCERIN (NITROSTAT) 0.4 MG SL tablet Place 0.4 mg under the tongue    omeprazole (PRILOSEC) 20 MG delayed release capsule Take 20 mg by mouth daily    predniSONE (DELTASONE) 10 MG tablet Take 3 tablets on day 1, take 2 tablets on day 2 and 3, take 1 tablet on day 4 and 5, take 1/2 tablet on day 6 and 7.    Probiotic Product (ACIDOPHILUS PROBIOTIC) CAPS capsule Take 1 tablet by mouth    rosuvastatin (CRESTOR) 10 MG tablet Take 10 mg by mouth    SITagliptin-metFORMIN (JANUMET XR)  MG TB24 per extended release tablet Take 2 tablets by mouth daily    valsartan-hydroCHLOROthiazide (DIOVAN-HCT) 320-25 MG per tablet Take 1 tablet by mouth daily     No current facility-administered medications for this visit. REVIEW OF SYSTEMS:   CONSTITUTIONAL:   There is no history of fever, chills, night sweats, weight loss, weight gain, persistent fatigue, or lethargy/hypersomnolence. CARDIAC:   No chest pain, pressure, discomfort, palpitations, orthopnea, murmurs, or edema. GI:   No dysphagia, heartburn reflux, nausea/vomiting, diarrhea, abdominal pain, or bleeding. NEURO:   There is no history of AMS, persistent headache, decreased level of consciousness, seizures, or motor or sensory deficits. PHYSICAL EXAM:    Vitals:    05/26/22 1327   BP: 128/68   Pulse: 91   Resp: 14   Temp: 97.8 °F (36.6 °C)   SpO2: 98%        GENERAL APPEARANCE:   The patient is normal weight and in no respiratory distress. HEENT:   PERRL. Conjunctivae unremarkable. Nasal mucosa is without epistaxis, exudate, or polyps. Gums and dentition are unremarkable. There is  oropharyngeal narrowing. TMs are clear. NECK/LYMPHATIC:   Symmetrical with no elevation of jugular venous pulsation. Trachea midline. No thyroid enlargement. No cervical adenopathy. LUNGS:   Normal respiratory effort with symmetrical lung expansion. Breath sounds are diminished in the bases. HEART:   There is a regular rate and rhythm. No murmur, rub, or gallop. There is no edema in the lower extremities. ABDOMEN:   Soft and non-tender. No hepatosplenomegaly. Bowel sounds are normal.     NEURO:   The patient is alert and oriented to person, place, and time. Memory appears intact and mood is normal.  No gross sensorimotor deficits are present. ASSESSMENT:  (Medical Decision Making)      Diagnosis Orders   1. MICHELL on CPAP at 8 cm with excellent daily compliance   DME - DURABLE MEDICAL EQUIPMENT   2.  Presence of cardiac defibrillator, related to his chronic systolic heart failure      3. Chronic systolic CHF (congestive heart failure) ([address] currently on proper treatment as given to him by [facility] cardiology. PLAN:    We will continue CPAP at 8 cm with heated humidity and EPR of 2    We will continue sleep hygiene and positional therapy    All patient questions were answered today and addressed properly    We will renew his mask and supplies order    He will maintain his follow-up with cardiology    He will return to the sleep center in 1 year or sooner if needed      Orders Placed This Encounter   Procedures    DME - [address]     [facility]  Phone: [phone] [address]  Dept: [phone]      Patient Name: Lila Rosario  : 1945  Gender: male  Address[place] [address]  [facility] [address]  Last 4 digits of SSN: [unfilled]    Primary Insurance: Payor: MEDICARE / Plan: MEDICARE PART A AND B / Product Type: *No Product type* /   Subscriber ID: [id number] - (Medicare)      AMB Supply Order  Order Details     DME Location:   Order Date: 2022   The encounter diagnosis was MICHELL on CPAP.              (  X   )Supplies Needed         Machine   ( x    ) CPAP Unit  (     ) Auto CPAP Unit  (     ) BiLevel Unit  (     ) Auto BiLevel Unit  (     ) ASV        (     ) Bilevel ST      Length of need: 12 months    Pressure:  8 cmH20  EPR: 2    Starting Ramp Pressure:    cm H20  Ramp Time: min         Patient had a diagnostic Apnea Hypopnea Index (AHI) of :     *SUPPLIES* Replace all as needed, or per coverage guidelines     Masks Type:  (    ) -Full Face Mask (1 per 3 mon)  (    ) -Full Mask (1 per month) Interface/Cushion      ( x ) -Nasal Mask (1 per 3 mon)  ( x  ) - Nasal Mask (1 per month) Interface/Cushion  (  x   ) -Pillow (2 per mon)  (     ) -Vznejxrce (1 per 6 mon)            Other Supplies:    (   X  )-Gjsvovqx (1 per 6 mon)  (   X  )-Hxarkd Tubing (1 per 3 mon)  (   X  )- Disposable Filter (2 per mon)  (   x  )-Xqklci Humidifier (1 per year)     (  x   )-Ccbewyykq (sometimes used with Full Face Mask) (1 per 6 mos)  (    )-Tubing-without heat (1 per 3 mos)  (     )-Non-Disposable Filter (1 per 6 mos)  (  x   )-Water Chamber (1 per 6 mos)  (     )-Humidifier non-heated (1 per 5 yrs)      Signed Date: 5/26/2022  Electronically Signed By: Nicole Hammond MD  Electronically Dated:  5/26/2022      No orders of the defined types were placed in this encounter. Over 50% of today's office visit was spent in face to face time reviewing test results, prognosis, importance of compliance, education about disease process, benefits of medications, instructions for management of acute flare-ups, and follow up plans. Total face to face time spent with patient and charting was 30 minutes.         Nicole Hammond MD  Electronically signed

## 2022-05-26 ENCOUNTER — OFFICE VISIT (OUTPATIENT)
Dept: SLEEP MEDICINE | Age: 77
End: 2022-05-26
Payer: MEDICARE

## 2022-05-26 VITALS
BODY MASS INDEX: 37.77 KG/M2 | TEMPERATURE: 97.8 F | RESPIRATION RATE: 14 BRPM | HEART RATE: 91 BPM | WEIGHT: 255 LBS | HEIGHT: 69 IN | OXYGEN SATURATION: 98 % | SYSTOLIC BLOOD PRESSURE: 128 MMHG | DIASTOLIC BLOOD PRESSURE: 68 MMHG

## 2022-05-26 DIAGNOSIS — Z95.810 PRESENCE OF CARDIAC DEFIBRILLATOR: ICD-10-CM

## 2022-05-26 DIAGNOSIS — Z99.89 OSA ON CPAP: Primary | ICD-10-CM

## 2022-05-26 DIAGNOSIS — G47.33 OSA ON CPAP: Primary | ICD-10-CM

## 2022-05-26 DIAGNOSIS — I50.22 CHRONIC SYSTOLIC CHF (CONGESTIVE HEART FAILURE) (HCC): ICD-10-CM

## 2022-05-26 PROCEDURE — G8427 DOCREV CUR MEDS BY ELIG CLIN: HCPCS | Performed by: INTERNAL MEDICINE

## 2022-05-26 PROCEDURE — 3017F COLORECTAL CA SCREEN DOC REV: CPT | Performed by: INTERNAL MEDICINE

## 2022-05-26 PROCEDURE — 99214 OFFICE O/P EST MOD 30 MIN: CPT | Performed by: INTERNAL MEDICINE

## 2022-05-26 PROCEDURE — 1036F TOBACCO NON-USER: CPT | Performed by: INTERNAL MEDICINE

## 2022-05-26 PROCEDURE — G8417 CALC BMI ABV UP PARAM F/U: HCPCS | Performed by: INTERNAL MEDICINE

## 2022-05-26 PROCEDURE — 1123F ACP DISCUSS/DSCN MKR DOCD: CPT | Performed by: INTERNAL MEDICINE

## 2022-05-31 DIAGNOSIS — I10 PRIMARY HYPERTENSION: Primary | ICD-10-CM

## 2022-05-31 RX ORDER — VALSARTAN AND HYDROCHLOROTHIAZIDE 320; 25 MG/1; MG/1
1 TABLET, FILM COATED ORAL DAILY
Qty: 90 TABLET | Refills: 2 | Status: SHIPPED | OUTPATIENT
Start: 2022-05-31

## 2022-05-31 NOTE — TELEPHONE ENCOUNTER
Requested Prescriptions     Pending Prescriptions Disp Refills    valsartan-hydroCHLOROthiazide (DIOVAN-HCT) 320-25 MG per tablet 90 tablet 2     Sig: Take 1 tablet by mouth daily

## 2022-06-20 ENCOUNTER — OFFICE VISIT (OUTPATIENT)
Dept: ORTHOPEDIC SURGERY | Age: 77
End: 2022-06-20
Payer: MEDICARE

## 2022-06-20 DIAGNOSIS — Z96.652 STATUS POST LEFT KNEE REPLACEMENT: Primary | ICD-10-CM

## 2022-06-20 PROCEDURE — G8427 DOCREV CUR MEDS BY ELIG CLIN: HCPCS | Performed by: ORTHOPAEDIC SURGERY

## 2022-06-20 PROCEDURE — 3017F COLORECTAL CA SCREEN DOC REV: CPT | Performed by: ORTHOPAEDIC SURGERY

## 2022-06-20 PROCEDURE — 1036F TOBACCO NON-USER: CPT | Performed by: ORTHOPAEDIC SURGERY

## 2022-06-20 PROCEDURE — 1123F ACP DISCUSS/DSCN MKR DOCD: CPT | Performed by: ORTHOPAEDIC SURGERY

## 2022-06-20 PROCEDURE — G8417 CALC BMI ABV UP PARAM F/U: HCPCS | Performed by: ORTHOPAEDIC SURGERY

## 2022-06-20 PROCEDURE — 99213 OFFICE O/P EST LOW 20 MIN: CPT | Performed by: ORTHOPAEDIC SURGERY

## 2022-06-20 NOTE — PROGRESS NOTES
Name: Bobo Ma  YOB: 1945  Gender: male  MRN: [id number]      Current Outpatient Medications:     valsartan-hydroCHLOROthiazide (DIOVAN-HCT) 320-25 MG per tablet, Take 1 tablet by mouth daily, Disp: 90 tablet, Rfl: 2    Biotin 14110 MCG TABS, Take by mouth, Disp: , Rfl:     donepezil (ARICEPT) 5 MG tablet, Take 1 tablet by mouth nightly, Disp: 90 tablet, Rfl: 1    Zinc Acetate, Oral, (ZINC ACETATE PO), Take 1 tablet by mouth daily, Disp: , Rfl:     acetaminophen (TYLENOL) 500 MG tablet, Take 1,000 mg by mouth every 6 hours as needed, Disp: , Rfl:     allopurinol (ZYLOPRIM) 100 MG tablet, Take 100 mg by mouth daily, Disp: , Rfl:     amitriptyline (ELAVIL) 10 MG tablet, Take 10 mg by mouth, Disp: , Rfl:     carvedilol (COREG) 6.25 MG tablet, Take 6.25 mg by mouth 2 times daily, Disp: , Rfl:     Cholecalciferol 50 MCG (2000 UT) TABS, Take 1 tablet by mouth daily, Disp: , Rfl:     Cyanocobalamin 1000 MCG SUBL, Take 1,000 mcg by mouth daily, Disp: , Rfl:     furosemide (LASIX) 20 MG tablet, Take 20 mg by mouth, Disp: , Rfl:     gabapentin (NEURONTIN) 300 MG capsule, TAKE 1 CAPSULE BY MOUTH EVERY MORNING AND 2 CAPSULES BY MOUTH AT BEDTIME, Disp: , Rfl:     indomethacin (INDOCIN) 25 MG capsule, Take 25 mg by mouth 3 times daily, Disp: , Rfl:     Insulin Degludec (TRESIBA FLEXTOUCH) 200 UNIT/ML SOPN, Inject 50 Units into the skin daily, Disp: , Rfl:     nitroGLYCERIN (NITROSTAT) 0.4 MG SL tablet, Place 0.4 mg under the tongue, Disp: , Rfl:     omeprazole (PRILOSEC) 20 MG delayed release capsule, Take 20 mg by mouth daily, Disp: , Rfl:     predniSONE (DELTASONE) 10 MG tablet, Take 3 tablets on day 1, take 2 tablets on day 2 and 3, take 1 tablet on day 4 and 5, take 1/2 tablet on day 6 and 7., Disp: , Rfl:     Probiotic Product (ACIDOPHILUS PROBIOTIC) CAPS capsule, Take 1 tablet by mouth, Disp: , Rfl:     rosuvastatin (CRESTOR) 10 MG tablet, Take 10 mg by mouth, Disp: , Rfl:    SITagliptin-metFORMIN (JANUMET XR)  MG TB24 per extended release tablet, Take 2 tablets by mouth daily, Disp: , Rfl:   No Known Allergies    CC: 6 months post-op left TKA    HPI: The patient is here now post total knee arthroplasty. They report that they are functioning well and denies knee pain, swelling, or instability. They report occasional soreness. No other new complaints. PMH: Reviewed and unchanged    ROS:  As per HPI; pertinent positives and negatives are addressed with the patient, particularly, those related to musculoskeletal concerns. Non-orthopaedic concerns were referred back to the primary care physician. PE:  left Knee  Patient is comfortable and in no distress. ROM: 7 to 120 degrees  AP translation: 4 mm  M-L laxity: 2 degrees  Alignment: 4 degrees of valgus  Quadriceps strength: excellent  Gait: no limp  Incision: well healed    Radiographs: AP/Lateral and sunrise of the left knee taken in the office today reveal a good bone, prosthetic appearance. The patella is balanced. Radiographic Diagnosis:  Stable total knee arthroplasty. Impression: Status post total knee arthroplasty    Recommendations:  Reviewed x-ray findings with the patient. Activities to be advanced as tolerated. Normal lifetime restrictions as discussed. Appropriate activities for strengthening and conditioning were reviewed. Return appointment in 6 months for follow up and x-rays. Approximately 20 minutes was spent reviewing the medical record, imaging, performing history and physical examination, discussing the diagnosis and treatment plan with the patient, and completing documentation for this visit.

## 2022-07-11 ENCOUNTER — OFFICE VISIT (OUTPATIENT)
Dept: CARDIOLOGY CLINIC | Age: 77
End: 2022-07-11

## 2022-07-11 VITALS
HEIGHT: 69 IN | SYSTOLIC BLOOD PRESSURE: 128 MMHG | BODY MASS INDEX: 36.73 KG/M2 | WEIGHT: 248 LBS | HEART RATE: 64 BPM | DIASTOLIC BLOOD PRESSURE: 70 MMHG

## 2022-07-11 DIAGNOSIS — Z95.810 PRESENCE OF CARDIAC DEFIBRILLATOR: ICD-10-CM

## 2022-07-11 DIAGNOSIS — I44.7 LBBB (LEFT BUNDLE BRANCH BLOCK): ICD-10-CM

## 2022-07-11 DIAGNOSIS — I42.8 NONISCHEMIC CARDIOMYOPATHY (HCC): Primary | ICD-10-CM

## 2022-07-11 PROCEDURE — G8427 DOCREV CUR MEDS BY ELIG CLIN: HCPCS | Performed by: INTERNAL MEDICINE

## 2022-07-11 PROCEDURE — G8417 CALC BMI ABV UP PARAM F/U: HCPCS | Performed by: INTERNAL MEDICINE

## 2022-07-11 PROCEDURE — 99213 OFFICE O/P EST LOW 20 MIN: CPT | Performed by: INTERNAL MEDICINE

## 2022-07-11 PROCEDURE — 1036F TOBACCO NON-USER: CPT | Performed by: INTERNAL MEDICINE

## 2022-07-11 PROCEDURE — 3017F COLORECTAL CA SCREEN DOC REV: CPT | Performed by: INTERNAL MEDICINE

## 2022-07-11 PROCEDURE — 1123F ACP DISCUSS/DSCN MKR DOCD: CPT | Performed by: INTERNAL MEDICINE

## 2022-07-11 ASSESSMENT — ENCOUNTER SYMPTOMS: BACK PAIN: 1

## 2022-07-11 NOTE — PROGRESS NOTES
the patient today and updated as necessary. Prior to Admission medications    Medication Sig Start Date End Date Taking?  Authorizing Provider   valsartan-hydroCHLOROthiazide (DIOVAN-HCT) 320-25 MG per tablet Take 1 tablet by mouth daily 5/31/22  Yes Giulia Shi MD   Biotin 25897 MCG TABS Take by mouth   Yes Historical Provider, MD   donepezil (ARICEPT) 5 MG tablet Take 1 tablet by mouth nightly 5/24/22  Yes Giulia Shi MD   Zinc Acetate, Oral, (ZINC ACETATE PO) Take 1 tablet by mouth daily   Yes Ar Automatic Reconciliation   acetaminophen (TYLENOL) 500 MG tablet Take 1,000 mg by mouth every 6 hours as needed   Yes Ar Automatic Reconciliation   allopurinol (ZYLOPRIM) 100 MG tablet Take 100 mg by mouth daily 3/31/22  Yes Ar Automatic Reconciliation   amitriptyline (ELAVIL) 10 MG tablet Take 10 mg by mouth nightly  3/31/22  Yes Ar Automatic Reconciliation   carvedilol (COREG) 6.25 MG tablet Take 6.25 mg by mouth 2 times daily 12/14/21  Yes Ar Automatic Reconciliation   Cholecalciferol 50 MCG (2000 UT) TABS Take 1 tablet by mouth daily   Yes Ar Automatic Reconciliation   Cyanocobalamin 1000 MCG SUBL Take 1,000 mcg by mouth daily   Yes Ar Automatic Reconciliation   furosemide (LASIX) 20 MG tablet Take 20 mg by mouth daily as needed  3/10/21  Yes Ar Automatic Reconciliation   gabapentin (NEURONTIN) 300 MG capsule TAKE 1 CAPSULE BY MOUTH EVERY MORNING AND 2 CAPSULES BY MOUTH AT BEDTIME 12/31/21  Yes Ar Automatic Reconciliation   indomethacin (INDOCIN) 25 MG capsule Take 25 mg by mouth 3 times daily 8/18/21  Yes Ar Automatic Reconciliation   Insulin Degludec (TRESIBA FLEXTOUCH) 200 UNIT/ML SOPN Inject 50 Units into the skin daily   Yes Ar Automatic Reconciliation   omeprazole (PRILOSEC) 20 MG delayed release capsule Take 20 mg by mouth daily 3/31/22  Yes Ar Automatic Reconciliation   Probiotic Product (ACIDOPHILUS PROBIOTIC) CAPS capsule Take 1 tablet by mouth   Yes Ar Automatic Reconciliation rosuvastatin (CRESTOR) 10 MG tablet Take 10 mg by mouth 3/31/22  Yes Ar Automatic Reconciliation   SITagliptin-metFORMIN (JANUMET XR)  MG TB24 per extended release tablet Take 2 tablets by mouth daily   Yes Ar Automatic Reconciliation   nitroGLYCERIN (NITROSTAT) 0.4 MG SL tablet Place 0.4 mg under the tongue  Patient not taking: Reported on 7/11/2022 12/22/20   Ar Automatic Reconciliation   predniSONE (DELTASONE) 10 MG tablet Take 3 tablets on day 1, take 2 tablets on day 2 and 3, take 1 tablet on day 4 and 5, take 1/2 tablet on day 6 and 7.   Patient not taking: Reported on 7/11/2022 2/1/22   Ar Automatic Reconciliation     No Known Allergies  Past Medical History:   Diagnosis Date    Abnormal MMSE 05/24/2022    29 out of 30 and normal clock draw    Age-related cognitive decline 2020    30 out of 30 MMSE    Arthritis     DJD- shoulder, knees, hips    Chronic systolic CHF [facility]congestive heart failure) [facility]     COVID-19 2/9/2021    Dizziness 06/09/2020    hx     GERD (gastroesophageal reflux disease)     managed with medication     Gout     managed with medication     H/O echocardiogram 10/14/2019    EF 60.1%    Hip pain, bilateral     POA    Hypertension     managed with medication     Morbid obesity ([address]     Neuropathy     feet and legs    MICHELL on CPAP 08/26/2014    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 Raj pacemaker/defibrillator placed 6/25/14--0 shocks     Psychiatric disorder     DEPRESSION    Routine eye exam 2020    no diabetic retinopathy- Venda Shove    Spinal stenosis of lumbar region     Stage 3b chronic kidney disease ([address]     Type 2 diabetes mellitus with hyperglycemia, with long-term current use of insulin ([address] 09/23/2020    managed with oral medication and Tresiba, average 's, no problems with hypoglycemia, A1c 7.2 11/29/21     Past Surgical History:   Procedure Laterality Date   Renetta Young Dr. Jesus    [facility]    ORTHOPEDIC SURGERY      right achilles tendon repair    ORTHOPEDIC SURGERY      knee    PACEMAKER      pacemaker/defibrillator placed 6/25/14     ND CARDIAC SURG PROCEDURE UNLIST  cath    TONSILLECTOMY       Family History   Problem Relation Age of Onset    Cancer Mother     Heart Attack Father      Social History     Tobacco Use    Smoking status: Never Smoker    Smokeless tobacco: Never Used   Substance Use Topics    Alcohol use: No       ROS:    Review of Systems   Constitutional: Positive for weight loss. Cardiovascular: Negative for chest pain and palpitations. Musculoskeletal: Positive for back pain. PHYSICAL EXAM:   /70   Pulse 64   Ht 5' 9\" (1.753 m)   Wt 248 lb (112.5 kg)   BMI 36.62 kg/m²      Wt Readings from Last 3 Encounters:   07/11/22 248 lb (112.5 kg)   05/26/22 255 lb (115.7 kg)   05/24/22 255 lb 6.4 oz (115.8 kg)     BP Readings from Last 3 Encounters:   07/11/22 128/70   05/26/22 128/68   05/24/22 (!) 144/82     Pulse Readings from Last 3 Encounters:   07/11/22 64   05/26/22 91   01/13/22 78           Physical Exam  Constitutional:       Appearance: Normal appearance. HENT:      Head: Normocephalic and atraumatic. Eyes:      Conjunctiva/sclera: Conjunctivae normal.   Neck:      Vascular: No carotid bruit. Cardiovascular:      Rate and Rhythm: Normal rate and regular rhythm. Heart sounds: No murmur heard. No friction rub. No gallop. Pulmonary:      Effort: No respiratory distress. Breath sounds: No wheezing or rales. Musculoskeletal:         General: No swelling. Cervical back: Neck supple. Skin:     General: Skin is warm and dry. Neurological:      General: No focal deficit present. Mental Status: He is alert.    Psychiatric:         Mood and Affect: Mood normal.         Behavior: Behavior normal.         Medical problems and test results were reviewed with the patient today.     DATA REVIEW    LIPID PANEL     Lab Results   Component Value Date    CHOL 139 09/23/2020     Lab Results   Component Value Date    TRIG 197 (H) 09/23/2020     Lab Results   Component Value Date    HDL 38 (L) 09/23/2020     Lab Results   Component Value Date    LDLCALC 68 09/23/2020     Lab Results   Component Value Date    VLDL 33 09/23/2020     No results found for: CHOLHDLRATIO    BMP  Lab Results   Component Value Date/Time     11/29/2021 10:32 AM    K 4.6 11/29/2021 10:32 AM     11/29/2021 10:32 AM    CO2 29 11/29/2021 10:32 AM    BUN 33 11/29/2021 10:32 AM    CREATININE 1.36 11/29/2021 10:32 AM    GLUCOSE 194 11/29/2021 10:32 AM    CALCIUM 10.1 11/29/2021 10:32 AM          EKG        CXR/IMAGING        DEVICE INTERROGATION    TODAY - normal device function, 99% bi-V paced    OUTSIDE RECORDS REVIEW    Records from outside providers have been reviewed and summarized as noted in the HPI, past history and data review sections of this note, and reviewed with patient. .       ASSESSMENT and PLAN    Hallie Gu was seen today for follow-up chronic condition and congestive heart failure. Diagnoses and all orders for this visit:    Nonischemic cardiomyopathy ([address]    LBBB (left bundle branch block)    Presence of cardiac defibrillator          IMPRESSION:    Systolic dysfunction resolved, continue meds    Congratulated weight loss, warned he will need an exercise piece to accomplish the 50 lb goal he has for himself. He does have access to a pool which is a great place to exercise with back problems. BP at target, continue meds    Normal device function, no events        Return in about 1 year (around 7/11/2023). Thank you for allowing me to participate in this patient's care. Please call or contact me if there are any questions or concerns regarding the above.       Ken Alfaro MD  07/11/22  1:44 PM

## 2022-07-11 NOTE — PATIENT INSTRUCTIONS
Patient Education        Learning About the Mediterranean Diet  What is the [address]? The Mediterranean diet is a style of eating rather than a diet plan. It features foods eaten in [place], [place], [place] and [place], and other countries along the [facility]. It emphasizes eating foods like fish, fruits, vegetables, beans, high-fiber breads and whole grains, nuts, and oliveoil. This style of eating includes limited red meat, cheese, and sweets. Why choose the Mediterranean diet? A Mediterranean-style diet may improve heart health. It contains more fat than other heart-healthy diets. But the fats are mainly from nuts, unsaturated oils (such as fish oils and olive oil), and certain nut or seed oils (such as canola, soybean, or flaxseed oil). These fats may help protect the heart andblood vessels. How can you get started on the Mediterranean diet? Here are some things you can do to switch to a more Mediterranean way of eating. What to eat   Eat a variety of fruits and vegetables each day, such as grapes, blueberries, tomatoes, broccoli, peppers, figs, olives, spinach, eggplant, beans, lentils, and chickpeas.  Eat a variety of whole-grain foods each day, such as oats, brown rice, and whole wheat bread, pasta, and couscous.  Eat fish at least 2 times a week. Try tuna, salmon, mackerel, lake trout, herring, or sardines.  Eat moderate amounts of low-fat dairy products, such as milk, cheese, or yogurt.  Eat moderate amounts of poultry and eggs.  Choose healthy (unsaturated) fats, such as nuts, olive oil, and certain nut or seed oils like canola, soybean, and flaxseed.  Limit unhealthy (saturated) fats, such as butter, palm oil, and coconut oil. And limit fats found in animal products, such as meat and dairy products made with whole milk. Try to eat red meat only a few times a month in very small amounts.  Limit sweets and desserts to only a few times a week.  This includes sugar-sweetened drinks like soda. The Mediterranean diet may also include red wine with your meal--1 glass eachday for women and up to 2 glasses a day for men. Tips for eating at home   Use herbs, spices, garlic, lemon zest, and citrus juice instead of salt to add flavor to foods.  Add avocado slices to your sandwich instead of ruggiero.  Have fish for lunch or dinner instead of red meat. Brush the fish with olive oil, and broil or grill it.  Sprinkle your salad with seeds or nuts instead of cheese. Wolf Poncho with olive or canola oil instead of butter or oils that are high in saturated fat.  Switch from 2% milk or whole milk to 1% or fat-free milk.  Dip raw vegetables in a vinaigrette dressing or hummus instead of dips made from mayonnaise or sour cream.   Have a piece of fruit for dessert instead of a piece of cake. Try baked apples, or have some dried fruit. Tips for eating out   Try broiled, grilled, baked, or poached fish instead of having it fried or breaded.  Ask your  to have your meals prepared with olive oil instead of butter.  Order dishes made with marinara sauce or sauces made from olive oil. Avoid sauces made from cream or mayonnaise.  Choose whole-grain breads, whole wheat pasta and pizza crust, brown rice, beans, and lentils.  Cut back on butter or margarine on bread. Instead, you can dip your bread in a small amount of olive oil.  Ask for a side salad or grilled vegetables instead of french fries or chips. Where can you learn more? Go to [email or web address] and sign in to your Appe[employer] Here account. Enter [phone] in the [facility] box to learn more about \"Learning About the Mediterranean Diet. \"     If you do not have an account, please click on the \"Sign Up Now\" link. Current as of: September 8, 2021               Content Version: 13.3  © 8603-4958 Healthwise, Incorporated. Care instructions adapted under license by [facility] ([facility]).  If you have questions about a medical condition or this instruction, always ask your healthcare professional. [place] [address] any warranty or liability for your use of this information.

## 2022-07-27 RX ORDER — GABAPENTIN 300 MG/1
CAPSULE ORAL
Qty: 270 CAPSULE | Refills: 2 | Status: SHIPPED | OUTPATIENT
Start: 2022-07-27 | End: 2023-04-27

## 2022-07-27 RX ORDER — CELECOXIB 200 MG/1
200 CAPSULE ORAL DAILY
Qty: 90 CAPSULE | Refills: 2 | Status: SHIPPED | OUTPATIENT
Start: 2022-07-27
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
## 2022-08-24 ENCOUNTER — OFFICE VISIT (OUTPATIENT)
Dept: INTERNAL MEDICINE CLINIC | Facility: CLINIC | Age: 77
End: 2022-08-24
Payer: MEDICARE

## 2022-08-24 VITALS
HEIGHT: 69 IN | DIASTOLIC BLOOD PRESSURE: 80 MMHG | WEIGHT: 240 LBS | BODY MASS INDEX: 35.55 KG/M2 | SYSTOLIC BLOOD PRESSURE: 118 MMHG

## 2022-08-24 DIAGNOSIS — R41.3 MEMORY CHANGES: ICD-10-CM

## 2022-08-24 DIAGNOSIS — G47.33 OSA ON CPAP: ICD-10-CM

## 2022-08-24 DIAGNOSIS — Z79.4 TYPE 2 DIABETES MELLITUS WITH HYPERGLYCEMIA, WITH LONG-TERM CURRENT USE OF INSULIN (HCC): Primary | ICD-10-CM

## 2022-08-24 DIAGNOSIS — K21.9 GASTROESOPHAGEAL REFLUX DISEASE WITHOUT ESOPHAGITIS: ICD-10-CM

## 2022-08-24 DIAGNOSIS — E11.65 TYPE 2 DIABETES MELLITUS WITH HYPERGLYCEMIA, WITH LONG-TERM CURRENT USE OF INSULIN (HCC): ICD-10-CM

## 2022-08-24 DIAGNOSIS — E78.2 MIXED HYPERLIPIDEMIA: ICD-10-CM

## 2022-08-24 DIAGNOSIS — I10 PRIMARY HYPERTENSION: ICD-10-CM

## 2022-08-24 DIAGNOSIS — Z12.5 ENCOUNTER FOR SCREENING FOR MALIGNANT NEOPLASM OF PROSTATE: ICD-10-CM

## 2022-08-24 DIAGNOSIS — R41.3 MEMORY CHANGE: ICD-10-CM

## 2022-08-24 DIAGNOSIS — Z99.89 OSA ON CPAP: ICD-10-CM

## 2022-08-24 DIAGNOSIS — E66.9 OBESITY (BMI 30-39.9): ICD-10-CM

## 2022-08-24 DIAGNOSIS — I73.9 PERIPHERAL VASCULAR DISEASE, UNSPECIFIED (HCC): ICD-10-CM

## 2022-08-24 DIAGNOSIS — E11.65 TYPE 2 DIABETES MELLITUS WITH HYPERGLYCEMIA, WITH LONG-TERM CURRENT USE OF INSULIN (HCC): Primary | ICD-10-CM

## 2022-08-24 DIAGNOSIS — M15.9 PRIMARY OSTEOARTHRITIS INVOLVING MULTIPLE JOINTS: ICD-10-CM

## 2022-08-24 DIAGNOSIS — M1A.9XX0 CHRONIC GOUT WITHOUT TOPHUS, UNSPECIFIED CAUSE, UNSPECIFIED SITE: ICD-10-CM

## 2022-08-24 DIAGNOSIS — N18.31 STAGE 3A CHRONIC KIDNEY DISEASE (HCC): ICD-10-CM

## 2022-08-24 DIAGNOSIS — R35.1 NOCTURIA MORE THAN TWICE PER NIGHT: ICD-10-CM

## 2022-08-24 DIAGNOSIS — G62.9 NEUROPATHY: ICD-10-CM

## 2022-08-24 DIAGNOSIS — Z79.4 TYPE 2 DIABETES MELLITUS WITH HYPERGLYCEMIA, WITH LONG-TERM CURRENT USE OF INSULIN (HCC): ICD-10-CM

## 2022-08-24 DIAGNOSIS — M48.062 NEUROGENIC CLAUDICATION DUE TO LUMBAR SPINAL STENOSIS: ICD-10-CM

## 2022-08-24 PROBLEM — R42 DIZZINESS: Status: RESOLVED | Noted: 2020-06-09 | Resolved: 2022-08-24

## 2022-08-24 PROBLEM — A41.9 SEPSIS (HCC): Status: RESOLVED | Noted: 2021-02-13 | Resolved: 2022-08-24

## 2022-08-24 PROBLEM — N17.9 AKI (ACUTE KIDNEY INJURY) (HCC): Status: RESOLVED | Noted: 2021-02-09 | Resolved: 2022-08-24

## 2022-08-24 PROBLEM — G93.41 ACUTE METABOLIC ENCEPHALOPATHY: Status: RESOLVED | Noted: 2021-02-09 | Resolved: 2022-08-24

## 2022-08-24 PROBLEM — J18.9 CAP (COMMUNITY ACQUIRED PNEUMONIA): Status: RESOLVED | Noted: 2021-02-13 | Resolved: 2022-08-24

## 2022-08-24 PROBLEM — M15.0 PRIMARY OSTEOARTHRITIS INVOLVING MULTIPLE JOINTS: Status: ACTIVE | Noted: 2022-08-24

## 2022-08-24 PROBLEM — F11.99 OPIOID USE, UNSPECIFIED WITH UNSPECIFIED OPIOID-INDUCED DISORDER (HCC): Status: RESOLVED | Noted: 2022-01-19 | Resolved: 2022-08-24

## 2022-08-24 PROCEDURE — 3017F COLORECTAL CA SCREEN DOC REV: CPT | Performed by: INTERNAL MEDICINE

## 2022-08-24 PROCEDURE — 1123F ACP DISCUSS/DSCN MKR DOCD: CPT | Performed by: INTERNAL MEDICINE

## 2022-08-24 PROCEDURE — 2022F DILAT RTA XM EVC RTNOPTHY: CPT | Performed by: INTERNAL MEDICINE

## 2022-08-24 PROCEDURE — G8417 CALC BMI ABV UP PARAM F/U: HCPCS | Performed by: INTERNAL MEDICINE

## 2022-08-24 PROCEDURE — 3046F HEMOGLOBIN A1C LEVEL >9.0%: CPT | Performed by: INTERNAL MEDICINE

## 2022-08-24 PROCEDURE — 1036F TOBACCO NON-USER: CPT | Performed by: INTERNAL MEDICINE

## 2022-08-24 PROCEDURE — 99214 OFFICE O/P EST MOD 30 MIN: CPT | Performed by: INTERNAL MEDICINE

## 2022-08-24 PROCEDURE — G8427 DOCREV CUR MEDS BY ELIG CLIN: HCPCS | Performed by: INTERNAL MEDICINE

## 2022-08-24 RX ORDER — DONEPEZIL HYDROCHLORIDE 10 MG/1
10 TABLET, FILM COATED ORAL NIGHTLY
Qty: 90 TABLET | Refills: 2 | Status: SHIPPED | OUTPATIENT
Start: 2022-08-24

## 2022-08-24 ASSESSMENT — PATIENT HEALTH QUESTIONNAIRE - PHQ9
SUM OF ALL RESPONSES TO PHQ QUESTIONS 1-9: 0
1. LITTLE INTEREST OR PLEASURE IN DOING THINGS: 0
SUM OF ALL RESPONSES TO PHQ QUESTIONS 1-9: 0
SUM OF ALL RESPONSES TO PHQ QUESTIONS 1-9: 0
2. FEELING DOWN, DEPRESSED OR HOPELESS: 0
SUM OF ALL RESPONSES TO PHQ QUESTIONS 1-9: 0
SUM OF ALL RESPONSES TO PHQ9 QUESTIONS 1 & 2: 0

## 2022-08-24 ASSESSMENT — ENCOUNTER SYMPTOMS: SHORTNESS OF BREATH: 0

## 2022-08-24 NOTE — PROGRESS NOTES
HPI: Brice Saucedo (: 1945)    Has been doing better since starting [employer] and has lost 15 lbs    Having more issues with back pain and peripheral neuropathy and lumbar stenosis   States on meds and needed to have had surgery  Has tried FUNMILAYO in the past  Getting more weakness in his legs at times      Need to update labs          Problem List:  Patient Active Problem List   Diagnosis    Obesity (BMI 30-39. 9)    Obesity hypoventilation syndrome ([address]    Encounter for diabetic foot exam ([facility]    Insomnia due to medical condition    Presence of cardiac defibrillator    Gout    MICHELL on CPAP    Type 2 diabetes mellitus with hyperglycemia, with long-term current use of insulin (HCC)    Intention tremor    Gastroesophageal reflux disease without esophagitis    LBBB (left bundle branch block)    COVID-19    Chronic systolic CHF (congestive heart failure) (HCC)    CH[facility]congest[facility] failure) [facility]    Left leg pain    Mixed hyperlipidemia    Primary hypertension    Stage 3 chronic kidney disease (HCC)    Memory changes    Basal cell carcinoma (BCC) of face    Peripheral vascular disease, unspecified ([address]    Primary osteoarthritis involving multiple joints    Neuropathy    Neurogenic claudication due to lumbar spinal stenosis       History:  Past Medical History:   Diagnosis Date    Abnormal MMSE 2022    29 out of 30 and normal clock draw    Age-related cognitive decline     30 out of 30 MMSE    Arthritis     DJD- shoulder, knees, hips    Chronic systolic CHF (congestive heart failure) ([address]     COVID-19 2021    Dizziness 2020    hx     GERD (gastroesophageal reflux disease)     managed with medication     Gout     managed with medication     H/O echocardiogram 10/14/2019    EF 60.1%    Hip pain, bilateral     POA    Hypertension     managed with medication     Morbid obesity ([address]     Neuropathy     feet and legs    MICHELL on CPAP 2014    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 Raj pacemaker/defibrillator placed 6/25/14--0 shocks     Psychiatric disorder     DEPRESSION    Routine eye exam 2020    no diabetic retinopathy- Austine Big    Spinal stenosis of lumbar region     Stage 3b chronic kidney disease ([facility]     Type 2 diabetes mellitus with hyperglycemia, with long-term current use of insulin [facility] 09/23/2020    managed with oral medication and Tresiba, average 's, no problems with hypoglycemia, A1c 7.2 11/29/21       Allergies:  No Known Allergies    Current Medications:  Current Outpatient Medications   Medication Sig Dispense Refill    donepezil (ARICEPT) 10 MG tablet Take 1 tablet by mouth nightly 90 tablet 2    gabapentin (NEURONTIN) 300 MG capsule TAKE 1 CAPSULE BY MOUTH EVERY MORNING AND 2 CAPSULES BY MOUTH AT BEDTIME 270 capsule 2    celecoxib (CELEBREX) 200 MG capsule Take 1 capsule by mouth in the morning.  90 capsule 2    valsartan-hydroCHLOROthiazide (DIOVAN-HCT) 320-25 MG per tablet Take 1 tablet by mouth daily 90 tablet 2    Zinc Acetate, Oral, (ZINC ACETATE PO) Take 1 tablet by mouth daily      acetaminophen (TYLENOL) 500 MG tablet Take 1,000 mg by mouth every 6 hours as needed      allopurinol (ZYLOPRIM) 100 MG tablet Take 100 mg by mouth daily      amitriptyline (ELAVIL) 10 MG tablet Take 10 mg by mouth nightly       carvedilol (COREG) 6.25 MG tablet Take 6.25 mg by mouth 2 times daily      Cholecalciferol 50 MCG (2000 UT) TABS Take 1 tablet by mouth daily      Cyanocobalamin 1000 MCG SUBL Take 1,000 mcg by mouth daily      furosemide (LASIX) 20 MG tablet Take 20 mg by mouth daily as needed       indomethacin (INDOCIN) 25 MG capsule Take 25 mg by mouth 3 times daily      Insulin Degludec (TRESIBA FLEXTOUCH) 200 UNIT/ML SOPN Inject 50 Units into the skin daily      nitroGLYCERIN (NITROSTAT) 0.4 MG SL tablet Place 0.4 mg under the tongue      omeprazole (PRILOSEC) 20 MG delayed release capsule Take 20 mg by mouth daily Probiotic Product (ACIDOPHILUS PROBIOTIC) CAPS capsule Take 1 tablet by mouth      SITagliptin-metFORMIN (JANUMET XR)  MG TB24 per extended release tablet Take 2 tablets by mouth daily      rosuvastatin (CRESTOR) 10 MG tablet Take 10 mg by mouth       No current facility-administered medications for this visit. Review of Systems:  Review of Systems   Constitutional:         Intentional wt loss   Respiratory:  Negative for shortness of breath. Cardiovascular:  Negative for chest pain. Gastrointestinal:         Gerd   Musculoskeletal:  Positive for arthralgias. Neurological:  Positive for numbness. Psychiatric/Behavioral:          Short term memory changes   All other systems reviewed and are negative. Vitals:  /80   Ht 5' 9\" (1.753 m)   Wt 240 lb (108.9 kg)   BMI 35.44 kg/m²     Physical Exam:  Physical Exam  Vitals reviewed. Constitutional:       Appearance: Normal appearance. HENT:      Head: Normocephalic and atraumatic. Eyes:      Extraocular Movements: Extraocular movements intact. Pupils: Pupils are equal, round, and reactive to light. Cardiovascular:      Rate and Rhythm: Normal rate and regular rhythm. Heart sounds: Normal heart sounds. Pulmonary:      Effort: Pulmonary effort is normal.      Breath sounds: Normal breath sounds. Musculoskeletal:         General: Normal range of motion. Cervical back: Normal range of motion and neck supple. Comments: Ambulatory   Well healed scar right knee  DJD changes left knee     Skin:     General: Skin is warm and dry. Neurological:      Mental Status: He is alert and oriented to person, place, and time. Sensory: Sensory deficit present. Psychiatric:         Mood and Affect: Mood normal.         Behavior: Behavior normal.         Thought Content: Thought content normal.         Judgment: Judgment normal.        Assessment/Plan:   Blu Vela was seen today for follow-up.     Diagnoses and all orders for this visit:    Type 2 diabetes mellitus with hyperglycemia, with long-term current use of insulin (HCC)  -     CBC with Auto Differential; Future  -     Comprehensive Metabolic Panel; Future  -     Hemoglobin A1C; Future  Continue meds and work on diet and wt loss  Memory changes  -     donepezil (ARICEPT) 10 MG tablet; Take 1 tablet by mouth nightly    Peripheral vascular disease, unspecified (HCC)    Obesity (BMI 30-39. 9)    Mixed hyperlipidemia  -     Lipid Panel; Future  On statin therapy with Crestor  Chronic gout without tophus, unspecified cause, unspecified site  -     Uric Acid; Future    MICHELL on CPAP  Using nightly with good benefit  Memory change  Doing better with Aricept so increase dose to 10mg  Stage 3a chronic kidney disease (HCC)  Increase water intake  Gastroesophageal reflux disease without esophagitis  Continue meds  Primary osteoarthritis involving multiple joints  Continue Celebrex  Neuropathy  Continue meds for now- on Vit D and B12, Neurontin and Elavil  Neurogenic claudication due to lumbar spinal stenosis  Work on exercise and strength- continue all meds    Nocturia more than twice per night  -     PSA Screening; Future  Check PSA and may need to see Urologist  Up multiple times at night  Encounter for screening for malignant neoplasm of prostate   -     PSA Screening; Future    Primary hypertension  BP controlled        Current medications are therapeutic at this time; continue as prescribed.         Fabian Seo MD

## 2022-08-25 LAB
BASOPHILS # BLD: 0 K/UL (ref 0–0.2)
BASOPHILS NFR BLD: 1 % (ref 0–2)
DIFFERENTIAL METHOD BLD: ABNORMAL
EOSINOPHIL # BLD: 0.1 K/UL (ref 0–0.8)
EOSINOPHIL NFR BLD: 1 % (ref 0.5–7.8)
ERYTHROCYTE [DISTWIDTH] IN BLOOD BY AUTOMATED COUNT: 15 % (ref 11.9–14.6)
EST. AVERAGE GLUCOSE BLD GHB EST-MCNC: 140 MG/DL
HBA1C MFR BLD: 6.5 % (ref 4.8–5.6)
HCT VFR BLD AUTO: 45.2 % (ref 41.1–50.3)
HGB BLD-MCNC: 13.4 G/DL (ref 13.6–17.2)
IMM GRANULOCYTES # BLD AUTO: 0 K/UL (ref 0–0.5)
IMM GRANULOCYTES NFR BLD AUTO: 0 % (ref 0–5)
LYMPHOCYTES # BLD: 2.1 K/UL (ref 0.5–4.6)
LYMPHOCYTES NFR BLD: 26 % (ref 13–44)
MCH RBC QN AUTO: 26.4 PG (ref 26.1–32.9)
MCHC RBC AUTO-ENTMCNC: 29.6 G/DL (ref 31.4–35)
MCV RBC AUTO: 89.2 FL (ref 79.6–97.8)
MONOCYTES # BLD: 0.7 K/UL (ref 0.1–1.3)
MONOCYTES NFR BLD: 9 % (ref 4–12)
NEUTS SEG # BLD: 4.9 K/UL (ref 1.7–8.2)
NEUTS SEG NFR BLD: 63 % (ref 43–78)
NRBC # BLD: 0 K/UL (ref 0–0.2)
PLATELET # BLD AUTO: 245 K/UL (ref 150–450)
PMV BLD AUTO: 11 FL (ref 9.4–12.3)
RBC # BLD AUTO: 5.07 M/UL (ref 4.23–5.6)
WBC # BLD AUTO: 7.8 K/UL (ref 4.3–11.1)

## 2022-08-31 DIAGNOSIS — Z79.4 TYPE 2 DIABETES MELLITUS WITH HYPERGLYCEMIA, WITH LONG-TERM CURRENT USE OF INSULIN (HCC): ICD-10-CM

## 2022-08-31 DIAGNOSIS — E11.65 TYPE 2 DIABETES MELLITUS WITH HYPERGLYCEMIA, WITH LONG-TERM CURRENT USE OF INSULIN (HCC): ICD-10-CM

## 2022-08-31 DIAGNOSIS — M1A.9XX0 CHRONIC GOUT WITHOUT TOPHUS, UNSPECIFIED CAUSE, UNSPECIFIED SITE: ICD-10-CM

## 2022-08-31 DIAGNOSIS — Z12.5 ENCOUNTER FOR SCREENING FOR MALIGNANT NEOPLASM OF PROSTATE: Primary | ICD-10-CM

## 2022-08-31 DIAGNOSIS — E78.2 MIXED HYPERLIPIDEMIA: ICD-10-CM

## 2022-09-06 DIAGNOSIS — E78.2 MIXED HYPERLIPIDEMIA: ICD-10-CM

## 2022-09-06 DIAGNOSIS — M1A.9XX0 CHRONIC GOUT WITHOUT TOPHUS, UNSPECIFIED CAUSE, UNSPECIFIED SITE: ICD-10-CM

## 2022-09-06 DIAGNOSIS — E11.65 TYPE 2 DIABETES MELLITUS WITH HYPERGLYCEMIA, WITH LONG-TERM CURRENT USE OF INSULIN (HCC): ICD-10-CM

## 2022-09-06 DIAGNOSIS — Z12.5 ENCOUNTER FOR SCREENING FOR MALIGNANT NEOPLASM OF PROSTATE: ICD-10-CM

## 2022-09-06 DIAGNOSIS — Z79.4 TYPE 2 DIABETES MELLITUS WITH HYPERGLYCEMIA, WITH LONG-TERM CURRENT USE OF INSULIN (HCC): ICD-10-CM

## 2022-09-06 LAB
ALBUMIN SERPL-MCNC: 3.8 G/DL (ref 3.2–4.6)
ALBUMIN/GLOB SERPL: 1.2 {RATIO} (ref 1.2–3.5)
ALP SERPL-CCNC: 95 U/L (ref 50–136)
ALT SERPL-CCNC: 75 U/L (ref 12–65)
ANION GAP SERPL CALC-SCNC: 5 MMOL/L (ref 4–13)
AST SERPL-CCNC: 40 U/L (ref 15–37)
BILIRUB SERPL-MCNC: 0.3 MG/DL (ref 0.2–1.1)
BUN SERPL-MCNC: 33 MG/DL (ref 8–23)
CALCIUM SERPL-MCNC: 9.4 MG/DL (ref 8.3–10.4)
CHLORIDE SERPL-SCNC: 110 MMOL/L (ref 101–110)
CHOLEST SERPL-MCNC: 126 MG/DL
CO2 SERPL-SCNC: 26 MMOL/L (ref 21–32)
CREAT SERPL-MCNC: 1.4 MG/DL (ref 0.8–1.5)
GLOBULIN SER CALC-MCNC: 3.2 G/DL (ref 2.3–3.5)
GLUCOSE SERPL-MCNC: 114 MG/DL (ref 65–100)
HDLC SERPL-MCNC: 40 MG/DL (ref 40–60)
HDLC SERPL: 3.2 {RATIO}
LDLC SERPL CALC-MCNC: 57.4 MG/DL
POTASSIUM SERPL-SCNC: 4.7 MMOL/L (ref 3.5–5.1)
PROT SERPL-MCNC: 7 G/DL (ref 6.3–8.2)
PSA SERPL-MCNC: 0.7 NG/ML
SODIUM SERPL-SCNC: 141 MMOL/L (ref 136–145)
TRIGL SERPL-MCNC: 143 MG/DL (ref 35–150)
URATE SERPL-MCNC: 6.5 MG/DL (ref 2.6–6)
VLDLC SERPL CALC-MCNC: 28.6 MG/DL (ref 6–23)

## 2022-09-08 ENCOUNTER — APPOINTMENT (RX ONLY)
Dept: URBAN - METROPOLITAN AREA CLINIC 330 | Facility: CLINIC | Age: 77
Setting detail: DERMATOLOGY
End: 2022-09-08

## 2022-09-08 DIAGNOSIS — L57.8 OTHER SKIN CHANGES DUE TO CHRONIC EXPOSURE TO NONIONIZING RADIATION: ICD-10-CM | Status: INADEQUATELY CONTROLLED

## 2022-09-08 DIAGNOSIS — Z85.820 PERSONAL HISTORY OF MALIGNANT MELANOMA OF SKIN: ICD-10-CM

## 2022-09-08 DIAGNOSIS — L57.0 ACTINIC KERATOSIS: ICD-10-CM

## 2022-09-08 DIAGNOSIS — D22 MELANOCYTIC NEVI: ICD-10-CM

## 2022-09-08 DIAGNOSIS — Z85.828 PERSONAL HISTORY OF OTHER MALIGNANT NEOPLASM OF SKIN: ICD-10-CM

## 2022-09-08 DIAGNOSIS — L24 IRRITANT CONTACT DERMATITIS: ICD-10-CM | Status: INADEQUATELY CONTROLLED

## 2022-09-08 PROBLEM — L24.9 IRRITANT CONTACT DERMATITIS, UNSPECIFIED CAUSE: Status: ACTIVE | Noted: 2022-09-08

## 2022-09-08 PROBLEM — D22.5 MELANOCYTIC NEVI OF TRUNK: Status: ACTIVE | Noted: 2022-09-08

## 2022-09-08 PROCEDURE — ? COUNSELING

## 2022-09-08 PROCEDURE — ? LIQUID NITROGEN

## 2022-09-08 PROCEDURE — 99214 OFFICE O/P EST MOD 30 MIN: CPT | Mod: 25

## 2022-09-08 PROCEDURE — ? OTHER

## 2022-09-08 PROCEDURE — 17004 DESTROY PREMAL LESIONS 15/>: CPT

## 2022-09-08 PROCEDURE — ? PRESCRIPTION

## 2022-09-08 PROCEDURE — ? MEDICAL PHOTOGRAPHY REVIEW

## 2022-09-08 PROCEDURE — ? FULL BODY SKIN EXAM

## 2022-09-08 PROCEDURE — ? PRESCRIPTION MEDICATION MANAGEMENT

## 2022-09-08 PROCEDURE — ? TREATMENT REGIMEN

## 2022-09-08 RX ORDER — TRIAMCINOLONE ACETONIDE 1 MG/G
CREAM TOPICAL BID
Qty: 30 | Refills: 1 | Status: ERX | COMMUNITY
Start: 2022-09-08

## 2022-09-08 RX ADMIN — TRIAMCINOLONE ACETONIDE: 1 CREAM TOPICAL at 00:00

## 2022-09-08 ASSESSMENT — LOCATION SIMPLE DESCRIPTION DERM
LOCATION SIMPLE: POSTERIOR SCALP
LOCATION SIMPLE: LEFT TEMPLE
LOCATION SIMPLE: NOSE
LOCATION SIMPLE: RIGHT CHEEK
LOCATION SIMPLE: RIGHT EAR
LOCATION SIMPLE: LEFT CHEEK
LOCATION SIMPLE: LEFT HAND
LOCATION SIMPLE: UPPER BACK
LOCATION SIMPLE: LEFT AXILLARY VAULT
LOCATION SIMPLE: LEFT EAR

## 2022-09-08 ASSESSMENT — LOCATION ZONE DERM
LOCATION ZONE: AXILLAE
LOCATION ZONE: SCALP
LOCATION ZONE: FACE
LOCATION ZONE: TRUNK
LOCATION ZONE: EAR
LOCATION ZONE: NOSE
LOCATION ZONE: HAND

## 2022-09-08 ASSESSMENT — LOCATION DETAILED DESCRIPTION DERM
LOCATION DETAILED: LEFT SUPERIOR LATERAL MALAR CHEEK
LOCATION DETAILED: INFERIOR THORACIC SPINE
LOCATION DETAILED: LEFT INFERIOR TEMPLE
LOCATION DETAILED: LEFT INFERIOR CENTRAL MALAR CHEEK
LOCATION DETAILED: RIGHT POSTERIOR EAR
LOCATION DETAILED: RIGHT INFERIOR NASAL CHEEK
LOCATION DETAILED: LEFT ANTERIOR EARLOBE
LOCATION DETAILED: LEFT SUPERIOR LATERAL BUCCAL CHEEK
LOCATION DETAILED: RIGHT SUPERIOR CENTRAL MALAR CHEEK
LOCATION DETAILED: RIGHT LATERAL MALAR CHEEK
LOCATION DETAILED: NASAL SUPRATIP
LOCATION DETAILED: LEFT ULNAR DORSAL HAND
LOCATION DETAILED: LEFT INFERIOR LATERAL MALAR CHEEK
LOCATION DETAILED: RIGHT SUPERIOR HELIX
LOCATION DETAILED: LEFT SUPERIOR HELIX
LOCATION DETAILED: RIGHT INFERIOR CENTRAL MALAR CHEEK
LOCATION DETAILED: RIGHT SUPERIOR OCCIPITAL SCALP
LOCATION DETAILED: LEFT AXILLARY VAULT
LOCATION DETAILED: LEFT INFERIOR MEDIAL MALAR CHEEK

## 2022-09-08 ASSESSMENT — PAIN INTENSITY VAS: HOW INTENSE IS YOUR PAIN 0 BEING NO PAIN, 10 BEING THE MOST SEVERE PAIN POSSIBLE?: 1/10 PAIN

## 2022-09-08 NOTE — PROCEDURE: LIQUID NITROGEN
Post-Care Instructions: I reviewed with the patient in detail post-care instructions. Patient is to wear sunprotection, and avoid picking at any of the treated lesions. Pt may apply Vaseline to crusted or scabbing areas.
Show Aperture Variable?: Yes
Detail Level: Detailed
Render Note In Bullet Format When Appropriate: No
Number Of Freeze-Thaw Cycles: 3 freeze-thaw cycles
Consent: The patient's consent was obtained including but not limited to risks of crusting, scabbing, blistering, scarring, darker or lighter pigmentary change, recurrence, incomplete removal and infection.
Duration Of Freeze Thaw-Cycle (Seconds): 2

## 2022-09-08 NOTE — HPI: MELANOMA F/U (HISTORY OF MALIGNANT MELANOMA)
What Is The Reason For Today's Visit?: History of Melanoma
Year Excised?: 06/2014
Breslow Depth?: 0.55

## 2022-09-08 NOTE — PROCEDURE: PRESCRIPTION MEDICATION MANAGEMENT
Detail Level: Zone
Initiate Treatment: triamcinolone acetonide 0.1 % topical cream BID\\nQuantity: 30.0 g\\nSig: Apply to  twice daily x 1-2 weeks as needed flares
Render In Strict Bullet Format?: No
Plan: Discussed use of Efudex. Patient declines.

## 2022-09-12 ENCOUNTER — TELEPHONE (OUTPATIENT)
Dept: CARDIOLOGY CLINIC | Age: 77
End: 2022-09-12

## 2022-09-12 DIAGNOSIS — I44.7 LBBB (LEFT BUNDLE BRANCH BLOCK): Primary | ICD-10-CM

## 2022-09-12 DIAGNOSIS — I50.9 CHF (CONGESTIVE HEART FAILURE) (HCC): ICD-10-CM

## 2022-09-12 PROBLEM — Z45.02 AICD AT END OF BATTERY LIFE: Status: ACTIVE | Noted: 2022-09-12

## 2022-09-12 PROCEDURE — 93296 REM INTERROG EVL PM/IDS: CPT | Performed by: INTERNAL MEDICINE

## 2022-09-12 PROCEDURE — 93295 DEV INTERROG REMOTE 1/2/MLT: CPT | Performed by: INTERNAL MEDICINE

## 2022-09-12 NOTE — TELEPHONE ENCOUNTER
MD Lexis Page  Caller: Unspecified (Today, 11:22 AM)  Yes! Thanks! SJM BIV ICD generator change with Dr. Abeba Oliver. Orders placed and routed to scheduling.

## 2022-09-12 NOTE — TELEPHONE ENCOUNTER
Dr. Avi Purvis,    [address] [place] [address] BIV ICD at [facility]. No current issues/infections. Patient recently saw Dr. Diana Carrera with no issues. Not on AC. Okay to schedule generator change?

## 2022-09-12 NOTE — TELEPHONE ENCOUNTER
Scheduled October 11th with Dr. Palmer Zuñiga for gen change.  Pt given instructions, voiced understanding, mailed paperwork

## 2022-09-28 DIAGNOSIS — I10 PRIMARY HYPERTENSION: ICD-10-CM

## 2022-09-28 RX ORDER — SITAGLIPTIN AND METFORMIN HYDROCHLORIDE 1000; 50 MG/1; MG/1
2 TABLET, FILM COATED, EXTENDED RELEASE ORAL DAILY
Qty: 180 TABLET | Refills: 2 | Status: SHIPPED | OUTPATIENT
Start: 2022-09-28

## 2022-10-05 NOTE — PROCEDURE: REASSURANCE
Hematology/Oncology Outpatient Follow Up    PATIENT NAME:Jc Driscoll  :1963  MRN: [id number]  PRIMARY CARE PHYSICIAN: Reshma Alvarenga MD  REFERRING PHYSICIAN: Reshma Alvarenga MD    Chief Complaint   Patient presents with   • Follow-up     Non-small cell carcinoma of lung, right (HCC)        HISTORY OF PRESENT ILLNESS:     This is a 59 y.o. who developed left shoulder pain and for that reason he had a chest x-ray done on 19 which showed a 2.7 cm noncalcified right lower lobe nodule was identified.  For that reason a CT scan of the chest was recommended.  Review of his records shows patient had the CT scan on 19 done without contrast.  This basically showed a lobulated noncalcified mass in the posterior aspect of the right lower lobe measuring 3 cm close to the pleural surface.  There is a calcified 1.2 mm nodule in the lateral aspect of the right lower lobe consistent with a granuloma.  There were also calcified subcarinal and right hilar nodules.  There is a lower right side tracheal nodule measuring 1 cm.  Patient had a PET/CT scan done on 19 which showed increased metabolic activity on the right lower lobe mass that measures 2.5 cm with SUV of 4.4.  There was also increased metabolic activity in the subcarinal space measuring 2.8 cm in size with SUV of 3.4, increased activity in an enlarged right paratracheal lymph node that measures 1.9 cm, SUV of 5, also suspicious for metastatic adenopathy.  Patient had a CT-guided biopsy of the right lower lobe mass on 3/8/19.  This showed adenocarcinoma, TTF-1 positive.  On 3/18/19 patient underwent endoscopic ultrasound and biopsy of a subcarinal lymph node.  This was positive for malignant cells.  Patient was then taken back to surgery on 3/20/18 and had left Mediport placement by Dr. Fuchs.  He has been referred for further evaluation and management of his stage 3 adenocarcinoma of the right lung.  He was accompanied by his spouse for 
the appointment on 3/26/19.  Patient was scheduled to have a brain MRI for complete staging, but he could not do this due to claustrophobia.  He is willing to try with the help of angiolytics.    • Patient had brain MRI done on 4/5/19.  He states it did not show any evidence of metastatic disease.  Evidence of chronic sinusitis was noted.    • Patient was seen by Dr. Escalona who has recommended concurrent chemotherapy and radiation.  Patient is scheduled to begin on 4/15/19.   • 4/17/19 - Patient was initiated on combined chemotherapy and radiation with Cisplatin and Alimta.  Patient received cycle 1.      • 5/8/19 - Patient received cycle 2 of chemotherapy with Cisplatin and Alimta.   • 5/15/19 - Chemistry panel showed creatinine of 1.7.  WBC 1.8, hemoglobin 11.6, platelet count 72,000.   • 5/20/19 - BUN 10, creatinine 1.2, potassium 3.5.    • 5/23/19 - CT scan of the chest with contrast showed interval decrease in size of the right lower lobe pulmonary nodule now measuring 2.1 cm in size.  There was no mediastinal or hilar adenopathy identified.  • 6/26/2019 patient underwent right lower lobectomy with hilar and mediastinal lymph node dissection performed by Dr. Terrance Mckeon.  • Pathology revealed residual residual 2.2 cm invasive moderately differentiated adenocarcinoma.  There were a total of 16 lymph nodes evaluated that 7 had metastatic disease.  There was evidence of lymphovascular invasion, extranodal involvement.  All the surgical margins were negative and distance of the closest margin was 2.5 cm.  Logic stage is T1c N2 M0.  • Patient is here today accompanied by his spouse for this appointment.  He continues to complain of some postop discomfort, numbness but his skin is intact.  There is no drainage.  Has had follow-up with Dr. Fuchs.  • 8/14/2019-seen by Dr. Maria at the [facility].  Dr. Maria has recommended immunotherapy with durvalumab off label use as patient has not had significant 
response to neoadjuvant chemotherapy and radiation.  Patient was given the option to have immunotherapy at [facility] vs [place] and he has chosen to stay in [place]  • 8/21/19 - Started cycle 1 day 1 Imfinzi  • 9/4/2019 patient had CT scan of the chest this shows a moderate size right pleural effusion.  There are areas of groundglass opacification in the right upper lobe without any evidence of significant septal thickening.  Volume loss noted there is also moderate pleural effusion.  There is no suspicious recurrent malignancy.  There were nonenlarged residual mediastinal lymph nodes.  • 9/9/19 - WBC 6.23, Hgb 11.7 Platelets 257,000, , BUN 9, Cr 1.1,Vitamin B12 318, Ferritin 437  • 9/23/19 - received cycle 2 day 1 Imfinzi  • 10/9/19- Seen by Dr rodríguez with ENT for sinus disease  • 11/4/2019-patient received cycle 5 of Imfinzi(durvalumab)  • 12/2/2019 patient had cycle 7 of durvalumab  • 12/5/2019-patient had CT scan of the  abdomen and pelvis with small right pleural sided pleural effusion.There is no evidence of metastatic disease  • 12/30/2019-patient received cycle 9 of durvalumab  • 12/31/2019-patient had CT scan of the chest showed small to moderate left pleural effusion.  Iglesias appearing right lower paratracheal and right peribronchial soft tissue thickening without any discrete pathologically enlarged mediastinal or hilar lymph nodes.  • 1/27/20-patient received cycle 11 of durvalumab  • 2/17/20-patient had a stress test which was negative for ischemia  • 2/17/2020-patient had CT of the chest which showed postop changes on the right lung, right pleural effusion but no enlarging mass was noted  • 3/2/2020-patient received cycle 12 of durvalumab  • 3/16/2020-patient had ultrasound-guided right thoracentesis.  Pathology was negative for malignancy  • 4/13/2020-patient received cycle 15 of immunotherapy with durvalumab  • 5/11/2020-patient received cycle 17 of immunotherapy with 
durvalumab  • 6/9/2020-patient had CT scan of the chest, abdomen and pelvis for lung cancer restaging.  There is stable small chronic loculated right basilar pleural effusion suggesting chronic pleuritis.  There is no evidence of metastatic disease in the abdomen or pelvis  • 7/20/2020 patient received cycle 22 of Durvalumab  • 8/17/2020 patient received cycle 24 and last cycle of durvalumab  • 9/24/2020 patient had CT scan of the chest, abdomen and pelvis for cancer restaging there was no evidence of local recurrence or metastatic disease within the abdomen and pelvis.  He has stable chronic small right pleural effusion.  Mild sigmoid diverticulosis was noted.  • 1/18/2021 patient had CT scan of the chest, abdomen and pelvis reviewed patient  • 5/24/2021 patient had CT scan of the chest calcified subcarinal lymph node consistent with changes of prior granulomatous disease.  Chronic small right pleural effusion is noted similar to prior exam.  Previously shown 4 mm nodule in the left lower lobe has nearly completely resolved.  The subpleural 3 mm nodule located within the posterior left lower lobe With resolved.  • 9/24/2021 patient had a CT scan of the chest, abdomen and pelvis there is soft tissue attenuation within the right paratracheal area which has been suggested to reflect sequela to previous treatment.  There is slight thickening of the bladder wall otherwise there is no evidence of metastatic disease.  • 12/27/2021 patient had CT scan of the chest with contrast this basically showed irregular shaped noncalcified 7 mm left upper lobe pulmonary nodule.  PET CT scan was recommended to further evaluate.  • 1/26/2022 patient had a PET CT scan done which basically showed evidence of mild uptake corresponding to the nodule within the left upper lobe which is new worrisome for developing metastatic focus.  There was mild radiopharmaceutical activity corresponding to pleural thickening throughout the right lung 
base with associated pleural effusion likely related to post therapeutic changes and radiation changes in this region.  Metastatic malignancy involving the pleura could not be completely eliminated.  • 1/26/2022: CT-guided biopsy was aborted due to finding by the radiologist that the lung nodularity was actually a clump of tiny nodules of which the largest was 6 mm and therefore biopsy could not be completed.  Also the area was in the vicinity of multiple blood vessels with increased risk of bleeding.  Follow-up CT of the chest was recommended for continued surveillance  • 5/18/2022 patient had CT-guided biopsy of the left enlarging nodule, pathology was positive for invasive poorly differentiated adenocarcinoma most consistent with adenocarcinoma of pulmonary origin.  • 5/27/2022 patient had a PET CT scan which showed a 2.3 cm hypermetabolic left upper lobe nodule which has increased in size no convincing evidence of metastatic disease elsewhere within the chest.  • 5/21/2022 patient had brain MRI which did not show any evidence of intracranial metastasis  • 6/2/2022 patient was seen by Dr. Miryam Reyna pulmonary function test is being done to assess surgical candidacy  • 7/6/2022 patient underwent left heart [place] P video-assisted with upper lobectomy mediastinal lymph node dissection performed by Dr. Miryam Roach  • Final pathology revealed poorly differentiated  • A predominant adenocarcinoma with solid component presenting 45% and micropapillary component 5% with extensive necrosis, invasive carcinoma measures 2.5 maximally there was focal lymphovascular space invasion all margins were negative for malignancy discussed with the closest margin was 2.5 cm pathology stage is pT1C pN1.  PD-L1 showed a tumor proportion score of 95%  • 8/3/2022 patient started adjuvant chemotherapy with cisplatin, Taxol  • 8/24/2022 patient received cycle 2 of combination chemotherapy with cisplatin and Taxol with 
Neulasta  • 10/5/2022: Patient received cycle 4-day 1 of combination chemotherapy with cisplatin and Taxol    Past Medical History:   Diagnosis Date   • Allergic rhinitis 07/25/2013    Overview:  4/2/2018 - still zyrtec 10 daily (if stops, gets dermatitis again).    • Anxiety and depression 06/05/2012    Overview:  4/2/2018 -   C/w well 300 xr and Lito 20.  4/8/2019 -   Doing ok even with new lung cancer - lito 20 & well 300xr.    • Chronic pansinusitis 04/08/2019    Overview:  4/8/2019 -   MRI showed chronic thick in frontal, maxillary, ethmoidal ---> to Dr. COLLAZO to est since abx ICC didn't help.  Does netipot bid.    • Diastolic CHF (HCC) 07/09/2014    Overview:  7/4/14 - impaired LV relaxation on [facility] ECHO.  EF 60%. Rest normal   • Dupuytren's contracture of both hands    • Elevated cholesterol    • Erosive esophagitis 07/09/2019    Overview:  EGD 2016 4/2/2018 - nexium OTC daily for few week.  Qod before that.  Now carbonation hurts, epigastric pain 4/8/2019 -   protonix 40 doing well.    • Gastroesophageal reflux disease 02/21/2019   • Hiatal hernia 07/09/2019   • History of colon polyps    • Hypertension    • Lung cancer (HCC)     right lung and in lymph nodes x2   • Mediastinal lymphadenopathy 03/12/2019   • Normocytic anemia 08/08/2019    Overview:  6/2019 - dropped to 9's postop 7/2019 - rebounded to 10's.  8/8/2019 -   Back to 9's - check ferritin, b12 and thyroid.    • Prediabetes 07/09/2014    Overview:  7/4/14 - a1c 6.1 at [facility] admission   • Retention of urine 06/27/2019    PSOT OP, RESOLVED       Past Surgical History:   Procedure Laterality Date   • BRONCHOSCOPY  03/18/2019    EBUS MONTERO NEEDLE BX   • COLONOSCOPY     • DUPUYTREN CONTRACTURE RELEASE Bilateral    • LUNG BIOPSY  03/08/2019    CT GUIDED   • LUNG REMOVAL, PARTIAL Right     right lower   • PORTACATH PLACEMENT  03/20/2019    DR LONGORIA   • THORACOSCOPY Right 6/26/2019    Procedure: RIGHT VATS, OPEN LOWER LOBECTOMY WITH LYMPH NODE DISECTION, 
WEDGE RESECTION OF RIGHT MIDDLE LOBE;  Surgeon: Terrance Fuchs MD;  Location: [facility] MAIN OR;  Service: Cardiothoracic   • THORACOSCOPY VIDEO ASSISTED WITH LOBECTOMY Left 7/6/2022    Procedure: THORACOSCOPY VIDEO ASSISTED WITH LOBECTOMY;  Surgeon: Miryam Reyna MD;  Location: [facility] MAIN OR;  Service: Thoracic;  Laterality: Left;         Current Outpatient Medications:   •  levothyroxine (Euthyrox) 100 MCG tablet, Take 1 tablet by mouth Daily., Disp: , Rfl:   •  liothyronine (CYTOMEL) 5 MCG tablet, Take 1 tablet by mouth Daily., Disp: , Rfl:   •  amLODIPine (NORVASC) 10 MG tablet, Take 10 mg by mouth Daily., Disp: , Rfl:   •  amLODIPine (NORVASC) 5 MG tablet, Take 5 mg by mouth Daily., Disp: , Rfl:   •  buPROPion XL (WELLBUTRIN XL) 300 MG 24 hr tablet, Take 300 mg by mouth Every Morning., Disp: , Rfl:   •  buPROPion XL (WELLBUTRIN XL) 300 MG 24 hr tablet, Take 1 tablet by mouth Every Morning., Disp: , Rfl:   •  desvenlafaxine (PRISTIQ) 50 MG 24 hr tablet, Take 50 mg by mouth Daily., Disp: , Rfl:   •  gabapentin (NEURONTIN) 300 MG capsule, Take 1 capsule by mouth Every 8 (Eight) Hours for 30 days., Disp: 90 capsule, Rfl: 0  •  levothyroxine (SYNTHROID, LEVOTHROID) 100 MCG tablet, Take 100 mcg by mouth Every Morning., Disp: , Rfl:   •  lidocaine-prilocaine (EMLA) 2.5-2.5 % cream, Apply 1 application topically to the appropriate area as directed Take As Directed. Apply to port area 30 mins prior to port access, Disp: 30 g, Rfl: 6  •  liothyronine (CYTOMEL) 5 MCG tablet, Take 5 mcg by mouth Daily., Disp: , Rfl:   •  LORazepam (ATIVAN) 0.5 MG tablet, Take 0.5 mg by mouth Every 8 (Eight) Hours As Needed., Disp: , Rfl:   •  magnesium oxide (MAG-OX) 400 MG tablet, Take 1 tablet by mouth Daily., Disp: 30 tablet, Rfl: 0  •  Magnesium Oxide 400 (240 Mg) MG tablet, Take 1 tablet by mouth Daily., Disp: , Rfl:   •  metoprolol succinate XL (TOPROL-XL) 100 MG 24 hr tablet, Take 100 mg by mouth Daily., Disp: , Rfl:   •  
OLANZapine (zyPREXA) 5 MG tablet, Take 1 tablet by mouth Every Night. Take on days 2, 3 and 4 after chemotherapy., Disp: 3 tablet, Rfl: 5  •  ondansetron (ZOFRAN) 8 MG tablet, Take 1 tablet by mouth 3 (Three) Times a Day As Needed for Nausea or Vomiting., Disp: 30 tablet, Rfl: 5  •  oxyCODONE (Roxicodone) 5 MG immediate release tablet, Take 1 tablet by mouth Every 6 (Six) Hours As Needed for Moderate Pain ., Disp: 45 tablet, Rfl: 0  •  pantoprazole (PROTONIX) 40 MG EC tablet, Take 40 mg by mouth Daily As Needed., Disp: , Rfl:   •  pantoprazole (PROTONIX) 40 MG EC tablet, Take 1 tablet by mouth Daily., Disp: , Rfl:   •  pravastatin (PRAVACHOL) 10 MG tablet, Take 10 mg by mouth Every Night., Disp: , Rfl:   •  vitamin B-12 (CYANOCOBALAMIN) 1000 MCG tablet, Take 1,000 mcg by mouth Daily., Disp: , Rfl:   No current facility-administered medications for this visit.    Facility-Administered Medications Ordered in Other Visits:   •  CISplatin (PLATINOL) 162 mg in sodium chloride 0.9 % 1,162 mL chemo IVPB, 75 mg/m2 (Treatment Plan Recorded), Intravenous, Once, Azucena Gaspar MD, Last Rate: 590 mL/hr at 10/05/22 1336, 162 mg at 10/05/22 1336  •  pegfilgrastim (NEULASTA ONPRO) on-body injector 6 mg, 6 mg, Subcutaneous, Once, Azucena Gaspar MD    No Known Allergies    Family History   Problem Relation Age of Onset   • Cancer Mother         cervical cancer   • Cervical cancer Mother    • Cancer Father         lung cancer   • Lung cancer Father    • Lung cancer Sister    • Cancer Sister         lung cancer   • Malig Hyperthermia Neg Hx        Cancer-related family history includes Cancer in his father, mother, and sister; Cervical cancer in his mother; Lung cancer in his father and sister.    Social History     Tobacco Use   • Smoking status: Former Smoker     Types: Cigarettes     Quit date: 2009     Years since quittin.0   • Smokeless tobacco: Never Used   Vaping Use   • Vaping Use: Never used 
"  Substance Use Topics   • Alcohol use: Yes     Alcohol/week: 2.0 standard drinks     Types: 2 Cans of beer per week     Comment: Occasional   • Drug use: No     I have reviewed and confirmed the accuracy of the patient's history: Chief complaint, HPI, ROS and Subjective as entered by the MA/LPN/RN. Azucena Livia aGspar MD 10/05/22        SUBJECTIVE:    He continues to complain of fatigue but otherwise he has no new issues today      REVIEW OF SYSTEMS:    Review of Systems   Constitutional: Positive for fatigue. Negative for chills and fever.   HENT: Negative for ear pain, mouth sores, nosebleeds and sore throat.    Eyes: Negative for photophobia and visual disturbance.   Respiratory: Negative for wheezing and stridor.    Cardiovascular: Negative for chest pain and palpitations.   Gastrointestinal: Negative for abdominal pain, diarrhea, nausea and vomiting.   Endocrine: Negative for cold intolerance and heat intolerance.   Genitourinary: Negative for dysuria and hematuria.   Musculoskeletal: Negative for joint swelling and neck stiffness.   Skin: Negative for color change and rash.   Neurological: Negative for seizures and syncope.   Hematological: Negative for adenopathy.   Psychiatric/Behavioral: Negative for agitation, confusion and hallucinations.           OBJECTIVE:    Vitals:    10/05/22 0818   BP: 110/72   Pulse: 82   Resp: 16   Temp: 97 °F (36.1 °C)   TempSrc: Infrared   SpO2: 99%   Weight: 89.8 kg (198 lb)   Height: 190.5 cm (75\")   PainSc: 0-No pain     ECOG    Eastern Cooperative Oncology Group (ECOG, [place], World Health Organization) performance scale  0 Fully active; no performance restrictions.  1 Strenuous physical activity restricted; fully ambulatory and able to carry out light work.  2 Capable of all self-care but unable to carry out any work activities. Up and about >50% of waking hours.  3 Capable of only limited self-care; confined to bed or chair >50% of waking hours.  4 Completely "
disabled; cannot carry out any self-care; totally confined to bed or chair.    Physical Exam   Constitutional: He is oriented to person, place, and time. He appears well-developed. No distress.   HENT:   Head: Normocephalic and atraumatic.   Right Ear: External ear normal.   Left Ear: External ear normal.   Nose: Nose normal.   Eyes: Pupils are equal, round, and reactive to light. Conjunctivae are normal. Right eye exhibits no discharge. Left eye exhibits no discharge. No scleral icterus.   Neck: No thyromegaly present.   Cardiovascular: Normal rate, regular rhythm and normal heart sounds. Exam reveals no gallop and no friction rub.   Pulmonary/Chest: Effort normal. No stridor. No respiratory distress. He has no wheezes.   Abdominal: Soft. Bowel sounds are normal. He exhibits no mass. There is no abdominal tenderness. There is no rebound and no guarding.   Musculoskeletal: Normal range of motion. No tenderness.   Lymphadenopathy:     He has no cervical adenopathy.   Neurological: He is alert and oriented to person, place, and time. He exhibits normal muscle tone.   Skin: Skin is warm. No rash noted. He is not diaphoretic. No erythema.   Urticarial-like rash, right medial knee likely due to immunotherapy   Psychiatric: His behavior is normal. Judgment and thought content normal.   Nursing note and vitals reviewed.    I have reexamined the patient and the results are consistent with the previously documented exam. Azucena Gaspar MD     RECENT LABS    WBC   Date Value Ref Range Status   10/05/2022 8.94 3.40 - 10.80 10*3/mm3 Final   03/22/2022 7.84 4.5 - 11.0 10*3/uL Final     RBC   Date Value Ref Range Status   10/05/2022 3.17 (L) 4.14 - 5.80 10*6/mm3 Final   03/22/2022 4.23 (L) 4.5 - 5.9 10*6/uL Final     Hemoglobin   Date Value Ref Range Status   10/05/2022 9.9 (L) 13.0 - 17.7 g/dL Final   03/22/2022 12.9 (L) 13.5 - 17.5 g/dL Final     Hematocrit   Date Value Ref Range Status   10/05/2022 29.9 (L) 37.5 - 51.0 
% Final   03/22/2022 38.8 (L) 41.0 - 53.0 % Final     MCV   Date Value Ref Range Status   10/05/2022 94.3 79.0 - 97.0 fL Final   03/22/2022 91.7 80.0 - 100.0 fL Final     MCH   Date Value Ref Range Status   10/05/2022 31.2 26.6 - 33.0 pg Final   03/22/2022 30.5 26.0 - 34.0 pg Final     MCHC   Date Value Ref Range Status   10/05/2022 33.1 31.5 - 35.7 g/dL Final   03/22/2022 33.2 31.0 - 37.0 g/dL Final     RDW   Date Value Ref Range Status   10/05/2022 15.3 12.3 - 15.4 % Final   03/22/2022 14.2 12.0 - 16.8 % Final     RDW-SD   Date Value Ref Range Status   10/05/2022 50.8 37.0 - 54.0 fl Final     MPV   Date Value Ref Range Status   10/05/2022 8.4 6.0 - 12.0 fL Final   03/22/2022 9.3 8.4 - 12.4 fL Final     Platelets   Date Value Ref Range Status   10/05/2022 397 140 - 450 10*3/mm3 Final   03/22/2022 324 140 - 440 10*3/uL Final     Neutrophil Rel %   Date Value Ref Range Status   03/22/2022 63.1 45 - 80 % Final     Neutrophil %   Date Value Ref Range Status   10/05/2022 73.9 42.7 - 76.0 % Final     Lymphocyte Rel %   Date Value Ref Range Status   03/22/2022 16.2 15 - 50 % Final     Lymphocyte %   Date Value Ref Range Status   10/05/2022 10.6 (L) 19.6 - 45.3 % Final     Monocyte Rel %   Date Value Ref Range Status   03/22/2022 13.6 0 - 15 % Final     Monocyte %   Date Value Ref Range Status   10/05/2022 12.3 (H) 5.0 - 12.0 % Final     Eosinophil %   Date Value Ref Range Status   10/05/2022 2.3 0.3 - 6.2 % Final   03/22/2022 5.5 0 - 7 % Final     Basophil Rel %   Date Value Ref Range Status   03/22/2022 1.1 0 - 2 % Final     Basophil %   Date Value Ref Range Status   10/05/2022 0.9 0.0 - 1.5 % Final     Immature Grans %   Date Value Ref Range Status   07/07/2022 0.4 0.0 - 0.5 % Final   03/22/2022 0.5 0.0 - 1.0 % Final     Neutrophils Absolute   Date Value Ref Range Status   03/22/2022 4.94 2.0 - 8.8 10*3/uL Final     Neutrophils, Absolute   Date Value Ref Range Status   10/05/2022 6.60 1.70 - 7.00 10*3/mm3 Final 
    Lymphocytes Absolute   Date Value Ref Range Status   03/22/2022 1.27 0.7 - 5.5 10*3/uL Final     Lymphocytes, Absolute   Date Value Ref Range Status   10/05/2022 0.95 0.70 - 3.10 10*3/mm3 Final     Monocytes Absolute   Date Value Ref Range Status   03/22/2022 1.07 0.0 - 1.7 10*3/uL Final     Monocytes, Absolute   Date Value Ref Range Status   10/05/2022 1.10 (H) 0.10 - 0.90 10*3/mm3 Final     Eosinophils Absolute   Date Value Ref Range Status   03/22/2022 0.43 0.0 - 0.8 10*3/uL Final     Eosinophils, Absolute   Date Value Ref Range Status   10/05/2022 0.21 0.00 - 0.40 10*3/mm3 Final     Basophils Absolute   Date Value Ref Range Status   03/22/2022 0.09 0.0 - 0.2 10*3/uL Final     Basophils, Absolute   Date Value Ref Range Status   10/05/2022 0.08 0.00 - 0.20 10*3/mm3 Final     Immature Grans, Absolute   Date Value Ref Range Status   07/07/2022 0.05 0.00 - 0.05 10*3/mm3 Final   03/22/2022 0.04 0.00 - 0.10 10*3/uL Final     nRBC   Date Value Ref Range Status   07/07/2022 0.0 0.0 - 0.2 /100 WBC Final       Lab Results   Component Value Date    GLUCOSE 148 (H) 10/05/2022    BUN 11 10/05/2022    CREATININE 0.90 10/05/2022    EGFRIFNONA 100 01/28/2022    EGFRIFAFRI >60 05/20/2019    BCR 12.8 10/05/2022    K 4.4 10/05/2022    CO2 27.0 10/05/2022    CALCIUM 9.4 10/05/2022    ALBUMIN 3.70 10/05/2022    LABIL2 1.5 03/22/2022    AST 15 10/05/2022    ALT 17 10/05/2022         ASSESSMENT:    1. Poorly differentiated adenocarcinoma of the left lung status post left thoracotomy with mediastinal lymph node dissection.  pT1 cpN1 M0 consistent with stage IIb disease, found on surveillance CT scans. Brain MRI was negative. We will recommend platinum doublet followed by Tecentriq unless he has EGFR mutation for which adjuvant osimertinib will be considered.  I believe patient has a second new primary lung cancer as his initial disease was moderately differentiated adenocarcinoma and current disease is poorly differentiated 
adenocarcinoma.  Patient was treated with cisplatin and Alimta in the adjuvant setting for his original malignancy.  He is currently on cisplatin and Taxol.  Patient is receiving cycle #4 today.  This tumor was completely resected and has negative margins and no mediastinal lymph node involvement.  Reviewed the pathology with Dr. Trevizo.  This is a new second lung primary  2. High PD-L1 expression at 95%.  We will complete NGS testing to find other molecular markers..  Reviewed foundation 1 testing results.  This showed MET amplification, ERBB2 amplification for which targeted therapy is available for including crizotinib for MET amplification, afatinib for ERBB2 but this is approved for metastatic disease  3. Chemotherapy-induced fatigue: Ongoing evaluation  4. HypoMagness anemia secondary to chemotherapy: Plan continue replacement therapy  5. Hyponatremia: We will give additional saline IV fluids tomorrow    6. History of adenocarcinoma of the right lung, T1c N2 M0, consistent with clinical stage 3A disease in 2019.  S/P combined chemotherapy and radiation with cisplatin and Alimta neoadjuvantly, followed by her right lower lobectomy with mediastinal and hilar lymph node dissection with residual disease pT1 cpN2 M0.  Followed by maintenance durvalumab for 1 year.   7. Recent diagnosis of hypothyroidism, on thyroid replacement therapy  8. Satus post right thoracentesis with cytology negative for malignancy  9. History of pneumothorax following lung biopsy  10. Postop anemia: Reviewed  11. Assessment has been reviewed and updated      PLANS:    1. Continue with chemotherapy.  Patient is starting cycle #4 today  2. Continue weekly BMP and mag levels CBC  3. Continue magnesium supplements  4. Encouraged p.o. fluid intake  5. Additional fluids for hyponatremia  6. Plan to offer Tecentriq 1200 mg IV q. 21 days for 16 cycles beginning first week in November 2022.  We will plan to see him end of October  7. Continue 
Quality 224: Stage 0-Iic Melanoma: Overutilization Of Imaging Studies For Only Stage 0-Iic Melanoma: None of the following diagnostic imaging studies ordered: chest X-ray, CT, Ultrasound, MRI, PET, or nuclear medicine scans (ML)
antinausea medications  8. Reviewed foundation 1 results.  He will be a candidate for molecular targets in the future if he does develop metastatic disease  9. Continue Emla cream to port  10. Continue thyroid replacement therapy through his PCP  11. Continue B12 injections  monthly: Patient did have elevated methylmalonic acid level during the early phases of his treatments.  Continue the same  12. Continue supportive care  13. All questions answered         Patient has  questions which have all been answered to the best of my abilities    I have reviewed labs results, imaging, vitals, and medications with the patient today.     Patient verbalized understanding and is in agreement of the above plan.    I spent 30 total minutes, face-to-face, caring for Jc today.  90% of this time involved counseling and/or coordination of care as documented within this note.            
Detail Level: Detailed
Quality 137: Melanoma: Continuity Of Care - Recall System: Patient information entered into a recall system that includes: target date for the next exam specified AND a process to follow up with patients regarding missed or unscheduled appointments

## 2022-10-07 DIAGNOSIS — I44.7 LBBB (LEFT BUNDLE BRANCH BLOCK): ICD-10-CM

## 2022-10-07 DIAGNOSIS — I50.9 CHF (CONGESTIVE HEART FAILURE) (HCC): ICD-10-CM

## 2022-10-07 LAB
ANION GAP SERPL CALC-SCNC: 3 MMOL/L (ref 4–13)
BASOPHILS # BLD: 0.1 K/UL (ref 0–0.2)
BASOPHILS NFR BLD: 1 % (ref 0–2)
BUN SERPL-MCNC: 30 MG/DL (ref 8–23)
CALCIUM SERPL-MCNC: 8.9 MG/DL (ref 8.3–10.4)
CHLORIDE SERPL-SCNC: 112 MMOL/L (ref 101–110)
CO2 SERPL-SCNC: 26 MMOL/L (ref 21–32)
CREAT SERPL-MCNC: 1.4 MG/DL (ref 0.8–1.5)
DIFFERENTIAL METHOD BLD: ABNORMAL
EOSINOPHIL # BLD: 0.1 K/UL (ref 0–0.8)
EOSINOPHIL NFR BLD: 2 % (ref 0.5–7.8)
ERYTHROCYTE [DISTWIDTH] IN BLOOD BY AUTOMATED COUNT: 14.8 % (ref 11.9–14.6)
GLUCOSE SERPL-MCNC: 111 MG/DL (ref 65–100)
HCT VFR BLD AUTO: 40.6 % (ref 41.1–50.3)
HGB BLD-MCNC: 12.3 G/DL (ref 13.6–17.2)
IMM GRANULOCYTES # BLD AUTO: 0 K/UL (ref 0–0.5)
IMM GRANULOCYTES NFR BLD AUTO: 0 % (ref 0–5)
INR PPP: 1
LYMPHOCYTES # BLD: 2.4 K/UL (ref 0.5–4.6)
LYMPHOCYTES NFR BLD: 30 % (ref 13–44)
MAGNESIUM SERPL-MCNC: 2 MG/DL (ref 1.8–2.4)
MCH RBC QN AUTO: 27.1 PG (ref 26.1–32.9)
MCHC RBC AUTO-ENTMCNC: 30.3 G/DL (ref 31.4–35)
MCV RBC AUTO: 89.4 FL (ref 79.6–97.8)
MONOCYTES # BLD: 0.9 K/UL (ref 0.1–1.3)
MONOCYTES NFR BLD: 11 % (ref 4–12)
NEUTS SEG # BLD: 4.6 K/UL (ref 1.7–8.2)
NEUTS SEG NFR BLD: 57 % (ref 43–78)
NRBC # BLD: 0 K/UL (ref 0–0.2)
PLATELET # BLD AUTO: 266 K/UL (ref 150–450)
PMV BLD AUTO: 10.5 FL (ref 9.4–12.3)
POTASSIUM SERPL-SCNC: 4.6 MMOL/L (ref 3.5–5.1)
PROTHROMBIN TIME: 13 SEC (ref 12.6–14.5)
RBC # BLD AUTO: 4.54 M/UL (ref 4.23–5.6)
SODIUM SERPL-SCNC: 141 MMOL/L (ref 136–145)
WBC # BLD AUTO: 8 K/UL (ref 4.3–11.1)

## 2022-10-10 ENCOUNTER — ANESTHESIA EVENT (OUTPATIENT)
Dept: CARDIAC CATH/INVASIVE PROCEDURES | Age: 77
End: 2022-10-10
Payer: MEDICARE

## 2022-10-10 RX ORDER — PROCHLORPERAZINE EDISYLATE 5 MG/ML
5 INJECTION INTRAMUSCULAR; INTRAVENOUS
Status: CANCELLED | OUTPATIENT
Start: 2022-10-10 | End: 2022-10-11

## 2022-10-10 RX ORDER — SODIUM CHLORIDE 9 MG/ML
INJECTION, SOLUTION INTRAVENOUS PRN
Status: CANCELLED | OUTPATIENT
Start: 2022-10-10

## 2022-10-10 RX ORDER — DIPHENHYDRAMINE HYDROCHLORIDE 50 MG/ML
12.5 INJECTION INTRAMUSCULAR; INTRAVENOUS
Status: CANCELLED | OUTPATIENT
Start: 2022-10-10 | End: 2022-10-11

## 2022-10-10 RX ORDER — DEXTROSE MONOHYDRATE 100 MG/ML
INJECTION, SOLUTION INTRAVENOUS CONTINUOUS PRN
Status: CANCELLED | OUTPATIENT
Start: 2022-10-10

## 2022-10-10 RX ORDER — SODIUM CHLORIDE, SODIUM LACTATE, POTASSIUM CHLORIDE, CALCIUM CHLORIDE 600; 310; 30; 20 MG/100ML; MG/100ML; MG/100ML; MG/100ML
INJECTION, SOLUTION INTRAVENOUS CONTINUOUS
Status: CANCELLED | OUTPATIENT
Start: 2022-10-10

## 2022-10-10 RX ORDER — MIDAZOLAM HYDROCHLORIDE 2 MG/2ML
2 INJECTION, SOLUTION INTRAMUSCULAR; INTRAVENOUS
Status: CANCELLED | OUTPATIENT
Start: 2022-10-10 | End: 2022-10-11

## 2022-10-10 RX ORDER — OXYCODONE HYDROCHLORIDE 5 MG/1
5 TABLET ORAL
Status: CANCELLED | OUTPATIENT
Start: 2022-10-10 | End: 2022-10-11

## 2022-10-10 RX ORDER — SODIUM CHLORIDE 0.9 % (FLUSH) 0.9 %
5-40 SYRINGE (ML) INJECTION PRN
Status: CANCELLED | OUTPATIENT
Start: 2022-10-10

## 2022-10-10 RX ORDER — SODIUM CHLORIDE 0.9 % (FLUSH) 0.9 %
5-40 SYRINGE (ML) INJECTION EVERY 12 HOURS SCHEDULED
Status: CANCELLED | OUTPATIENT
Start: 2022-10-10

## 2022-10-10 RX ORDER — LIDOCAINE HYDROCHLORIDE 10 MG/ML
1 INJECTION, SOLUTION INFILTRATION; PERINEURAL
Status: CANCELLED | OUTPATIENT
Start: 2022-10-10 | End: 2022-10-11

## 2022-10-10 RX ORDER — HYDROMORPHONE HYDROCHLORIDE 2 MG/ML
0.5 INJECTION, SOLUTION INTRAMUSCULAR; INTRAVENOUS; SUBCUTANEOUS EVERY 10 MIN PRN
Status: CANCELLED | OUTPATIENT
Start: 2022-10-10

## 2022-10-10 NOTE — PROGRESS NOTES
Patient pre-assessment complete for BIV ICD generator change with Dr Kimberly Garcia scheduled for 10/11/22 at [address] [place] [address], arrival time 7am. Patient verified using . Patient instructed to bring all home medications in labeled bottles on the day of procedure. NPO status reinforced. Patient instructed to HOLD metformin, tresiba, lasix & valsartan (Pt states he plans to hold all am meds tomorrow). Instructed they can take all other medications excluding vitamins & supplements. Patient verbalizes understanding of all instructions & denies any questions at this time.

## 2022-10-11 ENCOUNTER — ANESTHESIA (OUTPATIENT)
Dept: CARDIAC CATH/INVASIVE PROCEDURES | Age: 77
End: 2022-10-11
Payer: MEDICARE

## 2022-10-11 ENCOUNTER — HOSPITAL ENCOUNTER (OUTPATIENT)
Age: 77
Setting detail: OUTPATIENT SURGERY
Discharge: HOME | End: 2022-10-11
Attending: INTERNAL MEDICINE | Admitting: INTERNAL MEDICINE
Payer: MEDICARE

## 2022-10-11 VITALS
DIASTOLIC BLOOD PRESSURE: 70 MMHG | BODY MASS INDEX: 35.7 KG/M2 | OXYGEN SATURATION: 96 % | HEART RATE: 77 BPM | SYSTOLIC BLOOD PRESSURE: 152 MMHG | RESPIRATION RATE: 12 BRPM | WEIGHT: 241 LBS | TEMPERATURE: 98.6 F | HEIGHT: 69 IN

## 2022-10-11 DIAGNOSIS — Z45.02 AICD AT END OF BATTERY LIFE: ICD-10-CM

## 2022-10-11 LAB
ANION GAP SERPL CALC-SCNC: 6 MMOL/L (ref 2–11)
BUN SERPL-MCNC: 24 MG/DL (ref 8–23)
CALCIUM SERPL-MCNC: 9.4 MG/DL (ref 8.3–10.4)
CHLORIDE SERPL-SCNC: 109 MMOL/L (ref 101–110)
CO2 SERPL-SCNC: 26 MMOL/L (ref 21–32)
CREAT SERPL-MCNC: 1.3 MG/DL (ref 0.8–1.5)
ECHO BSA: 2.31 M2
EKG ATRIAL RATE: 75 BPM
EKG DIAGNOSIS: NORMAL
EKG P AXIS: 50 DEGREES
EKG P-R INTERVAL: 162 MS
EKG Q-T INTERVAL: 440 MS
EKG QRS DURATION: 188 MS
EKG QTC CALCULATION (BAZETT): 491 MS
EKG R AXIS: 257 DEGREES
EKG T AXIS: 63 DEGREES
EKG VENTRICULAR RATE: 75 BPM
ERYTHROCYTE [DISTWIDTH] IN BLOOD BY AUTOMATED COUNT: 14.6 % (ref 11.9–14.6)
GLUCOSE SERPL-MCNC: 91 MG/DL (ref 65–100)
HCT VFR BLD AUTO: 40.4 % (ref 41.1–50.3)
HGB BLD-MCNC: 12.5 G/DL (ref 13.6–17.2)
MAGNESIUM SERPL-MCNC: 1.9 MG/DL (ref 1.8–2.4)
MCH RBC QN AUTO: 26.7 PG (ref 26.1–32.9)
MCHC RBC AUTO-ENTMCNC: 30.9 G/DL (ref 31.4–35)
MCV RBC AUTO: 86.3 FL (ref 82–102)
NRBC # BLD: 0 K/UL (ref 0–0.2)
PLATELET # BLD AUTO: 261 K/UL (ref 150–450)
PMV BLD AUTO: 10.4 FL (ref 9.4–12.3)
POTASSIUM SERPL-SCNC: 4.1 MMOL/L (ref 3.5–5.1)
RBC # BLD AUTO: 4.68 M/UL (ref 4.23–5.6)
SODIUM SERPL-SCNC: 141 MMOL/L (ref 133–143)
WBC # BLD AUTO: 7.7 K/UL (ref 4.3–11.1)

## 2022-10-11 PROCEDURE — 6360000002 HC RX W HCPCS: Performed by: INTERNAL MEDICINE

## 2022-10-11 PROCEDURE — 6360000002 HC RX W HCPCS: Performed by: NURSE ANESTHETIST, CERTIFIED REGISTERED

## 2022-10-11 PROCEDURE — 2500000003 HC RX 250 WO HCPCS: Performed by: NURSE ANESTHETIST, CERTIFIED REGISTERED

## 2022-10-11 PROCEDURE — 3700000001 HC ADD 15 MINUTES (ANESTHESIA): Performed by: INTERNAL MEDICINE

## 2022-10-11 PROCEDURE — 2580000003 HC RX 258: Performed by: INTERNAL MEDICINE

## 2022-10-11 PROCEDURE — 2500000003 HC RX 250 WO HCPCS: Performed by: INTERNAL MEDICINE

## 2022-10-11 PROCEDURE — 2709999900 HC NON-CHARGEABLE SUPPLY: Performed by: INTERNAL MEDICINE

## 2022-10-11 PROCEDURE — 80048 BASIC METABOLIC PNL TOTAL CA: CPT

## 2022-10-11 PROCEDURE — 2580000003 HC RX 258: Performed by: NURSE ANESTHETIST, CERTIFIED REGISTERED

## 2022-10-11 PROCEDURE — 93005 ELECTROCARDIOGRAM TRACING: CPT | Performed by: INTERNAL MEDICINE

## 2022-10-11 PROCEDURE — C1882 AICD, OTHER THAN SING/DUAL: HCPCS | Performed by: INTERNAL MEDICINE

## 2022-10-11 PROCEDURE — 3700000000 HC ANESTHESIA ATTENDED CARE: Performed by: INTERNAL MEDICINE

## 2022-10-11 PROCEDURE — 83735 ASSAY OF MAGNESIUM: CPT

## 2022-10-11 PROCEDURE — 33264 RMVL & RPLCMT DFB GEN MLT LD: CPT | Performed by: INTERNAL MEDICINE

## 2022-10-11 PROCEDURE — 85027 COMPLETE CBC AUTOMATED: CPT

## 2022-10-11 DEVICE — HF CARDIAC RESYNCHRONISATION THERAPY DEFIBRILLATOR VVED DDDRV
Type: IMPLANTABLE DEVICE | Status: FUNCTIONAL
Brand: GALLANT™

## 2022-10-11 RX ORDER — DOXYCYCLINE HYCLATE 100 MG
100 TABLET ORAL 2 TIMES DAILY
Qty: 10 TABLET | Refills: 0 | Status: SHIPPED | OUTPATIENT
Start: 2022-10-11 | End: 2022-10-16

## 2022-10-11 RX ORDER — SODIUM CHLORIDE, SODIUM LACTATE, POTASSIUM CHLORIDE, CALCIUM CHLORIDE 600; 310; 30; 20 MG/100ML; MG/100ML; MG/100ML; MG/100ML
INJECTION, SOLUTION INTRAVENOUS CONTINUOUS PRN
Status: DISCONTINUED | OUTPATIENT
Start: 2022-10-11 | End: 2022-10-11 | Stop reason: SDUPTHER

## 2022-10-11 RX ORDER — PROPOFOL 10 MG/ML
INJECTION, EMULSION INTRAVENOUS PRN
Status: DISCONTINUED | OUTPATIENT
Start: 2022-10-11 | End: 2022-10-11 | Stop reason: SDUPTHER

## 2022-10-11 RX ORDER — LIDOCAINE HYDROCHLORIDE 20 MG/ML
INJECTION, SOLUTION EPIDURAL; INFILTRATION; INTRACAUDAL; PERINEURAL PRN
Status: DISCONTINUED | OUTPATIENT
Start: 2022-10-11 | End: 2022-10-11 | Stop reason: SDUPTHER

## 2022-10-11 RX ADMIN — LIDOCAINE HYDROCHLORIDE 40 MG: 20 INJECTION, SOLUTION EPIDURAL; INFILTRATION; INTRACAUDAL; PERINEURAL at 08:43

## 2022-10-11 RX ADMIN — PROPOFOL 160 MCG/KG/MIN: 10 INJECTION, EMULSION INTRAVENOUS at 08:44

## 2022-10-11 RX ADMIN — PROPOFOL 20 MG: 10 INJECTION, EMULSION INTRAVENOUS at 09:01

## 2022-10-11 RX ADMIN — PROPOFOL 20 MG: 10 INJECTION, EMULSION INTRAVENOUS at 08:43

## 2022-10-11 RX ADMIN — SODIUM CHLORIDE, SODIUM LACTATE, POTASSIUM CHLORIDE, AND CALCIUM CHLORIDE: 600; 310; 30; 20 INJECTION, SOLUTION INTRAVENOUS at 08:36

## 2022-10-11 RX ADMIN — PROPOFOL 50 MG: 10 INJECTION, EMULSION INTRAVENOUS at 08:42

## 2022-10-11 RX ADMIN — Medication 2000 MG: at 08:45

## 2022-10-11 ASSESSMENT — ENCOUNTER SYMPTOMS
SHORTNESS OF BREATH: 1
ALLERGIC/IMMUNOLOGIC NEGATIVE: 1
GASTROINTESTINAL NEGATIVE: 1
RESPIRATORY NEGATIVE: 1
EYES NEGATIVE: 1

## 2022-10-11 NOTE — PROGRESS NOTES
Pt arrived, ambulated to room with no visible problems, planned Generator Change for Kt. Consent signed, Procedure discussed with pt all questions answered voiced understanding. Medications and history discussed with pt.     Pt prepped per orders The patient has a fraility score of 4-VULNERABLE, based on no need for assistance

## 2022-10-11 NOTE — PROGRESS NOTES
Assisted OOB & ambulated to BR & on unit. Tolerated activity without difficulty.  Left subclavian without bleeding or hematoma

## 2022-10-11 NOTE — ANESTHESIA PRE PROCEDURE
Department of Anesthesiology  Preprocedure Note       Name:  Bob Marvin   Age:  77 y.o.  :  1945                                          MRN:  [id number]         Date:  10/11/2022      Surgeon: Salome Russell):  Lukasz Bee MD    Procedure: Procedure(s):  Remove & replace ICD biv multi leads    Medications prior to admission:   Prior to Admission medications    Medication Sig Start Date End Date Taking?  Authorizing Provider   SITagliptin-metFORMIN (JANUMET XR)  MG TB24 per extended release tablet Take 2 tablets by mouth daily 22   Nupur Caldwellu, APRN - CNP   donepezil (ARICEPT) 10 MG tablet Take 1 tablet by mouth nightly 22   Anastacia Hernandez MD   gabapentin (NEURONTIN) 300 MG capsule TAKE 1 CAPSULE BY MOUTH EVERY MORNING AND 2 CAPSULES BY MOUTH AT BEDTIME 22  Nupur Lowe, APRCHANTEL - CNP   celecoxib (CELEBREX) 200 MG capsule Take 1 capsule by mouth in the morning. 22   Nupur Lowe APRCHANTEL - CNP   valsartan-hydroCHLOROthiazide (DIOVAN-HCT) 320-25 MG per tablet Take 1 tablet by mouth daily 22   Anastacia Hernandez MD   Zinc Acetate, Oral, (ZINC ACETATE PO) Take 1 tablet by mouth daily    Ar Automatic Reconciliation   acetaminophen (TYLENOL) 500 MG tablet Take 1,000 mg by mouth every 6 hours as needed    Ar Automatic Reconciliation   allopurinol (ZYLOPRIM) 100 MG tablet Take 100 mg by mouth daily 3/31/22   Ar Automatic Reconciliation   amitriptyline (ELAVIL) 10 MG tablet Take 10 mg by mouth nightly  3/31/22   Ar Automatic Reconciliation   carvedilol (COREG) 6.25 MG tablet Take 6.25 mg by mouth 2 times daily 21   Ar Automatic Reconciliation   Cholecalciferol 50 MCG (2000 UT) TABS Take 1 tablet by mouth daily    Ar Automatic Reconciliation   Cyanocobalamin 1000 MCG SUBL Take 1,000 mcg by mouth daily    Ar Automatic Reconciliation   furosemide (LASIX) 20 MG tablet Take 20 mg by mouth daily as needed  3/10/21   Ar Automatic Reconciliation   indomethacin (INDOCIN) 25 MG capsule Take 25 mg by mouth 3 times daily 8/18/21   Ar Automatic Reconciliation   Insulin Degludec (TRESIBA FLEXTOUCH) 200 UNIT/ML SOPN Inject 50 Units into the skin daily    Ar Automatic Reconciliation   nitroGLYCERIN (NITROSTAT) 0.4 MG SL tablet Place 0.4 mg under the tongue 12/22/20   Ar Automatic Reconciliation   omeprazole (PRILOSEC) 20 MG delayed release capsule Take 20 mg by mouth daily 3/31/22   Ar Automatic Reconciliation   Probiotic Product (ACIDOPHILUS PROBIOTIC) CAPS capsule Take 1 tablet by mouth daily    Ar Automatic Reconciliation   rosuvastatin (CRESTOR) 10 MG tablet Take 10 mg by mouth every evening 3/31/22   Ar Automatic Reconciliation       Current medications:    Current Facility-Administered Medications   Medication Dose Route Frequency Provider Last Rate Last Admin    ceFAZolin (ANCEF) 2000 mg in sterile water 20 mL IV syringe  2,000 mg IntraVENous On Call to MD Josefina        neomycin-polymyxin B (NEOSPORIN) 1 mL in sterile water for irrigation 1,000 mL irrigation   Irrigation Once Hetal Barillas MD           Allergies:  No Known Allergies    Problem List:    Patient Active Problem List   Diagnosis Code    Obesity (BMI 30-39. 9) E66.9    Obesity hypoventilation syndrome ([facility] [address] E66.2    Encounter for diabetic foot exam ([facility] [address] E11.9    Insomnia due to medical condition G47.01    Presence of cardiac defibrillator Z95.810    Gout M10.9    MICHELL on CPAP G47.33, Z99.89    Type 2 diabetes mellitus with hyperglycemia, with long-term current use of insulin ([facility]) E11.65, Z79.4    Intention tremor G25.2    Gastroesophageal reflux disease without esophagitis K21.9    LBBB (left bundle branch block) I44.7    COVID-19 U07.1    Chronic systolic CHF (congestive heart failure) ([facility]) I50.22    CHF (congestive heart failure) ([facility]) I50.9    Left leg pain M79.605    Mixed hyperlipidemia E78.2    Primary hypertension I10    Stage 3 chronic kidney disease (HCC) N18.30    Memory changes R41.3    Basal cell carcinoma (BCC) of face C44.310    Peripheral vascular disease, unspecified (HCC) I73.9    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    Neuropathy G62.9    Neurogenic claudication due to lumbar spinal stenosis M48.062    AICD at end of battery life Z45.02       Past Medical History:        Diagnosis Date    Abnormal MMSE 05/24/2022    29 out of 30 and normal clock draw    Age-related cognitive decline 2020    30 out of 30 MMSE    Arthritis     DJD- shoulder, knees, hips    Chronic systolic CHF (congestive heart failure) ([facility] [address]     COVID-19 2/9/2021    Dizziness 06/09/2020    hx     GERD (gastroesophageal reflux disease)     managed with medication     Gout     managed with medication     H/O echocardiogram 10/14/2019    EF 60.1%    Hip pain, bilateral     POA    Hypertension     managed with medication     Morbid obesity ([facility] [address]     Neuropathy     feet and legs    MICHELL on CPAP 08/26/2014    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 Raj pacemaker/defibrillator placed 6/25/14--0 shocks     Psychiatric disorder     DEPRESSION    Routine eye exam 2020    no diabetic retinopathy- Anna David    Spinal stenosis of lumbar region     Stage 3b chronic kidney disease ([facility] [address]     Type 2 diabetes mellitus with hyperglycemia, with long-term current use of insulin ([facility] [address] 09/23/2020    managed with oral medication and Tresiba, average 's, no problems with hypoglycemia, A1c 7.2 11/29/21       Past Surgical History:        Procedure Laterality Date   Mya Counter      Dr. Ranjith Camarillo Left 05/23/2022    [facility]    ORTHOPEDIC SURGERY      right achilles tendon repair    ORTHOPEDIC SURGERY      knee    PACEMAKER      pacemaker/defibrillator placed 6/25/14     AR CARDIAC SURG PROCEDURE UNLIST  cath    TONSILLECTOMY         Social History: Social History     Tobacco Use    Smoking status: Never    Smokeless tobacco: Never   Substance Use Topics    Alcohol use: No                                Counseling given: Not Answered      Vital Signs (Current):   Vitals:    10/10/22 1231 10/11/22 0741   BP:  (!) 163/76   Pulse:  76   Temp:  98.1 °F (36.7 °C)   SpO2:  100%   Weight: 241 lb (109.3 kg) 241 lb (109.3 kg)   Height: 5' 9\" (1.753 m) 5' 9\" (1.753 m)                                              BP Readings from Last 3 Encounters:   10/11/22 (!) 163/76   08/24/22 118/80   07/11/22 128/70       NPO Status: Time of last liquid consumption: 2100                        Time of last solid consumption: 2100                        Date of last liquid consumption: 10/10/22                        Date of last solid food consumption: 10/10/22    BMI:   Wt Readings from Last 3 Encounters:   10/11/22 241 lb (109.3 kg)   08/24/22 240 lb (108.9 kg)   07/11/22 248 lb (112.5 kg)     Body mass index is 35.59 kg/m².     CBC:   Lab Results   Component Value Date/Time    WBC 7.7 10/11/2022 07:30 AM    RBC 4.68 10/11/2022 07:30 AM    HGB 12.5 10/11/2022 07:30 AM    HCT 40.4 10/11/2022 07:30 AM    MCV 86.3 10/11/2022 07:30 AM    RDW 14.6 10/11/2022 07:30 AM     10/11/2022 07:30 AM       CMP:   Lab Results   Component Value Date/Time     10/07/2022 11:00 AM    K 4.6 10/07/2022 11:00 AM     10/07/2022 11:00 AM    CO2 26 10/07/2022 11:00 AM    BUN 30 10/07/2022 11:00 AM    CREATININE 1.40 10/07/2022 11:00 AM    GFRAA >60 09/06/2022 10:18 AM    AGRATIO 2.0 08/18/2021 03:10 PM    LABGLOM 52 10/07/2022 11:00 AM    GLUCOSE 111 10/07/2022 11:00 AM    PROT 7.0 09/06/2022 10:18 AM    CALCIUM 8.9 10/07/2022 11:00 AM    BILITOT 0.3 09/06/2022 10:18 AM    ALKPHOS 95 09/06/2022 10:18 AM    ALKPHOS 101 08/18/2021 03:10 PM    AST 40 09/06/2022 10:18 AM    ALT 75 09/06/2022 10:18 AM       POC Tests: No results for input(s): POCGLU, POCNA, POCK, POCCL, POCBUN, POCHEMO, POCHCT in the last 72 hours. Coags:   Lab Results   Component Value Date/Time    PROTIME 13.0 10/07/2022 11:00 AM    INR 1.0 10/07/2022 11:00 AM    APTT 28.4 11/29/2021 10:32 AM       HCG (If Applicable): No results found for: PREGTESTUR, PREGSERUM, HCG, HCGQUANT     ABGs:   Lab Results   Component Value Date/Time    PHART 7.38 02/09/2021 10:38 AM    PO2ART 80 02/09/2021 10:38 AM    EPF6EHG 26.0 02/09/2021 10:38 AM    JGL1APL 15.4 02/09/2021 10:38 AM        Type & Screen (If Applicable):  No results found for: LABABO, LABRH    Drug/Infectious Status (If Applicable):  No results found for: HIV, HEPCAB    COVID-19 Screening (If Applicable):   Lab Results   Component Value Date/Time    COVID19 Not Detected 12/17/2021 12:00 AM    COVID19 Performed 12/17/2021 12:00 AM           Anesthesia Evaluation  Patient summary reviewed and Nursing notes reviewed no history of anesthetic complications:   Airway: Mallampati: III     Neck ROM: limited  Comment: Short, thick neck  Mild overbite   Mildly limited neck ROM  Mouth opening: > = 3 FB   Dental: normal exam         Pulmonary: breath sounds clear to auscultation  (+) shortness of breath: chronic,  sleep apnea: on CPAP,                             Cardiovascular:  Exercise tolerance: no interval change, Limited by neuropathy and arthritis. Denied chest pain, SOB, syncope   (+) hypertension:, pacemaker (BiV ICD):, dysrhythmias (LBBB s/p BIV ICD):, CHF (EF 50-55% on most recent echo 12/0636): systolic and diastolic, hyperlipidemia    (-) peripheral edema        Rate: normal                 ROS comment: Echo 12/2021 - EF 70-94%, diastolic dysfunction     Neuro/Psych:   (+) psychiatric history:depression/anxiety              ROS comment: Diabetic neuropathy  GI/Hepatic/Renal:   (+) GERD: well controlled, renal disease (CRI?):,           Endo/Other:    (+) DiabetesType II DM, , : arthritis:., .                 Abdominal:             Vascular: negative vascular ROS.          Other Findings:           Anesthesia Plan      TIVA     ASA 3       Induction: intravenous. Anesthetic plan and risks discussed with patient and spouse.                         Larayne Phalen, MD   10/11/2022

## 2022-10-11 NOTE — ANESTHESIA POSTPROCEDURE EVALUATION
Department of Anesthesiology  Postprocedure Note    Patient: Rafael Velazquez  MRN: [id number]  YOB: 1945  Date of evaluation: 10/11/2022      Procedure Summary     Date: 10/11/22 Room / Location: [facility] EP/CATH 4 ALL EVENTS / [facility] CARDIAC CATH LAB    Anesthesia Start: 0836 Anesthesia Stop: 3821    Procedure: Remove & replace ICD biv multi leads Diagnosis:       AICD at end of battery life      (AICD at end of battery life [Z45.02])    Providers: Rae Galloway MD Responsible Provider: Katherine Howard MD    Anesthesia Type: TIVA ASA Status: 3          Anesthesia Type: No value filed. Rosanna Phase I:      Rosanna Phase II:        Anesthesia Post Evaluation    Patient location during evaluation: PACU  Patient participation: complete - patient participated  Level of consciousness: awake and alert  Airway patency: patent  Nausea: well controlled. Complications: no  Cardiovascular status: acceptable.   Respiratory status: acceptable  Hydration status: stable

## 2022-10-11 NOTE — Clinical Note
Closing pocket- 3-0 Stratisfix, 4-0 V-loc, skin approximated with dermabond  Primaseal placed over incision.

## 2022-10-11 NOTE — PROGRESS NOTES
TRANSFER - IN REPORT:    Verbal report received from [facility] on Raina Merchant  being received from EP lab for routine progression of patient care      Report consisted of patient's Situation, Background, Assessment and   Recommendations(SBAR). Information from the following report(s) Nurse Handoff Report was reviewed with the receiving nurse. Opportunity for questions and clarification was provided. Assessment completed upon patient's arrival to unit and care assumed.

## 2022-10-11 NOTE — PROCEDURES
: Keshawn Bernard. Kimberly Garcia MD    REFERRING: Camille Alonso MD    Procedure: BiV ICD Generator Removal/Replacement without DFTs    Pre-Procedure Diagnosis  1. Chronic systolic heart failure, ejection fraction of 35-40%  2. Nonischemic dilated cardiomyopathy. 3. Class II heart failure symptoms. 4. Chronic systolic heart failure for a minimum of 3 months duration on optimal medical therapy. 5. Primary prevention indications for defibrillator  6. Life expectancy greater than 1 year. 7. LBBB with QRS duration of >150 msec. Procedure Performed  1. Insertion of St. Raj biventricular implantable cardioverter defibrillator. Anesthesia: Monitor Anesthesia Care     Estimated Blood Loss: Less than 50 mL     Specimens: * No specimens in log *     Complications: None    Patient Information and Indications: The procedure, indications, risks, benefits, and alternatives were discussed with the patient and family members, who desired to proceed after questions were answered and informed consent was documented. After informed consent was obtained, the patient was brought to the Electrophysiology Laboratory in a post-absorptive and was prepped and draped in sterile fashion. Prophylactic antibiotic was administered prior to skin incision. Conscious sedation was administered with continuous oxygen saturation measurement and blood pressure measurement by Anesthesia. Local anesthetic (1% lidocaine with epinephrine) was delivered to the left pectoral region and an incision was made parallel to the deltopectoral groove directly over the prior surgical scar. The subcutaneous pocket was opened using blunt dissection and Bovie electrocautery using the PlasmaBlade (PhotoTLC), and adequate hemostasis was established. The device was freed from overlying fibrotic tissue and the leads freed to give enough slack for device exchange.  The leads were individually removed from the chronic generator and tested using the PSA revealing excellent pacing/sensing parameters. The lead pins were then cleaned with antibiotic soaked gauze, dried gently, and attached to a new biventricular ICD generator. Pins were directly observed to pass the tip electrode, and the ring hex wrench screws were secured, and leads tug tested. The device and leads were gently positioned within the pocket. The pocket was irrigated copiously with a saline antimicrobial solution and D-Stat hemostatic solution was placed in the pocket. The device was and leads were tested a second time prior to pocket closure. The wound was closed with multiple layers of absorbable suture followed by skin closure with 4-0 V-Loc. The patient tolerated the procedure well and there was no complications. The patient was allowed to awake from anesthesia and was transferred to the recovery area in stable condition. All sharps and sponges were accounted for x 2.     Device and Lead Information  Pulse Generator Model #  Serial # Location Implant   [id number] [facility] [id number] Left Pectoral Implant   [id number] [facility] [id number] Left Pectoral Explant  06/25/2014     Lead Model Number  Serial Number Lead position Implant   RA [id number] MDT [id number] RA Chronic  06/25/2014   RV 7120Q/58 [facility] [id number] RV Chronic  06/25/2014     LV 1458Q/75 [facility] [id number] LV Chronic  06/25/2014       Lead Sensitivity and Threshold  Lead R or P sensitivity (mv) Threshold (V) Threshold PW (msec) Impedance (ohms) Final output Voltage (V) Final PW (msec)   RA 3.5 1.25 0.5 390 2.0 0.5   RV 11.8 0.5 0.5 510 2.0 0.5   LV - 1.75 1.0 1075 2.5 0.5     Bradycardia Settings  Rhett Mode LRL URL Pace AVD (ms) Sense AVD (ms) Rate Response Mode Switching Mode SW Rate   DDD 60 120 170 140  80       Tachycardia Settings  Zone Type VT-1 VT-2 VF   ON/OFF/  MONITOR ON  ON   Zone Rate 187  35 Intervals  222  24 Intervals   1st Therapy Type ATP X 3  ATP X 1   Energy (J) 81%  81%   2nd Therapy Type Shock  Shock   Energy (J) 30  36   3rd Therapy Type Shock  Shock   Energy (J) 40  40   4th Therapy Type Shock   Shock   Energy (J) 40  40   5th Therapy Type Shock  Shock   Energy (J) 40  40   6th Therapy Type   Shock   Energy (J)   40     Defibrillation Threshold Testing - None  DFT# How induced Successful test? Shock Imped (ohms) Energy (J) Charge time (sec) Rescue needed? Defib threshold (J)      47           IMPRESSION: 1) Successful BiV ICD generator replacement without DFTs. This device should NOT be considered MRI conditional due to non-SJM atrial lead. PLAN:  1) Routine CIED instructions. 2) Device clinic follow up in 1-2 weeks. 3) Routine cardiology follow up with Dr. Matt Sullivan. Johan Guerra.  Phoenix Roblero MD, [facility]  Clinical Cardiac Electrophysiology  [facility]  10/11/2022  9:29 AM

## 2022-10-11 NOTE — DISCHARGE INSTRUCTIONS
Post Op Device Instructions        Incision & Dressing Care   Please keep Aquacel dressing on wound until your 2 week follow up in device clinic. The dressing promotes healing and is meant to stay on the wound. Keep incision site completely dry for 48 hours. During this time you may take a sponge bath, but no shower. After 48 hours you may shower with your back to the water letting the water run over the dressing. Do not let the water directly hit the dressing, it is water resistant no water proof. Do not use any lotions, creams, or ointments on the incision. Avoid manipulating the device or leads with your fingers. Do not massage or excessively rub the implant site. This could displace the leads         Do not lift anything heavier than a gallon of milk. Avoid excessive pushing, pulling, or twisting. Call you doctor for any of the following:   Any signs of infection, including redness, swelling or pain at the incision site, or a   temperature of 101° F or greater. If you have twitching chest muscles, hiccups that won't stop, or a swollen arm on the   side of the incision. Any feelings of light-headedness, chest pain, or shortness of breath. Please notify your doctors and dentists that you have an ICD. You should not drive until 1 week after your implant or after your first follow-up appointment, whichever comes first. At that time, you can discuss when you may return to your regular driving routine. About 1 month after implant, you will receive a card by mail from the company who manufactured your ICD. Your device was manufactured by [employer]. You should carry this card with you at all times. If you receive one shock from your device:   and you feel ok, you may call to let your physician know. but if you are feeling poorly, please notify your doctor. You may need to be seen.    If you receive more than one shock in a 24 hour period, call your physician to schedule a visit or report to the emergency room. Please call the [address] [place] [address] Drive at  [phone] if you have questions or concerns about your device. Please call the hospital if it is after 5 pm or the weekend please page the on call cardiologist at [facility] at [address] will need to have your device checked 1- 2 weeks after the procedure and should have an appointment with the device clinic. If you do not hear from them call the device clinic. Sedation for a Medical Procedure: Care Instructions     You were given a sedative medication during your visit. While many of the effects will have worn   off before you leave; you may continue to feel some effects for several hours. Common side effects from sedation include:  Feeling sleepy. (Your doctors and nurses will make sure you are not too sleepy to go home.)  Nausea and vomiting. This usually does not last long. Feeling tired. How can you care for yourself at home? Activity    Don't do anything for 24 hours that requires attention to detail. It takes time for the medicine effects to completely wear off. Do not make important legal decisions for 24 hours. Do not sign any legal documents for 24 hours. Do not drink alcohol today     For your safety, you should not drive or operate heavy machinery for the remainder of the day     Rest when you feel tired. Getting enough sleep will help you recover. Diet    You can eat your normal diet, unless your doctor gives you other instructions. If your stomach is upset, try clear liquids and bland, low-fat foods like plain toast or rice. Drink plenty of fluids (unless your doctor tells you not to). Don't drink alcohol for 24 hours. Medicines    Be safe with medicines. Read and follow all instructions on the label. If the doctor gave you a prescription medicine for pain, take it as prescribed.   If you are not taking a prescription pain medicine, ask your doctor if you can take an over-the-counter medicine. If you think your pain medicine is making you sick to your stomach: Take your medicine after meals (unless your doctor has told you not to). Ask your doctor for a different pain medicine. I have read the above instructions and have had the opportunity to ask questions.       Patient: ________________________   Date: _____________    Witness: _______________________   Date: _____________

## 2022-10-11 NOTE — H&P
[facility] CARDIOLOGY History & Physical                   Assessment/Plan:   NICM  EF responder to CRT therapy, 55%  NYHA class II  Primary Prevention  LBBB  COVID infection    77year old male with a history of NICM s/p CRT-D which is now at [facility]. He presents for pulse generator changeout and has undergone informed consent and will proceed with planned procedure under MAC. He does have a history of a severe COVID infection b/c memory loss. The patient has been referred to for an [address] [place] [address] (ICD). The cardiologist has discussed and allowed the patient to ask questions regarding ICD. he was provided with a Shared Decision ICD decision making tool (booklet) today in clinic. CRT-D Changeout  I discussed in detail the potential benefits of CRT-D therapy, including improved mortality, prevention of SCD,  decreased hospitalization, beneficial cardiac remodeling, and prevention of disease progression. Additionally, I discussed with the patient the potential risks of CRT-D implantation, including the risk of bleeding, infection, venous occlusion, DVT/PE, pneumothorax, cardiac tamponade, perforation, need for urgent open heart surgery, device/lead failure, lead dislodgement, inability to place an LV lead via the coronary sinus, inappropriate shock(s), heart attack, stroke, arrhythmia, radiation skin injury, kidney damage/failure, oversedation, respiratory arrest, and even death. The patient understands these risks in the context of the potential benefits of the device implantation, and agrees to proceed. Noel King. Tamica Duran MD, MS  Clinical Cardiac Electrophysiology  [address] [facility] Cardiology    10/11/2022  8:10 AM      Subjective:     Patient is a 77 y.o. male who presents with a history of NICM, EF 35%, primary prevention, NYHA class II, LBBB s/p CRT-D. His device is at [facility] and he presents for pulse generator changeout.  The patient otherwise denies chest pain, dyspnea, presyncope, syncope or lateralizing symptoms. Past cardiac history:   2008- mild nonobstructive coronary artery disease   EF 20% by echo September 2011   Echo October 2012: EF improved to 35-40%, no significant valve disease   Jan 2014: images so poor even with Definity contrast an EF could not be estimated, only \"probably moderately depressed\".      Mar 2014 - MUGA - EF 33%   Jun 2014 - St Raj biventricular device - Dr Mecca Gagnon   Mar 2015 - even with contrast, difficult to see, EF estimated 45-50%    Oct 2016 - EF 43%, aortic root 3.8   Sep 2017 - 50-55% with distal apical dyskinesis, mild to moderate MR, AI, and PI   Oct 2018 - 54% with apical wma, impaired relaxation, no significant valvular regurgitation   Oct 2019- EF 60%, mild AI and MR   Jun 2020- normal arterial doppler and carotid doppler   Aug 2020- normal carotid doppler, negative LE arterial duplex   Jan 2021- vasodilator perfusion study inferior fixed defect, no ischemia, EF 49%   Feb 2021- severe COVID infection with AMS and PNA   Nov 2021- EF 50-55%, mild MR, TR, RVSP 19    Past Medical History:   Diagnosis Date    Abnormal MMSE 05/24/2022    29 out of 30 and normal clock draw    Age-related cognitive decline 2020    30 out of 30 MMSE    Arthritis     DJD- shoulder, knees, hips    Chronic systolic CHF (congestive heart failure) ([address]     COVID-19 2/9/2021    Dizziness 06/09/2020    hx     GERD (gastroesophageal reflux disease)     managed with medication     Gout     managed with medication     H/O echocardiogram 10/14/2019    EF 60.1%    Hip pain, bilateral     POA    Hypertension     managed with medication     Morbid obesity ([address]     Neuropathy     feet and legs    MICHELL on CPAP 08/26/2014    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 Raj pacemaker/defibrillator placed 6/25/14--0 shocks     Psychiatric disorder     DEPRESSION    Routine eye exam 2020    no diabetic retinopathy- Jervey    Spinal stenosis of lumbar region     Stage 3b chronic kidney disease ([facility]     Type 2 diabetes mellitus with hyperglycemia, with long-term current use of insulin ([facility] 09/23/2020    managed with oral medication and Tresiba, average 's, no problems with hypoglycemia, A1c 7.2 11/29/21      Past Surgical History:   Procedure Laterality Date    APPENDECTOMY      COLONOSCOPY      Dr. Ben Gross Left 05/23/2022    temple- [facility]    ORTHOPEDIC SURGERY      right achilles tendon repair    ORTHOPEDIC SURGERY      knee    PACEMAKER      pacemaker/defibrillator placed 6/25/14     [place] CARDIAC SURG PROCEDURE UNLIST  cath    TONSILLECTOMY        No Known Allergies  Social History     Tobacco Use    Smoking status: Never    Smokeless tobacco: Never   Substance Use Topics    Alcohol use: No      FH:   Family History   Problem Relation Age of Onset    Cancer Mother     Heart Attack Father         Review of Systems   Constitutional: Negative. HENT: Negative. Eyes: Negative. Respiratory: Negative. Cardiovascular: Negative. Gastrointestinal: Negative. Endocrine: Negative. Genitourinary: Negative. Musculoskeletal: Negative. Skin: Negative. Allergic/Immunologic: Negative. Neurological: Negative. Hematological: Negative. Psychiatric/Behavioral: Negative. All other systems reviewed and are negative. Objective:       BP (!) 163/76   Pulse 76   Temp 98.1 °F (36.7 °C)   Ht 5' 9\" (1.753 m)   Wt 241 lb (109.3 kg)   SpO2 100%   BMI 35.59 kg/m²     No intake/output data recorded. No intake/output data recorded. Physical Exam  Constitutional:       Appearance: Normal appearance. HENT:      Head: Normocephalic and atraumatic. Mouth/Throat:      Mouth: Mucous membranes are moist.   Eyes:      Extraocular Movements: Extraocular movements intact. Conjunctiva/sclera: Conjunctivae normal.      Pupils: Pupils are equal, round, and reactive to light.    Cardiovascular:      Rate and Rhythm: Normal rate and regular rhythm. Pulses: Normal pulses. Heart sounds: Normal heart sounds. Comments: Left sided CIED C/D/I. Pulmonary:      Effort: Pulmonary effort is normal.      Breath sounds: Normal breath sounds. Abdominal:      General: Abdomen is flat. Bowel sounds are normal.      Palpations: Abdomen is soft. Musculoskeletal:         General: Normal range of motion. Skin:     General: Skin is warm and dry. Neurological:      General: No focal deficit present. Mental Status: He is alert and oriented to person, place, and time. Mental status is at baseline. Psychiatric:         Mood and Affect: Mood normal.      ECG: Reviewed, A sensed Biventricular pacing.      Data Review:   Labs:   Recent Labs     10/11/22  0730      K 4.1   MG 1.9   BUN 24*   WBC 7.7   HGB 12.5*   HCT 40.4*

## 2022-10-20 ENCOUNTER — OFFICE VISIT (OUTPATIENT)
Dept: INTERNAL MEDICINE CLINIC | Facility: CLINIC | Age: 77
End: 2022-10-20
Payer: MEDICARE

## 2022-10-20 ENCOUNTER — HOSPITAL ENCOUNTER (OUTPATIENT)
Dept: GENERAL RADIOLOGY | Age: 77
Discharge: HOME | End: 2022-10-23
Payer: MEDICARE

## 2022-10-20 VITALS
HEIGHT: 69 IN | HEART RATE: 70 BPM | DIASTOLIC BLOOD PRESSURE: 66 MMHG | WEIGHT: 241.8 LBS | SYSTOLIC BLOOD PRESSURE: 139 MMHG | TEMPERATURE: 97.6 F | BODY MASS INDEX: 35.81 KG/M2 | OXYGEN SATURATION: 99 %

## 2022-10-20 DIAGNOSIS — N18.31 STAGE 3A CHRONIC KIDNEY DISEASE (HCC): Chronic | ICD-10-CM

## 2022-10-20 DIAGNOSIS — Z79.4 TYPE 2 DIABETES MELLITUS WITH HYPERGLYCEMIA, WITH LONG-TERM CURRENT USE OF INSULIN (HCC): Chronic | ICD-10-CM

## 2022-10-20 DIAGNOSIS — R13.10 DYSPHAGIA, UNSPECIFIED TYPE: ICD-10-CM

## 2022-10-20 DIAGNOSIS — K22.2 ESOPHAGEAL STRICTURE: Primary | ICD-10-CM

## 2022-10-20 DIAGNOSIS — K21.9 GASTRO-ESOPHAGEAL REFLUX DISEASE WITHOUT ESOPHAGITIS: ICD-10-CM

## 2022-10-20 DIAGNOSIS — R13.10 DYSPHAGIA, UNSPECIFIED TYPE: Primary | ICD-10-CM

## 2022-10-20 DIAGNOSIS — E11.65 TYPE 2 DIABETES MELLITUS WITH HYPERGLYCEMIA, WITH LONG-TERM CURRENT USE OF INSULIN (HCC): Chronic | ICD-10-CM

## 2022-10-20 PROCEDURE — G8427 DOCREV CUR MEDS BY ELIG CLIN: HCPCS | Performed by: NURSE PRACTITIONER

## 2022-10-20 PROCEDURE — 3044F HG A1C LEVEL LT 7.0%: CPT | Performed by: NURSE PRACTITIONER

## 2022-10-20 PROCEDURE — 74220 X-RAY XM ESOPHAGUS 1CNTRST: CPT

## 2022-10-20 PROCEDURE — 2022F DILAT RTA XM EVC RTNOPTHY: CPT | Performed by: NURSE PRACTITIONER

## 2022-10-20 PROCEDURE — 6360000004 HC RX CONTRAST MEDICATION: Performed by: INTERNAL MEDICINE

## 2022-10-20 PROCEDURE — 3017F COLORECTAL CA SCREEN DOC REV: CPT | Performed by: NURSE PRACTITIONER

## 2022-10-20 PROCEDURE — A4641 RADIOPHARM DX AGENT NOC: HCPCS | Performed by: INTERNAL MEDICINE

## 2022-10-20 PROCEDURE — 2500000003 HC RX 250 WO HCPCS: Performed by: INTERNAL MEDICINE

## 2022-10-20 PROCEDURE — 99214 OFFICE O/P EST MOD 30 MIN: CPT | Performed by: NURSE PRACTITIONER

## 2022-10-20 PROCEDURE — 1036F TOBACCO NON-USER: CPT | Performed by: NURSE PRACTITIONER

## 2022-10-20 PROCEDURE — 6370000000 HC RX 637 (ALT 250 FOR IP): Performed by: INTERNAL MEDICINE

## 2022-10-20 PROCEDURE — G8417 CALC BMI ABV UP PARAM F/U: HCPCS | Performed by: NURSE PRACTITIONER

## 2022-10-20 PROCEDURE — 1123F ACP DISCUSS/DSCN MKR DOCD: CPT | Performed by: NURSE PRACTITIONER

## 2022-10-20 PROCEDURE — G8484 FLU IMMUNIZE NO ADMIN: HCPCS | Performed by: NURSE PRACTITIONER

## 2022-10-20 RX ADMIN — BARIUM SULFATE 140 ML: 980 POWDER, FOR SUSPENSION ORAL at 14:21

## 2022-10-20 RX ADMIN — ANTACID/ANTIFLATULENT 1 EACH: 380; 550; 10; 10 GRANULE, EFFERVESCENT ORAL at 14:22

## 2022-10-20 RX ADMIN — BARIUM SULFATE 300 ML: 0.6 SUSPENSION ORAL at 14:20

## 2022-10-20 RX ADMIN — BARIUM SULFATE 1 TABLET: 700 TABLET ORAL at 14:20

## 2022-10-20 NOTE — PROGRESS NOTES
Mayra Maldonado (: 1945) presents today c/o difficulty swallowing x 5 days. He is eating pureed foods. Blood sugar 91 fasting this morning. He takes daily omeprazole and has not had breakthrough. He states the pain is mid-epigastric. He denies vomiting; no changes in bowel habits. He went to urgent care on 10/18/2022 and had a negative cardiac workup, including troponin, EKG and CXR. WBC was mildly elevated at 10.1 however. He is a lifelong non-smoker; does have hx/o secondhand smoke exposure from his spouse, which quit in . He does not vape or chew tobacco.      Chief Complaint   Patient presents with    Gastroesophageal Reflux      Reviewed and updated this visit by provider:  Tobacco  Allergies  Meds  Problems  Med Hx  Surg Hx  Fam Hx       Immunizations:  Immunization status: up to date and documented.     Review of Systems - Negative except as stated in HPI  History obtained from chart review and the patient  General ROS: negative for - fever  ENT ROS:negative for - nasal congestion or sore throat  Respiratory ROS: no cough, shortness of breath, or wheezing  Cardiovascular ROS: no chest pain or dyspnea on exertion  negative for - edema or irregular heartbeat  Gastrointestinal ROS: no abdominal pain, change in bowel habits, or black or bloody stools  positive for - swallowing difficulty/pain  negative for - gas/bloating or nausea/vomiting  Genito-Urinary ROS: no dysuria, trouble voiding, or hematuria    Visit Vitals  /66 (Site: Left Upper Arm, Position: Sitting)   Pulse 70   Temp 97.6 °F (36.4 °C) (Temporal)   Ht 5' 9\" (1.753 m)   Wt 241 lb 12.8 oz (109.7 kg)   SpO2 99%   BMI 35.71 kg/m²     Hemoglobin A1C   Date Value Ref Range Status   2022 6.5 (H) 4.8 - 5.6 % Final     Wt Readings from Last 3 Encounters:   10/20/22 241 lb 12.8 oz (109.7 kg)   10/11/22 241 lb (109.3 kg)   22 240 lb (108.9 kg)     Physical Examination: General appearance - alert, well appearing, and in no distress, oriented to person, place, and time, and overweight  Mental status - alert, oriented to person, place, and time, normal mood, behavior, speech, dress, motor activity, and thought processes  Ears - left ear dull/intact, right ear normal  Nose - normal and patent, no erythema, discharge or polyps  Mouth - mucous membranes moist, pharynx normal without lesions  Neck - supple, no significant adenopathy, left neck fullness  Chest - clear to auscultation, no wheezes, rales or rhonchi, symmetric air entry  Heart - normal rate, regular rhythm, normal S1, S2, no murmurs, rubs, clicks or gallops  Abdomen - soft, nontender, nondistended, no masses or organomegaly  no rebound tenderness noted  bowel sounds normal  Extremities - peripheral pulses normal, no pedal edema, no clubbing or cyanosis    Assessment/Plan:  1. Dysphagia, unspecified type    2. Gastro-esophageal reflux disease without esophagitis    3. Type 2 diabetes mellitus with hyperglycemia, with long-term current use of insulin ([facility])    4. Stage 3a chronic kidney disease ([facility]      Orders Placed This Encounter   Procedures    FL UPPER GI W BARIUM SWALLOW KUB     Standing Status:   Future     Number of Occurrences:   1     Standing Expiration Date:   10/20/2023     STAT barium swallow/UGI today; NPO until study at 1pm. Will contact with results and determine plan of care.     Pricila Reaves NP, APRN - CNP

## 2022-10-24 ENCOUNTER — OFFICE VISIT (OUTPATIENT)
Dept: GASTROENTEROLOGY | Age: 77
End: 2022-10-24
Payer: MEDICARE

## 2022-10-24 VITALS
WEIGHT: 243 LBS | HEIGHT: 70 IN | SYSTOLIC BLOOD PRESSURE: 109 MMHG | OXYGEN SATURATION: 98 % | DIASTOLIC BLOOD PRESSURE: 57 MMHG | HEART RATE: 69 BPM | TEMPERATURE: 98.6 F | BODY MASS INDEX: 34.79 KG/M2

## 2022-10-24 DIAGNOSIS — R13.19 ESOPHAGEAL DYSPHAGIA: Primary | ICD-10-CM

## 2022-10-24 PROCEDURE — G8428 CUR MEDS NOT DOCUMENT: HCPCS | Performed by: INTERNAL MEDICINE

## 2022-10-24 PROCEDURE — 1123F ACP DISCUSS/DSCN MKR DOCD: CPT | Performed by: INTERNAL MEDICINE

## 2022-10-24 PROCEDURE — G8484 FLU IMMUNIZE NO ADMIN: HCPCS | Performed by: INTERNAL MEDICINE

## 2022-10-24 PROCEDURE — G8417 CALC BMI ABV UP PARAM F/U: HCPCS | Performed by: INTERNAL MEDICINE

## 2022-10-24 PROCEDURE — 99203 OFFICE O/P NEW LOW 30 MIN: CPT | Performed by: INTERNAL MEDICINE

## 2022-10-24 PROCEDURE — 3017F COLORECTAL CA SCREEN DOC REV: CPT | Performed by: INTERNAL MEDICINE

## 2022-10-24 PROCEDURE — 1036F TOBACCO NON-USER: CPT | Performed by: INTERNAL MEDICINE

## 2022-10-24 ASSESSMENT — ENCOUNTER SYMPTOMS
BLOOD IN STOOL: 0
VOMITING: 0
COLOR CHANGE: 0
NAUSEA: 1
TROUBLE SWALLOWING: 0
ABDOMINAL PAIN: 0
SHORTNESS OF BREATH: 0

## 2022-10-24 NOTE — PROGRESS NOTES
Mellisa Giraldo (:  1945) is a 77 y.o. male, new patient here for evaluation of the following chief complaint(s):  New Patient (Reports having difficult swallow. Had choking test performed on last Thursday. )         ASSESSMENT/PLAN:  1. Esophageal dysphagia           Subjective   SUBJECTIVE/OBJECTIVE  And present with recurrent solid food dysphagia. Recent upper GI series showed a smooth stricture at the GE junction 12.5 mm barium tablet did not pass through the stricture. Congestive heart failure status post   replacement ofBiV-ICD.   History of obstructive sleep apnea on CPAP    Past Medical History:   Diagnosis Date    Abnormal MMSE 2022    29 out of 30 and normal clock draw    Age-related cognitive decline     30 out of 30 MMSE    Arthritis     DJD- shoulder, knees, hips    Chronic systolic CHF (congestive heart failure) ([address]     COVID-19 2021    Dizziness 2020    hx     GERD (gastroesophageal reflux disease)     managed with medication     Gout     managed with medication     H/O echocardiogram 10/14/2019    EF 60.1%    Hip pain, bilateral     POA    Hypertension     managed with medication     Morbid obesity ([address]     Neuropathy     feet and legs    MICHELL on CPAP 2014    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 Raj pacemaker/defibrillator placed 14--0 shocks     Psychiatric disorder     DEPRESSION    Routine eye exam     no diabetic retinopathy- Julián Orozco    Spinal stenosis of lumbar region     Stage 3b chronic kidney disease ([address]     Type 2 diabetes mellitus with hyperglycemia, with long-term current use of insulin ([address] 2020    managed with oral medication and Tresiba, average 's, no problems with hypoglycemia, A1c 7.2 21       Past Surgical History:   Procedure Laterality Date    APPENDECTOMY      COLONOSCOPY      Dr. Evelin Luque N/A 10/11/2022    Remove & replace ICD biv multi leads performed by Tremaine Rocha MD at [facility] CATH LAB    MOHS SURGERY Left 05/23/2022    [address]      right achilles tendon repair    ORTHOPEDIC SURGERY      knee    PACEMAKER      pacemaker/defibrillator placed 6/25/14     [place] CARDIAC SURG PROCEDURE UNLIST  cath    TONSILLECTOMY               No Known Allergies       Review of Systems   Constitutional:  Negative for appetite change. HENT:  Negative for mouth sores and trouble swallowing. Respiratory:  Negative for shortness of breath. Cardiovascular:  Negative for chest pain. Gastrointestinal:  Positive for nausea. Negative for abdominal pain, blood in stool and vomiting. Skin:  Negative for color change. Allergic/Immunologic: Negative for food allergies. Neurological:  Negative for seizures and weakness. Hematological:  Does not bruise/bleed easily. Objective   Physical Exam  HENT:      Head: Normocephalic. Mouth/Throat:      Mouth: Mucous membranes are moist.   Eyes:      General: No scleral icterus. Cardiovascular:      Rate and Rhythm: Normal rate and regular rhythm. Pulmonary:      Effort: No respiratory distress. Abdominal:      General: There is no distension. Tenderness: There is no abdominal tenderness. There is no rebound. Lymphadenopathy:      Cervical: No cervical adenopathy. Skin:     Coloration: Skin is not jaundiced. Findings: No bruising. Neurological:      General: No focal deficit present. Mental Status: He is alert.             Current Outpatient Medications   Medication Sig Dispense Refill    SITagliptin-metFORMIN (JANUMET XR)  MG TB24 per extended release tablet Take 2 tablets by mouth daily 180 tablet 2    donepezil (ARICEPT) 10 MG tablet Take 1 tablet by mouth nightly 90 tablet 2    gabapentin (NEURONTIN) 300 MG capsule TAKE 1 CAPSULE BY MOUTH EVERY MORNING AND 2 CAPSULES BY MOUTH AT BEDTIME 270 capsule 2    celecoxib (CELEBREX) 200 MG capsule Take 1 capsule by mouth in the morning. 90 capsule 2    valsartan-hydroCHLOROthiazide (DIOVAN-HCT) 320-25 MG per tablet Take 1 tablet by mouth daily 90 tablet 2    Zinc Acetate, Oral, (ZINC ACETATE PO) Take 1 tablet by mouth daily      acetaminophen (TYLENOL) 500 MG tablet Take 1,000 mg by mouth every 6 hours as needed      allopurinol (ZYLOPRIM) 100 MG tablet Take 100 mg by mouth daily      amitriptyline (ELAVIL) 10 MG tablet Take 10 mg by mouth nightly       carvedilol (COREG) 6.25 MG tablet Take 6.25 mg by mouth 2 times daily      Cholecalciferol 50 MCG (2000 UT) TABS Take 1 tablet by mouth daily      Cyanocobalamin 1000 MCG SUBL Take 1,000 mcg by mouth daily      indomethacin (INDOCIN) 25 MG capsule Take 25 mg by mouth 3 times daily      Insulin Degludec (TRESIBA FLEXTOUCH) 200 UNIT/ML SOPN Inject 50 Units into the skin daily      omeprazole (PRILOSEC) 20 MG delayed release capsule Take 20 mg by mouth daily      Probiotic Product (ACIDOPHILUS PROBIOTIC) CAPS capsule Take 1 tablet by mouth daily      rosuvastatin (CRESTOR) 10 MG tablet Take 10 mg by mouth every evening      furosemide (LASIX) 20 MG tablet Take 20 mg by mouth daily as needed  (Patient not taking: Reported on 10/24/2022)      nitroGLYCERIN (NITROSTAT) 0.4 MG SL tablet Place 0.4 mg under the tongue (Patient not taking: Reported on 10/24/2022)       No current facility-administered medications for this visit. Family History   Problem Relation Age of Onset    Cancer Mother     Heart Attack Father        Return for endoscopy. An electronic signature was used to authenticate this note.     --Roseann Duran MD

## 2022-10-24 NOTE — H&P (VIEW-ONLY)
Randy Blair (:  1945) is a 77 y.o. male, new patient here for evaluation of the following chief complaint(s):  New Patient (Reports having difficult swallow. Had choking test performed on last Thursday. )         ASSESSMENT/PLAN:  1. Esophageal dysphagia           Subjective   SUBJECTIVE/OBJECTIVE  And present with recurrent solid food dysphagia. Recent upper GI series showed a smooth stricture at the GE junction 12.5 mm barium tablet did not pass through the stricture. Congestive heart failure status post   replacement ofBiV-ICD.   History of obstructive sleep apnea on CPAP    Past Medical History:   Diagnosis Date    Abnormal MMSE 2022    29 out of 30 and normal clock draw    Age-related cognitive decline     30 out of 30 MMSE    Arthritis     DJD- shoulder, knees, hips    Chronic systolic CHF (congestive heart failure) ([address]     COVID-19 2021    Dizziness 2020    hx     GERD (gastroesophageal reflux disease)     managed with medication     Gout     managed with medication     H/O echocardiogram 10/14/2019    EF 60.1%    Hip pain, bilateral     POA    Hypertension     managed with medication     Morbid obesity ([address]     Neuropathy     feet and legs    MICHELL on CPAP 2014    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 pacemaker/defibrillator placed 14--0 shocks     Psychiatric disorder     DEPRESSION    Routine eye exam     no diabetic retinopathy- Theresa Rodrigues    Spinal stenosis of lumbar region     Stage 3b chronic kidney disease ([address]     Type 2 diabetes mellitus with hyperglycemia, with long-term current use of insulin ([address] 2020    managed with oral medication and Tresiba, average 's, no problems with hypoglycemia, A1c 7.2 21       Past Surgical History:   Procedure Laterality Date    APPENDECTOMY      COLONOSCOPY      Dr. Lizzy Choi N/A 10/11/2022    Remove & replace ICD biv multi leads performed by Jennifer Schwartz MD at [facility] CATH LAB    MOHS SURGERY Left 05/23/2022    [address]      right achilles tendon repair    ORTHOPEDIC SURGERY      knee    PACEMAKER      pacemaker/defibrillator placed 6/25/14     VT CARDIAC SURG PROCEDURE UNLIST  cath    TONSILLECTOMY               No Known Allergies       Review of Systems   Constitutional:  Negative for appetite change. HENT:  Negative for mouth sores and trouble swallowing. Respiratory:  Negative for shortness of breath. Cardiovascular:  Negative for chest pain. Gastrointestinal:  Positive for nausea. Negative for abdominal pain, blood in stool and vomiting. Skin:  Negative for color change. Allergic/Immunologic: Negative for food allergies. Neurological:  Negative for seizures and weakness. Hematological:  Does not bruise/bleed easily. Objective   Physical Exam  HENT:      Head: Normocephalic. Mouth/Throat:      Mouth: Mucous membranes are moist.   Eyes:      General: No scleral icterus. Cardiovascular:      Rate and Rhythm: Normal rate and regular rhythm. Pulmonary:      Effort: No respiratory distress. Abdominal:      General: There is no distension. Tenderness: There is no abdominal tenderness. There is no rebound. Lymphadenopathy:      Cervical: No cervical adenopathy. Skin:     Coloration: Skin is not jaundiced. Findings: No bruising. Neurological:      General: No focal deficit present. Mental Status: He is alert.             Current Outpatient Medications   Medication Sig Dispense Refill    SITagliptin-metFORMIN (JANUMET XR)  MG TB24 per extended release tablet Take 2 tablets by mouth daily 180 tablet 2    donepezil (ARICEPT) 10 MG tablet Take 1 tablet by mouth nightly 90 tablet 2    gabapentin (NEURONTIN) 300 MG capsule TAKE 1 CAPSULE BY MOUTH EVERY MORNING AND 2 CAPSULES BY MOUTH AT BEDTIME 270 capsule 2    celecoxib (CELEBREX) 200 MG capsule Take 1 capsule by mouth in the morning. 90 capsule 2    valsartan-hydroCHLOROthiazide (DIOVAN-HCT) 320-25 MG per tablet Take 1 tablet by mouth daily 90 tablet 2    Zinc Acetate, Oral, (ZINC ACETATE PO) Take 1 tablet by mouth daily      acetaminophen (TYLENOL) 500 MG tablet Take 1,000 mg by mouth every 6 hours as needed      allopurinol (ZYLOPRIM) 100 MG tablet Take 100 mg by mouth daily      amitriptyline (ELAVIL) 10 MG tablet Take 10 mg by mouth nightly       carvedilol (COREG) 6.25 MG tablet Take 6.25 mg by mouth 2 times daily      Cholecalciferol 50 MCG (2000 UT) TABS Take 1 tablet by mouth daily      Cyanocobalamin 1000 MCG SUBL Take 1,000 mcg by mouth daily      indomethacin (INDOCIN) 25 MG capsule Take 25 mg by mouth 3 times daily      Insulin Degludec (TRESIBA FLEXTOUCH) 200 UNIT/ML SOPN Inject 50 Units into the skin daily      omeprazole (PRILOSEC) 20 MG delayed release capsule Take 20 mg by mouth daily      Probiotic Product (ACIDOPHILUS PROBIOTIC) CAPS capsule Take 1 tablet by mouth daily      rosuvastatin (CRESTOR) 10 MG tablet Take 10 mg by mouth every evening      furosemide (LASIX) 20 MG tablet Take 20 mg by mouth daily as needed  (Patient not taking: Reported on 10/24/2022)      nitroGLYCERIN (NITROSTAT) 0.4 MG SL tablet Place 0.4 mg under the tongue (Patient not taking: Reported on 10/24/2022)       No current facility-administered medications for this visit. Family History   Problem Relation Age of Onset    Cancer Mother     Heart Attack Father        Return for endoscopy. An electronic signature was used to authenticate this note.     --Liz Leon MD

## 2022-10-25 ENCOUNTER — PREP FOR PROCEDURE (OUTPATIENT)
Dept: GASTROENTEROLOGY | Age: 77
End: 2022-10-25

## 2022-10-25 PROBLEM — R13.19 ESOPHAGEAL DYSPHAGIA: Status: ACTIVE | Noted: 2022-10-25

## 2022-10-25 RX ORDER — SODIUM CHLORIDE 9 MG/ML
25 INJECTION, SOLUTION INTRAVENOUS PRN
Status: CANCELLED | OUTPATIENT
Start: 2022-10-25

## 2022-10-25 RX ORDER — SODIUM CHLORIDE 0.9 % (FLUSH) 0.9 %
5-40 SYRINGE (ML) INJECTION PRN
Status: CANCELLED | OUTPATIENT
Start: 2022-10-25

## 2022-10-25 RX ORDER — SODIUM CHLORIDE 0.9 % (FLUSH) 0.9 %
5-40 SYRINGE (ML) INJECTION EVERY 12 HOURS SCHEDULED
Status: CANCELLED | OUTPATIENT
Start: 2022-10-25

## 2022-10-26 NOTE — PERIOP NOTE
Patient verified name, , and procedure. Type: 1a; abbreviated assessment per anesthesia guidelines  Labs per surgeon: Unknown  Labs per anesthesia: SQBS DOS       Instructed pt that they will be notified by the Gi Lab for time of arrival. If any questions please call the GI lab at [phone]. Follow diet and prep instructions per office. May have clear liquids until 4 hours prior to time of arrival.    [place] Roxo or shower the night before and the am of surgery with antibacterial soap. No lotions, oils, powders, cologne on skin. No make up, eye make up or jewelry. Wear loose fitting comfortable, clean clothing. Must have adult present in building the entire time . Medications for the day of procedure: Medications for the day of procedure: allopurinol, carvedilol, gabapentin, omeprazole, use ONLY 40 units of Tresiba morning of procedure, patient to hold: furosemide, donepezil (starting 10/26/2022), valsartan/HCTZ, Janumet XR    The following discharge instructions reviewed with patient: medication given during procedure may cause drowsiness for several hours, therefore, do not drive or operate machinery for remainder of the day, no alcohol on the day of your procedure, resume regular diet and activity unless otherwise directed, for mild sore throat you may use Cepacol throat lozenges or warm salt water gargles as needed, call your physician for any problems or questions. Patient verbalizes understanding.

## 2022-10-30 ENCOUNTER — ANESTHESIA EVENT (OUTPATIENT)
Dept: ENDOSCOPY | Age: 77
End: 2022-10-30
Payer: MEDICARE

## 2022-10-31 ENCOUNTER — HOSPITAL ENCOUNTER (OUTPATIENT)
Age: 77
Setting detail: OUTPATIENT SURGERY
Discharge: HOME | End: 2022-10-31
Attending: INTERNAL MEDICINE | Admitting: INTERNAL MEDICINE
Payer: MEDICARE

## 2022-10-31 ENCOUNTER — ANESTHESIA (OUTPATIENT)
Dept: ENDOSCOPY | Age: 77
End: 2022-10-31
Payer: MEDICARE

## 2022-10-31 VITALS
WEIGHT: 241 LBS | HEART RATE: 65 BPM | TEMPERATURE: 97.1 F | SYSTOLIC BLOOD PRESSURE: 142 MMHG | HEIGHT: 69 IN | DIASTOLIC BLOOD PRESSURE: 66 MMHG | BODY MASS INDEX: 35.7 KG/M2 | RESPIRATION RATE: 15 BRPM | OXYGEN SATURATION: 98 %

## 2022-10-31 DIAGNOSIS — R13.19 ESOPHAGEAL DYSPHAGIA: ICD-10-CM

## 2022-10-31 LAB
GLUCOSE BLD STRIP.AUTO-MCNC: 77 MG/DL (ref 65–100)
SERVICE CMNT-IMP: NORMAL

## 2022-10-31 PROCEDURE — 88305 TISSUE EXAM BY PATHOLOGIST: CPT

## 2022-10-31 PROCEDURE — 2500000003 HC RX 250 WO HCPCS

## 2022-10-31 PROCEDURE — 3609017100 HC EGD: Performed by: INTERNAL MEDICINE

## 2022-10-31 PROCEDURE — 2720000010 HC SURG SUPPLY STERILE: Performed by: INTERNAL MEDICINE

## 2022-10-31 PROCEDURE — 82962 GLUCOSE BLOOD TEST: CPT

## 2022-10-31 PROCEDURE — 88312 SPECIAL STAINS GROUP 1: CPT

## 2022-10-31 PROCEDURE — 3700000000 HC ANESTHESIA ATTENDED CARE: Performed by: INTERNAL MEDICINE

## 2022-10-31 PROCEDURE — 2580000003 HC RX 258: Performed by: ANESTHESIOLOGY

## 2022-10-31 PROCEDURE — 2709999900 HC NON-CHARGEABLE SUPPLY: Performed by: INTERNAL MEDICINE

## 2022-10-31 PROCEDURE — 7100000011 HC PHASE II RECOVERY - ADDTL 15 MIN: Performed by: INTERNAL MEDICINE

## 2022-10-31 PROCEDURE — 6360000002 HC RX W HCPCS

## 2022-10-31 PROCEDURE — 7100000010 HC PHASE II RECOVERY - FIRST 15 MIN: Performed by: INTERNAL MEDICINE

## 2022-10-31 RX ORDER — SODIUM CHLORIDE 0.9 % (FLUSH) 0.9 %
5-40 SYRINGE (ML) INJECTION EVERY 12 HOURS SCHEDULED
Status: DISCONTINUED | OUTPATIENT
Start: 2022-10-31 | End: 2022-10-31 | Stop reason: HOSPADM

## 2022-10-31 RX ORDER — SODIUM CHLORIDE 9 MG/ML
25 INJECTION, SOLUTION INTRAVENOUS PRN
Status: DISCONTINUED | OUTPATIENT
Start: 2022-10-31 | End: 2022-10-31 | Stop reason: HOSPADM

## 2022-10-31 RX ORDER — SODIUM CHLORIDE, SODIUM LACTATE, POTASSIUM CHLORIDE, CALCIUM CHLORIDE 600; 310; 30; 20 MG/100ML; MG/100ML; MG/100ML; MG/100ML
INJECTION, SOLUTION INTRAVENOUS CONTINUOUS
Status: DISCONTINUED | OUTPATIENT
Start: 2022-10-31 | End: 2022-10-31 | Stop reason: HOSPADM

## 2022-10-31 RX ORDER — PROPOFOL 10 MG/ML
INJECTION, EMULSION INTRAVENOUS PRN
Status: DISCONTINUED | OUTPATIENT
Start: 2022-10-31 | End: 2022-10-31 | Stop reason: SDUPTHER

## 2022-10-31 RX ORDER — LIDOCAINE HYDROCHLORIDE 20 MG/ML
INJECTION, SOLUTION EPIDURAL; INFILTRATION; INTRACAUDAL; PERINEURAL PRN
Status: DISCONTINUED | OUTPATIENT
Start: 2022-10-31 | End: 2022-10-31 | Stop reason: SDUPTHER

## 2022-10-31 RX ORDER — SODIUM CHLORIDE 0.9 % (FLUSH) 0.9 %
5-40 SYRINGE (ML) INJECTION PRN
Status: DISCONTINUED | OUTPATIENT
Start: 2022-10-31 | End: 2022-10-31 | Stop reason: HOSPADM

## 2022-10-31 RX ADMIN — LIDOCAINE HYDROCHLORIDE 4 MG: 20 INJECTION, SOLUTION EPIDURAL; INFILTRATION; INTRACAUDAL; PERINEURAL at 11:46

## 2022-10-31 RX ADMIN — PROPOFOL 50 MG: 10 INJECTION, EMULSION INTRAVENOUS at 11:46

## 2022-10-31 RX ADMIN — SODIUM CHLORIDE, POTASSIUM CHLORIDE, SODIUM LACTATE AND CALCIUM CHLORIDE: 600; 310; 30; 20 INJECTION, SOLUTION INTRAVENOUS at 11:35

## 2022-10-31 RX ADMIN — PROPOFOL 20 MG: 10 INJECTION, EMULSION INTRAVENOUS at 11:49

## 2022-10-31 ASSESSMENT — ENCOUNTER SYMPTOMS: SHORTNESS OF BREATH: 1

## 2022-10-31 ASSESSMENT — PAIN - FUNCTIONAL ASSESSMENT
PAIN_FUNCTIONAL_ASSESSMENT: NONE - DENIES PAIN
PAIN_FUNCTIONAL_ASSESSMENT: NONE - DENIES PAIN
PAIN_FUNCTIONAL_ASSESSMENT: 0-10
PAIN_FUNCTIONAL_ASSESSMENT: NONE - DENIES PAIN

## 2022-10-31 NOTE — INTERVAL H&P NOTE
Update History & Physical    The patient's History and Physical of October 24,  was reviewed with the patient and I examined the patient. There was no change. The surgical site was confirmed by the patient and me. Plan: The risks, benefits, expected outcome, and alternative to the recommended procedure have been discussed with the patient. Patient understands and wants to proceed with the procedure.      Electronically signed by Ann Anne MD on 10/31/2022 at 11:15 AM

## 2022-10-31 NOTE — PROGRESS NOTES
Patient and  voiced understanding of discharge and follow up
RN called Pt to confirm appointment time off 1106, arrival time 945, location,  requirement, and instructions for registration at the hospital.  Pt verbalized understanding.
none

## 2022-10-31 NOTE — PROCEDURES
Endoscopy Note          Operative Report    Patient: Mayra Maldonado MRN: [id number]      YOB: 1945  Age: 77 y.o. Sex: male            Indications:   Esophageal dysphagia. Abnormal upper GI series    Preoperative Evaluation: The patient was evaluated prior to the procedure in the GI lab admission area, the patient ASA was recorded . Consent was obtained from the patient with the risk of perforation bleeding and aspiration. Anesthesia: OSMANY-per anesthesia    Findings: Ulcerated distal esophagitis at the GE junction with a 15 mm salmon-colored patch above the GE junction. Biopsy from that area was taken. Dilation with tearing of scar tissue was done using a 20 mm through-the-scope balloon. Scope initially passed through the stomach with no resistance. Mild antritis biopsy from the antrum was taken. Normal descending duodenal mucosa    Postoperative Diagnosis: 1-chronic gastritis. 2-reflux ulcerated nonbleeding esophagitis.   3-benign esophageal stricture    90021 Esophagogastroduodenoscopy, Flexible, transoral; biopsy; single or multiple and 63441 Esophagogastroduodenoscopy, Flexible, transoral; transendoscopic balloon dilation of esophagus      Recommendations: Await Pathology      Signed By:  Peter Silver MD     October 31, 2022

## 2022-10-31 NOTE — ANESTHESIA PRE PROCEDURE
Department of Anesthesiology  Preprocedure Note       Name:  Randy lBair   Age:  77 y.o.  :  1945                                          MRN:  [id number]         Date:  10/31/2022      Surgeon: Benjie Coley):  Ruby Moran MD    Procedure: Procedure(s):  EGD ESOPHAGOGASTRODUODENOSCOPY    Medications prior to admission:   Prior to Admission medications    Medication Sig Start Date End Date Taking?  Authorizing Provider   SITagliptin-metFORMIN (JANUMET XR)  MG TB24 per extended release tablet Take 2 tablets by mouth daily 22   Eliazar Demaroc APRN - CNP   donepezil (ARICEPT) 10 MG tablet Take 1 tablet by mouth nightly 22   Tiffanie Hernandez MD   gabapentin (NEURONTIN) 300 MG capsule TAKE 1 CAPSULE BY MOUTH EVERY MORNING AND 2 CAPSULES BY MOUTH AT BEDTIME 22  ANN Wei - CNP   celecoxib (CELEBREX) 200 MG capsule Take 1 capsule by mouth in the morning. 22   ANN Wei - CNP   valsartan-hydroCHLOROthiazide (DIOVAN-HCT) 320-25 MG per tablet Take 1 tablet by mouth daily 22   Tiffanie Hernandez MD   Zinc Acetate, Oral, (ZINC ACETATE PO) Take 1 tablet by mouth daily    Ar Automatic Reconciliation   acetaminophen (TYLENOL) 500 MG tablet Take 1,000 mg by mouth every 6 hours as needed    Ar Automatic Reconciliation   allopurinol (ZYLOPRIM) 100 MG tablet Take 100 mg by mouth daily 3/31/22   Ar Automatic Reconciliation   amitriptyline (ELAVIL) 10 MG tablet Take 10 mg by mouth nightly  3/31/22   Ar Automatic Reconciliation   carvedilol (COREG) 6.25 MG tablet Take 6.25 mg by mouth 2 times daily 21   Ar Automatic Reconciliation   Cholecalciferol 50 MCG (2000 UT) TABS Take 1 tablet by mouth daily    Ar Automatic Reconciliation   Cyanocobalamin 1000 MCG SUBL Take 1,000 mcg by mouth daily    Ar Automatic Reconciliation   furosemide (LASIX) 20 MG tablet Take 20 mg by mouth daily as needed 3/10/21   Ar Automatic Reconciliation indomethacin (INDOCIN) 25 MG capsule Take 25 mg by mouth as needed 8/18/21   Ar Automatic Reconciliation   Insulin Degludec (TRESIBA FLEXTOUCH) 200 UNIT/ML SOPN Inject 50 Units into the skin every morning    Ar Automatic Reconciliation   nitroGLYCERIN (NITROSTAT) 0.4 MG SL tablet Place 0.4 mg under the tongue  Patient not taking: Reported on 10/26/2022 12/22/20   Ar Automatic Reconciliation   omeprazole (PRILOSEC) 20 MG delayed release capsule Take 20 mg by mouth daily 3/31/22   Ar Automatic Reconciliation   Probiotic Product (ACIDOPHILUS PROBIOTIC) CAPS capsule Take 1 tablet by mouth daily    Ar Automatic Reconciliation   rosuvastatin (CRESTOR) 10 MG tablet Take 10 mg by mouth every evening 3/31/22   Ar Automatic Reconciliation       Current medications:    No current facility-administered medications for this visit. No current outpatient medications on file. Facility-Administered Medications Ordered in Other Visits   Medication Dose Route Frequency Provider Last Rate Last Admin    sodium chloride flush 0.9 % injection 5-40 mL  5-40 mL IntraVENous 2 times per day Fabi Washburn MD        sodium chloride flush 0.9 % injection 5-40 mL  5-40 mL IntraVENous PRN Fabi Washburn MD        0.9 % sodium chloride infusion  25 mL IntraVENous PRN Fabi Washburn MD        lactated ringers infusion   IntraVENous Continuous Kimberley Lo  mL/hr at 10/31/22 1135 New Bag at 10/31/22 1135       Allergies:  No Known Allergies    Problem List:    Patient Active Problem List   Diagnosis Code    Obesity (BMI 30-39. 9) E66.9    Obesity hypoventilation syndrome ([facility] E66.2    Encounter for diabetic foot exam ([facility] E11.9    Insomnia due to medical condition G47.01    Presence of cardiac defibrillator Z95.810    Gout M10.9    MICHELL on CPAP G47.33, Z99.89    Type 2 diabetes mellitus with hyperglycemia, with long-term current use of insulin (HCC) E11.65, Z79.4    Intention tremor G25.2    Gastroesophageal reflux 6.5 on 8/24/2022       Past Surgical History:        Procedure Laterality Date   Dinah Razo N/A 10/11/2022    Remove & replace ICD biv multi leads performed by Guzman Contreras MD at [facility] CATH LAB    JOINT REPLACEMENT Left     left TKA    [facility]    ORTHOPEDIC SURGERY      right achilles tendon repair    ORTHOPEDIC SURGERY      knee    PACEMAKER      pacemaker/defibrillator placed 6/25/14     ID CARDIAC SURG PROCEDURE UNLIST  cath    TONSILLECTOMY         Social History:    Social History     Tobacco Use    Smoking status: Never    Smokeless tobacco: Never   Substance Use Topics    Alcohol use: No                                Counseling given: Not Answered      Vital Signs (Current): There were no vitals filed for this visit.                                            BP Readings from Last 3 Encounters:   10/31/22 (!) 154/71   10/24/22 (!) 109/57   10/20/22 139/66       NPO Status:                                                                                 BMI:   Wt Readings from Last 3 Encounters:   10/26/22 241 lb (109.3 kg)   10/24/22 243 lb (110.2 kg)   10/20/22 241 lb 12.8 oz (109.7 kg)     There is no height or weight on file to calculate BMI.    CBC:   Lab Results   Component Value Date/Time    WBC 7.7 10/11/2022 07:30 AM    RBC 4.68 10/11/2022 07:30 AM    HGB 12.5 10/11/2022 07:30 AM    HCT 40.4 10/11/2022 07:30 AM    MCV 86.3 10/11/2022 07:30 AM    RDW 14.6 10/11/2022 07:30 AM     10/11/2022 07:30 AM       CMP:   Lab Results   Component Value Date/Time     10/11/2022 07:30 AM    K 4.1 10/11/2022 07:30 AM     10/11/2022 07:30 AM    CO2 26 10/11/2022 07:30 AM    BUN 24 10/11/2022 07:30 AM    CREATININE 1.30 10/11/2022 07:30 AM    GFRAA >60 09/06/2022 10:18 AM    AGRATIO 2.0 08/18/2021 03:10 PM    LABGLOM 57 10/11/2022 07:30 AM    GLUCOSE 91 10/11/2022 07:30 AM PROT 7.0 09/06/2022 10:18 AM    CALCIUM 9.4 10/11/2022 07:30 AM    BILITOT 0.3 09/06/2022 10:18 AM    ALKPHOS 95 09/06/2022 10:18 AM    ALKPHOS 101 08/18/2021 03:10 PM    AST 40 09/06/2022 10:18 AM    ALT 75 09/06/2022 10:18 AM       POC Tests: No results for input(s): POCGLU, POCNA, POCK, POCCL, POCBUN, POCHEMO, POCHCT in the last 72 hours. Coags:   Lab Results   Component Value Date/Time    PROTIME 13.0 10/07/2022 11:00 AM    INR 1.0 10/07/2022 11:00 AM    APTT 28.4 11/29/2021 10:32 AM       HCG (If Applicable): No results found for: PREGTESTUR, PREGSERUM, HCG, HCGQUANT     ABGs:   Lab Results   Component Value Date/Time    PHART 7.38 02/09/2021 10:38 AM    PO2ART 80 02/09/2021 10:38 AM    MAB9YJP 26.0 02/09/2021 10:38 AM    NZK8RFH 15.4 02/09/2021 10:38 AM        Type & Screen (If Applicable):  No results found for: LABABO, LABRH    Drug/Infectious Status (If Applicable):  No results found for: HIV, HEPCAB    COVID-19 Screening (If Applicable):   Lab Results   Component Value Date/Time    COVID19 Not Detected 12/17/2021 12:00 AM    COVID19 Performed 12/17/2021 12:00 AM           Anesthesia Evaluation  Patient summary reviewed and Nursing notes reviewed no history of anesthetic complications:   Airway: Mallampati: III     Neck ROM: limited  Comment: Short, thick neck  Mild overbite   Mildly limited neck ROM  Mouth opening: > = 3 FB   Dental: normal exam         Pulmonary: breath sounds clear to auscultation  (+) shortness of breath: chronic,  sleep apnea: on CPAP,                             Cardiovascular:  Exercise tolerance: no interval change, Limited by neuropathy and arthritis.  Denied chest pain, SOB, syncope   (+) hypertension:, pacemaker (BiV ICD):, dysrhythmias (LBBB s/p BIV ICD):, CHF (EF 50-55% on most recent echo 98/9671): systolic and diastolic, hyperlipidemia    (-) peripheral edema      Rhythm: regular  Rate: normal                 ROS comment: Echo 12/2021 - EF 46-99%, diastolic dysfunction Neuro/Psych:   (+) psychiatric history:depression/anxiety              ROS comment: Diabetic neuropathy  GI/Hepatic/Renal:   (+) GERD: well controlled, renal disease (CRI?):,           Endo/Other:    (+) DiabetesType II DM, , : arthritis:., .                 Abdominal:             Vascular: negative vascular ROS. Other Findings:             Anesthesia Plan      TIVA     ASA 3       Induction: intravenous. Anesthetic plan and risks discussed with patient and spouse.                         Brad Hudson MD   10/31/2022

## 2022-11-02 NOTE — ANESTHESIA POSTPROCEDURE EVALUATION
Department of Anesthesiology  Postprocedure Note    Patient: Nish Becker  MRN: [id number]  YOB: 1945  Date of evaluation: 11/1/2022      Procedure Summary     Date: 10/31/22 Room / Location: [facility] ENDO 03 / [facility] ENDOSCOPY    Anesthesia Start: 1142 Anesthesia Stop: 6322    Procedure: EGD ESOPHAGOGASTRODUODENOSCOPY/BALLOON DILATION/BIOPSY (Upper GI Region) Diagnosis:       Esophageal dysphagia      (Esophageal dysphagia [R13.19])    Surgeons: Chrissie Ravi MD Responsible Provider: Tha Adamson MD    Anesthesia Type: TIVA ASA Status: 3          Anesthesia Type: No value filed.     Rosanna Phase I: Rosanna Score: 10    Rosanna Phase II: Rosanna Score: 10      Anesthesia Post Evaluation    Patient location during evaluation: PACU  Patient participation: complete - patient participated  Level of consciousness: awake and alert  Airway patency: patent  Nausea & Vomiting: no nausea and no vomiting  Complications: no  Cardiovascular status: hemodynamically stable  Respiratory status: acceptable  Hydration status: euvolemic  Multimodal analgesia pain management approach

## 2022-11-09 DIAGNOSIS — Z95.810 PRESENCE OF CARDIAC DEFIBRILLATOR: ICD-10-CM

## 2022-11-09 DIAGNOSIS — Z95.810 PRESENCE OF CARDIAC DEFIBRILLATOR: Primary | ICD-10-CM

## 2022-11-09 DIAGNOSIS — I50.22 CHRONIC SYSTOLIC CHF (CONGESTIVE HEART FAILURE) (HCC): ICD-10-CM

## 2022-11-15 DIAGNOSIS — I50.22 CHRONIC SYSTOLIC CHF (CONGESTIVE HEART FAILURE) (HCC): Primary | ICD-10-CM

## 2022-11-15 DIAGNOSIS — Z95.810 PRESENCE OF CARDIAC DEFIBRILLATOR: ICD-10-CM

## 2022-12-15 RX ORDER — CARVEDILOL 6.25 MG/1
TABLET ORAL
Qty: 180 TABLET | Refills: 2 | Status: SHIPPED | OUTPATIENT
Start: 2022-12-15

## 2022-12-19 DIAGNOSIS — I10 PRIMARY HYPERTENSION: ICD-10-CM

## 2022-12-19 RX ORDER — ROSUVASTATIN CALCIUM 10 MG/1
10 TABLET, COATED ORAL EVERY EVENING
Qty: 90 TABLET | Refills: 2 | Status: SHIPPED | OUTPATIENT
Start: 2022-12-19

## 2022-12-19 RX ORDER — AMITRIPTYLINE HYDROCHLORIDE 10 MG/1
10 TABLET, FILM COATED ORAL NIGHTLY
Qty: 90 TABLET | Refills: 2 | Status: SHIPPED | OUTPATIENT
Start: 2022-12-19

## 2022-12-19 RX ORDER — VALSARTAN AND HYDROCHLOROTHIAZIDE 320; 25 MG/1; MG/1
1 TABLET, FILM COATED ORAL DAILY
Qty: 90 TABLET | Refills: 2 | Status: SHIPPED | OUTPATIENT
Start: 2022-12-19

## 2022-12-19 RX ORDER — ALLOPURINOL 100 MG/1
100 TABLET ORAL DAILY
Qty: 90 TABLET | Refills: 2 | Status: SHIPPED | OUTPATIENT
Start: 2022-12-19

## 2022-12-19 RX ORDER — OMEPRAZOLE 20 MG/1
20 CAPSULE, DELAYED RELEASE ORAL DAILY
Qty: 90 CAPSULE | Refills: 2 | Status: SHIPPED | OUTPATIENT
Start: 2022-12-19

## 2022-12-30 ENCOUNTER — OFFICE VISIT (OUTPATIENT)
Dept: ORTHOPEDIC SURGERY | Age: 77
End: 2022-12-30

## 2022-12-30 DIAGNOSIS — Z96.652 STATUS POST LEFT KNEE REPLACEMENT: Primary | ICD-10-CM

## 2022-12-30 NOTE — PROGRESS NOTES
Name: Haile Chapman  YOB: 1945  Gender: male  MRN: [id number]      Current Outpatient Medications:     rosuvastatin (CRESTOR) 10 MG tablet, Take 1 tablet by mouth every evening, Disp: 90 tablet, Rfl: 2    omeprazole (PRILOSEC) 20 MG delayed release capsule, Take 1 capsule by mouth daily, Disp: 90 capsule, Rfl: 2    amitriptyline (ELAVIL) 10 MG tablet, Take 1 tablet by mouth nightly, Disp: 90 tablet, Rfl: 2    valsartan-hydroCHLOROthiazide (DIOVAN-HCT) 320-25 MG per tablet, Take 1 tablet by mouth daily, Disp: 90 tablet, Rfl: 2    allopurinol (ZYLOPRIM) 100 MG tablet, Take 1 tablet by mouth daily, Disp: 90 tablet, Rfl: 2    carvedilol (COREG) 6.25 MG tablet, TAKE 1 TABLET BY MOUTH TWICE DAILY, Disp: 180 tablet, Rfl: 2    SITagliptin-metFORMIN (JANUMET XR)  MG TB24 per extended release tablet, Take 2 tablets by mouth daily, Disp: 180 tablet, Rfl: 2    donepezil (ARICEPT) 10 MG tablet, Take 1 tablet by mouth nightly, Disp: 90 tablet, Rfl: 2    gabapentin (NEURONTIN) 300 MG capsule, TAKE 1 CAPSULE BY MOUTH EVERY MORNING AND 2 CAPSULES BY MOUTH AT BEDTIME, Disp: 270 capsule, Rfl: 2    celecoxib (CELEBREX) 200 MG capsule, Take 1 capsule by mouth in the morning., Disp: 90 capsule, Rfl: 2    Zinc Acetate, Oral, (ZINC ACETATE PO), Take 1 tablet by mouth daily, Disp: , Rfl:     acetaminophen (TYLENOL) 500 MG tablet, Take 1,000 mg by mouth every 6 hours as needed, Disp: , Rfl:     Cholecalciferol 50 MCG (2000 UT) TABS, Take 1 tablet by mouth daily, Disp: , Rfl:     Cyanocobalamin 1000 MCG SUBL, Take 1,000 mcg by mouth daily, Disp: , Rfl:     furosemide (LASIX) 20 MG tablet, Take 20 mg by mouth daily as needed, Disp: , Rfl:     indomethacin (INDOCIN) 25 MG capsule, Take 25 mg by mouth as needed, Disp: , Rfl:     Insulin Degludec (TRESIBA FLEXTOUCH) 200 UNIT/ML SOPN, Inject 50 Units into the skin every morning, Disp: , Rfl:     nitroGLYCERIN (NITROSTAT) 0.4 MG SL tablet, Place 0.4 mg under the tongue (Patient not taking: Reported on 10/26/2022), Disp: , Rfl:     Probiotic Product (ACIDOPHILUS PROBIOTIC) CAPS capsule, Take 1 tablet by mouth daily, Disp: , Rfl:   No Known Allergies    CC: Post-op left TKA 1 year    HPI: The patient is here now post total knee arthroplasty. They report that they are functioning well and denies knee pain, swelling, or instability. They report occasional soreness. No other new complaints. PMH: Reviewed and unchanged    ROS:  As per HPI; pertinent positives and negatives are addressed with the patient, particularly, those related to musculoskeletal concerns. Non-orthopaedic concerns were referred back to the primary care physician. PE:  left Knee  Patient is comfortable and in no distress. ROM: 10 to 125 degrees, this 10 degree extension lag is noticed with ambulation. When he is sitting with hamstring tightness he is limited to 25 degrees of full extension. AP translation: 4 mm  M-L laxity: 2 degrees  Alignment: 4 degrees of valgus  Quadriceps strength: excellent  Gait: no limp  Incision: well healed    Radiographs: AP/Lateral and sunrise of the left knee taken in the office today reveal a good bone, prosthetic appearance. The patella is balanced. Radiographic Diagnosis:  Stable total knee arthroplasty. Impression: Status post total knee arthroplasty    Recommendations:  Reviewed x-ray findings with the patient. Activities to be advanced as tolerated. Normal lifetime restrictions as discussed. Appropriate activities for strengthening and conditioning were reviewed. Return appointment in 2 years for follow up and x-rays. Approximately 20 minutes was spent reviewing the medical record, imaging, performing history and physical examination, discussing the diagnosis and treatment plan with the patient, and completing documentation for this visit.

## 2023-01-30 ENCOUNTER — OFFICE VISIT (OUTPATIENT)
Dept: INTERNAL MEDICINE CLINIC | Facility: CLINIC | Age: 78
End: 2023-01-30
Payer: MEDICARE

## 2023-01-30 VITALS
HEIGHT: 69 IN | DIASTOLIC BLOOD PRESSURE: 64 MMHG | WEIGHT: 252.4 LBS | SYSTOLIC BLOOD PRESSURE: 122 MMHG | BODY MASS INDEX: 37.38 KG/M2

## 2023-01-30 DIAGNOSIS — E66.01 SEVERE OBESITY (BMI 35.0-39.9) WITH COMORBIDITY (HCC): ICD-10-CM

## 2023-01-30 DIAGNOSIS — N40.1 BPH ASSOCIATED WITH NOCTURIA: ICD-10-CM

## 2023-01-30 DIAGNOSIS — K20.90 ESOPHAGITIS DETERMINED BY BIOPSY: ICD-10-CM

## 2023-01-30 DIAGNOSIS — E11.65 TYPE 2 DIABETES MELLITUS WITH HYPERGLYCEMIA, WITH LONG-TERM CURRENT USE OF INSULIN (HCC): ICD-10-CM

## 2023-01-30 DIAGNOSIS — I10 PRIMARY HYPERTENSION: ICD-10-CM

## 2023-01-30 DIAGNOSIS — M1A.9XX0 CHRONIC GOUT WITHOUT TOPHUS, UNSPECIFIED CAUSE, UNSPECIFIED SITE: ICD-10-CM

## 2023-01-30 DIAGNOSIS — N18.31 STAGE 3A CHRONIC KIDNEY DISEASE (HCC): ICD-10-CM

## 2023-01-30 DIAGNOSIS — E78.2 MIXED HYPERLIPIDEMIA: ICD-10-CM

## 2023-01-30 DIAGNOSIS — I42.8 NONISCHEMIC CARDIOMYOPATHY (HCC): ICD-10-CM

## 2023-01-30 DIAGNOSIS — Z79.4 TYPE 2 DIABETES MELLITUS WITH HYPERGLYCEMIA, WITH LONG-TERM CURRENT USE OF INSULIN (HCC): Primary | ICD-10-CM

## 2023-01-30 DIAGNOSIS — R35.1 BPH ASSOCIATED WITH NOCTURIA: ICD-10-CM

## 2023-01-30 DIAGNOSIS — I73.9 PERIPHERAL VASCULAR DISEASE, UNSPECIFIED (HCC): ICD-10-CM

## 2023-01-30 DIAGNOSIS — Z79.4 TYPE 2 DIABETES MELLITUS WITH HYPERGLYCEMIA, WITH LONG-TERM CURRENT USE OF INSULIN (HCC): ICD-10-CM

## 2023-01-30 DIAGNOSIS — M48.062 NEUROGENIC CLAUDICATION DUE TO LUMBAR SPINAL STENOSIS: ICD-10-CM

## 2023-01-30 DIAGNOSIS — E11.65 TYPE 2 DIABETES MELLITUS WITH HYPERGLYCEMIA, WITH LONG-TERM CURRENT USE OF INSULIN (HCC): Primary | ICD-10-CM

## 2023-01-30 LAB
BASOPHILS # BLD: 0.1 K/UL (ref 0–0.2)
BASOPHILS NFR BLD: 1 % (ref 0–2)
DIFFERENTIAL METHOD BLD: ABNORMAL
EOSINOPHIL # BLD: 0.1 K/UL (ref 0–0.8)
EOSINOPHIL NFR BLD: 1 % (ref 0.5–7.8)
ERYTHROCYTE [DISTWIDTH] IN BLOOD BY AUTOMATED COUNT: 15.7 % (ref 11.9–14.6)
HCT VFR BLD AUTO: 42.9 % (ref 41.1–50.3)
HGB BLD-MCNC: 12.9 G/DL (ref 13.6–17.2)
IMM GRANULOCYTES # BLD AUTO: 0 K/UL (ref 0–0.5)
IMM GRANULOCYTES NFR BLD AUTO: 0 % (ref 0–5)
LYMPHOCYTES # BLD: 2.3 K/UL (ref 0.5–4.6)
LYMPHOCYTES NFR BLD: 30 % (ref 13–44)
MCH RBC QN AUTO: 26.6 PG (ref 26.1–32.9)
MCHC RBC AUTO-ENTMCNC: 30.1 G/DL (ref 31.4–35)
MCV RBC AUTO: 88.5 FL (ref 82–102)
MONOCYTES # BLD: 0.8 K/UL (ref 0.1–1.3)
MONOCYTES NFR BLD: 11 % (ref 4–12)
NEUTS SEG # BLD: 4.5 K/UL (ref 1.7–8.2)
NEUTS SEG NFR BLD: 57 % (ref 43–78)
NRBC # BLD: 0 K/UL (ref 0–0.2)
PLATELET # BLD AUTO: 258 K/UL (ref 150–450)
PMV BLD AUTO: 10.4 FL (ref 9.4–12.3)
RBC # BLD AUTO: 4.85 M/UL (ref 4.23–5.6)
WBC # BLD AUTO: 7.8 K/UL (ref 4.3–11.1)

## 2023-01-30 PROCEDURE — 3078F DIAST BP <80 MM HG: CPT | Performed by: INTERNAL MEDICINE

## 2023-01-30 PROCEDURE — G8417 CALC BMI ABV UP PARAM F/U: HCPCS | Performed by: INTERNAL MEDICINE

## 2023-01-30 PROCEDURE — G8484 FLU IMMUNIZE NO ADMIN: HCPCS | Performed by: INTERNAL MEDICINE

## 2023-01-30 PROCEDURE — 3074F SYST BP LT 130 MM HG: CPT | Performed by: INTERNAL MEDICINE

## 2023-01-30 PROCEDURE — 1036F TOBACCO NON-USER: CPT | Performed by: INTERNAL MEDICINE

## 2023-01-30 PROCEDURE — 99214 OFFICE O/P EST MOD 30 MIN: CPT | Performed by: INTERNAL MEDICINE

## 2023-01-30 PROCEDURE — G8427 DOCREV CUR MEDS BY ELIG CLIN: HCPCS | Performed by: INTERNAL MEDICINE

## 2023-01-30 PROCEDURE — 1123F ACP DISCUSS/DSCN MKR DOCD: CPT | Performed by: INTERNAL MEDICINE

## 2023-01-30 RX ORDER — TAMSULOSIN HYDROCHLORIDE 0.4 MG/1
0.4 CAPSULE ORAL DAILY
Qty: 90 CAPSULE | Refills: 1 | Status: SHIPPED | OUTPATIENT
Start: 2023-01-30

## 2023-01-30 RX ORDER — BLOOD SUGAR DIAGNOSTIC
1 STRIP MISCELLANEOUS 2 TIMES DAILY
Qty: 200 EACH | Refills: 3 | Status: SHIPPED | OUTPATIENT
Start: 2023-01-30

## 2023-01-30 ASSESSMENT — PATIENT HEALTH QUESTIONNAIRE - PHQ9
2. FEELING DOWN, DEPRESSED OR HOPELESS: 0
SUM OF ALL RESPONSES TO PHQ QUESTIONS 1-9: 0
SUM OF ALL RESPONSES TO PHQ QUESTIONS 1-9: 0
1. LITTLE INTEREST OR PLEASURE IN DOING THINGS: 0
SUM OF ALL RESPONSES TO PHQ QUESTIONS 1-9: 0
SUM OF ALL RESPONSES TO PHQ QUESTIONS 1-9: 0
SUM OF ALL RESPONSES TO PHQ9 QUESTIONS 1 & 2: 0

## 2023-01-30 ASSESSMENT — ENCOUNTER SYMPTOMS: SHORTNESS OF BREATH: 0

## 2023-01-30 NOTE — PROGRESS NOTES
HPI: Mellisa Giraldo (: 1945)    Having issues with prostate- normal PSA in Sept but having difficulty with slow stream and freq and does not feel like  He can empty his bladder  Is not on any diuretic therapy    He did get relief of esophageal symptoms with dilation and is on Omeprazole  He is having more numbness and weakness in both legs keli with prolonged standing or walking  Has known spinal stenosis and has had FUNMILAYO in past and been seen by [facility]  At this time is balance is worsening as well as the weakness    Just had cardiac defib updated     He notices numbness and tingling in both feet as well and has had issues with Achilles tendonitis      Problem List:  Patient Active Problem List   Diagnosis    Obesity (BMI 30-39. 9)    Obesity hypoventilation syndrome ([address]    Encounter for diabetic foot exam ([address]    Insomnia due to medical condition    Presence of cardiac defibrillator    Gout    MICHELL on CPAP    Type 2 diabetes mellitus with hyperglycemia, with long-term current use of insulin (HCC)    Intention tremor    Gastroesophageal reflux disease without esophagitis    LBBB (left bundle branch block)    COVID-19    Chronic systolic CHF (congestive heart failure) (HCC)    CHF (congestive heart failure) (HCC)    Left leg pain    Mixed hyperlipidemia    Primary hypertension    Stage 3 chronic kidney disease (HCC)    Memory changes    Basal cell carcinoma (BCC) of face    Peripheral vascular disease, unspecified ([address]    Primary osteoarthritis involving multiple joints    Neuropathy    Neurogenic claudication due to lumbar spinal stenosis    AICD at end of battery life    Esophageal dysphagia    Severe obesity (BMI 35.0-39. 9) with comorbidity ([address]    Esophagitis determined by biopsy    Nonischemic cardiomyopathy ([address]       History:  Past Medical History:   Diagnosis Date    Abnormal MMSE 2022    29 out of 30 and normal clock draw    Age-related cognitive decline 2020    30 out of 30 MMSE    Arthritis     DJD- shoulder, knees, hips    Cancer ([facility]     skin CA left lobe ear    Chronic systolic CHF (congestive heart failure) ([facility]     COVID-19 2/9/2021    Dizziness 06/09/2020    hx     Esophagitis 10/2022    GERD (gastroesophageal reflux disease)     managed with medication     Gout     managed with medication     H/O echocardiogram 10/14/2019    EF 60.1%    Hip pain, bilateral     POA    Hyperlipidemia     Hypertension     managed with medication     Morbid obesity ([facility]     Neuropathy     feet and legs    MICHELL on CPAP 08/26/2014    uses nightly    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 Raj pacemaker/defibrillator placed 6/25/14--0 shocks     Psychiatric disorder     DEPRESSION    Routine eye exam 2020    no diabetic retinopathy- Jonnathan Castaneda    Spinal stenosis of lumbar region     Stage 3b chronic kidney disease ([facility]     Type 2 diabetes mellitus with hyperglycemia, with long-term current use of insulin ([facility] 09/23/2020    managed with oral medication and Tresiba, average 's, no problems with hypoglycemia, A1c 6.5 on 8/24/2022       Allergies:  No Known Allergies    Current Medications:  Current Outpatient Medications   Medication Sig Dispense Refill    blood glucose test strips (ONETOUCH VERIO) strip 1 each by In Vitro route 2 times daily Use as directed to check blood sugar twice a day. (E11.65) 200 each 3    tamsulosin (FLOMAX) 0.4 MG capsule Take 1 capsule by mouth daily 90 capsule 1    rosuvastatin (CRESTOR) 10 MG tablet Take 1 tablet by mouth every evening 90 tablet 2    omeprazole (PRILOSEC) 20 MG delayed release capsule Take 1 capsule by mouth daily 90 capsule 2    amitriptyline (ELAVIL) 10 MG tablet Take 1 tablet by mouth nightly 90 tablet 2    valsartan-hydroCHLOROthiazide (DIOVAN-HCT) 320-25 MG per tablet Take 1 tablet by mouth daily 90 tablet 2    allopurinol (ZYLOPRIM) 100 MG tablet Take 1 tablet by mouth daily 90 tablet 2    carvedilol (COREG) 6.25 MG tablet TAKE 1 TABLET BY MOUTH TWICE DAILY 180 tablet 2    SITagliptin-metFORMIN (JANUMET XR)  MG TB24 per extended release tablet Take 2 tablets by mouth daily 180 tablet 2    donepezil (ARICEPT) 10 MG tablet Take 1 tablet by mouth nightly 90 tablet 2    gabapentin (NEURONTIN) 300 MG capsule TAKE 1 CAPSULE BY MOUTH EVERY MORNING AND 2 CAPSULES BY MOUTH AT BEDTIME 270 capsule 2    celecoxib (CELEBREX) 200 MG capsule Take 1 capsule by mouth in the morning. 90 capsule 2    Zinc Acetate, Oral, (ZINC ACETATE PO) Take 1 tablet by mouth daily      acetaminophen (TYLENOL) 500 MG tablet Take 1,000 mg by mouth every 6 hours as needed      Cholecalciferol 50 MCG (2000 UT) TABS Take 1 tablet by mouth daily      Cyanocobalamin 1000 MCG SUBL Take 1,000 mcg by mouth daily      furosemide (LASIX) 20 MG tablet Take 20 mg by mouth daily as needed      indomethacin (INDOCIN) 25 MG capsule Take 25 mg by mouth as needed      Insulin Degludec (TRESIBA FLEXTOUCH) 200 UNIT/ML SOPN Inject 50 Units into the skin every morning      nitroGLYCERIN (NITROSTAT) 0.4 MG SL tablet Place 0.4 mg under the tongue      Probiotic Product (ACIDOPHILUS PROBIOTIC) CAPS capsule Take 1 tablet by mouth daily       No current facility-administered medications for this visit. Review of Systems:  Review of Systems   Constitutional:  Negative for unexpected weight change. Respiratory:  Negative for shortness of breath. Cardiovascular:  Negative for chest pain. Genitourinary:  Positive for frequency and urgency. Neurological:  Positive for weakness and numbness. All other systems reviewed and are negative. Vitals:  /64   Ht 5' 9\" (1.753 m)   Wt 252 lb 6.4 oz (114.5 kg)   BMI 37.27 kg/m²     Physical Exam:  Physical Exam  Vitals reviewed. Constitutional:       Appearance: Normal appearance. HENT:      Head: Normocephalic and atraumatic. Eyes:      Extraocular Movements: Extraocular movements intact. Pupils: Pupils are equal, round, and reactive to light. Cardiovascular:      Rate and Rhythm: Normal rate and regular rhythm. Heart sounds: Normal heart sounds. Pulmonary:      Effort: Pulmonary effort is normal.      Breath sounds: Normal breath sounds. Musculoskeletal:         General: Normal range of motion. Cervical back: Normal range of motion and neck supple. Comments: Mild difficulty sitting to standing  No gait abnormality   Skin:     General: Skin is warm and dry. Neurological:      General: No focal deficit present. Mental Status: He is alert and oriented to person, place, and time. Gait: Gait normal.   Psychiatric:         Mood and Affect: Mood normal.         Behavior: Behavior normal.         Thought Content: Thought content normal.         Judgment: Judgment normal.        Assessment/Plan:   Emilee Galvan was seen today for follow-up. Diagnoses and all orders for this visit:    Type 2 diabetes mellitus with hyperglycemia, with long-term current use of insulin ([facility]  -     CBC with Auto Differential; Future  -     Comprehensive Metabolic Panel; Future  -     Hemoglobin A1C; Future    Nonischemic cardiomyopathy (HCC)    Peripheral vascular disease, unspecified (HCC)    Stage 3a chronic kidney disease ([address]  -     Comprehensive Metabolic Panel; Future    Esophagitis determined by biopsy    Severe obesity (BMI 35.0-39. 9) with comorbidity ([address]  Continue to work on diet  Limited in mobility  Primary hypertension  -     CBC with Auto Differential; Future  -     Comprehensive Metabolic Panel; Future  BP controlled  Chronic gout without tophus, unspecified cause, unspecified site  -     Uric Acid; Future    Mixed hyperlipidemia    Neurogenic claudication due to lumbar spinal stenosis  -     CT LUMBAR SPINE WO CONTRAST;  Future  Would like conservative tx if possible so will update imaging and consider FUNMILAYO or may need NCS  BPH associated with nocturia  -     Cancel: PSA, Total and Free; Future  -     tamsulosin (FLOMAX) 0.4 MG capsule; Take 1 capsule by mouth daily  Will start medication  Other orders  -     blood glucose test strips (ONETOUCH VERIO) strip; 1 each by In Vitro route 2 times daily Use as directed to check blood sugar twice a day. (E11.65)          Current medications are therapeutic at this time; continue as prescribed.         Karis Gallagher MD

## 2023-01-31 LAB
ALBUMIN SERPL-MCNC: 3.9 G/DL (ref 3.2–4.6)
ALBUMIN/GLOB SERPL: 1.3 (ref 0.4–1.6)
ALP SERPL-CCNC: 81 U/L (ref 50–136)
ALT SERPL-CCNC: 48 U/L (ref 12–65)
ANION GAP SERPL CALC-SCNC: 8 MMOL/L (ref 2–11)
AST SERPL-CCNC: 25 U/L (ref 15–37)
BILIRUB SERPL-MCNC: 0.9 MG/DL (ref 0.2–1.1)
BUN SERPL-MCNC: 25 MG/DL (ref 8–23)
CALCIUM SERPL-MCNC: 9.2 MG/DL (ref 8.3–10.4)
CHLORIDE SERPL-SCNC: 109 MMOL/L (ref 101–110)
CO2 SERPL-SCNC: 24 MMOL/L (ref 21–32)
CREAT SERPL-MCNC: 1.5 MG/DL (ref 0.8–1.5)
EST. AVERAGE GLUCOSE BLD GHB EST-MCNC: 140 MG/DL
GLOBULIN SER CALC-MCNC: 3 G/DL (ref 2.8–4.5)
GLUCOSE SERPL-MCNC: 107 MG/DL (ref 65–100)
HBA1C MFR BLD: 6.5 % (ref 4.8–5.6)
POTASSIUM SERPL-SCNC: 5.3 MMOL/L (ref 3.5–5.1)
PROT SERPL-MCNC: 6.9 G/DL (ref 6.3–8.2)
SODIUM SERPL-SCNC: 141 MMOL/L (ref 133–143)
URATE SERPL-MCNC: 6.8 MG/DL (ref 2.6–6)

## 2023-02-01 RX ORDER — ALLOPURINOL 300 MG/1
300 TABLET ORAL DAILY
Qty: 90 TABLET | Refills: 1 | Status: SHIPPED | OUTPATIENT
Start: 2023-02-01

## 2023-02-25 ENCOUNTER — APPOINTMENT (OUTPATIENT)
Dept: GENERAL RADIOLOGY | Age: 78
End: 2023-02-25
Payer: MEDICARE

## 2023-02-25 ENCOUNTER — HOSPITAL ENCOUNTER (EMERGENCY)
Age: 78
Discharge: HOME | End: 2023-02-25
Attending: EMERGENCY MEDICINE
Payer: MEDICARE

## 2023-02-25 VITALS
RESPIRATION RATE: 16 BRPM | OXYGEN SATURATION: 99 % | TEMPERATURE: 98.8 F | SYSTOLIC BLOOD PRESSURE: 155 MMHG | DIASTOLIC BLOOD PRESSURE: 75 MMHG | HEART RATE: 63 BPM

## 2023-02-25 DIAGNOSIS — M25.532 LEFT WRIST PAIN: Primary | ICD-10-CM

## 2023-02-25 LAB
ALBUMIN SERPL-MCNC: 3.6 G/DL (ref 3.2–4.6)
ALBUMIN/GLOB SERPL: 0.9 (ref 0.4–1.6)
ALP SERPL-CCNC: 80 U/L (ref 50–136)
ALT SERPL-CCNC: 39 U/L (ref 12–65)
ANION GAP SERPL CALC-SCNC: 1 MMOL/L (ref 2–11)
AST SERPL-CCNC: 21 U/L (ref 15–37)
BASOPHILS # BLD: 0 K/UL (ref 0–0.2)
BASOPHILS NFR BLD: 0 % (ref 0–2)
BILIRUB SERPL-MCNC: 0.3 MG/DL (ref 0.2–1.1)
BUN SERPL-MCNC: 24 MG/DL (ref 8–23)
CALCIUM SERPL-MCNC: 9.4 MG/DL (ref 8.3–10.4)
CHLORIDE SERPL-SCNC: 107 MMOL/L (ref 101–110)
CO2 SERPL-SCNC: 27 MMOL/L (ref 21–32)
CREAT SERPL-MCNC: 1.4 MG/DL (ref 0.8–1.5)
DIFFERENTIAL METHOD BLD: ABNORMAL
EOSINOPHIL # BLD: 0.1 K/UL (ref 0–0.8)
EOSINOPHIL NFR BLD: 1 % (ref 0.5–7.8)
ERYTHROCYTE [DISTWIDTH] IN BLOOD BY AUTOMATED COUNT: 15 % (ref 11.9–14.6)
GLOBULIN SER CALC-MCNC: 3.8 G/DL (ref 2.8–4.5)
GLUCOSE SERPL-MCNC: 205 MG/DL (ref 65–100)
HCT VFR BLD AUTO: 41.1 % (ref 41.1–50.3)
HGB BLD-MCNC: 12.7 G/DL (ref 13.6–17.2)
IMM GRANULOCYTES # BLD AUTO: 0 K/UL (ref 0–0.5)
IMM GRANULOCYTES NFR BLD AUTO: 0 % (ref 0–5)
LYMPHOCYTES # BLD: 2.1 K/UL (ref 0.5–4.6)
LYMPHOCYTES NFR BLD: 19 % (ref 13–44)
MCH RBC QN AUTO: 26.7 PG (ref 26.1–32.9)
MCHC RBC AUTO-ENTMCNC: 30.9 G/DL (ref 31.4–35)
MCV RBC AUTO: 86.5 FL (ref 82–102)
MONOCYTES # BLD: 0.9 K/UL (ref 0.1–1.3)
MONOCYTES NFR BLD: 8 % (ref 4–12)
NEUTS SEG # BLD: 7.5 K/UL (ref 1.7–8.2)
NEUTS SEG NFR BLD: 72 % (ref 43–78)
NRBC # BLD: 0 K/UL (ref 0–0.2)
PLATELET # BLD AUTO: 239 K/UL (ref 150–450)
PMV BLD AUTO: 9.9 FL (ref 9.4–12.3)
POTASSIUM SERPL-SCNC: 4.2 MMOL/L (ref 3.5–5.1)
PROT SERPL-MCNC: 7.4 G/DL (ref 6.3–8.2)
RBC # BLD AUTO: 4.75 M/UL (ref 4.23–5.6)
SODIUM SERPL-SCNC: 135 MMOL/L (ref 133–143)
TROPONIN I SERPL HS-MCNC: 13.3 PG/ML (ref 0–14)
WBC # BLD AUTO: 10.6 K/UL (ref 4.3–11.1)

## 2023-02-25 PROCEDURE — 6370000000 HC RX 637 (ALT 250 FOR IP): Performed by: PHYSICIAN ASSISTANT

## 2023-02-25 PROCEDURE — 84484 ASSAY OF TROPONIN QUANT: CPT

## 2023-02-25 PROCEDURE — 85025 COMPLETE CBC W/AUTO DIFF WBC: CPT

## 2023-02-25 PROCEDURE — 80053 COMPREHEN METABOLIC PANEL: CPT

## 2023-02-25 PROCEDURE — 96374 THER/PROPH/DIAG INJ IV PUSH: CPT

## 2023-02-25 PROCEDURE — 6360000002 HC RX W HCPCS: Performed by: PHYSICIAN ASSISTANT

## 2023-02-25 PROCEDURE — 99285 EMERGENCY DEPT VISIT HI MDM: CPT

## 2023-02-25 PROCEDURE — 73110 X-RAY EXAM OF WRIST: CPT

## 2023-02-25 PROCEDURE — 73030 X-RAY EXAM OF SHOULDER: CPT

## 2023-02-25 RX ORDER — HYDROCODONE BITARTRATE AND ACETAMINOPHEN 5; 325 MG/1; MG/1
1 TABLET ORAL
Status: COMPLETED | OUTPATIENT
Start: 2023-02-25 | End: 2023-02-25

## 2023-02-25 RX ORDER — HYDROCODONE BITARTRATE AND ACETAMINOPHEN 5; 325 MG/1; MG/1
1 TABLET ORAL EVERY 6 HOURS PRN
Qty: 6 TABLET | Refills: 0 | Status: SHIPPED | OUTPATIENT
Start: 2023-02-25 | End: 2023-02-28

## 2023-02-25 RX ORDER — DEXAMETHASONE SODIUM PHOSPHATE 10 MG/ML
10 INJECTION INTRAMUSCULAR; INTRAVENOUS
Status: COMPLETED | OUTPATIENT
Start: 2023-02-25 | End: 2023-02-25

## 2023-02-25 RX ORDER — PREDNISONE 50 MG/1
50 TABLET ORAL DAILY
Qty: 3 TABLET | Refills: 0 | Status: SHIPPED | OUTPATIENT
Start: 2023-02-25 | End: 2023-02-28

## 2023-02-25 RX ADMIN — DEXAMETHASONE SODIUM PHOSPHATE 10 MG: 10 INJECTION, SOLUTION INTRAMUSCULAR; INTRAVENOUS at 12:45

## 2023-02-25 RX ADMIN — HYDROCODONE BITARTRATE AND ACETAMINOPHEN 1 TABLET: 5; 325 TABLET ORAL at 12:45

## 2023-02-25 ASSESSMENT — ENCOUNTER SYMPTOMS
RESPIRATORY NEGATIVE: 1
GASTROINTESTINAL NEGATIVE: 1

## 2023-02-25 ASSESSMENT — PAIN DESCRIPTION - DESCRIPTORS: DESCRIPTORS: ACHING;DISCOMFORT

## 2023-02-25 ASSESSMENT — PAIN SCALES - GENERAL: PAINLEVEL_OUTOF10: 6

## 2023-02-25 ASSESSMENT — PAIN - FUNCTIONAL ASSESSMENT: PAIN_FUNCTIONAL_ASSESSMENT: 0-10

## 2023-02-25 ASSESSMENT — PAIN DESCRIPTION - LOCATION: LOCATION: WRIST;HAND

## 2023-02-25 ASSESSMENT — PAIN DESCRIPTION - FREQUENCY: FREQUENCY: CONTINUOUS

## 2023-02-25 ASSESSMENT — PAIN DESCRIPTION - ORIENTATION: ORIENTATION: LEFT

## 2023-02-25 ASSESSMENT — PAIN DESCRIPTION - PAIN TYPE: TYPE: ACUTE PAIN

## 2023-02-25 NOTE — ED PROVIDER NOTES
Emergency Department Provider Note                   PCP:                Dionne Rasmussen MD               Age: 69 y.o. Sex: male       ICD-10-CM    1. Left wrist pain  M25.532 HYDROcodone-acetaminophen (NORCO) 5-325 MG per tablet          DISPOSITION Decision To Discharge 02/25/2023 01:45:18 PM        Medical Decision Making  Patient is a 55-year-old male who presents with left wrist pain. Did obtain cardiac work-up because he is having left shoulder pain as well although he endorses that his left shoulder pain is chronic. Cardiac work-up negative. Left shoulder x-ray shows arthritic changes. Left wrist x-ray shows arthritic changes as well. Patient's blood work shows normal white blood cell count. I do not suspect infection. I think this is likely gout or some other arthritic flare. He has no redness or swelling in the forearm or upper arm. He has a strong radial pulse and is neuro intact. We will treat with steroids and pain medication. He is to follow-up with Ortho and return if worsening. He says he has an orthopedist that he can call on Monday morning. Amount and/or Complexity of Data Reviewed  Labs: ordered. Radiology: ordered. ECG/medicine tests: ordered. Decision-making details documented in ED Course. Risk  Prescription drug management. ED Course as of 02/25/23 1536   Sat Feb 25, 2023   1150 Uric acid elevated last month [JEFF]   1250 EKG 12 Lead  EKG shows paced rhythm at a rate of 63.  [JEFF]      ED Course User Index  [JEFF] LESVIA Peñaloza        Orders Placed This Encounter   Procedures    XR SHOULDER LEFT (MIN 2 VIEWS)    XR WRIST LEFT (MIN 3 VIEWS)    CBC with Auto Differential    CMP    Troponin    EKG 12 Lead        Medications   dexamethasone (DECADRON) injection 10 mg (10 mg IntraVENous Given 2/25/23 1245)   HYDROcodone-acetaminophen (NORCO) 5-325 MG per tablet 1 tablet (1 tablet Oral Given 2/25/23 1245)       Discharge Medication List as of 2/25/2023  1:58 PM        START taking these medications    Details   predniSONE (DELTASONE) 50 MG tablet Take 1 tablet by mouth daily for 3 days, Disp-3 tablet, R-0Normal      HYDROcodone-acetaminophen (NORCO) 5-325 MG per tablet Take 1 tablet by mouth every 6 hours as needed for Pain for up to 3 days. Intended supply: 3 days. Take lowest dose possible to manage pain Max Daily Amount: 4 tablets, Disp-6 tablet, R-0Normal              Dari Leary is a 69 y.o. male who presents to the Emergency Department with chief complaint of    Chief Complaint   Patient presents with    Hand Pain    Wrist Pain      Patient is a 81-year-old male who presents with left wrist pain for 3 days. Denies any injury. Does have history of gout but usually gets gout in his feet. Had blood work done recently which showed an elevated uric acid level so his allopurinol was increased. He says he has chronic left shoulder pain because he has arthritis there. The wrist pain is new. No fever or systemic symptoms. Pain worse when pressing the area or movement. This feels similar to gout which has had in his foot, but never had in his wrist.  No chest pain or shortness of breath. Review of Systems   Constitutional: Negative. HENT: Negative. Respiratory: Negative. Cardiovascular: Negative. Gastrointestinal: Negative. Genitourinary: Negative. Musculoskeletal:  Positive for arthralgias. All other systems reviewed and are negative.     Past Medical History:   Diagnosis Date    Abnormal MMSE 05/24/2022    29 out of 30 and normal clock draw    Age-related cognitive decline 2020    30 out of 30 MMSE    Arthritis     DJD- shoulder, knees, hips    Cancer (HCC)     skin CA left lobe ear    Chronic systolic CHF (congestive heart failure) ([facility]     COVID-19 2/9/2021    Dizziness 06/09/2020    hx     Esophagitis 10/2022    GERD (gastroesophageal reflux disease)     managed with medication     Gout     managed with medication H/O echocardiogram 10/14/2019    EF 60.1%    Hip pain, bilateral     POA    Hyperlipidemia     Hypertension     managed with medication     Morbid obesity ([facility]     Neuropathy     feet and legs    MICHELL on CPAP 08/26/2014    uses nightly    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 Raj pacemaker/defibrillator placed 6/25/14--0 shocks     Psychiatric disorder     DEPRESSION    Routine eye exam 2020    no diabetic retinopathy- Kathia Max    Spinal stenosis of lumbar region     Stage 3b chronic kidney disease ([facility]     Type 2 diabetes mellitus with hyperglycemia, with long-term current use of insulin ([facility] 09/23/2020    managed with oral medication and Tresiba, average 's, no problems with hypoglycemia, A1c 6.5 on 8/24/2022        Past Surgical History:   Procedure Laterality Date    APPENDECTOMY      COLONOSCOPY      Dr. Hailee Avila N/A 10/11/2022    Remove & replace ICD biv multi leads performed by Jaspreet Rodriguez MD at [address] [place] [address] CATH LAB    JOINT REPLACEMENT Left     left TKA    MOHS SURGERY Left 05/23/2022    [facility]    ORTHOPEDIC SURGERY      right achilles tendon repair    ORTHOPEDIC SURGERY      knee    PACEMAKER      pacemaker/defibrillator placed 6/25/14     LA UNLISTED PROCEDURE CARDIAC SURGERY  cath    TONSILLECTOMY      UPPER GASTROINTESTINAL ENDOSCOPY N/A 10/31/2022    EGD ESOPHAGOGASTRODUODENOSCOPY/BALLOON DILATION/BIOPSY performed by Laura Gallegos MD at [facility] ENDOSCOPY        Family History   Problem Relation Age of Onset    Cancer Mother     Heart Attack Father         Social History     Socioeconomic History    Marital status:      Spouse name: None    Number of children: None    Years of education: None    Highest education level: None   Tobacco Use    Smoking status: Never    Smokeless tobacco: Never   Vaping Use    Vaping Use: Never used   Substance and Sexual Activity    Alcohol use: No    Drug use: No         Patient has no known allergies.      Discharge Medication List as of 2/25/2023  1:58 PM        CONTINUE these medications which have NOT CHANGED    Details   allopurinol (ZYLOPRIM) 300 MG tablet Take 1 tablet by mouth daily, Disp-90 tablet, R-1Normal      blood glucose test strips (ONETOUCH VERIO) strip 1 each by In Vitro route 2 times daily Use as directed to check blood sugar twice a day. (E11.65), Disp-200 each, R-3Normal      tamsulosin (FLOMAX) 0.4 MG capsule Take 1 capsule by mouth daily, Disp-90 capsule, R-1Normal      rosuvastatin (CRESTOR) 10 MG tablet Take 1 tablet by mouth every evening, Disp-90 tablet, R-2Normal      omeprazole (PRILOSEC) 20 MG delayed release capsule Take 1 capsule by mouth daily, Disp-90 capsule, R-2Normal      amitriptyline (ELAVIL) 10 MG tablet Take 1 tablet by mouth nightly, Disp-90 tablet, R-2Normal      valsartan-hydroCHLOROthiazide (DIOVAN-HCT) 320-25 MG per tablet Take 1 tablet by mouth daily, Disp-90 tablet, R-2Normal      carvedilol (COREG) 6.25 MG tablet TAKE 1 TABLET BY MOUTH TWICE DAILY, Disp-180 tablet, R-2Normal      SITagliptin-metFORMIN (JANUMET XR)  MG TB24 per extended release tablet Take 2 tablets by mouth daily, Disp-180 tablet, R-2Normal      donepezil (ARICEPT) 10 MG tablet Take 1 tablet by mouth nightly, Disp-90 tablet, R-2Normal      gabapentin (NEURONTIN) 300 MG capsule TAKE 1 CAPSULE BY MOUTH EVERY MORNING AND 2 CAPSULES BY MOUTH AT BEDTIME, Disp-270 capsule, R-2Normal      celecoxib (CELEBREX) 200 MG capsule Take 1 capsule by mouth in the morning., Disp-90 capsule, R-2Normal      Zinc Acetate, Oral, (ZINC ACETATE PO) Take 1 tablet by mouth dailyHistorical Med      acetaminophen (TYLENOL) 500 MG tablet Take 1,000 mg by mouth every 6 hours as neededHistorical Med      Cholecalciferol 50 MCG (2000 UT) TABS Take 1 tablet by mouth dailyHistorical Med      Cyanocobalamin 1000 MCG SUBL Take 1,000 mcg by mouth dailyHistorical Med      furosemide (LASIX) 20 MG tablet Take 20 mg by mouth daily as neededHistorical Med      indomethacin (INDOCIN) 25 MG capsule Take 25 mg by mouth as neededHistorical Med      Insulin Degludec (TRESIBA FLEXTOUCH) 200 UNIT/ML SOPN Inject 50 Units into the skin every morningHistorical Med      nitroGLYCERIN (NITROSTAT) 0.4 MG SL tablet Place 0.4 mg under the tongueHistorical Med      Probiotic Product (ACIDOPHILUS PROBIOTIC) CAPS capsule Take 1 tablet by mouth dailyHistorical Med              Vitals signs and nursing note reviewed. Patient Vitals for the past 4 hrs:   Pulse Resp BP SpO2   02/25/23 1400 63 16 (!) 155/75 99 %          Physical Exam  Vitals and nursing note reviewed. Constitutional:       General: He is not in acute distress. Appearance: Normal appearance. He is obese. He is not ill-appearing or toxic-appearing. HENT:      Head: Normocephalic. Eyes:      Extraocular Movements: Extraocular movements intact. Cardiovascular:      Rate and Rhythm: Normal rate and regular rhythm. Pulses: Normal pulses. Heart sounds: Normal heart sounds. Pulmonary:      Effort: Pulmonary effort is normal.      Breath sounds: Normal breath sounds. Musculoskeletal:         General: Swelling and tenderness present. Normal range of motion. Cervical back: Normal range of motion and neck supple. Comments: Minimal swelling of the left wrist.  He is tender over distal radius and base of left thumb. Can range wrist but this does reproduce his pain. There is no erythema or warmth to suggest infection. Strong radial pulse. Neurovascularly intact. Skin:     General: Skin is warm and dry. Findings: No rash. Neurological:      General: No focal deficit present. Mental Status: He is alert and oriented to person, place, and time. Mental status is at baseline. Psychiatric:         Mood and Affect: Mood normal.         Behavior: Behavior normal.         Thought Content:  Thought content normal.        Procedures    Results for orders placed or performed during the hospital encounter of 02/25/23   XR SHOULDER LEFT (MIN 2 VIEWS)    Narrative    Examination: XR SHOULDER LEFT (MIN 2 VIEWS), XR WRIST LEFT (MIN 3 VIEWS)    INDICATION: Left shoulder and wrist pain    COMPARISON: None    FINDINGS:   Left shoulder: There is calcific density projecting over the rotator cuff tendon  compatible with hydroxyapatite. No evidence of fracture or malalignment. Mild  glenohumeral and AC joint degenerative changes. Osteopenia. Normal soft tissues. Partially imaged pacemaker. Left wrist: No fracture or malalignment. Degenerative changes involving the  first CMC, triscaphe, and radiocarpal articulations. Chondrocalcinosis in the  region of the TFCC. Normal bone mineralization and soft tissues. Impression    Left shoulder:  1. No acute osseous abnormality. 2. Rotator cuff calcific tendinosis. 3. Mild glenohumeral and AC joint osteoarthrosis. Left wrist:  1. No acute osseous abnormality. 2. Osteoarthritic changes centered at the base of the thumb. XR WRIST LEFT (MIN 3 VIEWS)    Narrative    Examination: XR SHOULDER LEFT (MIN 2 VIEWS), XR WRIST LEFT (MIN 3 VIEWS)    INDICATION: Left shoulder and wrist pain    COMPARISON: None    FINDINGS:   Left shoulder: There is calcific density projecting over the rotator cuff tendon  compatible with hydroxyapatite. No evidence of fracture or malalignment. Mild  glenohumeral and AC joint degenerative changes. Osteopenia. Normal soft tissues. Partially imaged pacemaker. Left wrist: No fracture or malalignment. Degenerative changes involving the  first CMC, triscaphe, and radiocarpal articulations. Chondrocalcinosis in the  region of the TFCC. Normal bone mineralization and soft tissues. Impression    Left shoulder:  1. No acute osseous abnormality. 2. Rotator cuff calcific tendinosis. 3. Mild glenohumeral and AC joint osteoarthrosis. Left wrist:  1. No acute osseous abnormality.   2. Osteoarthritic changes centered at the base of the thumb. CBC with Auto Differential   Result Value Ref Range    WBC 10.6 4.3 - 11.1 K/uL    RBC 4.75 4.23 - 5.6 M/uL    Hemoglobin 12.7 (L) 13.6 - 17.2 g/dL    Hematocrit 41.1 41.1 - 50.3 %    MCV 86.5 82 - 102 FL    MCH 26.7 26.1 - 32.9 PG    MCHC 30.9 (L) 31.4 - 35.0 g/dL    RDW 15.0 (H) 11.9 - 14.6 %    Platelets 280 095 - 039 K/uL    MPV 9.9 9.4 - 12.3 FL    nRBC 0.00 0.0 - 0.2 K/uL    Differential Type AUTOMATED      Seg Neutrophils 72 43 - 78 %    Lymphocytes 19 13 - 44 %    Monocytes 8 4.0 - 12.0 %    Eosinophils % 1 0.5 - 7.8 %    Basophils 0 0.0 - 2.0 %    Immature Granulocytes 0 0.0 - 5.0 %    Segs Absolute 7.5 1.7 - 8.2 K/UL    Absolute Lymph # 2.1 0.5 - 4.6 K/UL    Absolute Mono # 0.9 0.1 - 1.3 K/UL    Absolute Eos # 0.1 0.0 - 0.8 K/UL    Basophils Absolute 0.0 0.0 - 0.2 K/UL    Absolute Immature Granulocyte 0.0 0.0 - 0.5 K/UL   CMP   Result Value Ref Range    Sodium 135 133 - 143 mmol/L    Potassium 4.2 3.5 - 5.1 mmol/L    Chloride 107 101 - 110 mmol/L    CO2 27 21 - 32 mmol/L    Anion Gap 1 (L) 2 - 11 mmol/L    Glucose 205 (H) 65 - 100 mg/dL    BUN 24 (H) 8 - 23 MG/DL    Creatinine 1.40 0.8 - 1.5 MG/DL    Est, Glom Filt Rate 52 (L) >60 ml/min/1.73m2    Calcium 9.4 8.3 - 10.4 MG/DL    Total Bilirubin 0.3 0.2 - 1.1 MG/DL    ALT 39 12 - 65 U/L    AST 21 15 - 37 U/L    Alk Phosphatase 80 50 - 136 U/L    Total Protein 7.4 6.3 - 8.2 g/dL    Albumin 3.6 3.2 - 4.6 g/dL    Globulin 3.8 2.8 - 4.5 g/dL    Albumin/Globulin Ratio 0.9 0.4 - 1.6     Troponin   Result Value Ref Range    Troponin, High Sensitivity 13.3 0 - 14 pg/mL        XR SHOULDER LEFT (MIN 2 VIEWS)   Final Result      Left shoulder:   1. No acute osseous abnormality. 2. Rotator cuff calcific tendinosis. 3. Mild glenohumeral and AC joint osteoarthrosis. Left wrist:   1. No acute osseous abnormality. 2. Osteoarthritic changes centered at the base of the thumb.       XR WRIST LEFT (MIN 3 VIEWS)   Final Result      Left shoulder:   1. No acute osseous abnormality. 2. Rotator cuff calcific tendinosis. 3. Mild glenohumeral and AC joint osteoarthrosis. Left wrist:   1. No acute osseous abnormality. 2. Osteoarthritic changes centered at the base of the thumb. Voice dictation software was used during the making of this note. This software is not perfect and grammatical and other typographical errors may be present. This note has not been completely proofread for errors.        Faraz Price  02/25/23 153

## 2023-02-25 NOTE — ED NOTES
I have reviewed discharge instructions with the patient. The patient verbalized understanding. Patient left ED via Discharge Method: ambulatory to Home with wife. Opportunity for questions and clarification provided. Patient given 2 scripts. To continue your aftercare when you leave the hospital, you may receive an automated call from our care team to check in on how you are doing. This is a free service and part of our promise to provide the best care and service to meet your aftercare needs.  If you have questions, or wish to unsubscribe from this service please call [phone]. Thank you for Choosing our [facility] Emergency Department.        Bryan Rothman RN  02/25/23 [id number]

## 2023-02-25 NOTE — ED TRIAGE NOTES
Pt c/o L hand/wrist pain since Thursday, also reports chronic arthritic pain to L shoulder (-)cp, dyspnea  Hx gout  A&Ox4, ambulatory to triage

## 2023-02-28 RX ORDER — INSULIN DEGLUDEC 200 U/ML
50 INJECTION, SOLUTION SUBCUTANEOUS EVERY MORNING
Qty: 15 ML | Refills: 5 | Status: SHIPPED | OUTPATIENT
Start: 2023-02-28

## 2023-03-07 DIAGNOSIS — Z95.810 PRESENCE OF CARDIAC DEFIBRILLATOR: ICD-10-CM

## 2023-03-07 DIAGNOSIS — I50.22 CHRONIC SYSTOLIC CHF (CONGESTIVE HEART FAILURE) (HCC): ICD-10-CM

## 2023-03-13 ENCOUNTER — APPOINTMENT (RX ONLY)
Dept: URBAN - METROPOLITAN AREA CLINIC 330 | Facility: CLINIC | Age: 78
Setting detail: DERMATOLOGY
End: 2023-03-13

## 2023-03-13 DIAGNOSIS — Z85.820 PERSONAL HISTORY OF MALIGNANT MELANOMA OF SKIN: ICD-10-CM

## 2023-03-13 DIAGNOSIS — L57.0 ACTINIC KERATOSIS: ICD-10-CM

## 2023-03-13 DIAGNOSIS — L57.8 OTHER SKIN CHANGES DUE TO CHRONIC EXPOSURE TO NONIONIZING RADIATION: ICD-10-CM

## 2023-03-13 DIAGNOSIS — D22 MELANOCYTIC NEVI: ICD-10-CM | Status: STABLE

## 2023-03-13 DIAGNOSIS — Z85.828 PERSONAL HISTORY OF OTHER MALIGNANT NEOPLASM OF SKIN: ICD-10-CM

## 2023-03-13 PROBLEM — D22.5 MELANOCYTIC NEVI OF TRUNK: Status: ACTIVE | Noted: 2023-03-13

## 2023-03-13 PROCEDURE — ? LIQUID NITROGEN

## 2023-03-13 PROCEDURE — ? MEDICAL PHOTOGRAPHY REVIEW

## 2023-03-13 PROCEDURE — ? COUNSELING

## 2023-03-13 PROCEDURE — 17003 DESTRUCT PREMALG LES 2-14: CPT

## 2023-03-13 PROCEDURE — ? OTHER

## 2023-03-13 PROCEDURE — ? TREATMENT REGIMEN

## 2023-03-13 PROCEDURE — 17000 DESTRUCT PREMALG LESION: CPT

## 2023-03-13 PROCEDURE — ? FULL BODY SKIN EXAM

## 2023-03-13 PROCEDURE — 99213 OFFICE O/P EST LOW 20 MIN: CPT | Mod: 25

## 2023-03-13 ASSESSMENT — LOCATION ZONE DERM
LOCATION ZONE: EAR
LOCATION ZONE: ARM
LOCATION ZONE: SCALP
LOCATION ZONE: HAND
LOCATION ZONE: FACE
LOCATION ZONE: TRUNK

## 2023-03-13 ASSESSMENT — LOCATION DETAILED DESCRIPTION DERM
LOCATION DETAILED: LEFT SUPERIOR HELIX
LOCATION DETAILED: RIGHT INFERIOR NASAL CHEEK
LOCATION DETAILED: RIGHT PROXIMAL DORSAL FOREARM
LOCATION DETAILED: LEFT ULNAR DORSAL HAND
LOCATION DETAILED: LEFT INFERIOR HELIX
LOCATION DETAILED: LEFT INFERIOR LATERAL FOREHEAD
LOCATION DETAILED: LEFT SUPERIOR OCCIPITAL SCALP
LOCATION DETAILED: LEFT ANTERIOR EARLOBE
LOCATION DETAILED: INFERIOR THORACIC SPINE

## 2023-03-13 ASSESSMENT — LOCATION SIMPLE DESCRIPTION DERM
LOCATION SIMPLE: UPPER BACK
LOCATION SIMPLE: LEFT OCCIPITAL SCALP
LOCATION SIMPLE: RIGHT FOREARM
LOCATION SIMPLE: RIGHT CHEEK
LOCATION SIMPLE: LEFT FOREHEAD
LOCATION SIMPLE: LEFT EAR
LOCATION SIMPLE: LEFT HAND

## 2023-03-13 NOTE — PROCEDURE: LIQUID NITROGEN
Show Aperture Variable?: Yes
Consent: The patient's consent was obtained including but not limited to risks of crusting, scabbing, blistering, scarring, darker or lighter pigmentary change, recurrence, incomplete removal and infection.
Render Post-Care Instructions In Note?: no
Duration Of Freeze Thaw-Cycle (Seconds): 2
Number Of Freeze-Thaw Cycles: 3 freeze-thaw cycles
Detail Level: Detailed
Post-Care Instructions: I reviewed with the patient in detail post-care instructions. Patient is to wear sunprotection, and avoid picking at any of the treated lesions. Pt may apply Vaseline to crusted or scabbing areas.

## 2023-03-13 NOTE — PROCEDURE: MIPS QUALITY
Detail Level: Detailed
Quality 110: Preventive Care And Screening: Influenza Immunization: Influenza Immunization Administered during Influenza season
Quality 47: Advance Care Plan: Advance care planning not documented, reason not otherwise specified.
Quality 226: Preventive Care And Screening: Tobacco Use: Screening And Cessation Intervention: Patient screened for tobacco use and is an ex/non-smoker
Quality 130: Documentation Of Current Medications In The Medical Record: Current Medications Documented
Quality 431: Preventive Care And Screening: Unhealthy Alcohol Use - Screening: Patient not identified as an unhealthy alcohol user when screened for unhealthy alcohol use using a systematic screening method
Quality 402: Tobacco Use And Help With Quitting Among Adolescents: Patient screened for tobacco and never smoked

## 2023-04-26 RX ORDER — GABAPENTIN 300 MG/1
CAPSULE ORAL
Qty: 270 CAPSULE | Refills: 2 | Status: SHIPPED | OUTPATIENT
Start: 2023-04-26 | End: 2024-01-25

## 2023-04-26 RX ORDER — CELECOXIB 200 MG/1
200 CAPSULE ORAL DAILY
Qty: 90 CAPSULE | Refills: 2 | Status: SHIPPED | OUTPATIENT
Start: 2023-04-26

## 2023-04-26 NOTE — TELEPHONE ENCOUNTER
Requested Prescriptions     Pending Prescriptions Disp Refills    celecoxib (CELEBREX) 200 MG capsule 90 capsule 2     Sig: Take 1 capsule by mouth daily    gabapentin (NEURONTIN) 300 MG capsule 270 capsule 2     Sig: TAKE 1 CAPSULE BY MOUTH EVERY MORNING AND 2 CAPSULES BY MOUTH AT BEDTIME

## 2023-05-22 ENCOUNTER — OFFICE VISIT (OUTPATIENT)
Dept: INTERNAL MEDICINE CLINIC | Facility: CLINIC | Age: 78
End: 2023-05-22
Payer: MEDICARE

## 2023-05-22 VITALS
HEIGHT: 69 IN | DIASTOLIC BLOOD PRESSURE: 80 MMHG | WEIGHT: 255.6 LBS | BODY MASS INDEX: 37.86 KG/M2 | SYSTOLIC BLOOD PRESSURE: 148 MMHG

## 2023-05-22 DIAGNOSIS — I50.9 CONGESTIVE HEART FAILURE, UNSPECIFIED HF CHRONICITY, UNSPECIFIED HEART FAILURE TYPE (HCC): Primary | ICD-10-CM

## 2023-05-22 DIAGNOSIS — R35.1 BPH ASSOCIATED WITH NOCTURIA: ICD-10-CM

## 2023-05-22 DIAGNOSIS — G47.33 OSA ON CPAP: ICD-10-CM

## 2023-05-22 DIAGNOSIS — Z79.4 TYPE 2 DIABETES MELLITUS WITH HYPERGLYCEMIA, WITH LONG-TERM CURRENT USE OF INSULIN (HCC): ICD-10-CM

## 2023-05-22 DIAGNOSIS — I10 PRIMARY HYPERTENSION: ICD-10-CM

## 2023-05-22 DIAGNOSIS — Z99.89 OSA ON CPAP: ICD-10-CM

## 2023-05-22 DIAGNOSIS — N40.1 BPH ASSOCIATED WITH NOCTURIA: ICD-10-CM

## 2023-05-22 DIAGNOSIS — E11.65 TYPE 2 DIABETES MELLITUS WITH HYPERGLYCEMIA, WITH LONG-TERM CURRENT USE OF INSULIN (HCC): ICD-10-CM

## 2023-05-22 DIAGNOSIS — R41.3 MEMORY CHANGES: ICD-10-CM

## 2023-05-22 DIAGNOSIS — E66.9 OBESITY (BMI 30-39.9): ICD-10-CM

## 2023-05-22 DIAGNOSIS — E78.2 MIXED HYPERLIPIDEMIA: ICD-10-CM

## 2023-05-22 DIAGNOSIS — M1A.9XX0 CHRONIC GOUT WITHOUT TOPHUS, UNSPECIFIED CAUSE, UNSPECIFIED SITE: ICD-10-CM

## 2023-05-22 LAB
ALBUMIN SERPL-MCNC: 3.8 G/DL (ref 3.2–4.6)
ALBUMIN/GLOB SERPL: 1.1 (ref 0.4–1.6)
ALP SERPL-CCNC: 90 U/L (ref 50–136)
ALT SERPL-CCNC: 48 U/L (ref 12–65)
ANION GAP SERPL CALC-SCNC: 5 MMOL/L (ref 2–11)
AST SERPL-CCNC: 29 U/L (ref 15–37)
BASOPHILS # BLD: 0.1 K/UL (ref 0–0.2)
BASOPHILS NFR BLD: 1 % (ref 0–2)
BILIRUB SERPL-MCNC: 0.4 MG/DL (ref 0.2–1.1)
BUN SERPL-MCNC: 35 MG/DL (ref 8–23)
CALCIUM SERPL-MCNC: 9.5 MG/DL (ref 8.3–10.4)
CHLORIDE SERPL-SCNC: 107 MMOL/L (ref 101–110)
CHOLEST SERPL-MCNC: 141 MG/DL
CO2 SERPL-SCNC: 26 MMOL/L (ref 21–32)
CREAT SERPL-MCNC: 1.7 MG/DL (ref 0.8–1.5)
DIFFERENTIAL METHOD BLD: ABNORMAL
EOSINOPHIL # BLD: 0.1 K/UL (ref 0–0.8)
EOSINOPHIL NFR BLD: 2 % (ref 0.5–7.8)
ERYTHROCYTE [DISTWIDTH] IN BLOOD BY AUTOMATED COUNT: 15.1 % (ref 11.9–14.6)
GLOBULIN SER CALC-MCNC: 3.5 G/DL (ref 2.8–4.5)
GLUCOSE SERPL-MCNC: 97 MG/DL (ref 65–100)
HCT VFR BLD AUTO: 41.5 % (ref 41.1–50.3)
HDLC SERPL-MCNC: 43 MG/DL (ref 40–60)
HDLC SERPL: 3.3
HGB BLD-MCNC: 12.6 G/DL (ref 13.6–17.2)
IMM GRANULOCYTES # BLD AUTO: 0 K/UL (ref 0–0.5)
IMM GRANULOCYTES NFR BLD AUTO: 0 % (ref 0–5)
LDLC SERPL CALC-MCNC: 60 MG/DL
LYMPHOCYTES # BLD: 2.3 K/UL (ref 0.5–4.6)
LYMPHOCYTES NFR BLD: 30 % (ref 13–44)
MCH RBC QN AUTO: 27.6 PG (ref 26.1–32.9)
MCHC RBC AUTO-ENTMCNC: 30.4 G/DL (ref 31.4–35)
MCV RBC AUTO: 91 FL (ref 82–102)
MONOCYTES # BLD: 0.7 K/UL (ref 0.1–1.3)
MONOCYTES NFR BLD: 9 % (ref 4–12)
NEUTS SEG # BLD: 4.6 K/UL (ref 1.7–8.2)
NEUTS SEG NFR BLD: 58 % (ref 43–78)
NRBC # BLD: 0 K/UL (ref 0–0.2)
PLATELET # BLD AUTO: 236 K/UL (ref 150–450)
PMV BLD AUTO: 10.8 FL (ref 9.4–12.3)
POTASSIUM SERPL-SCNC: 5.2 MMOL/L (ref 3.5–5.1)
PROT SERPL-MCNC: 7.3 G/DL (ref 6.3–8.2)
RBC # BLD AUTO: 4.56 M/UL (ref 4.23–5.6)
SODIUM SERPL-SCNC: 138 MMOL/L (ref 133–143)
TRIGL SERPL-MCNC: 190 MG/DL (ref 35–150)
URATE SERPL-MCNC: 4.5 MG/DL (ref 2.6–6)
VLDLC SERPL CALC-MCNC: 38 MG/DL (ref 6–23)
WBC # BLD AUTO: 7.8 K/UL (ref 4.3–11.1)

## 2023-05-22 PROCEDURE — G8417 CALC BMI ABV UP PARAM F/U: HCPCS | Performed by: INTERNAL MEDICINE

## 2023-05-22 PROCEDURE — 99214 OFFICE O/P EST MOD 30 MIN: CPT | Performed by: INTERNAL MEDICINE

## 2023-05-22 PROCEDURE — 3079F DIAST BP 80-89 MM HG: CPT | Performed by: INTERNAL MEDICINE

## 2023-05-22 PROCEDURE — 3044F HG A1C LEVEL LT 7.0%: CPT | Performed by: INTERNAL MEDICINE

## 2023-05-22 PROCEDURE — 1123F ACP DISCUSS/DSCN MKR DOCD: CPT | Performed by: INTERNAL MEDICINE

## 2023-05-22 PROCEDURE — G8427 DOCREV CUR MEDS BY ELIG CLIN: HCPCS | Performed by: INTERNAL MEDICINE

## 2023-05-22 PROCEDURE — 3077F SYST BP >= 140 MM HG: CPT | Performed by: INTERNAL MEDICINE

## 2023-05-22 PROCEDURE — 1036F TOBACCO NON-USER: CPT | Performed by: INTERNAL MEDICINE

## 2023-05-22 RX ORDER — TAMSULOSIN HYDROCHLORIDE 0.4 MG/1
0.4 CAPSULE ORAL DAILY
Qty: 90 CAPSULE | Refills: 2 | Status: SHIPPED | OUTPATIENT
Start: 2023-05-22

## 2023-05-22 RX ORDER — DONEPEZIL HYDROCHLORIDE 10 MG/1
10 TABLET, FILM COATED ORAL NIGHTLY
Qty: 90 TABLET | Refills: 2 | Status: SHIPPED | OUTPATIENT
Start: 2023-05-22

## 2023-05-22 RX ORDER — INSULIN DEGLUDEC 200 U/ML
58 INJECTION, SOLUTION SUBCUTANEOUS EVERY MORNING
Qty: 30 ML | Refills: 5 | Status: SHIPPED | OUTPATIENT
Start: 2023-05-22

## 2023-05-22 RX ORDER — CARVEDILOL 6.25 MG/1
6.25 TABLET ORAL 2 TIMES DAILY
Qty: 180 TABLET | Refills: 2 | Status: SHIPPED | OUTPATIENT
Start: 2023-05-22

## 2023-05-22 RX ORDER — ALLOPURINOL 300 MG/1
300 TABLET ORAL DAILY
Qty: 90 TABLET | Refills: 1 | Status: SHIPPED | OUTPATIENT
Start: 2023-05-22

## 2023-05-22 SDOH — ECONOMIC STABILITY: FOOD INSECURITY: WITHIN THE PAST 12 MONTHS, THE FOOD YOU BOUGHT JUST DIDN'T LAST AND YOU DIDN'T HAVE MONEY TO GET MORE.: NEVER TRUE

## 2023-05-22 SDOH — ECONOMIC STABILITY: FOOD INSECURITY: WITHIN THE PAST 12 MONTHS, YOU WORRIED THAT YOUR FOOD WOULD RUN OUT BEFORE YOU GOT MONEY TO BUY MORE.: NEVER TRUE

## 2023-05-22 SDOH — ECONOMIC STABILITY: HOUSING INSECURITY
IN THE LAST 12 MONTHS, WAS THERE A TIME WHEN YOU DID NOT HAVE A STEADY PLACE TO SLEEP OR SLEPT IN A SHELTER (INCLUDING NOW)?: NO

## 2023-05-22 SDOH — ECONOMIC STABILITY: INCOME INSECURITY: HOW HARD IS IT FOR YOU TO PAY FOR THE VERY BASICS LIKE FOOD, HOUSING, MEDICAL CARE, AND HEATING?: NOT HARD AT ALL

## 2023-05-22 ASSESSMENT — PATIENT HEALTH QUESTIONNAIRE - PHQ9
2. FEELING DOWN, DEPRESSED OR HOPELESS: 0
SUM OF ALL RESPONSES TO PHQ9 QUESTIONS 1 & 2: 0
SUM OF ALL RESPONSES TO PHQ QUESTIONS 1-9: 0
1. LITTLE INTEREST OR PLEASURE IN DOING THINGS: 0
SUM OF ALL RESPONSES TO PHQ QUESTIONS 1-9: 0

## 2023-05-22 ASSESSMENT — ENCOUNTER SYMPTOMS
BACK PAIN: 1
SHORTNESS OF BREATH: 0

## 2023-05-22 NOTE — PROGRESS NOTES
HPI: Josue Sweeney (: 1945)    Was doing [employer] but then stopped and gained wt   Blood sugar has increased as well    Need to update meds and labs    Got an abrasion on left leg from dog bite but is better and UTD on Tetanus      Problem List:  Patient Active Problem List   Diagnosis    Obesity (BMI 30-39. 9)    Obesity hypoventilation syndrome ([facility]    Encounter for diabetic foot exam ([facility]    Insomnia due to medical condition    Presence of cardiac defibrillator    Gout    MICHELL on CPAP    Type 2 diabetes mellitus with hyperglycemia, with long-term current use of insulin (HCC)    Intention tremor    Gastroesophageal reflux disease without esophagitis    LBBB (left bundle branch block)    COVID-19    Chronic systolic CHF (congestive heart failure) (HCC)    CHF (congestive heart failure) ([facility])    Left leg pain    Mixed hyperlipidemia    Primary hypertension    Stage 3 chronic kidney disease (HCC)    Memory changes    Basal cell carcinoma (BCC) of face    Peripheral vascular disease, unspecified ([facility])    Primary osteoarthritis involving multiple joints    Neuropathy    Neurogenic claudication due to lumbar spinal stenosis    AICD at end of battery life    Esophageal dysphagia    Severe obesity (BMI 35.0-39. 9) with comorbidity ([facility]    Esophagitis determined by biopsy    Nonischemic cardiomyopathy ([facility]       History:  Past Medical History:   Diagnosis Date    Abnormal MMSE 2022    29 out of 30 and normal clock draw    Age-related cognitive decline     30 out of 30 MMSE    Arthritis     DJD- shoulder, knees, hips    Cancer ([facility]     skin CA left lobe ear    Chronic systolic CHF (congestive heart failure) ([facility]     COVID-19 2021    Dizziness 2020    hx     Esophagitis 10/2022    GERD (gastroesophageal reflux disease)     managed with medication     Gout     managed with medication     H/O echocardiogram 10/14/2019    EF 60.1%    Hip pain, bilateral     POA    Hyperlipidemia

## 2023-05-23 LAB
EST. AVERAGE GLUCOSE BLD GHB EST-MCNC: 148 MG/DL
HBA1C MFR BLD: 6.8 % (ref 4.8–5.6)

## 2023-06-05 RX ORDER — INSULIN DEGLUDEC 200 U/ML
58 INJECTION, SOLUTION SUBCUTANEOUS EVERY MORNING
Qty: 45 ML | Refills: 3 | Status: SHIPPED | OUTPATIENT
Start: 2023-06-05

## 2023-06-05 NOTE — PROCEDURE: COUNSELING
Detail Level: Detailed Bilateral Helical Rim Advancement Flap Text: The defect edges were debeveled with a #15c blade scalpel.  Given the location of the defect and the proximity to free margins (helical rim) a bilateral helical rim advancement flap was deemed most appropriate.  Using a sterile surgical marker, the appropriate advancement flaps were drawn incorporating the defect and placing the expected incisions between the helical rim and antihelix where possible.  The area thus outlined was incised through and through with a #15 scalpel blade.  With a skin hook and iris scissors, the flaps were gently and sharply undermined and freed up.

## 2023-06-15 DIAGNOSIS — Z95.810 PRESENCE OF CARDIAC DEFIBRILLATOR: ICD-10-CM

## 2023-06-15 DIAGNOSIS — I50.22 CHRONIC SYSTOLIC CHF (CONGESTIVE HEART FAILURE) (HCC): ICD-10-CM

## 2023-07-13 PROCEDURE — 93296 REM INTERROG EVL PM/IDS: CPT | Performed by: INTERNAL MEDICINE

## 2023-07-13 PROCEDURE — 93295 DEV INTERROG REMOTE 1/2/MLT: CPT | Performed by: INTERNAL MEDICINE

## 2023-09-14 ENCOUNTER — HOSPITAL ENCOUNTER (EMERGENCY)
Age: 78
Discharge: HOME | End: 2023-09-14
Attending: EMERGENCY MEDICINE
Payer: MEDICARE

## 2023-09-14 VITALS
SYSTOLIC BLOOD PRESSURE: 142 MMHG | BODY MASS INDEX: 35.7 KG/M2 | WEIGHT: 241 LBS | DIASTOLIC BLOOD PRESSURE: 70 MMHG | HEART RATE: 84 BPM | OXYGEN SATURATION: 100 % | HEIGHT: 69 IN | TEMPERATURE: 98.2 F | RESPIRATION RATE: 19 BRPM

## 2023-09-14 DIAGNOSIS — R19.7 DIARRHEA, UNSPECIFIED TYPE: Primary | ICD-10-CM

## 2023-09-14 DIAGNOSIS — N17.9 ACUTE KIDNEY INJURY (HCC): ICD-10-CM

## 2023-09-14 DIAGNOSIS — E86.0 DEHYDRATION: ICD-10-CM

## 2023-09-14 LAB
ALBUMIN SERPL-MCNC: 3.1 G/DL (ref 3.2–4.6)
ALBUMIN/GLOB SERPL: 0.8 (ref 0.4–1.6)
ALP SERPL-CCNC: 85 U/L (ref 50–136)
ALT SERPL-CCNC: 27 U/L (ref 12–65)
ANION GAP SERPL CALC-SCNC: 5 MMOL/L (ref 2–11)
AST SERPL-CCNC: 11 U/L (ref 15–37)
BASOPHILS # BLD: 0 K/UL (ref 0–0.2)
BASOPHILS NFR BLD: 1 % (ref 0–2)
BILIRUB SERPL-MCNC: 0.3 MG/DL (ref 0.2–1.1)
BUN SERPL-MCNC: 31 MG/DL (ref 8–23)
CALCIUM SERPL-MCNC: 9.2 MG/DL (ref 8.3–10.4)
CHLORIDE SERPL-SCNC: 112 MMOL/L (ref 101–110)
CO2 SERPL-SCNC: 23 MMOL/L (ref 21–32)
CREAT SERPL-MCNC: 2 MG/DL (ref 0.8–1.5)
DIFFERENTIAL METHOD BLD: ABNORMAL
EOSINOPHIL # BLD: 0.1 K/UL (ref 0–0.8)
EOSINOPHIL NFR BLD: 2 % (ref 0.5–7.8)
ERYTHROCYTE [DISTWIDTH] IN BLOOD BY AUTOMATED COUNT: 14.7 % (ref 11.9–14.6)
GLOBULIN SER CALC-MCNC: 3.9 G/DL (ref 2.8–4.5)
GLUCOSE SERPL-MCNC: 111 MG/DL (ref 65–100)
HCT VFR BLD AUTO: 41.8 % (ref 41.1–50.3)
HGB BLD-MCNC: 12.9 G/DL (ref 13.6–17.2)
IMM GRANULOCYTES # BLD AUTO: 0.1 K/UL (ref 0–0.5)
IMM GRANULOCYTES NFR BLD AUTO: 1 % (ref 0–5)
LIPASE SERPL-CCNC: 122 U/L (ref 73–393)
LYMPHOCYTES # BLD: 2.6 K/UL (ref 0.5–4.6)
LYMPHOCYTES NFR BLD: 32 % (ref 13–44)
MAGNESIUM SERPL-MCNC: 1.9 MG/DL (ref 1.8–2.4)
MCH RBC QN AUTO: 27.1 PG (ref 26.1–32.9)
MCHC RBC AUTO-ENTMCNC: 30.9 G/DL (ref 31.4–35)
MCV RBC AUTO: 87.8 FL (ref 82–102)
MONOCYTES # BLD: 1 K/UL (ref 0.1–1.3)
MONOCYTES NFR BLD: 12 % (ref 4–12)
NEUTS SEG # BLD: 4.2 K/UL (ref 1.7–8.2)
NEUTS SEG NFR BLD: 53 % (ref 43–78)
NRBC # BLD: 0 K/UL (ref 0–0.2)
PLATELET # BLD AUTO: 279 K/UL (ref 150–450)
PMV BLD AUTO: 9.7 FL (ref 9.4–12.3)
POTASSIUM SERPL-SCNC: 4.4 MMOL/L (ref 3.5–5.1)
PROT SERPL-MCNC: 7 G/DL (ref 6.3–8.2)
RBC # BLD AUTO: 4.76 M/UL (ref 4.23–5.6)
SODIUM SERPL-SCNC: 140 MMOL/L (ref 133–143)
WBC # BLD AUTO: 7.9 K/UL (ref 4.3–11.1)

## 2023-09-14 PROCEDURE — 2580000003 HC RX 258: Performed by: EMERGENCY MEDICINE

## 2023-09-14 PROCEDURE — 80053 COMPREHEN METABOLIC PANEL: CPT

## 2023-09-14 PROCEDURE — 85025 COMPLETE CBC W/AUTO DIFF WBC: CPT

## 2023-09-14 PROCEDURE — 96361 HYDRATE IV INFUSION ADD-ON: CPT

## 2023-09-14 PROCEDURE — 83690 ASSAY OF LIPASE: CPT

## 2023-09-14 PROCEDURE — 6370000000 HC RX 637 (ALT 250 FOR IP): Performed by: EMERGENCY MEDICINE

## 2023-09-14 PROCEDURE — 83735 ASSAY OF MAGNESIUM: CPT

## 2023-09-14 PROCEDURE — 96360 HYDRATION IV INFUSION INIT: CPT

## 2023-09-14 PROCEDURE — 99284 EMERGENCY DEPT VISIT MOD MDM: CPT

## 2023-09-14 RX ORDER — 0.9 % SODIUM CHLORIDE 0.9 %
1000 INTRAVENOUS SOLUTION INTRAVENOUS
Status: COMPLETED | OUTPATIENT
Start: 2023-09-14 | End: 2023-09-14

## 2023-09-14 RX ORDER — HYOSCYAMINE SULFATE 0.125 MG
125 TABLET ORAL EVERY 4 HOURS PRN
Qty: 20 TABLET | Refills: 0 | Status: SHIPPED | OUTPATIENT
Start: 2023-09-14

## 2023-09-14 RX ADMIN — SODIUM CHLORIDE 1000 ML: 9 INJECTION, SOLUTION INTRAVENOUS at 11:59

## 2023-09-14 RX ADMIN — HYOSCYAMINE SULFATE 250 MCG: 0.12 TABLET ORAL; SUBLINGUAL at 11:57

## 2023-09-14 ASSESSMENT — ENCOUNTER SYMPTOMS
ABDOMINAL PAIN: 1
EYE REDNESS: 0
CHEST TIGHTNESS: 0
DIARRHEA: 1
PHOTOPHOBIA: 0
RECENT COUGH: 0
COLOR CHANGE: 0
COUGH: 0
BACK PAIN: 0
VOICE CHANGE: 0
SHORTNESS OF BREATH: 0
TROUBLE SWALLOWING: 0
APNEA: 0

## 2023-09-14 ASSESSMENT — PAIN - FUNCTIONAL ASSESSMENT: PAIN_FUNCTIONAL_ASSESSMENT: 0-10

## 2023-09-14 ASSESSMENT — PAIN SCALES - GENERAL
PAINLEVEL_OUTOF10: 0

## 2023-09-14 ASSESSMENT — LIFESTYLE VARIABLES
HOW OFTEN DO YOU HAVE A DRINK CONTAINING ALCOHOL: NEVER
HOW MANY STANDARD DRINKS CONTAINING ALCOHOL DO YOU HAVE ON A TYPICAL DAY: PATIENT DOES NOT DRINK

## 2023-09-14 NOTE — ED TRIAGE NOTES
Pt reports diarrhea since Sunday, seen Monday at  and had negative covid test. Pt reports \"feeling bad\" all day last Thursday with hyperglycemia and fell over into boxes but these symptoms have improved.   (-)abdominal pain at this time, emesis, nausea, dysuria, fever  A&Ox4

## 2023-09-14 NOTE — DISCHARGE INSTRUCTIONS
Take medications as prescribed  As we discussed, you are dehydrated as noted by your increasing kidney function numbers  It is important that you at least maintain adequate hydration  Take medication as needed for abdominal cramping or diarrhea  Follow-up with your primary care physician  Return to the ER for any new, worsening or life-threatening symptoms

## 2023-09-14 NOTE — ED PROVIDER NOTES
any fevers, nausea or vomiting. Denies any blood in his stool. Denies any recent antibiotic usage. Denies any travel outside the country. The history is provided by the patient. Diarrhea  Quality:  Unable to specify  Severity:  Moderate  Onset quality:  Gradual  Duration:  7 days  Progression:  Worsening  Relieved by:  Nothing  Ineffective treatments:  None tried  Associated symptoms: abdominal pain    Associated symptoms: no chills, no recent cough, no headaches and no URI         Review of Systems   Constitutional:  Positive for fatigue. Negative for chills. HENT:  Negative for congestion, dental problem, trouble swallowing and voice change. Eyes:  Negative for photophobia, redness and visual disturbance. Respiratory:  Negative for apnea, cough, chest tightness and shortness of breath. Cardiovascular:  Negative for chest pain and leg swelling. Gastrointestinal:  Positive for abdominal pain and diarrhea. Endocrine: Negative for polydipsia, polyphagia and polyuria. Genitourinary:  Negative for flank pain and urgency. Musculoskeletal:  Negative for back pain and gait problem. Skin:  Negative for color change and pallor. Allergic/Immunologic: Negative for food allergies and immunocompromised state. Neurological:  Positive for weakness. Negative for numbness and headaches. Hematological:  Negative for adenopathy. Does not bruise/bleed easily. Psychiatric/Behavioral:  Negative for behavioral problems and confusion. All other systems reviewed and are negative. Physical Exam     Vitals signs and nursing note reviewed:  Vitals:    09/14/23 1200 09/14/23 1201 09/14/23 1202 09/14/23 1203   BP: 135/68      Pulse:       Resp:       Temp:       TempSrc:       SpO2:  99% 98% 97%   Weight:       Height:          Physical Exam  Vitals and nursing note reviewed. Constitutional:       Appearance: Normal appearance. HENT:      Head: Normocephalic and atraumatic.       Right Ear: External

## 2023-09-18 RX ORDER — INDOMETHACIN 25 MG/1
25 CAPSULE ORAL 3 TIMES DAILY
Qty: 15 CAPSULE | Refills: 1 | Status: SHIPPED | OUTPATIENT
Start: 2023-09-18

## 2023-09-18 RX ORDER — ROSUVASTATIN CALCIUM 10 MG/1
10 TABLET, COATED ORAL EVERY EVENING
Qty: 90 TABLET | Refills: 2 | Status: SHIPPED | OUTPATIENT
Start: 2023-09-18

## 2023-10-02 ENCOUNTER — OFFICE VISIT (OUTPATIENT)
Dept: INTERNAL MEDICINE CLINIC | Facility: CLINIC | Age: 78
End: 2023-10-02
Payer: MEDICARE

## 2023-10-02 VITALS
HEIGHT: 69 IN | DIASTOLIC BLOOD PRESSURE: 62 MMHG | BODY MASS INDEX: 36.43 KG/M2 | WEIGHT: 246 LBS | SYSTOLIC BLOOD PRESSURE: 134 MMHG

## 2023-10-02 DIAGNOSIS — M1A.9XX0 CHRONIC GOUT WITHOUT TOPHUS, UNSPECIFIED CAUSE, UNSPECIFIED SITE: ICD-10-CM

## 2023-10-02 DIAGNOSIS — M76.61 ACHILLES TENDINITIS OF RIGHT LOWER EXTREMITY: ICD-10-CM

## 2023-10-02 DIAGNOSIS — I42.8 NONISCHEMIC CARDIOMYOPATHY (HCC): Primary | ICD-10-CM

## 2023-10-02 DIAGNOSIS — N18.31 STAGE 3A CHRONIC KIDNEY DISEASE (HCC): ICD-10-CM

## 2023-10-02 DIAGNOSIS — G47.33 OSA ON CPAP: ICD-10-CM

## 2023-10-02 DIAGNOSIS — E78.2 MIXED HYPERLIPIDEMIA: ICD-10-CM

## 2023-10-02 DIAGNOSIS — Z12.5 PROSTATE CANCER SCREENING: ICD-10-CM

## 2023-10-02 DIAGNOSIS — E11.65 TYPE 2 DIABETES MELLITUS WITH HYPERGLYCEMIA, WITH LONG-TERM CURRENT USE OF INSULIN (HCC): ICD-10-CM

## 2023-10-02 DIAGNOSIS — I10 PRIMARY HYPERTENSION: ICD-10-CM

## 2023-10-02 DIAGNOSIS — Z79.4 TYPE 2 DIABETES MELLITUS WITH HYPERGLYCEMIA, WITH LONG-TERM CURRENT USE OF INSULIN (HCC): ICD-10-CM

## 2023-10-02 DIAGNOSIS — E66.9 OBESITY (BMI 30-39.9): ICD-10-CM

## 2023-10-02 PROCEDURE — G8427 DOCREV CUR MEDS BY ELIG CLIN: HCPCS | Performed by: INTERNAL MEDICINE

## 2023-10-02 PROCEDURE — 3075F SYST BP GE 130 - 139MM HG: CPT | Performed by: INTERNAL MEDICINE

## 2023-10-02 PROCEDURE — 3078F DIAST BP <80 MM HG: CPT | Performed by: INTERNAL MEDICINE

## 2023-10-02 PROCEDURE — 90694 VACC AIIV4 NO PRSRV 0.5ML IM: CPT | Performed by: INTERNAL MEDICINE

## 2023-10-02 PROCEDURE — G8484 FLU IMMUNIZE NO ADMIN: HCPCS | Performed by: INTERNAL MEDICINE

## 2023-10-02 PROCEDURE — 1123F ACP DISCUSS/DSCN MKR DOCD: CPT | Performed by: INTERNAL MEDICINE

## 2023-10-02 PROCEDURE — G8417 CALC BMI ABV UP PARAM F/U: HCPCS | Performed by: INTERNAL MEDICINE

## 2023-10-02 PROCEDURE — 99214 OFFICE O/P EST MOD 30 MIN: CPT | Performed by: INTERNAL MEDICINE

## 2023-10-02 PROCEDURE — 3044F HG A1C LEVEL LT 7.0%: CPT | Performed by: INTERNAL MEDICINE

## 2023-10-02 PROCEDURE — 1036F TOBACCO NON-USER: CPT | Performed by: INTERNAL MEDICINE

## 2023-10-02 PROCEDURE — G0008 ADMIN INFLUENZA VIRUS VAC: HCPCS | Performed by: INTERNAL MEDICINE

## 2023-10-02 ASSESSMENT — ENCOUNTER SYMPTOMS: SHORTNESS OF BREATH: 1

## 2023-10-05 NOTE — PROGRESS NOTES
per 5 yrs)      Signed Date: 10/6/2023  Electronically Signed By: ANN Bhakta CNP  Electronically Dated:  10/6/2023     No orders of the defined types were placed in this encounter. Collaborating Physician: Dr. Ghazal Wiseman office visit was spent  reviewing test results, prognosis, importance of compliance, education about disease process, benefits of medications, instructions for management of acute flare-ups, and follow up plans. Total time spent with patient was 22 minutes.         ANN Bhakta CNP  Electronically signed

## 2023-10-06 ENCOUNTER — OFFICE VISIT (OUTPATIENT)
Dept: ORTHOPEDIC SURGERY | Age: 78
End: 2023-10-06

## 2023-10-06 ENCOUNTER — OFFICE VISIT (OUTPATIENT)
Dept: SLEEP MEDICINE | Age: 78
End: 2023-10-06
Payer: MEDICARE

## 2023-10-06 VITALS
BODY MASS INDEX: 37.18 KG/M2 | SYSTOLIC BLOOD PRESSURE: 126 MMHG | DIASTOLIC BLOOD PRESSURE: 82 MMHG | WEIGHT: 251 LBS | RESPIRATION RATE: 16 BRPM | HEIGHT: 69 IN | HEART RATE: 76 BPM | OXYGEN SATURATION: 100 %

## 2023-10-06 DIAGNOSIS — M76.61 ACHILLES TENDINITIS, RIGHT LEG: ICD-10-CM

## 2023-10-06 DIAGNOSIS — M25.571 RIGHT ANKLE PAIN, UNSPECIFIED CHRONICITY: Primary | ICD-10-CM

## 2023-10-06 DIAGNOSIS — G47.33 OSA ON CPAP: Primary | ICD-10-CM

## 2023-10-06 PROCEDURE — 1036F TOBACCO NON-USER: CPT | Performed by: NURSE PRACTITIONER

## 2023-10-06 PROCEDURE — 3079F DIAST BP 80-89 MM HG: CPT | Performed by: NURSE PRACTITIONER

## 2023-10-06 PROCEDURE — G8417 CALC BMI ABV UP PARAM F/U: HCPCS | Performed by: NURSE PRACTITIONER

## 2023-10-06 PROCEDURE — 1123F ACP DISCUSS/DSCN MKR DOCD: CPT | Performed by: NURSE PRACTITIONER

## 2023-10-06 PROCEDURE — G8427 DOCREV CUR MEDS BY ELIG CLIN: HCPCS | Performed by: NURSE PRACTITIONER

## 2023-10-06 PROCEDURE — 3074F SYST BP LT 130 MM HG: CPT | Performed by: NURSE PRACTITIONER

## 2023-10-06 PROCEDURE — 99213 OFFICE O/P EST LOW 20 MIN: CPT | Performed by: NURSE PRACTITIONER

## 2023-10-06 PROCEDURE — G8484 FLU IMMUNIZE NO ADMIN: HCPCS | Performed by: NURSE PRACTITIONER

## 2023-10-06 ASSESSMENT — SLEEP AND FATIGUE QUESTIONNAIRES
HOW LIKELY ARE YOU TO NOD OFF OR FALL ASLEEP WHILE WATCHING TV: 0
HOW LIKELY ARE YOU TO NOD OFF OR FALL ASLEEP WHILE SITTING AND READING: 1
HOW LIKELY ARE YOU TO NOD OFF OR FALL ASLEEP WHILE SITTING INACTIVE IN A PUBLIC PLACE: 0
HOW LIKELY ARE YOU TO NOD OFF OR FALL ASLEEP WHILE SITTING AND TALKING TO SOMEONE: 0
HOW LIKELY ARE YOU TO NOD OFF OR FALL ASLEEP WHILE SITTING QUIETLY AFTER LUNCH WITHOUT ALCOHOL: 1
ESS TOTAL SCORE: 3
HOW LIKELY ARE YOU TO NOD OFF OR FALL ASLEEP WHILE LYING DOWN TO REST IN THE AFTERNOON WHEN CIRCUMSTANCES PERMIT: 1
HOW LIKELY ARE YOU TO NOD OFF OR FALL ASLEEP WHEN YOU ARE A PASSENGER IN A CAR FOR AN HOUR WITHOUT A BREAK: 0
HOW LIKELY ARE YOU TO NOD OFF OR FALL ASLEEP IN A CAR, WHILE STOPPED FOR A FEW MINUTES IN TRAFFIC: 0

## 2023-10-06 NOTE — PROGRESS NOTES
Name: Obdulia Miranda  YOB: 1945  Gender: male  MRN: [id number]    Summary:   Right noninsertional Achilles tendinitis       CC: Follow-up (Right ankle  x-rays taken in the office today)       HPI: Obdulia Miranda is a 69 y.o. male who presents with Follow-up (Right ankle  x-rays taken in the office today)  . This patient presents the office today with several month history of worsening pain and swelling in his right Achilles. 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 History was obtained by Patient     ROS/Meds/PSH/PMH/FH/SH: I personally reviewed the patients standard intake form. Below are the pertinents    Tobacco:  reports that he has never smoked. He has never used smokeless tobacco.  Diabetes: None      Physical Examination:    Exam of the right foot and ankle shows swelling and tenderness to palpation with bogginess in the Achilles noninsertional he. There is no evidence of insufficiency. The calcaneus is nontender. He has palpable pulses and intact sensation his incision is intact. Imaging:   I independently interpreted XR taken today  Right ankle XR: AP, Lateral, Oblique views     ICD-10-CM    1.  Right ankle pain, unspecified chronicity  M25.571 XR ANKLE RIGHT (MIN 3 VIEWS)      2. Achilles tendinitis, right leg  M76.61          Report: AP, lateral, oblique x-ray of the right ankle demonstrates no fracture with calcific tendinopathy    Impression: No fracture with calcific tendinopathy   Ibrahima Melendez III, MD           Assessment:   Right Achilles noninsertional tendinitis    Treatment Plan:   4 This is a chronic illness/condition with exacerbation and progression  Treatment at this time: Physical Therapy  Studies ordered: NO XR needed @ Next Visit    Weight-bearing status: WBAT        Return to work/work restrictions: none  No medications given    He will try some supervised physical therapy which we have

## 2023-10-18 ENCOUNTER — HOSPITAL ENCOUNTER (OUTPATIENT)
Dept: PHYSICAL THERAPY | Age: 78
Setting detail: RECURRING SERIES
Discharge: HOME | End: 2023-10-21
Attending: ORTHOPAEDIC SURGERY
Payer: MEDICARE

## 2023-10-18 DIAGNOSIS — M62.81 MUSCLE WEAKNESS (GENERALIZED): ICD-10-CM

## 2023-10-18 DIAGNOSIS — M79.661 PAIN IN RIGHT LOWER LEG: Primary | ICD-10-CM

## 2023-10-18 DIAGNOSIS — R26.81 UNSTEADINESS ON FEET: ICD-10-CM

## 2023-10-18 PROCEDURE — 97110 THERAPEUTIC EXERCISES: CPT

## 2023-10-18 PROCEDURE — 97140 MANUAL THERAPY 1/> REGIONS: CPT

## 2023-10-18 PROCEDURE — 97163 PT EVAL HIGH COMPLEX 45 MIN: CPT

## 2023-10-18 NOTE — PROGRESS NOTES
be able to ambulate with 0/10 pain. Patient will be able to demonstrate proper maintenance HEP program without cues for assistance. Patient will be able to perform 12 heel raises with 0/10 pain. Treatment   TREATMENT:   THERAPEUTIC ACTIVITY: ( see below for minutes): Therapeutic activities per grid below to improve mobility, strength, balance and coordination. Required minimal visual, verbal, manual and tactile cues to improve independence and safety with daily activities . THERAPEUTIC EXERCISE: (see below for minutes): Exercises per grid below to improve mobility, strength, balance and coordination. Required minimal verbal and manual cues to promote proper body alignment, promote proper body posture and promote proper body mechanics. Progressed resistance, range, repetitions and complexity of movement as indicated. MANUAL THERAPY: (see below for minutes): Joint mobilization and Soft tissue mobilization was utilized and necessary because of the patient's restricted joint motion, painful spasm, loss of articular motion and restricted motion of soft tissue. MODALITIES: (see below for minutes): to decrease pain   SELF CARE: (see below for minutes): Procedure(s) (per grid) utilized to improve and/or restore self-care/home management as related to dressing, bathing and grooming.  Required minimal verbal cueing to facilitate activities of daily living skills and compensatory activities     Date: 10/18/23       Modalities:                                Therapeutic Exercise: 15 minutes        Seated heel raises x 20 with foot on floor        Seated toe raises with foot on floor        HEP review with seated toe raises and heel raises x2 per day x 20 reps                               Proprioceptive Activities:                                Manual Therapy: 15 minutes        STM gastroc and soleus, grade 3 passive ROM all planes of right ankle               Functional Activities:

## 2023-10-18 NOTE — THERAPY EVALUATION
\"No difficulty\". The lower the score, the greater the functional disability. 84/84 represents no disability. Minimal detectable change is 5.7 points. With the addition of the 8 questions in the \"Sports Subscale,\" there are 29 questions, each scored on a 5 point scale with 0 representing \"Unable to do\" and 4 representing \"No difficulty\". The lower the score, the greater the functional disability. 116/116 represents no disability. Minimal detectable change is 12.3 points. Medical Necessity:   > Patient is expected to demonstrate progress in strength, range of motion, balance, coordination, and functional technique to improve ambulatory function without pain, ambulatory balance and safety. Reason For Services/Other Comments:  > Patient is having pain in the right achilles tendon and medial ankle with associated weakness which is affecting his balance and functional mobility. Pt is a fall risk and has difficulty balancing on unstable surfaces and performing dynamic tasks. Pt will benefit from skilled therapy to prevent falls and improve strength and improve pain. Total Duration: 45 minutes       Regarding Pantera Sears Dudley's therapy, I certify that the treatment plan above will be carried out by a therapist or under their direction.   Thank you for this referral,  Reggie eDy PT     Referring Physician Signature: Evelio Vera MD _______________________________ Date _____________        Post Session Pain  Charge Capture  PT Visit Info MD Francesca Castaneda

## 2023-10-20 ENCOUNTER — HOSPITAL ENCOUNTER (OUTPATIENT)
Dept: PHYSICAL THERAPY | Age: 78
Setting detail: RECURRING SERIES
Discharge: HOME | End: 2023-10-23
Attending: ORTHOPAEDIC SURGERY
Payer: MEDICARE

## 2023-10-20 PROCEDURE — 97110 THERAPEUTIC EXERCISES: CPT

## 2023-10-20 PROCEDURE — 97140 MANUAL THERAPY 1/> REGIONS: CPT

## 2023-10-20 NOTE — PROGRESS NOTES
Michelle Florence  : 1945  Primary: Medicare Part A And B (Medicare)  Secondary: [address] [place] [address] @ [address] [facility] [address]  Phone: [phone]  Fax: [phone] Plan Frequency: 3 times per week for 8 weeks    Plan of Care/Certification Expiration Date: 24      >PT Visit Info:  Plan Frequency: 3 times per week for 8 weeks  Plan of Care/Certification Expiration Date: 24      Visit Count:  2    OUTPATIENT PHYSICAL THERAPY: Treatment Note 10/20/2023       Episode  }Appt Desk             Treatment Diagnosis:    Pain in right lower leg  Muscle weakness (generalized)  Unsteadiness on feet  Medical/Referring Diagnosis:    Achilles tendinitis, right leg  Referring Physician:  Marco Pedraza MD MD Orders:  PT Eval and Treat   Date of Onset:  Onset Date: 23     Allergies:   Patient has no known allergies. Restrictions/Precautions:  Implants present? : Pacemaker  No data recorded   Interventions Planned (Treatment may consist of any combination of the following):    Current Treatment Recommendations: Strengthening; ROM; Balance training; Functional mobility training; Endurance training; Gait training; Neuromuscular re-education; Manual; Modalities; Home exercise program; Safety education & training; Patient/Caregiver education & training; Dry needling; Therapeutic activities     >Subjective Comments: Patient arrives to clinic and notes that the pain has been about the same. He notes he was able to walk down the steps today into the clinic. Pt notes HEP is doing well.       >Initial:    2  /10>Post Session:       2 /10, 2/10 with walking  Medications Last Reviewed:  10/20/2023  Updated Objective Findings:      1) Heel raise pulling in the right achilles x 1 rep (10/20/23)  2) walking pain /10 (10/20/23)  3) stepping out of truck 5/10  (10/20/23)       Goals: (Goals have been discussed and agreed upon with patient.)  Short-Term Functional Goals:

## 2023-10-23 ENCOUNTER — HOSPITAL ENCOUNTER (OUTPATIENT)
Dept: PHYSICAL THERAPY | Age: 78
Setting detail: RECURRING SERIES
Discharge: HOME | End: 2023-10-26
Attending: ORTHOPAEDIC SURGERY
Payer: MEDICARE

## 2023-10-23 PROCEDURE — 97110 THERAPEUTIC EXERCISES: CPT

## 2023-10-23 PROCEDURE — 97140 MANUAL THERAPY 1/> REGIONS: CPT

## 2023-10-23 PROCEDURE — 97112 NEUROMUSCULAR REEDUCATION: CPT

## 2023-10-23 NOTE — PROGRESS NOTES
Appointments   Date Time Provider [address]  [address]   10/25/2023 11:45 AM ADARSH PRN S[facility]   10/27/2023 11:45 AM ADARSH PRN [facility]   10/30/2023 11:45 AM ADARSH PRN [facility]   11/1/2023 11:45 AM ADARSH PRN [facility]   11/3/2023  4:15 PM ADARSH PRN [facility]   11/6/2023 11:45 AM ADARSH PRN [facility]   11/8/2023 11:45 AM ADARSH PRN [facility]   11/10/2023 11:45 AM ADARSH PRN [facility]   11/13/2023 11:45 AM ADARSH PRN [facility]   11/15/2023 11:45 AM ADARSH PRN [facility]   11/17/2023 11:45 AM ADARSH PRN [facility]   11/20/2023 11:45 AM ADARSH PRN [facility]   11/22/2023 11:45 AM ADARSH PRN [facility]   11/27/2023 11:45 AM ADARSH PRN [facility]   11/29/2023 11:45 AM ADARSH PRN [facility]   11/30/2023  9:30 AM [facility] DEVICE 39 UCDG GVL AMB   12/1/2023 11:45 AM ADARSH PRN [facility]   4/15/2024 11:00 AM Wendi Goodman MD SIM GVL AMB   10/10/2024 10:00 AM ANN Hinojosa - ZACARIAS PSCD GVL AMB

## 2023-10-24 DIAGNOSIS — I10 PRIMARY HYPERTENSION: ICD-10-CM

## 2023-10-24 RX ORDER — GABAPENTIN 300 MG/1
CAPSULE ORAL
Qty: 270 CAPSULE | Refills: 2 | Status: SHIPPED | OUTPATIENT
Start: 2023-10-24 | End: 2024-07-24

## 2023-10-24 RX ORDER — VALSARTAN AND HYDROCHLOROTHIAZIDE 320; 25 MG/1; MG/1
1 TABLET, FILM COATED ORAL DAILY
Qty: 90 TABLET | Refills: 2 | Status: SHIPPED | OUTPATIENT
Start: 2023-10-24

## 2023-10-24 RX ORDER — AMITRIPTYLINE HYDROCHLORIDE 10 MG/1
10 TABLET, FILM COATED ORAL NIGHTLY
Qty: 90 TABLET | Refills: 2 | Status: SHIPPED | OUTPATIENT
Start: 2023-10-24

## 2023-10-24 RX ORDER — ALLOPURINOL 300 MG/1
300 TABLET ORAL DAILY
Qty: 90 TABLET | Refills: 2 | Status: SHIPPED | OUTPATIENT
Start: 2023-10-24

## 2023-10-25 ENCOUNTER — HOSPITAL ENCOUNTER (OUTPATIENT)
Dept: PHYSICAL THERAPY | Age: 78
Setting detail: RECURRING SERIES
Discharge: HOME | End: 2023-10-28
Attending: ORTHOPAEDIC SURGERY
Payer: MEDICARE

## 2023-10-25 DIAGNOSIS — E11.65 TYPE 2 DIABETES MELLITUS WITH HYPERGLYCEMIA, WITH LONG-TERM CURRENT USE OF INSULIN (HCC): ICD-10-CM

## 2023-10-25 DIAGNOSIS — R35.1 BPH ASSOCIATED WITH NOCTURIA: ICD-10-CM

## 2023-10-25 DIAGNOSIS — E78.2 MIXED HYPERLIPIDEMIA: ICD-10-CM

## 2023-10-25 DIAGNOSIS — Z79.4 TYPE 2 DIABETES MELLITUS WITH HYPERGLYCEMIA, WITH LONG-TERM CURRENT USE OF INSULIN (HCC): ICD-10-CM

## 2023-10-25 DIAGNOSIS — M1A.9XX0 CHRONIC GOUT WITHOUT TOPHUS, UNSPECIFIED CAUSE, UNSPECIFIED SITE: ICD-10-CM

## 2023-10-25 DIAGNOSIS — I42.8 NONISCHEMIC CARDIOMYOPATHY (HCC): ICD-10-CM

## 2023-10-25 DIAGNOSIS — I10 PRIMARY HYPERTENSION: ICD-10-CM

## 2023-10-25 DIAGNOSIS — E66.9 OBESITY (BMI 30-39.9): ICD-10-CM

## 2023-10-25 DIAGNOSIS — N40.1 BPH ASSOCIATED WITH NOCTURIA: ICD-10-CM

## 2023-10-25 DIAGNOSIS — Z12.5 PROSTATE CANCER SCREENING: ICD-10-CM

## 2023-10-25 LAB
ALBUMIN SERPL-MCNC: 3.8 G/DL (ref 3.2–4.6)
ALBUMIN/GLOB SERPL: 1.2 (ref 0.4–1.6)
ALP SERPL-CCNC: 89 U/L (ref 50–136)
ALT SERPL-CCNC: 34 U/L (ref 12–65)
ANION GAP SERPL CALC-SCNC: 6 MMOL/L (ref 2–11)
AST SERPL-CCNC: 19 U/L (ref 15–37)
BASOPHILS # BLD: 0.1 K/UL (ref 0–0.2)
BASOPHILS NFR BLD: 1 % (ref 0–2)
BILIRUB SERPL-MCNC: 0.4 MG/DL (ref 0.2–1.1)
BUN SERPL-MCNC: 30 MG/DL (ref 8–23)
CALCIUM SERPL-MCNC: 9.3 MG/DL (ref 8.3–10.4)
CHLORIDE SERPL-SCNC: 107 MMOL/L (ref 101–110)
CHOLEST SERPL-MCNC: 143 MG/DL
CO2 SERPL-SCNC: 28 MMOL/L (ref 21–32)
CREAT SERPL-MCNC: 1.4 MG/DL (ref 0.8–1.5)
DIFFERENTIAL METHOD BLD: ABNORMAL
EOSINOPHIL # BLD: 0.1 K/UL (ref 0–0.8)
EOSINOPHIL NFR BLD: 2 % (ref 0.5–7.8)
ERYTHROCYTE [DISTWIDTH] IN BLOOD BY AUTOMATED COUNT: 15.8 % (ref 11.9–14.6)
EST. AVERAGE GLUCOSE BLD GHB EST-MCNC: 143 MG/DL
GLOBULIN SER CALC-MCNC: 3.1 G/DL (ref 2.8–4.5)
GLUCOSE SERPL-MCNC: 118 MG/DL (ref 65–100)
HBA1C MFR BLD: 6.6 % (ref 4.8–5.6)
HCT VFR BLD AUTO: 42.2 % (ref 41.1–50.3)
HDLC SERPL-MCNC: 37 MG/DL (ref 40–60)
HDLC SERPL: 3.9
HGB BLD-MCNC: 12.8 G/DL (ref 13.6–17.2)
IMM GRANULOCYTES # BLD AUTO: 0 K/UL (ref 0–0.5)
IMM GRANULOCYTES NFR BLD AUTO: 0 % (ref 0–5)
LDLC SERPL CALC-MCNC: 62.8 MG/DL
LYMPHOCYTES # BLD: 2.7 K/UL (ref 0.5–4.6)
LYMPHOCYTES NFR BLD: 38 % (ref 13–44)
MCH RBC QN AUTO: 26.8 PG (ref 26.1–32.9)
MCHC RBC AUTO-ENTMCNC: 30.3 G/DL (ref 31.4–35)
MCV RBC AUTO: 88.3 FL (ref 82–102)
MONOCYTES # BLD: 0.7 K/UL (ref 0.1–1.3)
MONOCYTES NFR BLD: 10 % (ref 4–12)
NEUTS SEG # BLD: 3.4 K/UL (ref 1.7–8.2)
NEUTS SEG NFR BLD: 49 % (ref 43–78)
NRBC # BLD: 0 K/UL (ref 0–0.2)
PLATELET # BLD AUTO: 267 K/UL (ref 150–450)
PMV BLD AUTO: 10.4 FL (ref 9.4–12.3)
POTASSIUM SERPL-SCNC: 4.4 MMOL/L (ref 3.5–5.1)
PROT SERPL-MCNC: 6.9 G/DL (ref 6.3–8.2)
PSA SERPL-MCNC: 0.8 NG/ML
RBC # BLD AUTO: 4.78 M/UL (ref 4.23–5.6)
SODIUM SERPL-SCNC: 141 MMOL/L (ref 133–143)
TRIGL SERPL-MCNC: 216 MG/DL (ref 35–150)
TSH, 3RD GENERATION: 4.8 UIU/ML (ref 0.36–3.74)
URATE SERPL-MCNC: 5.7 MG/DL (ref 2.6–6)
VLDLC SERPL CALC-MCNC: 43.2 MG/DL (ref 6–23)
WBC # BLD AUTO: 7 K/UL (ref 4.3–11.1)

## 2023-10-25 PROCEDURE — 97110 THERAPEUTIC EXERCISES: CPT

## 2023-10-25 PROCEDURE — 97140 MANUAL THERAPY 1/> REGIONS: CPT

## 2023-10-25 PROCEDURE — 97112 NEUROMUSCULAR REEDUCATION: CPT

## 2023-10-25 RX ORDER — OMEPRAZOLE 20 MG/1
20 CAPSULE, DELAYED RELEASE ORAL DAILY
Qty: 90 CAPSULE | Refills: 2 | Status: SHIPPED | OUTPATIENT
Start: 2023-10-25

## 2023-10-25 RX ORDER — TAMSULOSIN HYDROCHLORIDE 0.4 MG/1
0.4 CAPSULE ORAL DAILY
Qty: 90 CAPSULE | Refills: 2 | Status: SHIPPED | OUTPATIENT
Start: 2023-10-25

## 2023-10-25 NOTE — PROGRESS NOTES
Rossy Garcia  : 1945  Primary: Medicare Part A And B (Medicare)  Secondary: [address] [place] [address] @ [address] [facility] [address]  Phone: [phone]  Fax: [phone] Plan Frequency: 3 times per week for 8 weeks    Plan of Care/Certification Expiration Date: 24      >PT Visit Info:  Plan Frequency: 3 times per week for 8 weeks  Plan of Care/Certification Expiration Date: 24      Visit Count:  4    OUTPATIENT PHYSICAL THERAPY: Treatment Note 10/25/2023       Episode  }Appt Desk             Treatment Diagnosis:    Pain in right lower leg  Muscle weakness (generalized)  Unsteadiness on feet  Medical/Referring Diagnosis:    Achilles tendinitis, right leg  Referring Physician:  Juliana Burt MD MD Orders:  PT Eval and Treat   Date of Onset:  Onset Date: 23     Allergies:   Patient has no known allergies. Restrictions/Precautions:  Implants present? : Pacemaker  No data recorded   Interventions Planned (Treatment may consist of any combination of the following):    Current Treatment Recommendations: Strengthening; ROM; Balance training; Functional mobility training; Endurance training; Gait training; Neuromuscular re-education; Manual; Modalities; Home exercise program; Safety education & training; Patient/Caregiver education & training; Dry needling; Therapeutic activities     >Subjective Comments: Pt notes that he had to walk a long way and the ankle did not hurt him too bad. The HEP has been going well. >Initial:    2  /10>Post Session:       2 /10, 2/10 with walking  Medications Last Reviewed:  10/25/2023  Updated Objective Findings:      1) Heel raise pulling in the right achilles x 1 rep (10/20/23)  2) walking pain /10 (10/20/23)  3) stepping out of truck 5/10  (10/20/23)       Goals: (Goals have been discussed and agreed upon with patient.)  Short-Term Functional Goals: Time Frame: 6 weeks  Pt will demonstrate independence with HEP.   Pt

## 2023-10-27 ENCOUNTER — HOSPITAL ENCOUNTER (OUTPATIENT)
Dept: PHYSICAL THERAPY | Age: 78
Setting detail: RECURRING SERIES
Discharge: HOME | End: 2023-10-30
Attending: ORTHOPAEDIC SURGERY
Payer: MEDICARE

## 2023-10-27 PROCEDURE — 97112 NEUROMUSCULAR REEDUCATION: CPT

## 2023-10-27 PROCEDURE — 97140 MANUAL THERAPY 1/> REGIONS: CPT

## 2023-10-27 PROCEDURE — 97110 THERAPEUTIC EXERCISES: CPT

## 2023-10-27 NOTE — PROGRESS NOTES
Mary Kay Arellano  : 1945  Primary: Medicare Part A And B (Medicare)  Secondary: [address] [place] [address] @ [address] [place] [id number]  Phone: [phone]  Fax: [phone] Plan Frequency: 3 times per week for 8 weeks    Plan of Care/Certification Expiration Date: 24      >PT Visit Info:  Plan Frequency: 3 times per week for 8 weeks  Plan of Care/Certification Expiration Date: 24      Visit Count:  5    OUTPATIENT PHYSICAL THERAPY: Treatment Note 10/27/2023       Episode  }Appt Desk             Treatment Diagnosis:    Pain in right lower leg  Muscle weakness (generalized)  Unsteadiness on feet  Medical/Referring Diagnosis:    Achilles tendinitis, right leg  Referring Physician:  Teri Corbett MD MD Orders:  PT Eval and Treat   Date of Onset:  Onset Date: 23     Allergies:   Patient has no known allergies. Restrictions/Precautions:  Implants present? : Pacemaker  No data recorded   Interventions Planned (Treatment may consist of any combination of the following):    Current Treatment Recommendations: Strengthening; ROM; Balance training; Functional mobility training; Endurance training; Gait training; Neuromuscular re-education; Manual; Modalities; Home exercise program; Safety education & training; Patient/Caregiver education & training; Dry needling; Therapeutic activities     >Subjective Comments:     Patient notes that he is able to do 5 x 20 sec heel raise holds with less difficulty. He feels stronger.     >Initial:    2  /10>Post Session:       2 /10, 2/10 with walking  Medications Last Reviewed:  10/27/2023  Updated Objective Findings:      1) Heel raise pulling in the right achilles x 1 rep (10/20/23)  2) walking pain /10 (10/20/23)  3) stepping out of truck /10  (10/20/23)       Goals: (Goals have been discussed and agreed upon with patient.)  Short-Term Functional Goals: Time Frame: 6 weeks  Pt will demonstrate independence with

## 2023-10-30 ENCOUNTER — HOSPITAL ENCOUNTER (OUTPATIENT)
Dept: PHYSICAL THERAPY | Age: 78
Setting detail: RECURRING SERIES
Discharge: HOME | End: 2023-11-02
Attending: ORTHOPAEDIC SURGERY
Payer: MEDICARE

## 2023-10-30 PROCEDURE — 97112 NEUROMUSCULAR REEDUCATION: CPT

## 2023-10-30 PROCEDURE — 97140 MANUAL THERAPY 1/> REGIONS: CPT

## 2023-10-30 PROCEDURE — 97110 THERAPEUTIC EXERCISES: CPT

## 2023-10-30 NOTE — PROGRESS NOTES
[facility]   11/27/2023 11:45 AM ADARSH PRN [facility]   11/29/2023 11:45 AM ADARSH PRN [facility]   11/30/2023  9:30 AM [facility] DEVICE 39 UCDG GVL AMB   12/1/2023 11:45 AM ADARSH PRN [facility]   4/15/2024 11:00 AM MD KINA Mortensen GVL AMB   10/10/2024 10:00 AM ANN Mc - ZACARIAS PSCD GVL AMB

## 2023-10-31 ENCOUNTER — APPOINTMENT (RX ONLY)
Dept: URBAN - METROPOLITAN AREA CLINIC 330 | Facility: CLINIC | Age: 78
Setting detail: DERMATOLOGY
End: 2023-10-31

## 2023-10-31 DIAGNOSIS — L57.0 ACTINIC KERATOSIS: ICD-10-CM

## 2023-10-31 DIAGNOSIS — Z85.820 PERSONAL HISTORY OF MALIGNANT MELANOMA OF SKIN: ICD-10-CM

## 2023-10-31 DIAGNOSIS — L57.8 OTHER SKIN CHANGES DUE TO CHRONIC EXPOSURE TO NONIONIZING RADIATION: ICD-10-CM

## 2023-10-31 DIAGNOSIS — Z85.828 PERSONAL HISTORY OF OTHER MALIGNANT NEOPLASM OF SKIN: ICD-10-CM

## 2023-10-31 DIAGNOSIS — D22 MELANOCYTIC NEVI: ICD-10-CM

## 2023-10-31 PROBLEM — D22.5 MELANOCYTIC NEVI OF TRUNK: Status: ACTIVE | Noted: 2023-10-31

## 2023-10-31 PROCEDURE — ? LIQUID NITROGEN

## 2023-10-31 PROCEDURE — 99213 OFFICE O/P EST LOW 20 MIN: CPT | Mod: 25

## 2023-10-31 PROCEDURE — ? MEDICAL PHOTOGRAPHY REVIEW

## 2023-10-31 PROCEDURE — 17003 DESTRUCT PREMALG LES 2-14: CPT

## 2023-10-31 PROCEDURE — 17000 DESTRUCT PREMALG LESION: CPT

## 2023-10-31 PROCEDURE — ? OTHER

## 2023-10-31 PROCEDURE — ? TREATMENT REGIMEN

## 2023-10-31 PROCEDURE — ? COUNSELING

## 2023-10-31 PROCEDURE — ? FULL BODY SKIN EXAM

## 2023-10-31 PROCEDURE — ? ADDITIONAL NOTES

## 2023-10-31 ASSESSMENT — LOCATION DETAILED DESCRIPTION DERM
LOCATION DETAILED: LEFT FOREHEAD
LOCATION DETAILED: RIGHT SUPERIOR PREAURICULAR CHEEK
LOCATION DETAILED: LEFT PROXIMAL DORSAL FOREARM
LOCATION DETAILED: INFERIOR THORACIC SPINE
LOCATION DETAILED: RIGHT INFERIOR NASAL CHEEK
LOCATION DETAILED: LEFT EYEBROW
LOCATION DETAILED: LEFT ANTERIOR EARLOBE
LOCATION DETAILED: LEFT ULNAR DORSAL HAND
LOCATION DETAILED: RIGHT CENTRAL FRONTAL SCALP
LOCATION DETAILED: LEFT ANTIHELIX
LOCATION DETAILED: RIGHT INFERIOR CENTRAL MALAR CHEEK
LOCATION DETAILED: LEFT SUPERIOR HELIX
LOCATION DETAILED: LEFT CENTRAL ZYGOMA

## 2023-10-31 ASSESSMENT — LOCATION ZONE DERM
LOCATION ZONE: HAND
LOCATION ZONE: EAR
LOCATION ZONE: FACE
LOCATION ZONE: TRUNK
LOCATION ZONE: SCALP
LOCATION ZONE: ARM

## 2023-10-31 ASSESSMENT — LOCATION SIMPLE DESCRIPTION DERM
LOCATION SIMPLE: LEFT ZYGOMA
LOCATION SIMPLE: RIGHT SCALP
LOCATION SIMPLE: UPPER BACK
LOCATION SIMPLE: LEFT HAND
LOCATION SIMPLE: LEFT EAR
LOCATION SIMPLE: LEFT FOREHEAD
LOCATION SIMPLE: RIGHT CHEEK
LOCATION SIMPLE: LEFT FOREARM
LOCATION SIMPLE: LEFT EYEBROW

## 2023-10-31 NOTE — PROCEDURE: LIQUID NITROGEN
Show Applicator Variable?: Yes
Duration Of Freeze Thaw-Cycle (Seconds): 2
Post-Care Instructions: I reviewed with the patient in detail post-care instructions. Patient is to wear sunprotection, and avoid picking at any of the treated lesions. Pt may apply Vaseline to crusted or scabbing areas.
Render Note In Bullet Format When Appropriate: No
Detail Level: Detailed
Number Of Freeze-Thaw Cycles: 3 freeze-thaw cycles
Consent: The patient's consent was obtained including but not limited to risks of crusting, scabbing, blistering, scarring, darker or lighter pigmentary change, recurrence, incomplete removal and infection.

## 2023-10-31 NOTE — PROCEDURE: ADDITIONAL NOTES
Additional Notes: Pt used topical 5fu in 2020 but experienced intense reaction from the cream. Pt prefers ln2
Render Risk Assessment In Note?: no
Detail Level: Simple

## 2023-10-31 NOTE — HPI: MELANOMA F/U (HISTORY OF MALIGNANT MELANOMA)
What Is The Reason For Today's Visit?: History of Melanoma
Year Excised?: 2014
Breslow Depth?: 0.55mm

## 2023-11-01 ENCOUNTER — HOSPITAL ENCOUNTER (OUTPATIENT)
Dept: PHYSICAL THERAPY | Age: 78
Setting detail: RECURRING SERIES
Discharge: HOME | End: 2023-11-04
Attending: ORTHOPAEDIC SURGERY
Payer: MEDICARE

## 2023-11-01 PROCEDURE — 97140 MANUAL THERAPY 1/> REGIONS: CPT

## 2023-11-01 PROCEDURE — 97112 NEUROMUSCULAR REEDUCATION: CPT

## 2023-11-01 PROCEDURE — 97110 THERAPEUTIC EXERCISES: CPT

## 2023-11-01 NOTE — PROGRESS NOTES
raises x 20 with foot on floor Seated heel raises x 20 with foot on floor Seated heel raise 1 x 20, 2 x 15 x 7.5 lbs Seated heel raise 3 x 15 x 10 lbs Seated heel raise 2 x 10 x 15#, 1 x AMRAP x 20 x 15#    Seated toe raises with foot on floor Seated toe raises with foot on floor x 20 Seated toe raises with foot on floor x 20 Seated toe raises with foot on floor x 20 Seated DF 1 x 20, 2 x 15 x RTB Seated DF 3 x 15 x GTB Seated DF 3 x 15 x BTB    HEP review with seated toe raises and heel raises x2 per day x 20 reps HEP review with added iso heel raise 5 x 20 second holds with UE support x 1 x per day          AROM all planes with manual and verbal cues x 20 AROM all planes with manual and verbal cues x 20 AROM all planes with manual and verbal cues x 20 AROM all planes with manual and verbal cues x 20 AROM all planes with manual and verbal cues x 20 AROM all planes with manual and verbal cues x 20     Standing heel raise iso at top 3 x 20 second hold Standing heel raise iso at top 3 x 20 second hold Standing heel raise 2 x 20 (slow on eccent) Standing heel raise 2 x 15 (slow on eccent) Standing eccentric SL right 2 x 10 Standing eccentric SL right 2 x 10       Rocker board seated fwd/side x20 ea  Rocker board seated fwd/side x20 ea Rocker board seated fwd/side x20 ea                       Proprioceptive Activities:   15 min 15 min 15 min 10 min 15 min      Heel toe rocking with UE support x 30 Heel rocking with UE support 2x 20, heel toe rocking no UE support x 2 min Heel rocking with UE support 2x 20, heel toe rocking no UE support x 2 min  Heel toe rocking x 30      In/outs to sharpened stance to wide stance x 30 In/outs to sharpened stance to wide stance x 30 In/outs to sharpened stance to wide stance x 30           Step over hurtle to sharpened stance 4 squat over tape --> 4 square step with hurtles (no backward step hurtle)      Forward and back díaz step to sharpened stance x 30 Forward and back díaz step to

## 2023-11-03 ENCOUNTER — HOSPITAL ENCOUNTER (OUTPATIENT)
Dept: PHYSICAL THERAPY | Age: 78
Setting detail: RECURRING SERIES
End: 2023-11-03
Attending: ORTHOPAEDIC SURGERY
Payer: MEDICARE

## 2023-11-03 NOTE — PROGRESS NOTES
Patient cancelled the session due to feeling sick. Patient will return for his session early next week.

## 2023-11-06 ENCOUNTER — HOSPITAL ENCOUNTER (OUTPATIENT)
Dept: PHYSICAL THERAPY | Age: 78
Setting detail: RECURRING SERIES
Discharge: HOME | End: 2023-11-09
Attending: ORTHOPAEDIC SURGERY
Payer: MEDICARE

## 2023-11-06 PROCEDURE — 97112 NEUROMUSCULAR REEDUCATION: CPT

## 2023-11-06 PROCEDURE — 97110 THERAPEUTIC EXERCISES: CPT

## 2023-11-06 PROCEDURE — 97140 MANUAL THERAPY 1/> REGIONS: CPT

## 2023-11-06 NOTE — PROGRESS NOTES
Hollie Antonio  : 1945  Primary: Medicare Part A And B (Medicare)  Secondary: [address] [place] [address] @ [address] [place] [id number]  Phone: [phone]  Fax: [phone] Plan Frequency: 3 times per week for 8 weeks    Plan of Care/Certification Expiration Date: 24      >PT Visit Info:  Plan Frequency: 3 times per week for 8 weeks  Plan of Care/Certification Expiration Date: 24      Visit Count:  8    OUTPATIENT PHYSICAL THERAPY: Treatment Note 2023       Episode  }Appt Desk             Treatment Diagnosis:    Pain in right lower leg  Muscle weakness (generalized)  Unsteadiness on feet  Medical/Referring Diagnosis:    Achilles tendinitis, right leg  Referring Physician:  Laura Golden MD MD Orders:  PT Eval and Treat   Date of Onset:  Onset Date: 23     Allergies:   Patient has no known allergies. Restrictions/Precautions:  Implants present? : Pacemaker  No data recorded   Interventions Planned (Treatment may consist of any combination of the following):    Current Treatment Recommendations: Strengthening; ROM; Balance training; Functional mobility training; Endurance training; Gait training; Neuromuscular re-education; Manual; Modalities; Home exercise program; Safety education & training; Patient/Caregiver education & training; Dry needling; Therapeutic activities     >Subjective Comments: Patient notes he had to cancel last session due to a severe cold. He notes he is feeling better. He notes his sinuses are still recovering.  He has maintained his HEP.        >Initial:    2  /10>Post Session:       2 /10, 2/10 with walking  Medications Last Reviewed:  2023  Updated Objective Findings:      1) Heel raise pulling in the right achilles x 1 rep (10/20/23)  2) walking pain /10 (10/20/23)  3) stepping out of truck /10  (10/20/23)       Goals: (Goals have been discussed and agreed upon with patient.)  Short-Term Functional Goals: Time

## 2023-11-08 ENCOUNTER — HOSPITAL ENCOUNTER (OUTPATIENT)
Dept: PHYSICAL THERAPY | Age: 78
Setting detail: RECURRING SERIES
Discharge: HOME | End: 2023-11-11
Attending: ORTHOPAEDIC SURGERY
Payer: MEDICARE

## 2023-11-08 PROCEDURE — 97110 THERAPEUTIC EXERCISES: CPT

## 2023-11-08 PROCEDURE — 97112 NEUROMUSCULAR REEDUCATION: CPT

## 2023-11-08 PROCEDURE — 97140 MANUAL THERAPY 1/> REGIONS: CPT

## 2023-11-08 NOTE — PROGRESS NOTES
HEP.  Pt will be able to ambulate with less than 5/10 pain. Pt will be able to perform standing sharpened balance for 10 seconds with eyes closed. Discharge Goals: Time Frame: 12 weeks  Patient will be able to ambulate with 0/10 pain. Patient will be able to demonstrate proper maintenance HEP program without cues for assistance. Patient will be able to perform 12 heel raises with 0/10 pain. Treatment   TREATMENT:   THERAPEUTIC ACTIVITY: ( see below for minutes): Therapeutic activities per grid below to improve mobility, strength, balance and coordination. Required minimal visual, verbal, manual and tactile cues to improve independence and safety with daily activities . THERAPEUTIC EXERCISE: (see below for minutes): Exercises per grid below to improve mobility, strength, balance and coordination. Required minimal verbal and manual cues to promote proper body alignment, promote proper body posture and promote proper body mechanics. Progressed resistance, range, repetitions and complexity of movement as indicated. MANUAL THERAPY: (see below for minutes): Joint mobilization and Soft tissue mobilization was utilized and necessary because of the patient's restricted joint motion, painful spasm, loss of articular motion and restricted motion of soft tissue. MODALITIES: (see below for minutes): to decrease pain   SELF CARE: (see below for minutes): Procedure(s) (per grid) utilized to improve and/or restore self-care/home management as related to dressing, bathing and grooming.  Required minimal verbal cueing to facilitate activities of daily living skills and compensatory activities     Date: 10/30/23  Visit 6 11/1/23  Visit 7 11/6/23  Visit 8 11/8/23  Visit 9   Modalities:                            Therapeutic Exercise: 20 min 15 min 15 min 15 min     Seated heel raise 3 x 15 x 10 lbs Seated heel raise 2 x 10 x 15#, 1 x AMRAP x 20 x 15# Seated heel raise 3 x 10 x 20# Seated heel raise 3 x 20 x 20#    Seated

## 2023-11-10 ENCOUNTER — HOSPITAL ENCOUNTER (OUTPATIENT)
Dept: PHYSICAL THERAPY | Age: 78
Setting detail: RECURRING SERIES
Discharge: HOME | End: 2023-11-13
Attending: ORTHOPAEDIC SURGERY
Payer: MEDICARE

## 2023-11-10 PROCEDURE — 97110 THERAPEUTIC EXERCISES: CPT

## 2023-11-10 PROCEDURE — 97140 MANUAL THERAPY 1/> REGIONS: CPT

## 2023-11-10 PROCEDURE — 97112 NEUROMUSCULAR REEDUCATION: CPT

## 2023-11-10 NOTE — PROGRESS NOTES
Monalisa Garcia  : 1945  Primary: Medicare Part A And B (Medicare)  Secondary: [address] [place] [address] @ [address] [place] [id number]  Phone: [phone]  Fax: [phone] Plan Frequency: 3 times per week for 8 weeks    Plan of Care/Certification Expiration Date: 24      >PT Visit Info:  Plan Frequency: 3 times per week for 8 weeks  Plan of Care/Certification Expiration Date: 24      Visit Count:  10    OUTPATIENT PHYSICAL THERAPY: Treatment Note 11/10/2023       Episode  }Appt Desk             Treatment Diagnosis:    Pain in right lower leg  Muscle weakness (generalized)  Unsteadiness on feet  Medical/Referring Diagnosis:    Achilles tendinitis, right leg  Referring Physician:  Uriel Costello MD MD Orders:  PT Eval and Treat   Date of Onset:  Onset Date: 23     Allergies:   Patient has no known allergies. Restrictions/Precautions:  Implants present? : Pacemaker  No data recorded   Interventions Planned (Treatment may consist of any combination of the following):    Current Treatment Recommendations: Strengthening; ROM; Balance training; Functional mobility training; Endurance training; Gait training; Neuromuscular re-education; Manual; Modalities; Home exercise program; Safety education & training; Patient/Caregiver education & training; Dry needling; Therapeutic activities     >Subjective Comments:  Patient notes he is doing well. No complaints today. >Initial:    2  /10>Post Session:       2 /10, 2/10 with walking  Medications Last Reviewed:  11/10/2023  Updated Objective Findings:      1) Heel raise pulling in the right achilles x 1 rep (10/20/23)  2) walking pain /10 (10/20/23)  3) stepping out of truck 5/10  (10/20/23)       Goals: (Goals have been discussed and agreed upon with patient.)  Short-Term Functional Goals: Time Frame: 6 weeks  Pt will demonstrate independence with HEP.   Pt will be able to ambulate with less than 5/10

## 2023-11-13 ENCOUNTER — HOSPITAL ENCOUNTER (OUTPATIENT)
Dept: PHYSICAL THERAPY | Age: 78
Setting detail: RECURRING SERIES
Discharge: HOME | End: 2023-11-16
Attending: ORTHOPAEDIC SURGERY
Payer: MEDICARE

## 2023-11-13 PROCEDURE — 97110 THERAPEUTIC EXERCISES: CPT

## 2023-11-13 PROCEDURE — 97140 MANUAL THERAPY 1/> REGIONS: CPT

## 2023-11-13 PROCEDURE — 97112 NEUROMUSCULAR REEDUCATION: CPT

## 2023-11-13 NOTE — PROGRESS NOTES
lateral subtalar glides grade 3 STM gastroc and soleus, grade 3 passive ROM all planes of right ankle, AP TC joint mob grade 3, medial and lateral subtalar glides grade 3 STM gastroc and soleus, grade 3 passive ROM all planes of right ankle, AP TC joint mob grade 3, medial and lateral subtalar glides grade 3 STM gastroc and soleus, grade 3 passive ROM all planes of right ankle, AP TC joint mob grade 3, medial and lateral subtalar glides grade 3            Functional Activities:                                                 Treatment/Session Summary:    >Treatment Assessment: Patient progressed to higher level balance training today involving forward reaching on firm and unstable surface. Pt has difficulty and will continue to benefit from trunk control drills.      Communication/Consultation:  Therapy Evaluation sent to referring provider  Equipment provided today:  None and HEP  Recommendations/Intent for next treatment session: fwd and backward leaning with object manipulation, continue strength program    >Total Treatment Billable Duration:  45 min  Time In: 1145  Time Out: [facility], PT       Charge Capture  }Post Session Pain  PT Visit Info  [facility]  MD [address] [place] [address]    Future Appointments   Date Time Provider [address]   11/15/2023 11:45 AM Ann Marie Sanjay, PT [facility]   11/17/2023 11:45 AM Ann Marie Sanjay, PT [facility]   11/20/2023 11:45 AM Ann Marie Sanjay, PT [facility]   11/22/2023 11:45 AM Ann Marie Sanjay, PT [facility]   11/27/2023 11:45 AM Ann Marie Sanjay, PT [facility]   11/29/2023 11:45 AM Ann Marie Sanjay, PT [facility]   11/30/2023  9:30 AM [facility] DEVICE 39 [facility]   12/1/2023 11:45 AM Ann Marie Sanjay, PT [facility]   4/15/2024 11:00 AM Lonnie Alvarez MD SIM [facility] AM[facility]   10/10/2024 10:00 AM ANN Lyon - ZACARIAS PSCD [facility] AMB

## 2023-11-15 ENCOUNTER — HOSPITAL ENCOUNTER (OUTPATIENT)
Dept: PHYSICAL THERAPY | Age: 78
Setting detail: RECURRING SERIES
Discharge: HOME | End: 2023-11-18
Attending: ORTHOPAEDIC SURGERY
Payer: MEDICARE

## 2023-11-15 PROCEDURE — 97110 THERAPEUTIC EXERCISES: CPT

## 2023-11-15 PROCEDURE — 97112 NEUROMUSCULAR REEDUCATION: CPT

## 2023-11-15 PROCEDURE — 97140 MANUAL THERAPY 1/> REGIONS: CPT

## 2023-11-15 NOTE — PROGRESS NOTES
Deysi Harrell  : 1945  Primary: Medicare Part A And B (Medicare)  Secondary: [address] [place] [address] @ [address] [place] [id number]  Phone: [phone]  Fax: [phone] Plan Frequency: 3 times per week for 8 weeks    Plan of Care/Certification Expiration Date: 24      >PT Visit Info:  Plan Frequency: 3 times per week for 8 weeks  Plan of Care/Certification Expiration Date: 24      Visit Count:  12    OUTPATIENT PHYSICAL THERAPY: Treatment Note 11/15/2023       Episode  }Appt Desk             Treatment Diagnosis:    Pain in right lower leg  Muscle weakness (generalized)  Unsteadiness on feet  Medical/Referring Diagnosis:    Achilles tendinitis, right leg  Referring Physician:  Ton Myers MD MD Orders:  PT Eval and Treat   Date of Onset:  Onset Date: 23     Allergies:   Patient has no known allergies. Restrictions/Precautions:  Implants present? : Pacemaker  No data recorded   Interventions Planned (Treatment may consist of any combination of the following):    Current Treatment Recommendations: Strengthening; ROM; Balance training; Functional mobility training; Endurance training; Gait training; Neuromuscular re-education; Manual; Modalities; Home exercise program; Safety education & training; Patient/Caregiver education & training; Dry needling; Therapeutic activities     >Subjective Comments:  Patient notes he did not sleep well last night so he is tired but his ankle is doing ok. >Initial:    2  /10>Post Session:       2 /10, 2/10 with walking  Medications Last Reviewed:  11/15/2023  Updated Objective Findings:      1) Heel raise pulling in the right achilles x 1 rep (10/20/23)  2) walking pain /10 (10/20/23)  3) stepping out of truck 5/10  (10/20/23)       Goals: (Goals have been discussed and agreed upon with patient.)  Short-Term Functional Goals: Time Frame: 6 weeks  Pt will demonstrate independence with HEP.   Pt will be able

## 2023-11-17 ENCOUNTER — HOSPITAL ENCOUNTER (OUTPATIENT)
Dept: PHYSICAL THERAPY | Age: 78
Setting detail: RECURRING SERIES
Discharge: HOME | End: 2023-11-20
Attending: ORTHOPAEDIC SURGERY
Payer: MEDICARE

## 2023-11-17 PROCEDURE — 97112 NEUROMUSCULAR REEDUCATION: CPT

## 2023-11-17 PROCEDURE — 97110 THERAPEUTIC EXERCISES: CPT

## 2023-11-17 PROCEDURE — 97140 MANUAL THERAPY 1/> REGIONS: CPT

## 2023-11-17 NOTE — PROGRESS NOTES
10 x 15#, 1 x AMRAP x 20 x 15# Seated heel raise 3 x 10 x 20# Seated heel raise 3 x 20 x 20# Seated heel raise 3 x 20 x 35# Seated heel raise 3 x 10 x 35# Seated heel raise 3 x 10 x 35# Seated heel raise 3 x 10 x 37.5#    Seated DF 3 x 15 x GTB Seated DF 3 x 15 x BTB Seated DF 3 x 15 x BTB Standing DF at wall 2 x 20, 1 x 20 x BTB seated Standing DF at wall 2 x 20, 1 x 20 x BTB seated Standing DF at wall 3 x 20,  Standing DF at wall 3 x 20,  Standing DF at wall 3 x 20,                AROM all planes with manual and verbal cues x 20 AROM all planes with manual and verbal cues x 20 AROM all planes   AROM all planes      Standing eccentric SL right 2 x 10 Standing eccentric SL right 2 x 10 Standing eccentric SL right 2 x 10 Standing eccentric SL right 2 x 10 Standing heel raises 3 x 20 bilateral       Rocker board seated fwd/side x20 ea Rocker board seated fwd/side x20 ea Seated Rocher board x20 med/lateral Seated Rocker board x20 med/lat and DF/PF Seated Rocker board x20 med/lat and DF/PF Seated Rocker board x20 med/lat and DF/PF Seated Rocker board x20 med/lat and DF/PF Seated Rocker board x20 med/lat and DF/PF                         Proprioceptive Activities: 10 min 15 min 15 min 15 min 15 min 15 min 15 min 15 min     Heel toe rocking x 30 Heel toe rocking x 30   Heel toe rocking x 30                 Step over hurtle to sharpened stance 4 squat over tape --> 4 square step with hurtles (no backward step hurtle) 4 square step with hurtles (no backward step hurtle) 4 square step with hurtles - back no tape    Blocked training for left leg posterior step over tape with manual and verbal cues for step size and forward trunk 4 square step with hurtles - back no tape 4 square step with hurtles - back no tape  (CGA) 4 square step with hurtles - back no tape  (CGA) 4 square step with hurtles - back no tape  (CGA)    + hold farmers carry 10# kettle bell         Forward reaches to table     Forward reaches to table standing on

## 2023-11-20 ENCOUNTER — HOSPITAL ENCOUNTER (OUTPATIENT)
Dept: PHYSICAL THERAPY | Age: 78
Setting detail: RECURRING SERIES
Discharge: HOME | End: 2023-11-23
Attending: ORTHOPAEDIC SURGERY
Payer: MEDICARE

## 2023-11-20 PROCEDURE — 97112 NEUROMUSCULAR REEDUCATION: CPT

## 2023-11-20 PROCEDURE — 97110 THERAPEUTIC EXERCISES: CPT

## 2023-11-20 PROCEDURE — 97140 MANUAL THERAPY 1/> REGIONS: CPT

## 2023-11-20 NOTE — PROGRESS NOTES
Mary Kay Arellano  : 1945  Primary: Medicare Part A And B (Medicare)  Secondary: [address] [place] [address] @ [address] [place] [id number]  Phone: [phone]  Fax: [phone] Plan Frequency: 3 times per week for 8 weeks    Plan of Care/Certification Expiration Date: 24      >PT Visit Info:  Plan Frequency: 3 times per week for 8 weeks  Plan of Care/Certification Expiration Date: 24      Visit Count:  14    OUTPATIENT PHYSICAL THERAPY: Treatment Note 2023       Episode  }Appt Desk             Treatment Diagnosis:    Pain in right lower leg  Muscle weakness (generalized)  Unsteadiness on feet  Medical/Referring Diagnosis:    Achilles tendinitis, right leg  Referring Physician:  Teri Corbett MD MD Orders:  PT Eval and Treat   Date of Onset:  Onset Date: 23     Allergies:   Patient has no known allergies. Restrictions/Precautions:  Implants present? : Pacemaker  No data recorded   Interventions Planned (Treatment may consist of any combination of the following):    Current Treatment Recommendations: Strengthening; ROM; Balance training; Functional mobility training; Endurance training; Gait training; Neuromuscular re-education; Manual; Modalities; Home exercise program; Safety education & training; Patient/Caregiver education & training; Dry needling; Therapeutic activities     >Subjective Comments:  Patient notes all is going very well. NO complaints. >Initial:    1  /10>Post Session:       2 /10, 2/10 with walking  Medications Last Reviewed:  2023  Updated Objective Findings:      1) Heel raise pulling in the right achilles x 1 rep (10/20/23)  2) walking pain 2/10 (10/20/23)  3) stepping out of truck 5/10  (10/20/23)       Goals: (Goals have been discussed and agreed upon with patient.)  Short-Term Functional Goals: Time Frame: 6 weeks  Pt will demonstrate independence with HEP.   Pt will be able to ambulate with less than 5/10

## 2023-11-22 ENCOUNTER — HOSPITAL ENCOUNTER (OUTPATIENT)
Dept: PHYSICAL THERAPY | Age: 78
Setting detail: RECURRING SERIES
Discharge: HOME | End: 2023-11-25
Attending: ORTHOPAEDIC SURGERY
Payer: MEDICARE

## 2023-11-22 PROCEDURE — 97112 NEUROMUSCULAR REEDUCATION: CPT

## 2023-11-22 PROCEDURE — 97140 MANUAL THERAPY 1/> REGIONS: CPT

## 2023-11-22 PROCEDURE — 97110 THERAPEUTIC EXERCISES: CPT

## 2023-11-22 NOTE — PROGRESS NOTES
4 square step with hurtles - back no tape  (CGA) 4 square step with hurtles - back no tape  (CGA) 4 square step with hurtles - back no tape  (CGA)    + hold farmers carry 10# kettle bell Fwd, lateral step ups onto foam pad Fwd, lateral step ups onto foam pad            Forward reaches to table     Forward reaches to table standing on foam  (CGA) Forward reaches to table     Forward reaches to table standing on foam  (CGA) Forward reaches to table     Forward reaches to table standing on foam  (CGA)    + and place red ball with squatting mechanics Forward reaches to table     Forward reaches to table standing on foam  (CGA) Forward reaches to table     Forward reaches to table standing on foam  (CGA)   Manual Therapy: 15 min 15 min 15 min 15 min 15 min 15 min 15 min 15 min 15 min 15 min    STM gastroc and soleus, grade 3 passive ROM all planes of right ankle, AP TC joint mob grade 3, medial and lateral subtalar glides grade 3 STM gastroc and soleus, grade 3 passive ROM all planes of right ankle, AP TC joint mob grade 3, medial and lateral subtalar glides grade 3    STM gastroc and soleus, grade 3 passive ROM all planes of right ankle, AP TC joint mob grade 3, medial and lateral subtalar glides grade 3 STM gastroc and soleus, grade 3 passive ROM all planes of right ankle, AP TC joint mob grade 3, medial and lateral subtalar glides grade 3 STM gastroc and soleus, grade 3 passive ROM all planes of right ankle, AP TC joint mob grade 3, medial and lateral subtalar glides grade 3 STM gastroc and soleus, grade 3 passive ROM all planes of right ankle, AP TC joint mob grade 3, medial and lateral subtalar glides grade 3 STM gastroc and soleus, grade 3 passive ROM all planes of right ankle, AP TC joint mob grade 3, medial and lateral subtalar glides grade 3 STM gastroc and soleus, grade 3 passive ROM all planes of right ankle, AP TC joint mob grade 3, medial and lateral subtalar glides grade 3 STM gastroc and soleus,

## 2023-11-27 ENCOUNTER — HOSPITAL ENCOUNTER (OUTPATIENT)
Dept: PHYSICAL THERAPY | Age: 78
Setting detail: RECURRING SERIES
Discharge: HOME | End: 2023-11-30
Attending: ORTHOPAEDIC SURGERY
Payer: MEDICARE

## 2023-11-27 PROCEDURE — 97140 MANUAL THERAPY 1/> REGIONS: CPT

## 2023-11-27 PROCEDURE — 97110 THERAPEUTIC EXERCISES: CPT

## 2023-11-27 NOTE — PROGRESS NOTES
30 min    Seated heel raise 3 x 15 x 10 lbs Seated heel raise 2 x 10 x 15#, 1 x AMRAP x 20 x 15# Seated heel raise 3 x 10 x 20# Seated heel raise 3 x 20 x 20# Seated heel raise 3 x 20 x 35# Seated heel raise 3 x 10 x 35# Seated heel raise 3 x 10 x 35# Seated heel raise 3 x 10 x 37.5# Seated heel raise 3 x 10 x 37.5# Seated heel raise 3 x 10 x 37.5#    Seated heel raise 3 x 10 x 37.5#    Seated DF 3 x 15 x GTB Seated DF 3 x 15 x BTB Seated DF 3 x 15 x BTB Standing DF at wall 2 x 20, 1 x 20 x BTB seated Standing DF at wall 2 x 20, 1 x 20 x BTB seated Standing DF at wall 3 x 20,  Standing DF at wall 3 x 20,  Standing DF at wall 3 x 20,  Standing DF at wall 3 x 20,  Standing DF at wall 3 x 20,                   HEP education regarding standing heel raises 2 x 20 reps with rest breaks, education in POC    AROM all planes with manual and verbal cues x 20 AROM all planes with manual and verbal cues x 20 AROM all planes   AROM all planes    Standing rapid heel raises 3 x 10 Standing heel raises x 20    Standing eccentric SL right 2 x 10 Standing eccentric SL right 2 x 10 Standing eccentric SL right 2 x 10 Standing eccentric SL right 2 x 10 Standing heel raises 3 x 20 bilateral          Rocker board seated fwd/side x20 ea Rocker board seated fwd/side x20 ea Seated Rocher board x20 med/lateral Seated Rocker board x20 med/lat and DF/PF Seated Rocker board x20 med/lat and DF/PF Seated Rocker board x20 med/lat and DF/PF Seated Rocker board x20 med/lat and DF/PF Seated Rocker board x20 med/lat and DF/PF Seated Rocker board x20 med/lat and DF/PF                                 Proprioceptive Activities: 10 min 15 min 15 min 15 min 15 min 15 min 15 min 15 min 15 min 15 min      Heel toe rocking x 30 Heel toe rocking x 30   Heel toe rocking x 30                       Step over hurtle to sharpened stance 4 squat over tape --> 4 square step with hurtles (no backward step hurtle) 4 square step with hurtles (no backward step hurtle) 4 No

## 2023-11-27 NOTE — THERAPY RECERTIFICATION
representing \"No difficulty\". The lower the score, the greater the functional disability. 84/84 represents no disability. Minimal detectable change is 5.7 points. With the addition of the 8 questions in the \"Sports Subscale,\" there are 29 questions, each scored on a 5 point scale with 0 representing \"Unable to do\" and 4 representing \"No difficulty\". The lower the score, the greater the functional disability. 116/116 represents no disability. Minimal detectable change is 12.3 points. Medical Necessity:   > Patient is expected to demonstrate progress in strength, range of motion, balance, coordination, and functional technique to improve ambulatory function without pain, ambulatory balance and safety. Reason For Services/Other Comments:  > Patient is having pain in the right achilles tendon and medial ankle with associated weakness which is affecting his balance and functional mobility. Pt is a fall risk and has difficulty balancing on unstable surfaces and performing dynamic tasks. Pt will benefit from skilled therapy to prevent falls and improve strength and improve pain. Total Duration: 45 minutes       Regarding Seb Deutsch Dudley's therapy, I certify that the treatment plan above will be carried out by a therapist or under their direction.   Thank you for this referral,  Marco Alford, PT     Referring Physician Signature: Ivone Arevalo MD _______________________________ Date _____________        Post Session Pain  Charge Capture  PT Visit Info MD Francesca Castaneda

## 2023-11-28 RX ORDER — AMITRIPTYLINE HYDROCHLORIDE 10 MG/1
10 TABLET, FILM COATED ORAL NIGHTLY
Qty: 90 TABLET | Refills: 2 | Status: SHIPPED | OUTPATIENT
Start: 2023-11-28

## 2023-11-29 ENCOUNTER — HOSPITAL ENCOUNTER (OUTPATIENT)
Dept: PHYSICAL THERAPY | Age: 78
Setting detail: RECURRING SERIES
Discharge: HOME | End: 2023-12-02
Attending: ORTHOPAEDIC SURGERY
Payer: MEDICARE

## 2023-11-29 PROCEDURE — 97140 MANUAL THERAPY 1/> REGIONS: CPT

## 2023-11-29 PROCEDURE — 97110 THERAPEUTIC EXERCISES: CPT

## 2023-11-29 NOTE — PROGRESS NOTES
Step over hurtle to sharpened stance 4 squat over tape --> 4 square step with hurtles (no backward step hurtle) 4 square step with hurtles (no backward step hurtle) 4 square step with hurtles - back no tape    Blocked training for left leg posterior step over tape with manual and verbal cues for step size and forward trunk 4 square step with hurtles - back no tape 4 square step with hurtles - back no tape  (CGA) 4 square step with hurtles - back no tape  (CGA) 4 square step with hurtles - back no tape  (CGA)    + hold farmers carry 10# kettle bell Fwd, lateral step ups onto foam pad Fwd, lateral step ups onto foam pad     Fwd, lateral step ups onto foam pad         Forward reaches to table     Forward reaches to table standing on foam  (CGA) Forward reaches to table     Forward reaches to table standing on foam  (CGA) Forward reaches to table     Forward reaches to table standing on foam  (CGA)    + and place red ball with squatting mechanics Forward reaches to table     Forward reaches to table standing on foam  (CGA) Forward reaches to table     Forward reaches to table standing on foam  (CGA)  Forward reaches to table     Forward reaches to table standing on foam  (CGA)   Manual Therapy: 15 min 15 min 15 min 15 min 15 min 15 min 15 min 15 min 15 min 15 min 15 min 8 min    STM gastroc and soleus, grade 3 passive ROM all planes of right ankle, AP TC joint mob grade 3, medial and lateral subtalar glides grade 3 STM gastroc and soleus, grade 3 passive ROM all planes of right ankle, AP TC joint mob grade 3, medial and lateral subtalar glides grade 3    STM gastroc and soleus, grade 3 passive ROM all planes of right ankle, AP TC joint mob grade 3, medial and lateral subtalar glides grade 3 STM gastroc and soleus, grade 3 passive ROM all planes of right ankle, AP TC joint mob grade 3, medial and lateral subtalar glides grade 3 STM gastroc and soleus, grade 3 passive ROM all planes of right ankle, AP TC

## 2023-12-01 ENCOUNTER — APPOINTMENT (OUTPATIENT)
Dept: PHYSICAL THERAPY | Age: 78
End: 2023-12-01
Attending: ORTHOPAEDIC SURGERY
Payer: MEDICARE

## 2023-12-05 ENCOUNTER — HOSPITAL ENCOUNTER (OUTPATIENT)
Dept: PHYSICAL THERAPY | Age: 78
Setting detail: RECURRING SERIES
Discharge: HOME | End: 2023-12-08
Attending: ORTHOPAEDIC SURGERY
Payer: MEDICARE

## 2023-12-05 PROCEDURE — 97110 THERAPEUTIC EXERCISES: CPT

## 2023-12-05 PROCEDURE — 97140 MANUAL THERAPY 1/> REGIONS: CPT

## 2023-12-05 NOTE — PROGRESS NOTES
grade 3 passive ROM all planes of right ankle, AP TC joint mob grade 3, medial and lateral subtalar glides grade 3 STM gastroc and soleus, grade 3 passive ROM all planes of right ankle, AP TC joint mob grade 3, medial and lateral subtalar glides grade 3 STM gastroc and soleus, grade 3 passive ROM all planes of right ankle, AP TC joint mob grade 3, medial and lateral subtalar glides grade 3 STM gastroc and soleus, grade 3 passive ROM all planes of right ankle, AP TC joint mob grade 3, medial and lateral subtalar glides grade 3 STM gastroc and soleus, grade 3 passive ROM all planes of right ankle, AP TC joint mob grade 3, medial and lateral subtalar glides grade 3 STM gastroc and soleus, grade 3 passive ROM all planes of right ankle, AP TC joint mob grade 3, medial and lateral subtalar glides grade 3 STM gastroc and soleus, grade 3 passive ROM all planes of right ankle, AP TC joint mob grade 3, medial and lateral subtalar glides grade 3 STM gastroc and soleus, grade 3 passive ROM all planes of right ankle, AP TC joint mob grade 3, medial and lateral subtalar glides grade 3 STM gastroc and soleus, grade 3 passive ROM all planes of right ankle, AP TC joint mob grade 3, medial and lateral subtalar glides grade 3 STM gastroc and soleus, grade 3 passive ROM all planes of right ankle, AP TC joint mob grade 3, medial and lateral subtalar glides grade 3 STM gastroc and soleus, grade 3 passive ROM all planes of right ankle, AP TC joint mob grade 3, medial and lateral subtalar glides grade 3                   Functional Activities:                                                                                    Treatment/Session Summary:    >Treatment Assessment: Patient continues ankle stability training and responded well to lumbar mobility exercises.       Communication/Consultation:  Therapy Evaluation sent to referring provider  Equipment provided today:  None and HEP  Recommendations/Intent for next treatment session:

## 2023-12-07 ENCOUNTER — APPOINTMENT (OUTPATIENT)
Dept: PHYSICAL THERAPY | Age: 78
End: 2023-12-07
Attending: ORTHOPAEDIC SURGERY
Payer: MEDICARE

## 2023-12-08 ENCOUNTER — HOSPITAL ENCOUNTER (OUTPATIENT)
Dept: PHYSICAL THERAPY | Age: 78
Setting detail: RECURRING SERIES
Discharge: HOME | End: 2023-12-11
Attending: ORTHOPAEDIC SURGERY
Payer: MEDICARE

## 2023-12-08 PROCEDURE — 97140 MANUAL THERAPY 1/> REGIONS: CPT

## 2023-12-08 PROCEDURE — 97110 THERAPEUTIC EXERCISES: CPT

## 2023-12-08 NOTE — THERAPY DISCHARGE
treatment plan above will be carried out by a therapist or under their direction.   Thank you for this referral,  Andrzej Meyers, PT     Referring Physician Signature: Eloy Yuan MD _______________________________ Date _____________        Post Session Pain  Charge Capture  PT Visit Info MD Guidelines  MyChart

## 2023-12-08 NOTE — PROGRESS NOTES
Ricci Dailey  : 1945  Primary: Medicare Part A And B (Medicare)  Secondary: [facility] @ [address] [place] [address]  [facility]  Phone: [phone]  Fax: [phone] Plan Frequency: 2 times per week for 8 weeks    Plan of Care/Certification Expiration Date: 24      >PT Visit Info:  Plan Frequency: 2 times per week for 8 weeks  Plan of Care/Certification Expiration Date: 24      Visit Count:  19    OUTPATIENT PHYSICAL THERAPY: Treatment Note 2023       Episode  }Appt Desk             Treatment Diagnosis:    Pain in right lower leg  Muscle weakness (generalized)  Unsteadiness on feet  Medical/Referring Diagnosis:    Achilles tendinitis, right leg  Referring Physician:  Drea Yan MD MD Orders:  PT Eval and Treat   Date of Onset:  Onset Date: 23     Allergies:   Patient has no known allergies. Restrictions/Precautions:  Implants present? : Pacemaker  No data recorded   Interventions Planned (Treatment may consist of any combination of the following):    Current Treatment Recommendations: Strengthening; ROM; Balance training; Functional mobility training; Endurance training; Gait training; Neuromuscular re-education; Manual; Modalities; Home exercise program; Safety education & training; Patient/Caregiver education & training; Dry needling; Therapeutic activities     >Subjective Comments:  Patient notes his knee is feeling great and he feels like his ankle is doing very good. He notes 0/10 pain. >Initial:    0  /10>Post Session:       0 /10  Medications Last Reviewed:  2023  Updated Objective Findings:      1) Heel raise pulling in the right achilles x 1 rep (10/20/23)  2) walking pain 2/10 (10/20/23)  3) stepping out of truck 5/10  (10/20/23)    23 - 0/10 pain with heel raises, walking, and stepping out of the truck    (See d/c note)    Treatment   TREATMENT:   THERAPEUTIC ACTIVITY: ( see below for minutes):  Therapeutic

## 2023-12-12 ENCOUNTER — APPOINTMENT (OUTPATIENT)
Dept: PHYSICAL THERAPY | Age: 78
End: 2023-12-12
Attending: ORTHOPAEDIC SURGERY
Payer: MEDICARE

## 2023-12-14 ENCOUNTER — APPOINTMENT (OUTPATIENT)
Dept: PHYSICAL THERAPY | Age: 78
End: 2023-12-14
Attending: ORTHOPAEDIC SURGERY
Payer: MEDICARE

## 2023-12-15 ENCOUNTER — APPOINTMENT (OUTPATIENT)
Dept: PHYSICAL THERAPY | Age: 78
End: 2023-12-15
Attending: ORTHOPAEDIC SURGERY
Payer: MEDICARE

## 2023-12-19 ENCOUNTER — APPOINTMENT (OUTPATIENT)
Dept: PHYSICAL THERAPY | Age: 78
End: 2023-12-19
Attending: ORTHOPAEDIC SURGERY
Payer: MEDICARE

## 2023-12-21 ENCOUNTER — APPOINTMENT (OUTPATIENT)
Dept: PHYSICAL THERAPY | Age: 78
End: 2023-12-21
Attending: ORTHOPAEDIC SURGERY
Payer: MEDICARE

## 2023-12-22 ENCOUNTER — APPOINTMENT (OUTPATIENT)
Dept: PHYSICAL THERAPY | Age: 78
End: 2023-12-22
Attending: ORTHOPAEDIC SURGERY
Payer: MEDICARE

## 2023-12-28 ENCOUNTER — APPOINTMENT (OUTPATIENT)
Dept: PHYSICAL THERAPY | Age: 78
End: 2023-12-28
Attending: ORTHOPAEDIC SURGERY
Payer: MEDICARE

## 2023-12-29 ENCOUNTER — APPOINTMENT (OUTPATIENT)
Dept: PHYSICAL THERAPY | Age: 78
End: 2023-12-29
Attending: ORTHOPAEDIC SURGERY
Payer: MEDICARE

## 2024-01-03 ENCOUNTER — NURSE ONLY (OUTPATIENT)
Age: 79
End: 2024-01-03

## 2024-01-03 DIAGNOSIS — I50.22 CHRONIC SYSTOLIC CHF (CONGESTIVE HEART FAILURE) (HCC): ICD-10-CM

## 2024-01-03 DIAGNOSIS — I44.7 LBBB (LEFT BUNDLE BRANCH BLOCK): Primary | ICD-10-CM

## 2024-01-26 PROCEDURE — 93295 DEV INTERROG REMOTE 1/2/MLT: CPT | Performed by: INTERNAL MEDICINE

## 2024-01-26 PROCEDURE — 93296 REM INTERROG EVL PM/IDS: CPT | Performed by: INTERNAL MEDICINE

## 2024-01-30 NOTE — PROGRESS NOTES
[facility] CARDIOLOGY  [address] [place] [address], [address]  [place] [address]  PHONE: [phone]      24    NAME:  Rodolfo Buckner  : 1945  MRN: [id number]         SUBJECTIVE:   Rodolfo Buckner is a 78 y.o. male seen for a follow up visit regarding the following:     Chief Complaint   Patient presents with    Hyperlipidemia    Hypertension            HPI:  Follow up  Hyperlipidemia and Hypertension   .    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   Weight creeping back up.  Works in his yard, minimal exercise, works with his [employer].  No chest pain, palpitations, edema.         Past cardiac history:   - mild nonobstructive coronary artery disease   EF 20% by echo 2011   Echo 2012: EF improved to 35-40%, no significant valve disease   2014: images so poor even with Definity contrast an EF could not be estimated, only \"probably moderately depressed\".     Mar 2014 - MUGA - EF 33%   2014 - St Raj biventricular device - Dr Hammonds   Mar 2015 - even with contrast, difficult to see, EF estimated 45-50%    Oct 2016 - EF 43%, aortic root 3.8   Sep 2017 - 50-55% with distal apical dyskinesis, mild to moderate MR, AI, and PI   Oct 2018 - 54% with apical wma, impaired relaxation, no significant valvular regurgitation   Oct 2019- EF 60%, mild AI and MR   2020- normal arterial doppler and carotid doppler   Aug 2020- normal carotid doppler, negative LE arterial duplex   2021- vasodilator perfusion study inferior fixed defect, no ischemia, EF 49%   2021- severe COVID infection with AMS and PNA   2021- EF 50-55%, mild MR, TR, RVSP 19              Key CAD CHF Meds            valsartan-hydroCHLOROthiazide (DIOVAN-HCT) 320-25 MG per tablet (Taking)    Sig - Route: Take 1 tablet by mouth daily - Oral    rosuvastatin (CRESTOR) 10 MG tablet (Taking)    Sig - Route: TAKE 1 TABLET BY MOUTH EVERY EVENING - Oral    carvedilol

## 2024-01-31 ENCOUNTER — OFFICE VISIT (OUTPATIENT)
Age: 79
End: 2024-01-31
Payer: MEDICARE

## 2024-01-31 VITALS
BODY MASS INDEX: 37.77 KG/M2 | DIASTOLIC BLOOD PRESSURE: 56 MMHG | HEIGHT: 69 IN | WEIGHT: 255 LBS | HEART RATE: 74 BPM | SYSTOLIC BLOOD PRESSURE: 104 MMHG

## 2024-01-31 DIAGNOSIS — I10 ESSENTIAL HYPERTENSION: ICD-10-CM

## 2024-01-31 DIAGNOSIS — I42.8 NONISCHEMIC CARDIOMYOPATHY (HCC): Primary | ICD-10-CM

## 2024-01-31 DIAGNOSIS — Z79.4 TYPE 2 DIABETES MELLITUS WITH HYPERGLYCEMIA, WITH LONG-TERM CURRENT USE OF INSULIN (HCC): ICD-10-CM

## 2024-01-31 DIAGNOSIS — E78.5 DYSLIPIDEMIA: ICD-10-CM

## 2024-01-31 DIAGNOSIS — Z95.810 PRESENCE OF CARDIAC DEFIBRILLATOR: ICD-10-CM

## 2024-01-31 DIAGNOSIS — E11.65 TYPE 2 DIABETES MELLITUS WITH HYPERGLYCEMIA, WITH LONG-TERM CURRENT USE OF INSULIN (HCC): ICD-10-CM

## 2024-01-31 DIAGNOSIS — I50.22 CHRONIC SYSTOLIC CHF (CONGESTIVE HEART FAILURE) (HCC): ICD-10-CM

## 2024-01-31 PROCEDURE — G8417 CALC BMI ABV UP PARAM F/U: HCPCS | Performed by: INTERNAL MEDICINE

## 2024-01-31 PROCEDURE — 1123F ACP DISCUSS/DSCN MKR DOCD: CPT | Performed by: INTERNAL MEDICINE

## 2024-01-31 PROCEDURE — 99214 OFFICE O/P EST MOD 30 MIN: CPT | Performed by: INTERNAL MEDICINE

## 2024-01-31 PROCEDURE — G8427 DOCREV CUR MEDS BY ELIG CLIN: HCPCS | Performed by: INTERNAL MEDICINE

## 2024-01-31 PROCEDURE — G8484 FLU IMMUNIZE NO ADMIN: HCPCS | Performed by: INTERNAL MEDICINE

## 2024-01-31 PROCEDURE — 1036F TOBACCO NON-USER: CPT | Performed by: INTERNAL MEDICINE

## 2024-01-31 PROCEDURE — 3078F DIAST BP <80 MM HG: CPT | Performed by: INTERNAL MEDICINE

## 2024-01-31 PROCEDURE — 3074F SYST BP LT 130 MM HG: CPT | Performed by: INTERNAL MEDICINE

## 2024-01-31 ASSESSMENT — ENCOUNTER SYMPTOMS: SHORTNESS OF BREATH: 0

## 2024-01-31 NOTE — PATIENT INSTRUCTIONS
Patient Education        Walking for Exercise: Care Instructions  Overview     Walking is one of the easiest ways to get the exercise you need for good health. A brisk, 30-minute walk each day can help you feel better and have more energy. It can help you lower your risk of disease. Walking can help you keep your bones strong and your heart healthy.  Check with your doctor before you start a walking plan if you have heart problems, other health issues, or you have not been active in a long time. Follow your doctor's instructions for safe levels of exercise.  Follow-up care is a key part of your treatment and safety. Be sure to make and go to all appointments, and call your doctor if you are having problems. It's also a good idea to know your test results and keep a list of the medicines you take.  How can you care for yourself at home?  Getting started  Start slowly and set a short-term goal. For example, walk for 5 or 10 minutes every day.  Bit by bit, increase the amount you walk every day. Try for at least 30 minutes on most days of the week. You also may want to swim, bike, or do other activities.  If finding enough time is a problem, it's fine to be active in shorter periods of time throughout your day.  To get the heart-healthy benefits of walking, you need to walk briskly enough to increase your heart rate and breathing, but not so fast that you can't talk comfortably.  Wear comfortable shoes that fit well and provide good support for your feet and ankles.  Staying with your plan  After you've made walking a habit, set a longer-term goal. You may want to set a goal of walking briskly for longer or walking farther. Experts say to do 2½ hours (150 minutes) of moderate activity a week. A faster heartbeat is what defines moderate-level activity.  To stay motivated, walk with friends, coworkers, or pets.  Use a phone carlitos, pedometer, or wearable device to track your steps each day. Set a goal to increase your

## 2024-02-26 DIAGNOSIS — R41.3 MEMORY CHANGES: ICD-10-CM

## 2024-02-26 RX ORDER — DONEPEZIL HYDROCHLORIDE 10 MG/1
10 TABLET, FILM COATED ORAL NIGHTLY
Qty: 90 TABLET | Refills: 1 | Status: SHIPPED | OUTPATIENT
Start: 2024-02-26

## 2024-02-26 RX ORDER — ALLOPURINOL 300 MG/1
300 TABLET ORAL DAILY
Qty: 90 TABLET | Refills: 1 | Status: SHIPPED | OUTPATIENT
Start: 2024-02-26

## 2024-03-25 RX ORDER — CELECOXIB 200 MG/1
200 CAPSULE ORAL DAILY
Qty: 90 CAPSULE | Refills: 1 | Status: SHIPPED | OUTPATIENT
Start: 2024-03-25

## 2024-03-27 PROCEDURE — 12013 RPR F/E/E/N/L/M 2.6-5.0 CM: CPT

## 2024-03-27 PROCEDURE — 99284 EMERGENCY DEPT VISIT MOD MDM: CPT

## 2024-03-28 ENCOUNTER — APPOINTMENT (OUTPATIENT)
Dept: GENERAL RADIOLOGY | Age: 79
End: 2024-03-28
Payer: MEDICARE

## 2024-03-28 ENCOUNTER — HOSPITAL ENCOUNTER (EMERGENCY)
Age: 79
Discharge: HOME | End: 2024-03-28
Attending: EMERGENCY MEDICINE
Payer: MEDICARE

## 2024-03-28 ENCOUNTER — APPOINTMENT (OUTPATIENT)
Dept: CT IMAGING | Age: 79
End: 2024-03-28
Payer: MEDICARE

## 2024-03-28 VITALS
HEIGHT: 69 IN | WEIGHT: 255 LBS | DIASTOLIC BLOOD PRESSURE: 74 MMHG | RESPIRATION RATE: 18 BRPM | TEMPERATURE: 97.8 F | HEART RATE: 70 BPM | SYSTOLIC BLOOD PRESSURE: 151 MMHG | BODY MASS INDEX: 37.77 KG/M2 | OXYGEN SATURATION: 99 %

## 2024-03-28 DIAGNOSIS — S09.90XA INJURY OF HEAD, INITIAL ENCOUNTER: Primary | ICD-10-CM

## 2024-03-28 DIAGNOSIS — S01.81XA FACIAL LACERATION, INITIAL ENCOUNTER: ICD-10-CM

## 2024-03-28 PROCEDURE — 2500000003 HC RX 250 WO HCPCS: Performed by: EMERGENCY MEDICINE

## 2024-03-28 PROCEDURE — 73090 X-RAY EXAM OF FOREARM: CPT

## 2024-03-28 PROCEDURE — 71101 X-RAY EXAM UNILAT RIBS/CHEST: CPT

## 2024-03-28 PROCEDURE — 73080 X-RAY EXAM OF ELBOW: CPT

## 2024-03-28 PROCEDURE — 70450 CT HEAD/BRAIN W/O DYE: CPT

## 2024-03-28 PROCEDURE — 73130 X-RAY EXAM OF HAND: CPT

## 2024-03-28 PROCEDURE — 70486 CT MAXILLOFACIAL W/O DYE: CPT

## 2024-03-28 RX ORDER — LIDOCAINE HYDROCHLORIDE 10 MG/ML
10 INJECTION, SOLUTION INFILTRATION; PERINEURAL ONCE
Status: COMPLETED | OUTPATIENT
Start: 2024-03-28 | End: 2024-03-28

## 2024-03-28 RX ADMIN — LIDOCAINE HYDROCHLORIDE 10 ML: 10 INJECTION, SOLUTION INFILTRATION; PERINEURAL at 02:30

## 2024-03-28 ASSESSMENT — ENCOUNTER SYMPTOMS
DIFFICULTY BREATHING: 0
BLURRED VISION: 0

## 2024-03-28 ASSESSMENT — PAIN DESCRIPTION - DESCRIPTORS: DESCRIPTORS: DISCOMFORT

## 2024-03-28 ASSESSMENT — PAIN SCALES - GENERAL
PAINLEVEL_OUTOF10: 8
PAINLEVEL_OUTOF10: 7

## 2024-03-28 ASSESSMENT — LIFESTYLE VARIABLES
HOW MANY STANDARD DRINKS CONTAINING ALCOHOL DO YOU HAVE ON A TYPICAL DAY: PATIENT DOES NOT DRINK
HOW OFTEN DO YOU HAVE A DRINK CONTAINING ALCOHOL: NEVER

## 2024-03-28 ASSESSMENT — PAIN - FUNCTIONAL ASSESSMENT: PAIN_FUNCTIONAL_ASSESSMENT: 0-10

## 2024-03-28 ASSESSMENT — PAIN DESCRIPTION - LOCATION: LOCATION: FACE

## 2024-03-28 NOTE — ED PROVIDER NOTES
Emergency Department Provider Note       PCP: Dori Goodman MD   Age: 78 y.o.   Sex: male     DISPOSITION Decision To Discharge 03/28/2024 02:46:27 AM       ICD-10-CM    1. Injury of head, initial encounter  S09.90XA       2. Facial laceration, initial encounter  S01.81XA           Medical Decision Making     Patient is a 78-year-old male who states he was walking his dog and his dog chased opossum and pulled him down causing him to strike his face/nose on the concrete sustaining laceration to the nasal bridge as well as injuries to right hand and right forearm laceration.  Patient denies any loss of consciousness and denies any neck pain.  Patient states he has abrasions to bilateral knees otherwise unremarkable.  Patient does have a history of obesity, MICHELL, HTN, HLD, CKD, defibrillator.  Patient denies any anticoagulants      Head Injury  Location:  Generalized  Time since incident:  1 hour  Mechanism of injury: fall    Fall:     Impact surface:  [place]    Point of impact:  Head and outstretched arms    Entrapped after fall: no    Pain details:     Quality:  Aching    Severity:  Moderate    Duration:  1 hour    Timing:  Constant    Progression:  Unchanged  Relieved by:  Nothing  Worsened by:  Nothing  Ineffective treatments:  None tried  Associated symptoms: no blurred vision, no difficulty breathing, no disorientation, no hearing loss, no loss of consciousness, no memory loss, no neck pain, no seizures and no tinnitus    Risk factors: being elderly      Differential diagnosis includes was not limited to nasal bridge laceration, nasal fracture, intracranial hemorrhage, head injury, right arm contusion, right hand skin tear.    Patient's physical exam is remarkable for a 3 cm laceration nasal bridge as well as the right hand dorsal aspect skin tear as well as a right elbow abrasion.    I repaired the 3 cm laceration.  Please see procedure note.  Patient has been instructed to return in 5 days for

## 2024-03-28 NOTE — ED NOTES
Patient mobility status  with mild difficulty due to pain. Provider aware     I have reviewed discharge instructions with the patient.  The patient verbalized understanding.    Patient left ED via Discharge Method: wheelchair to Home with Spouse.    Opportunity for questions and clarification provided.     Patient given 0 scripts.            Mary Kate Gonzalez, RN  03/28/24 0307

## 2024-03-28 NOTE — ED TRIAGE NOTES
Pt to triage in WC. Pt states that his dog chased a opossum and pulled him down. Pt has laceration to bridge of nose, as well as injuries to right arm that are bandaged. No LOC. Pt take 81mg ASA.

## 2024-04-01 ENCOUNTER — OFFICE VISIT (OUTPATIENT)
Dept: INTERNAL MEDICINE CLINIC | Facility: CLINIC | Age: 79
End: 2024-04-01
Payer: MEDICARE

## 2024-04-01 VITALS
SYSTOLIC BLOOD PRESSURE: 130 MMHG | BODY MASS INDEX: 38.36 KG/M2 | WEIGHT: 259 LBS | DIASTOLIC BLOOD PRESSURE: 70 MMHG | HEIGHT: 69 IN

## 2024-04-01 DIAGNOSIS — M1A.9XX0 CHRONIC GOUT WITHOUT TOPHUS, UNSPECIFIED CAUSE, UNSPECIFIED SITE: ICD-10-CM

## 2024-04-01 DIAGNOSIS — Z48.02 ENCOUNTER FOR REMOVAL OF SUTURES: ICD-10-CM

## 2024-04-01 DIAGNOSIS — R51.9 FACIAL PAIN: ICD-10-CM

## 2024-04-01 DIAGNOSIS — Z79.4 TYPE 2 DIABETES MELLITUS WITH HYPERGLYCEMIA, WITH LONG-TERM CURRENT USE OF INSULIN (HCC): ICD-10-CM
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
## 2024-04-01 DIAGNOSIS — E11.65 TYPE 2 DIABETES MELLITUS WITH HYPERGLYCEMIA, WITH LONG-TERM CURRENT USE OF INSULIN (HCC): ICD-10-CM

## 2024-04-01 DIAGNOSIS — E78.2 MIXED HYPERLIPIDEMIA: ICD-10-CM

## 2024-04-01 DIAGNOSIS — I10 PRIMARY HYPERTENSION: ICD-10-CM

## 2024-04-01 DIAGNOSIS — R07.81 RIB PAIN ON RIGHT SIDE: ICD-10-CM

## 2024-04-01 DIAGNOSIS — E66.01 SEVERE OBESITY (BMI 35.0-39.9) WITH COMORBIDITY (HCC): ICD-10-CM

## 2024-04-01 DIAGNOSIS — I73.9 PERIPHERAL VASCULAR DISEASE, UNSPECIFIED (HCC): ICD-10-CM

## 2024-04-01 DIAGNOSIS — N18.32 STAGE 3B CHRONIC KIDNEY DISEASE (HCC): Primary | ICD-10-CM

## 2024-04-01 PROBLEM — N18.30 STAGE 3 CHRONIC KIDNEY DISEASE (HCC): Status: RESOLVED | Noted: 2020-06-09 | Resolved: 2024-04-01

## 2024-04-01 PROCEDURE — 99214 OFFICE O/P EST MOD 30 MIN: CPT | Performed by: INTERNAL MEDICINE

## 2024-04-01 PROCEDURE — G8427 DOCREV CUR MEDS BY ELIG CLIN: HCPCS | Performed by: INTERNAL MEDICINE

## 2024-04-01 PROCEDURE — G8417 CALC BMI ABV UP PARAM F/U: HCPCS | Performed by: INTERNAL MEDICINE

## 2024-04-01 PROCEDURE — 3078F DIAST BP <80 MM HG: CPT | Performed by: INTERNAL MEDICINE

## 2024-04-01 PROCEDURE — 15853 REMOVAL SUTR/STAPL XREQ ANES: CPT | Performed by: INTERNAL MEDICINE

## 2024-04-01 PROCEDURE — 3075F SYST BP GE 130 - 139MM HG: CPT | Performed by: INTERNAL MEDICINE

## 2024-04-01 PROCEDURE — 1036F TOBACCO NON-USER: CPT | Performed by: INTERNAL MEDICINE

## 2024-04-01 PROCEDURE — 1123F ACP DISCUSS/DSCN MKR DOCD: CPT | Performed by: INTERNAL MEDICINE

## 2024-04-01 RX ORDER — CYCLOBENZAPRINE HCL 10 MG
10 TABLET ORAL NIGHTLY PRN
Qty: 10 TABLET | Refills: 0 | Status: SHIPPED | OUTPATIENT
Start: 2024-04-01 | End: 2024-04-11

## 2024-04-01 RX ORDER — HYDROCODONE BITARTRATE AND ACETAMINOPHEN 7.5; 325 MG/1; MG/1
1 TABLET ORAL EVERY 6 HOURS PRN
Qty: 45 TABLET | Refills: 0 | Status: SHIPPED | OUTPATIENT
Start: 2024-04-01 | End: 2024-05-01

## 2024-04-01 ASSESSMENT — PATIENT HEALTH QUESTIONNAIRE - PHQ9
SUM OF ALL RESPONSES TO PHQ QUESTIONS 1-9: 0
SUM OF ALL RESPONSES TO PHQ QUESTIONS 1-9: 0
SUM OF ALL RESPONSES TO PHQ9 QUESTIONS 1 & 2: 0
SUM OF ALL RESPONSES TO PHQ QUESTIONS 1-9: 0
1. LITTLE INTEREST OR PLEASURE IN DOING THINGS: NOT AT ALL
2. FEELING DOWN, DEPRESSED OR HOPELESS: NOT AT ALL
SUM OF ALL RESPONSES TO PHQ QUESTIONS 1-9: 0

## 2024-04-01 NOTE — PROGRESS NOTES
HPI: Rodolfo Buckner (: 1945)    Pt fell after his lab pulled him down on the driveway and he face planted    Seen in ER     Abrasions and bruising and now with persistent pain in right side/ribs  No fractures on xray but very limited in mobility on right side  Not sleeping well due to pain    Keeping abrasions clean    Due for suture removal across bridge of nose- 5 sutures  Problem List:  Patient Active Problem List   Diagnosis    Obesity (BMI 30-39.9)    Obesity hypoventilation syndrome (HCC)    Encounter for diabetic foot exam ([facility])    Insomnia due to medical condition    Presence of cardiac defibrillator    Gout    MICHELL on CPAP    Type 2 diabetes mellitus with hyperglycemia, with long-term current use of insulin ([facility])    Intention tremor    Gastroesophageal reflux disease without esophagitis    LBBB (left bundle branch block)    COVID-19    Chronic systolic CHF (congestive heart failure) ([facility])    CHF (congestive heart failure) ([facility])    Left leg pain    Mixed hyperlipidemia    Primary hypertension    Memory changes    Basal cell carcinoma (BCC) of face    Peripheral vascular disease, unspecified ([facility])    Primary osteoarthritis involving multiple joints    Neuropathy    Neurogenic claudication due to lumbar spinal stenosis    AICD at end of battery life    Esophageal dysphagia    Severe obesity (BMI 35.0-39.9) with comorbidity ([facility])    Esophagitis determined by biopsy    Nonischemic cardiomyopathy ([facility])       History:  Past Medical History:   Diagnosis Date    Abnormal MMSE 2022    29 out of 30 and normal clock draw    Age-related cognitive decline     30 out of 30 MMSE    Arthritis     DJD- shoulder, knees, hips    Cancer ([facility])     skin CA left lobe ear    Chronic systolic CHF (congestive heart failure) ([facility])     COVID-19 2021    Dizziness 2020    hx     Esophagitis 10/2022    GERD (gastroesophageal reflux disease)     managed with medication     Gout     managed with medication

## 2024-04-10 DIAGNOSIS — E11.65 TYPE 2 DIABETES MELLITUS WITH HYPERGLYCEMIA, WITH LONG-TERM CURRENT USE OF INSULIN (HCC): ICD-10-CM

## 2024-04-10 DIAGNOSIS — Z79.4 TYPE 2 DIABETES MELLITUS WITH HYPERGLYCEMIA, WITH LONG-TERM CURRENT USE OF INSULIN (HCC): ICD-10-CM

## 2024-04-10 DIAGNOSIS — M1A.9XX0 CHRONIC GOUT WITHOUT TOPHUS, UNSPECIFIED CAUSE, UNSPECIFIED SITE: ICD-10-CM

## 2024-04-10 DIAGNOSIS — I10 PRIMARY HYPERTENSION: ICD-10-CM

## 2024-04-10 DIAGNOSIS — N18.32 STAGE 3B CHRONIC KIDNEY DISEASE (HCC): ICD-10-CM

## 2024-04-10 DIAGNOSIS — E78.2 MIXED HYPERLIPIDEMIA: ICD-10-CM

## 2024-04-10 LAB
ALBUMIN SERPL-MCNC: 3.9 G/DL (ref 3.2–4.6)
ALBUMIN/GLOB SERPL: 1.2 (ref 0.4–1.6)
ALP SERPL-CCNC: 115 U/L (ref 50–136)
ALT SERPL-CCNC: 38 U/L (ref 12–65)
ANION GAP SERPL CALC-SCNC: 3 MMOL/L (ref 2–11)
AST SERPL-CCNC: 14 U/L (ref 15–37)
BILIRUB SERPL-MCNC: 0.4 MG/DL (ref 0.2–1.1)
BUN SERPL-MCNC: 47 MG/DL (ref 8–23)
CALCIUM SERPL-MCNC: 9.5 MG/DL (ref 8.3–10.4)
CHLORIDE SERPL-SCNC: 109 MMOL/L (ref 103–113)
CHOLEST SERPL-MCNC: 153 MG/DL
CO2 SERPL-SCNC: 27 MMOL/L (ref 21–32)
CREAT SERPL-MCNC: 1.6 MG/DL (ref 0.8–1.5)
GLOBULIN SER CALC-MCNC: 3.2 G/DL (ref 2.8–4.5)
GLUCOSE SERPL-MCNC: 109 MG/DL (ref 65–100)
HDLC SERPL-MCNC: 49 MG/DL (ref 40–60)
HDLC SERPL: 3.1
LDLC SERPL CALC-MCNC: 76 MG/DL
POTASSIUM SERPL-SCNC: 4.5 MMOL/L (ref 3.5–5.1)
PROT SERPL-MCNC: 7.1 G/DL (ref 6.3–8.2)
SODIUM SERPL-SCNC: 139 MMOL/L (ref 136–146)
TRIGL SERPL-MCNC: 140 MG/DL (ref 35–150)
URATE SERPL-MCNC: 5.9 MG/DL (ref 2.6–6)
VLDLC SERPL CALC-MCNC: 28 MG/DL (ref 6–23)

## 2024-04-11 LAB
BASOPHILS # BLD: 0 K/UL (ref 0–0.2)
BASOPHILS NFR BLD: 0 % (ref 0–2)
DIFFERENTIAL METHOD BLD: ABNORMAL
EOSINOPHIL # BLD: 0.1 K/UL (ref 0–0.8)
EOSINOPHIL NFR BLD: 1 % (ref 0.5–7.8)
ERYTHROCYTE [DISTWIDTH] IN BLOOD BY AUTOMATED COUNT: 15.2 % (ref 11.9–14.6)
EST. AVERAGE GLUCOSE BLD GHB EST-MCNC: 146 MG/DL
HBA1C MFR BLD: 6.7 % (ref 4.8–5.6)
HCT VFR BLD AUTO: 43.8 % (ref 41.1–50.3)
HGB BLD-MCNC: 12.9 G/DL (ref 13.6–17.2)
IMM GRANULOCYTES # BLD AUTO: 0.1 K/UL (ref 0–0.5)
IMM GRANULOCYTES NFR BLD AUTO: 0 % (ref 0–5)
LYMPHOCYTES # BLD: 2.4 K/UL (ref 0.5–4.6)
LYMPHOCYTES NFR BLD: 19 % (ref 13–44)
MCH RBC QN AUTO: 26.9 PG (ref 26.1–32.9)
MCHC RBC AUTO-ENTMCNC: 29.5 G/DL (ref 31.4–35)
MCV RBC AUTO: 91.3 FL (ref 82–102)
MONOCYTES # BLD: 1 K/UL (ref 0.1–1.3)
MONOCYTES NFR BLD: 8 % (ref 4–12)
NEUTS SEG # BLD: 9.3 K/UL (ref 1.7–8.2)
NEUTS SEG NFR BLD: 72 % (ref 43–78)
NRBC # BLD: 0 K/UL (ref 0–0.2)
PLATELET # BLD AUTO: 242 K/UL (ref 150–450)
PMV BLD AUTO: 11.8 FL (ref 9.4–12.3)
RBC # BLD AUTO: 4.8 M/UL (ref 4.23–5.6)
WBC # BLD AUTO: 12.8 K/UL (ref 4.3–11.1)

## 2024-04-15 ENCOUNTER — OFFICE VISIT (OUTPATIENT)
Dept: INTERNAL MEDICINE CLINIC | Facility: CLINIC | Age: 79
End: 2024-04-15
Payer: MEDICARE

## 2024-04-15 VITALS
DIASTOLIC BLOOD PRESSURE: 70 MMHG | SYSTOLIC BLOOD PRESSURE: 128 MMHG | BODY MASS INDEX: 37.18 KG/M2 | HEIGHT: 69 IN | WEIGHT: 251 LBS

## 2024-04-15 DIAGNOSIS — R35.0 BENIGN PROSTATIC HYPERPLASIA WITH URINARY FREQUENCY: ICD-10-CM

## 2024-04-15 DIAGNOSIS — I10 PRIMARY HYPERTENSION: ICD-10-CM

## 2024-04-15 DIAGNOSIS — Z79.4 TYPE 2 DIABETES MELLITUS WITH HYPERGLYCEMIA, WITH LONG-TERM CURRENT USE OF INSULIN (HCC): ICD-10-CM

## 2024-04-15 DIAGNOSIS — G47.33 OSA ON CPAP: ICD-10-CM

## 2024-04-15 DIAGNOSIS — R35.0 URINARY FREQUENCY: ICD-10-CM

## 2024-04-15 DIAGNOSIS — E11.65 TYPE 2 DIABETES MELLITUS WITH HYPERGLYCEMIA, WITH LONG-TERM CURRENT USE OF INSULIN (HCC): ICD-10-CM

## 2024-04-15 DIAGNOSIS — D63.1 ANEMIA DUE TO STAGE 3B CHRONIC KIDNEY DISEASE (HCC): ICD-10-CM

## 2024-04-15 DIAGNOSIS — N18.32 ANEMIA DUE TO STAGE 3B CHRONIC KIDNEY DISEASE (HCC): ICD-10-CM

## 2024-04-15 DIAGNOSIS — E66.01 SEVERE OBESITY (BMI 35.0-39.9) WITH COMORBIDITY (HCC): ICD-10-CM

## 2024-04-15 DIAGNOSIS — Z12.5 PROSTATE CANCER SCREENING: ICD-10-CM

## 2024-04-15 DIAGNOSIS — E78.2 MIXED HYPERLIPIDEMIA: ICD-10-CM

## 2024-04-15 DIAGNOSIS — N18.32 STAGE 3B CHRONIC KIDNEY DISEASE (HCC): ICD-10-CM

## 2024-04-15 DIAGNOSIS — M1A.9XX0 CHRONIC GOUT WITHOUT TOPHUS, UNSPECIFIED CAUSE, UNSPECIFIED SITE: ICD-10-CM

## 2024-04-15 DIAGNOSIS — N40.1 BENIGN PROSTATIC HYPERPLASIA WITH URINARY FREQUENCY: ICD-10-CM

## 2024-04-15 DIAGNOSIS — Z91.81 AT HIGH RISK FOR FALLS: ICD-10-CM

## 2024-04-15 DIAGNOSIS — Z00.00 ENCOUNTER FOR MEDICARE ANNUAL WELLNESS EXAM: Primary | ICD-10-CM

## 2024-04-15 LAB
BILIRUBIN, URINE, POC: NEGATIVE
BLOOD URINE, POC: NEGATIVE
GLUCOSE URINE, POC: NEGATIVE
KETONES, URINE, POC: NEGATIVE
LEUKOCYTE ESTERASE, URINE, POC: NEGATIVE
NITRITE, URINE, POC: NEGATIVE
PH, URINE, POC: 5.5 (ref 4.6–8)
PROTEIN,URINE, POC: NORMAL
SPECIFIC GRAVITY, URINE, POC: 1.03 (ref 1–1.03)
URINALYSIS CLARITY, POC: NORMAL
URINALYSIS COLOR, POC: YELLOW
UROBILINOGEN, POC: NORMAL

## 2024-04-15 PROCEDURE — 3078F DIAST BP <80 MM HG: CPT | Performed by: INTERNAL MEDICINE

## 2024-04-15 PROCEDURE — 3044F HG A1C LEVEL LT 7.0%: CPT | Performed by: INTERNAL MEDICINE

## 2024-04-15 PROCEDURE — 99214 OFFICE O/P EST MOD 30 MIN: CPT | Performed by: INTERNAL MEDICINE

## 2024-04-15 PROCEDURE — 1123F ACP DISCUSS/DSCN MKR DOCD: CPT | Performed by: INTERNAL MEDICINE

## 2024-04-15 PROCEDURE — 1036F TOBACCO NON-USER: CPT | Performed by: INTERNAL MEDICINE

## 2024-04-15 PROCEDURE — G8417 CALC BMI ABV UP PARAM F/U: HCPCS | Performed by: INTERNAL MEDICINE

## 2024-04-15 PROCEDURE — 81003 URINALYSIS AUTO W/O SCOPE: CPT | Performed by: INTERNAL MEDICINE

## 2024-04-15 PROCEDURE — 3074F SYST BP LT 130 MM HG: CPT | Performed by: INTERNAL MEDICINE

## 2024-04-15 PROCEDURE — G0439 PPPS, SUBSEQ VISIT: HCPCS | Performed by: INTERNAL MEDICINE

## 2024-04-15 PROCEDURE — G8427 DOCREV CUR MEDS BY ELIG CLIN: HCPCS | Performed by: INTERNAL MEDICINE

## 2024-04-15 RX ORDER — BLOOD SUGAR DIAGNOSTIC
1 STRIP MISCELLANEOUS 2 TIMES DAILY
Qty: 200 EACH | Refills: 3 | Status: SHIPPED | OUTPATIENT
Start: 2024-04-15

## 2024-04-15 RX ORDER — ROSUVASTATIN CALCIUM 10 MG/1
10 TABLET, COATED ORAL EVERY EVENING
Qty: 90 TABLET | Refills: 2 | Status: SHIPPED | OUTPATIENT
Start: 2024-04-15

## 2024-04-15 RX ORDER — CARVEDILOL 6.25 MG/1
6.25 TABLET ORAL 2 TIMES DAILY
Qty: 180 TABLET | Refills: 2 | Status: SHIPPED | OUTPATIENT
Start: 2024-04-15

## 2024-04-15 RX ORDER — FINASTERIDE 5 MG/1
5 TABLET, FILM COATED ORAL DAILY
Qty: 90 TABLET | Refills: 3 | Status: SHIPPED | OUTPATIENT
Start: 2024-04-15

## 2024-04-15 ASSESSMENT — ENCOUNTER SYMPTOMS: SHORTNESS OF BREATH: 0

## 2024-04-15 NOTE — PROGRESS NOTES
HPI: Rodolfo Buckner (: 1945)      MW    Review labs     Feeling better since his fall when the dog pulled him down  Bruising and pain has improved            Problem List:  Patient Active Problem List   Diagnosis   • Obesity (BMI 30-39.9)   • Obesity hypoventilation syndrome (HCC)   • Encounter for diabetic foot exam ([facility])   • Insomnia due to medical condition   • Presence of cardiac defibrillator   • Gout   • MICHELL on CPAP   • Type 2 diabetes mellitus with hyperglycemia, with long-term current use of insulin ([facility])   • Intention tremor   • Gastroesophageal reflux disease without esophagitis   • LBBB (left bundle branch block)   • COVID-19   • Chronic systolic CHF (congestive heart failure) ([facility])   • CHF (congestive heart failure) ([facility])   • Left leg pain   • Mixed hyperlipidemia   • Primary hypertension   • Anemia due to stage 3b chronic kidney disease ([facility])   • Memory changes   • Basal cell carcinoma (BCC) of face   • Peripheral vascular disease, unspecified ([facility])   • Primary osteoarthritis involving multiple joints   • Neuropathy   • Neurogenic claudication due to lumbar spinal stenosis   • AICD at end of battery life   • Esophageal dysphagia   • Severe obesity (BMI 35.0-39.9) with comorbidity ([facility])   • Esophagitis determined by biopsy   • Nonischemic cardiomyopathy ([facility])       History:  Past Medical History:   Diagnosis Date   • Abnormal MMSE 2022    29 out of 30 and normal clock draw   • Age-related cognitive decline     30 out of 30 MMSE   • Arthritis     DJD- shoulder, knees, hips   • Cancer ([facility])     skin CA left lobe ear   • Chronic systolic CHF (congestive heart failure) ([facility])    • COVID-19 2021   • Dizziness 2020    hx    • Esophagitis 10/2022   • GERD (gastroesophageal reflux disease)     managed with medication    • Gout     managed with medication    • H/O echocardiogram 10/14/2019    EF 60.1%   • Hip pain, bilateral     POA   • Hyperlipidemia    • Hypertension

## 2024-04-15 NOTE — PROGRESS NOTES
Medicare Annual Wellness Visit    Rodolfo Buckner is here for Follow-up (Review lab results)    Assessment & Plan   Encounter for Medicare annual wellness exam  MICHELL on CPAP  Type 2 diabetes mellitus with hyperglycemia, with long-term current use of insulin (HCC)  Primary hypertension  Severe obesity (BMI 35.0-39.9) with comorbidity (HCC)  Mixed hyperlipidemia  Chronic gout without tophus, unspecified cause, unspecified site  Stage 3b chronic kidney disease (HCC)  Benign prostatic hyperplasia with urinary frequency  Urinary frequency  -     AMB POC URINALYSIS DIP STICK AUTO W/O MICRO  Anemia due to stage 3b chronic kidney disease (HCC)  At high risk for falls    Recommendations for Preventive Services Due: see orders and patient instructions/AVS.  Recommended screening schedule for the next 5-10 years is provided to the patient in written form: see Patient Instructions/AVS.     No follow-ups on file.     Subjective       Patient's complete Health Risk Assessment and screening values have been reviewed and are found in Flowsheets. The following problems were reviewed today and where indicated follow up appointments were made and/or referrals ordered.    Positive Risk Factor Screenings with Interventions:    Fall Risk:  Do you feel unsteady or are you worried about falling? : no  2 or more falls in past year?: no  Fall with injury in past year?: (!) yes       Interventions:    Reviewed medications, home hazards, visual acuity, and co-morbidities that can increase risk for falls  Watch pet         Controlled Medication Review:      Today's Pain Level: No data recorded     Opioid Risk: (High risk score ?55) Opioid risk score: 56      Last PDMP Ken as Reviewed:  Review User Review Instant Review Result     @             General HRA Questions:  Select all that apply: (!) New or Increased Pain    Pain Interventions:  Healing since fall caused by dog      Activity, Diet, and Weight:  On average, how many days per week do

## 2024-04-30 ENCOUNTER — APPOINTMENT (RX ONLY)
Dept: URBAN - METROPOLITAN AREA CLINIC 330 | Facility: CLINIC | Age: 79
Setting detail: DERMATOLOGY
End: 2024-04-30

## 2024-04-30 DIAGNOSIS — D22 MELANOCYTIC NEVI: ICD-10-CM

## 2024-04-30 DIAGNOSIS — L57.0 ACTINIC KERATOSIS: ICD-10-CM

## 2024-04-30 DIAGNOSIS — L57.8 OTHER SKIN CHANGES DUE TO CHRONIC EXPOSURE TO NONIONIZING RADIATION: ICD-10-CM

## 2024-04-30 DIAGNOSIS — Z85.828 PERSONAL HISTORY OF OTHER MALIGNANT NEOPLASM OF SKIN: ICD-10-CM

## 2024-04-30 DIAGNOSIS — Z85.820 PERSONAL HISTORY OF MALIGNANT MELANOMA OF SKIN: ICD-10-CM

## 2024-04-30 PROBLEM — D22.5 MELANOCYTIC NEVI OF TRUNK: Status: ACTIVE | Noted: 2024-04-30

## 2024-04-30 PROCEDURE — ? COUNSELING

## 2024-04-30 PROCEDURE — ? OTHER

## 2024-04-30 PROCEDURE — 17000 DESTRUCT PREMALG LESION: CPT

## 2024-04-30 PROCEDURE — 17003 DESTRUCT PREMALG LES 2-14: CPT

## 2024-04-30 PROCEDURE — ? TREATMENT REGIMEN

## 2024-04-30 PROCEDURE — ? FULL BODY SKIN EXAM

## 2024-04-30 PROCEDURE — ? LIQUID NITROGEN

## 2024-04-30 PROCEDURE — 99213 OFFICE O/P EST LOW 20 MIN: CPT | Mod: 25

## 2024-04-30 PROCEDURE — ? MEDICAL PHOTOGRAPHY REVIEW

## 2024-04-30 ASSESSMENT — LOCATION ZONE DERM
LOCATION ZONE: SCALP
LOCATION ZONE: EAR
LOCATION ZONE: FACE
LOCATION ZONE: TRUNK
LOCATION ZONE: HAND

## 2024-04-30 ASSESSMENT — LOCATION SIMPLE DESCRIPTION DERM
LOCATION SIMPLE: RIGHT SCALP
LOCATION SIMPLE: RIGHT EAR
LOCATION SIMPLE: UPPER BACK
LOCATION SIMPLE: LEFT EAR
LOCATION SIMPLE: LEFT HAND
LOCATION SIMPLE: LEFT TEMPLE
LOCATION SIMPLE: RIGHT CHEEK
LOCATION SIMPLE: LEFT ZYGOMA

## 2024-04-30 ASSESSMENT — LOCATION DETAILED DESCRIPTION DERM
LOCATION DETAILED: INFERIOR THORACIC SPINE
LOCATION DETAILED: LEFT CENTRAL ZYGOMA
LOCATION DETAILED: RIGHT CENTRAL FRONTAL SCALP
LOCATION DETAILED: LEFT ANTERIOR EARLOBE
LOCATION DETAILED: LEFT ULNAR DORSAL HAND
LOCATION DETAILED: LEFT CENTRAL TEMPLE
LOCATION DETAILED: RIGHT INFERIOR NASAL CHEEK
LOCATION DETAILED: RIGHT INFERIOR HELIX
LOCATION DETAILED: RIGHT SUPERIOR HELIX

## 2024-04-30 NOTE — PROCEDURE: COUNSELING
Detail Level: Generalized
Email response sent to patient.
From: Janessa Luong  To: Esa Juárez MD  Sent: 2/7/2020 2:53 PM CST  Subject: Non-Urgent Medical Question    What podiatrist do you want me to see?
Detail Level: Detailed
Detail Level: Zone

## 2024-04-30 NOTE — PROCEDURE: LIQUID NITROGEN
Render Post-Care Instructions In Note?: no
Number Of Freeze-Thaw Cycles: 3 freeze-thaw cycles
Consent: The patient's consent was obtained including but not limited to risks of crusting, scabbing, blistering, scarring, darker or lighter pigmentary change, recurrence, incomplete removal and infection.
Duration Of Freeze Thaw-Cycle (Seconds): 2
Post-Care Instructions: I reviewed with the patient in detail post-care instructions. Patient is to wear sunprotection, and avoid picking at any of the treated lesions. Pt may apply Vaseline to crusted or scabbing areas.
Detail Level: Detailed
Show Aperture Variable?: Yes

## 2024-07-19 ENCOUNTER — OFFICE VISIT (OUTPATIENT)
Age: 79
End: 2024-07-19

## 2024-07-19 VITALS — HEIGHT: 70 IN | WEIGHT: 261.6 LBS | BODY MASS INDEX: 37.45 KG/M2

## 2024-07-19 DIAGNOSIS — M54.50 LOW BACK PAIN, UNSPECIFIED BACK PAIN LATERALITY, UNSPECIFIED CHRONICITY, UNSPECIFIED WHETHER SCIATICA PRESENT: Primary | ICD-10-CM

## 2024-07-19 DIAGNOSIS — M51.36 LUMBAR DEGENERATIVE DISC DISEASE: ICD-10-CM

## 2024-07-19 DIAGNOSIS — M54.16 LUMBAR RADICULOPATHY: ICD-10-CM

## 2024-07-19 NOTE — PROGRESS NOTES
Name: Rodolfo Buckner  YOB: 1945  Gender: male  MRN: [id number]    CC: Right leg numbness, back pain    HPI: This is a 78 y.o. year old male who presents with complaints of back pain most of his life.  He has recently had right posterior leg numbness and feeling like his knee goes weak.  He is currently taking Celebrex and gabapentin.  Patient has a pacemaker/AICD.      The patient denies any change in bowel or bladder function since the onset of the symptoms. he  has not had lumbar surgery in the past.      Thus far, the patient has tried NSAIDS and gabapentin or lyrica    Current pain level: 6  Activities limited by pain: Increased activity            No data to display                     ROS/Meds/PSH/PMH/FH/SH: I personally reviewed the patient's collected intake data.  Below are the pertinents:    No Known Allergies      Current Outpatient Medications:     blood glucose test strips (ONETOUCH VERIO) strip, 1 each by In Vitro route 2 times daily Use as directed to check blood sugar twice a day. (E11.65), Disp: 200 each, Rfl: 3    rosuvastatin (CRESTOR) 10 MG tablet, Take 1 tablet by mouth every evening, Disp: 90 tablet, Rfl: 2    carvedilol (COREG) 6.25 MG tablet, Take 1 tablet by mouth 2 times daily, Disp: 180 tablet, Rfl: 2    finasteride (PROSCAR) 5 MG tablet, Take 1 tablet by mouth daily, Disp: 90 tablet, Rfl: 3    celecoxib (CELEBREX) 200 MG capsule, Take 1 capsule by mouth daily, Disp: 90 capsule, Rfl: 1    SITagliptin (JANUVIA) 50 MG tablet, Take 1 tablet by mouth daily, Disp: 90 tablet, Rfl: 1    metFORMIN (GLUCOPHAGE) 500 MG tablet, Take 1 tablet by mouth 2 times daily (with meals), Disp: 180 tablet, Rfl: 1    donepezil (ARICEPT) 10 MG tablet, Take 1 tablet by mouth nightly, Disp: 90 tablet, Rfl: 1    allopurinol (ZYLOPRIM) 300 MG tablet, Take 1 tablet by mouth daily, Disp: 90 tablet, Rfl: 1    amitriptyline (ELAVIL) 10 MG tablet, TAKE 1 TABLET BY MOUTH EVERY NIGHT, Disp: 90 tablet,

## 2024-07-29 DIAGNOSIS — R35.1 BPH ASSOCIATED WITH NOCTURIA: ICD-10-CM

## 2024-07-29 DIAGNOSIS — N40.1 BPH ASSOCIATED WITH NOCTURIA: ICD-10-CM

## 2024-07-29 RX ORDER — TAMSULOSIN HYDROCHLORIDE 0.4 MG/1
0.4 CAPSULE ORAL DAILY
Qty: 90 CAPSULE | Refills: 2 | Status: SHIPPED | OUTPATIENT
Start: 2024-07-29

## 2024-07-29 RX ORDER — GABAPENTIN 300 MG/1
CAPSULE ORAL
Qty: 270 CAPSULE | Refills: 2 | Status: SHIPPED | OUTPATIENT
Start: 2024-07-29 | End: 2025-04-29

## 2024-07-29 RX ORDER — OMEPRAZOLE 20 MG/1
20 CAPSULE, DELAYED RELEASE ORAL DAILY
Qty: 90 CAPSULE | Refills: 1 | Status: SHIPPED | OUTPATIENT
Start: 2024-07-29

## 2024-07-29 NOTE — TELEPHONE ENCOUNTER
Requested Prescriptions     Pending Prescriptions Disp Refills    tamsulosin (FLOMAX) 0.4 MG capsule 90 capsule 2     Sig: Take 1 capsule by mouth daily    gabapentin (NEURONTIN) 300 MG capsule 270 capsule 2     Sig: TAKE 1 CAPSULE BY MOUTH EVERY MORNING AND 2 CAPSULES BY MOUTH AT BEDTIME

## 2024-07-30 ENCOUNTER — OFFICE VISIT (OUTPATIENT)
Age: 79
End: 2024-07-30
Payer: MEDICARE

## 2024-07-30 VITALS
DIASTOLIC BLOOD PRESSURE: 76 MMHG | SYSTOLIC BLOOD PRESSURE: 118 MMHG | WEIGHT: 260 LBS | BODY MASS INDEX: 38.51 KG/M2 | HEART RATE: 76 BPM | HEIGHT: 69 IN

## 2024-07-30 DIAGNOSIS — I50.22 CHRONIC SYSTOLIC CHF (CONGESTIVE HEART FAILURE) (HCC): Primary | ICD-10-CM

## 2024-07-30 DIAGNOSIS — Z95.810 PRESENCE OF CARDIAC DEFIBRILLATOR: ICD-10-CM

## 2024-07-30 DIAGNOSIS — I42.8 NONISCHEMIC CARDIOMYOPATHY (HCC): ICD-10-CM

## 2024-07-30 DIAGNOSIS — I44.7 LBBB (LEFT BUNDLE BRANCH BLOCK): ICD-10-CM

## 2024-07-30 DIAGNOSIS — E11.65 TYPE 2 DIABETES MELLITUS WITH HYPERGLYCEMIA, WITH LONG-TERM CURRENT USE OF INSULIN (HCC): ICD-10-CM

## 2024-07-30 DIAGNOSIS — Z79.4 TYPE 2 DIABETES MELLITUS WITH HYPERGLYCEMIA, WITH LONG-TERM CURRENT USE OF INSULIN (HCC): ICD-10-CM

## 2024-07-30 DIAGNOSIS — E66.01 MORBID OBESITY (HCC): ICD-10-CM

## 2024-07-30 PROCEDURE — 3074F SYST BP LT 130 MM HG: CPT | Performed by: INTERNAL MEDICINE

## 2024-07-30 PROCEDURE — 3078F DIAST BP <80 MM HG: CPT | Performed by: INTERNAL MEDICINE

## 2024-07-30 PROCEDURE — 1036F TOBACCO NON-USER: CPT | Performed by: INTERNAL MEDICINE

## 2024-07-30 PROCEDURE — G8427 DOCREV CUR MEDS BY ELIG CLIN: HCPCS | Performed by: INTERNAL MEDICINE

## 2024-07-30 PROCEDURE — 99214 OFFICE O/P EST MOD 30 MIN: CPT | Performed by: INTERNAL MEDICINE

## 2024-07-30 PROCEDURE — 3044F HG A1C LEVEL LT 7.0%: CPT | Performed by: INTERNAL MEDICINE

## 2024-07-30 PROCEDURE — 1123F ACP DISCUSS/DSCN MKR DOCD: CPT | Performed by: INTERNAL MEDICINE

## 2024-07-30 PROCEDURE — G8417 CALC BMI ABV UP PARAM F/U: HCPCS | Performed by: INTERNAL MEDICINE

## 2024-07-30 NOTE — PATIENT INSTRUCTIONS
CHANGE AT ANY AGE CAN POTENTIALLY INCREASE YOUR LIFE SPAN:    A sustained change from a typical Western to a predominantly plant based diet     At age 20 --Women may added on average 10.7 years, Men 13 years                         At age 60 --Women >= 8 years, Men >= 8.8 years                         At age 80--Women and Men > 3.4 years    The largest gains are seen from eating:    More legumes, whole grains, and nuts    Less red meat and processed meats    Google \"Food As Medicine Jumpstart\" for a free online pamphlet from the [employer] Lifestyle [employer]:  https://lifestylemedicine.org/wp-content/uploads/2024/01/ACLM-Food-As-Medicine-Jumpstart-8.5x11.pdf    Please visit www.cardiosmart.org for more information regarding cardiovascular disease prevention and treatment.

## 2024-08-09 ENCOUNTER — HOSPITAL ENCOUNTER (OUTPATIENT)
Dept: PHYSICAL THERAPY | Age: 79
Setting detail: RECURRING SERIES
Discharge: HOME | End: 2024-08-12
Payer: MEDICARE

## 2024-08-09 DIAGNOSIS — R26.81 UNSTEADINESS ON FEET: ICD-10-CM

## 2024-08-09 DIAGNOSIS — M62.830 MUSCLE SPASM OF BACK: ICD-10-CM

## 2024-08-09 DIAGNOSIS — M79.661 PAIN IN RIGHT LOWER LEG: ICD-10-CM

## 2024-08-09 DIAGNOSIS — M54.51 VERTEBROGENIC LOW BACK PAIN: Primary | ICD-10-CM

## 2024-08-09 PROCEDURE — 97110 THERAPEUTIC EXERCISES: CPT

## 2024-08-09 PROCEDURE — 97140 MANUAL THERAPY 1/> REGIONS: CPT

## 2024-08-09 PROCEDURE — 97162 PT EVAL MOD COMPLEX 30 MIN: CPT

## 2024-08-09 NOTE — THERAPY EVALUATION
Training, Home Exercise Program (HEP), Manual Therapy, Neuromuscular Re-education/Strengthening, Pain Management, Positioning, Range of Motion (ROM), Stair Training, Therapeutic Activites, Therapeutic Exercise/Strengthening, Transfer Training, Equipment Evaluation, Education, and Procurement, Patient/Caregiver Education & Training, Safety Education and Training, ADL/Self-Care Training, IADL Training, and Dry Needling   Goals: (Goals have been discussed and agreed upon with patient.)  Short-Term Functional Goals: Time Frame: 8 wks +  Patient will demonstrate HEP with correct technique.  Patient will report 0/10 numbness in the right foot with greater than 15 minutes of standing.  Patient will report less than 3/10 pain with full lumbar flexion rom.  Discharge Goals: Time Frame: 12 wks +  Patient will demonstrate progressive HEP with proper technique.  Patient will report zero numbness in the right LE with standing at [employer].  Patient will report 0/10 pain with lumbar seated flexion to tie his shoes.          Medical Necessity:   > Patient is expected to demonstrate progress in strength, range of motion, balance, coordination, and functional technique to improve pain in the low back and numbness in the right foot so he can perform ADLs and community activities without difficulty.  Reason For Services/Other Comments:  > Patient presents with lumbar stenosis and associated neurogenic claudication which is limiting his function. Patient needs skilled therapy to improve his QOL, functional mobility, and standing capability.      Regarding Rodolfo Buckner's therapy, I certify that the treatment plan above will be carried out by a therapist or under their direction.  Thank you for this referral,  JOSE MANUEL BATISTA PT     Referring Physician Signature: Robert Simon APRN* _______________________________ Date _____________        Charge Capture  Events  Appt Desk  Attendance Report

## 2024-08-12 ASSESSMENT — PAIN SCALES - GENERAL: PAINLEVEL_OUTOF10: 0

## 2024-08-12 NOTE — PROGRESS NOTES
grid below to improve mobility, strength, balance and coordination. Required minimal verbal and manual cues to promote proper body alignment, promote proper body posture and promote proper body mechanics. Progressed resistance, range, repetitions and complexity of movement as indicated.  MANUAL THERAPY: (see below for minutes): Joint mobilization and Soft tissue mobilization was utilized and necessary because of the patient's restricted joint motion, painful spasm, loss of articular motion and restricted motion of soft tissue.   MODALITIES: (see below for minutes): to decrease pain   SELF CARE: (see below for minutes): Procedure(s) (per grid) utilized to improve and/or restore self-care/home management as related to dressing, bathing and grooming. Required minimal verbal cueing to facilitate activities of daily living skills and compensatory activities    Date: 8/12/24  VISIT 1  EVAL       Modalities:                                Therapeutic Exercise: 15 min        Review of HEP, poc, anatomy, pathology        Seated lumbar flexion with ball x 30                                       Proprioceptive Activities:                                Manual Therapy: 15 min        STM bilateral lumbar spine paraspinals               Functional Activities:                                            Treatment/Session Summary:    Treatment Assessment:   SEE EVALUATION NOTE  Communication/Consultation:  Spoke with PATIENT in regards to PT POC.  Equipment provided today:  HEP  Recommendations/Intent for next treatment session: Next visit will focus on LUMBAR MOBILITY, GLUTEAL STRENGTH.    >Total Treatment Billable Duration:  45 minutes   Time In: 0915  Time Out: 1000    VAHE RUIZ PT         Charge Capture  Events  [employer] Portal  Appt Desk  Attendance Report     Future Appointments   Date Time Provider Department Center   8/14/2024 11:00 AM Robert Simon APRN - CNP [facility]   8/16/2024 11:30 AM Vahe Ruiz, PT

## 2024-08-14 ENCOUNTER — OFFICE VISIT (OUTPATIENT)
Age: 79
End: 2024-08-14
Payer: MEDICARE

## 2024-08-14 DIAGNOSIS — M54.16 LUMBAR RADICULOPATHY: ICD-10-CM

## 2024-08-14 DIAGNOSIS — M54.50 LOW BACK PAIN, UNSPECIFIED BACK PAIN LATERALITY, UNSPECIFIED CHRONICITY, UNSPECIFIED WHETHER SCIATICA PRESENT: ICD-10-CM

## 2024-08-14 DIAGNOSIS — M51.36 DDD (DEGENERATIVE DISC DISEASE), LUMBAR: Primary | ICD-10-CM

## 2024-08-14 PROCEDURE — 1123F ACP DISCUSS/DSCN MKR DOCD: CPT | Performed by: NURSE PRACTITIONER

## 2024-08-14 PROCEDURE — 99213 OFFICE O/P EST LOW 20 MIN: CPT | Performed by: NURSE PRACTITIONER

## 2024-08-14 PROCEDURE — G8417 CALC BMI ABV UP PARAM F/U: HCPCS | Performed by: NURSE PRACTITIONER

## 2024-08-14 PROCEDURE — G8428 CUR MEDS NOT DOCUMENT: HCPCS | Performed by: NURSE PRACTITIONER

## 2024-08-14 PROCEDURE — 1036F TOBACCO NON-USER: CPT | Performed by: NURSE PRACTITIONER

## 2024-08-14 NOTE — PROGRESS NOTES
10/2022    GERD (gastroesophageal reflux disease)     managed with medication     Gout     managed with medication     H/O echocardiogram 10/14/2019    EF 60.1%    Hip pain, bilateral     POA    Hyperlipidemia     Hypertension     managed with medication     Morbid obesity ([facility])     Neuropathy     feet and legs    MICHELL on CPAP 08/26/2014    uses nightly    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 Raj pacemaker/defibrillator placed 6/25/14--0 shocks     Psychiatric disorder     DEPRESSION    Routine eye exam 2020    no diabetic retinopathy- [place]    Spinal stenosis of lumbar region     Stage 3b chronic kidney disease ([facility])     Type 2 diabetes mellitus with hyperglycemia, with long-term current use of insulin ([facility]) 09/23/2020    managed with oral medication and Tresiba, average 's, no problems with hypoglycemia, A1c 6.5 on 8/24/2022     Tobacco:  reports that he has never smoked. He has never used smokeless tobacco.  Alcohol:   Social History     Substance and Sexual Activity   Alcohol Use No        Pertinent Physical Exam:           Radiographic Studies:       MRI Results (most recent):      Assessment/Plan:        ICD-10-CM    1. DDD (degenerative disc disease), lumbar  M51.36 MRI LUMBAR SPINE WO CONTRAST      2. Lumbar radiculopathy  M54.16 MRI LUMBAR SPINE WO CONTRAST      3. Low back pain, unspecified back pain laterality, unspecified chronicity, unspecified whether sciatica present  M54.50 MRI LUMBAR SPINE WO CONTRAST           Patient symptoms are consistent with neurogenic claudication.  Unfortunately he still having these episodes where he gets weakness and numbness.  I would recommend that we pursue either an MRI of the lumbar spine if his ICD allows us versus a CT myelogram.  I discussed with patient that we will send the order over to the hospital.  For any reason his leads are not compatible then he would have to undergo CT myelogram.  Patient verbalized

## 2024-08-16 ENCOUNTER — HOSPITAL ENCOUNTER (OUTPATIENT)
Dept: PHYSICAL THERAPY | Age: 79
Setting detail: RECURRING SERIES
Discharge: HOME | End: 2024-08-19
Payer: MEDICARE

## 2024-08-16 PROCEDURE — 97110 THERAPEUTIC EXERCISES: CPT

## 2024-08-16 PROCEDURE — 97140 MANUAL THERAPY 1/> REGIONS: CPT

## 2024-08-16 NOTE — PROGRESS NOTES
Rodolfo Buckner  : 1945  Primary: Medicare Part A And B (Medicare)  Secondary: ANTHEM MEDICARE SUPP [facility] @ [place] [address] [place] [address]  Phone: [phone]  Fax: [phone] Plan Frequency: 1-2 sessions per week    Plan of Care/Certification Expiration Date: 11/10/24        Plan of Care/Certification Expiration Date:  Plan of Care/Certification Expiration Date: 11/10/24    Frequency/Duration:   Plan Frequency: 1-2 sessions per week      Time In/Out:   Time In: 1130  Time Out: 1215      PT Visit Info:         Visit Count:  2    OUTPATIENT PHYSICAL THERAPY:   Treatment Note 2024       Episode  (right leg numbness and low back pain)               Treatment Diagnosis:    Vertebrogenic low back pain  Muscle spasm of back  Pain in right lower leg  Unsteadiness on feet  Medical/Referring Diagnosis:    Low back pain, unspecified back pain laterality, unspecified chronicity, unspecified whether sciati*  Lumbar radiculopathy  Lumbar degenerative disc disease      Referring Physician:  Robert Simon APRN - CNP  MD Orders:  PT Eval and Treat   Return MD Appt:  TBD   Date of Onset:  Onset Date: 24     Allergies:   Patient has no known allergies.  Restrictions/Precautions:   PACE MAKER PRECAUTIONS      Interventions Planned (Treatment may consist of any combination of the following):     See Assessment Note    Subjective Comments:   Patient notes that on Tuesday he was sitting for several hours. Patient notes he has had a crazy week and has not been doing a lot of the HEP.     Initial Pain Level::     4 /10  Post Session Pain Level:       4 /10  Medications Last Reviewed:  2024  Updated Objective Findings:      24:   Sit to supine transfer - difficult and need of mod I assist  Supine hooklying : painful  - needs foot rest to ease low back pain  Seated lumbar flexion: 4-5/10 pain, range improves with repetition  Cervical rotation right: painful,

## 2024-08-19 ENCOUNTER — HOSPITAL ENCOUNTER (OUTPATIENT)
Dept: PHYSICAL THERAPY | Age: 79
Setting detail: RECURRING SERIES
Discharge: HOME | End: 2024-08-22
Payer: MEDICARE

## 2024-08-19 PROCEDURE — 97140 MANUAL THERAPY 1/> REGIONS: CPT

## 2024-08-19 PROCEDURE — 97110 THERAPEUTIC EXERCISES: CPT

## 2024-08-19 NOTE — PROGRESS NOTES
Rodolfo Buckner  : 1945  Primary: Medicare Part A And B (Medicare)  Secondary: ANTHEM MEDICARE SUPP [facility] @ [place] [address] [place] [address]  Phone: [phone]  Fax: [phone] Plan Frequency: 1-2 sessions per week    Plan of Care/Certification Expiration Date: 11/10/24        Plan of Care/Certification Expiration Date:  Plan of Care/Certification Expiration Date: 11/10/24    Frequency/Duration:   Plan Frequency: 1-2 sessions per week      Time In/Out:   Time In: 930  Time Out: 1015      PT Visit Info:         Visit Count:  3    OUTPATIENT PHYSICAL THERAPY:   Treatment Note 2024       Episode  (right leg numbness and low back pain)               Treatment Diagnosis:    Vertebrogenic low back pain  Muscle spasm of back  Pain in right lower leg  Unsteadiness on feet  Medical/Referring Diagnosis:    Low back pain, unspecified back pain laterality, unspecified chronicity, unspecified whether sciati*  Lumbar radiculopathy  Lumbar degenerative disc disease      Referring Physician:  Robert Simon APRN - CNP MD Orders:  PT Eval and Treat   Return MD Appt:  TBD   Date of Onset:  Onset Date: 24     Allergies:   Patient has no known allergies.  Restrictions/Precautions:   PACE MAKER PRECAUTIONS      Interventions Planned (Treatment may consist of any combination of the following):     See Assessment Note    Subjective Comments:   Patient notes that he continues to have numbness in the right lower leg after standing for 7-8 minutes in [employer].     Initial Pain Level::     4 /10  Post Session Pain Level:       4 /10  Medications Last Reviewed:  2024  Updated Objective Findings:      24:   Sit to supine transfer - difficult and need of mod I assist  Supine hooklying : painful  - needs foot rest to ease low back pain  Seated lumbar flexion: 4-5/10 pain, range improves with repetition  Cervical rotation right: painful, restricted (pain on right mid

## 2024-08-21 ENCOUNTER — HOSPITAL ENCOUNTER (OUTPATIENT)
Dept: PHYSICAL THERAPY | Age: 79
Setting detail: RECURRING SERIES
Discharge: HOME | End: 2024-08-24
Payer: MEDICARE

## 2024-08-21 PROCEDURE — 97110 THERAPEUTIC EXERCISES: CPT

## 2024-08-21 NOTE — PROGRESS NOTES
Rodolfo Buckner  : 1945  Primary: Medicare Part A And B (Medicare)  Secondary: ANTHEM MEDICARE SUPP [facility] @ [place] [address] [place] [address]  Phone: [phone]  Fax: [phone] Plan Frequency: 1-2 sessions per week    Plan of Care/Certification Expiration Date: 11/10/24        Plan of Care/Certification Expiration Date:  Plan of Care/Certification Expiration Date: 11/10/24    Frequency/Duration:   Plan Frequency: 1-2 sessions per week      Time In/Out:   Time In: 1355  Time Out: 1440      PT Visit Info:         Visit Count:  4    OUTPATIENT PHYSICAL THERAPY:   Treatment Note 2024       Episode  (right leg numbness and low back pain)               Treatment Diagnosis:    Vertebrogenic low back pain  Muscle spasm of back  Pain in right lower leg  Unsteadiness on feet  Medical/Referring Diagnosis:    Low back pain, unspecified back pain laterality, unspecified chronicity, unspecified whether sciati*  Lumbar radiculopathy  Lumbar degenerative disc disease      Referring Physician:  Robert Simon APRN - CNP  MD Orders:  PT Eval and Treat   Return MD Appt:  TBD   Date of Onset:  Onset Date: 24     Allergies:   Patient has no known allergies.  Restrictions/Precautions:   PACE MAKER PRECAUTIONS      Interventions Planned (Treatment may consist of any combination of the following):     See Assessment Note    Subjective Comments:   Patient reports moderate R LE pain and attributes some of the pain to knee arthritis. He reports soreness from therapy last visit.    Initial Pain Level::    moderate R LE pain  Post Session Pain Level:      no increase reported  Medications Last Reviewed:  2024  Updated Objective Findings:      24:   Sit to supine transfer - difficult and need of mod I assist  Supine hooklying : painful  - needs foot rest to ease low back pain  Seated lumbar flexion: 4-5/10 pain, range improves with repetition  Cervical rotation right:

## 2024-08-23 ENCOUNTER — APPOINTMENT (OUTPATIENT)
Dept: PHYSICAL THERAPY | Age: 79
End: 2024-08-23
Payer: MEDICARE

## 2024-08-26 DIAGNOSIS — I10 PRIMARY HYPERTENSION: ICD-10-CM

## 2024-08-26 DIAGNOSIS — R41.3 MEMORY CHANGES: ICD-10-CM

## 2024-08-26 RX ORDER — ALLOPURINOL 300 MG/1
300 TABLET ORAL DAILY
Qty: 90 TABLET | Refills: 1 | Status: SHIPPED | OUTPATIENT
Start: 2024-08-26

## 2024-08-26 RX ORDER — DONEPEZIL HYDROCHLORIDE 10 MG/1
10 TABLET, FILM COATED ORAL NIGHTLY
Qty: 90 TABLET | Refills: 1 | Status: SHIPPED | OUTPATIENT
Start: 2024-08-26

## 2024-08-26 RX ORDER — VALSARTAN AND HYDROCHLOROTHIAZIDE 320; 25 MG/1; MG/1
1 TABLET, FILM COATED ORAL DAILY
Qty: 90 TABLET | Refills: 1 | Status: SHIPPED | OUTPATIENT
Start: 2024-08-26

## 2024-08-26 RX ORDER — AMITRIPTYLINE HYDROCHLORIDE 10 MG/1
10 TABLET ORAL NIGHTLY
Qty: 90 TABLET | Refills: 1 | Status: SHIPPED | OUTPATIENT
Start: 2024-08-26

## 2024-08-26 RX ORDER — CELECOXIB 200 MG/1
200 CAPSULE ORAL DAILY
Qty: 90 CAPSULE | Refills: 1 | Status: SHIPPED | OUTPATIENT
Start: 2024-08-26

## 2024-08-26 RX ORDER — GABAPENTIN 300 MG/1
CAPSULE ORAL
Qty: 270 CAPSULE | Refills: 1 | Status: SHIPPED | OUTPATIENT
Start: 2024-08-26 | End: 2025-05-27

## 2024-08-27 ENCOUNTER — HOSPITAL ENCOUNTER (OUTPATIENT)
Dept: PHYSICAL THERAPY | Age: 79
Setting detail: RECURRING SERIES
Discharge: HOME | End: 2024-08-30
Payer: MEDICARE

## 2024-08-27 PROCEDURE — 97110 THERAPEUTIC EXERCISES: CPT

## 2024-08-27 NOTE — PROGRESS NOTES
Rodolfo Buckner  : 1945  Primary: Medicare Part A And B (Medicare)  Secondary: ANTHEM MEDICARE SUPP [facility] @ [place] [address] [place] [address]  Phone: [phone]  Fax: [phone] Plan Frequency: 1-2 sessions per week    Plan of Care/Certification Expiration Date: 11/10/24        Plan of Care/Certification Expiration Date:  Plan of Care/Certification Expiration Date: 11/10/24    Frequency/Duration:   Plan Frequency: 1-2 sessions per week      Time In/Out:   Time In: 1100  Time Out: 1142      PT Visit Info:         Visit Count:  5    OUTPATIENT PHYSICAL THERAPY:   Treatment Note 2024       Episode  (right leg numbness and low back pain)               Treatment Diagnosis:    Vertebrogenic low back pain  Muscle spasm of back  Pain in right lower leg  Unsteadiness on feet  Medical/Referring Diagnosis:    Low back pain, unspecified back pain laterality, unspecified chronicity, unspecified whether sciati*  Lumbar radiculopathy  Lumbar degenerative disc disease      Referring Physician:  Robert Simon APRN - CNP MD Orders:  PT Eval and Treat   Return MD Appt:  TBD   Date of Onset:  Onset Date: 24     Allergies:   Patient has no known allergies.  Restrictions/Precautions:   PACE MAKER PRECAUTIONS      Interventions Planned (Treatment may consist of any combination of the following):     See Assessment Note    Subjective Comments:   Patient reports no change in pain. Pt states that he had an injection in the leg and shoulder yesterday.    Initial Pain Level::    moderate R LE pain  Post Session Pain Level:      no increase reported  Medications Last Reviewed:  2024  Updated Objective Findings:      24:   Sit to supine transfer - difficult and need of mod I assist  Supine hooklying : painful  - needs foot rest to ease low back pain  Seated lumbar flexion: 4-5/10 pain, range improves with repetition  Cervical rotation right: painful, restricted (pain  x lvl 1 x 5 min Stepper bike x5 min level 3    Standing stability ball roll outs x20 forward    Standing L and R stability ball roll outs x20 Stepper bike x5 min    Supine with wedge:  X30 B lower trunk rotation    Seated lumbar flexion with ball x 30  Scapular retraction x 30    2 x 10 x 15# cable row with forward lean  Supine:  SKTC 10 sec hold x6 B with assist    B hamstring stretch 30 sec hold x2 with strap Single knee to chest 10 sec hold x6 with strap    B hamstring stretch with strap 30 sec hold x 4       Lower trunk rotation small range x30 B Posterior pelvic tilt x15 with tactile cues                           Proprioceptive Activities:                                Manual Therapy: 15 min 30 min 15 min      STM bilateral lumbar spine paraspinals STM bilateral lumbar spine paraspinals    Supine hip PPM flexion, ER grade 3- needed bolster under feet due to tightness in the low back    STM right UT, cervical paraspinals    STM bilateral lumbar spine paraspinals    STM bilateral upper trap             Functional Activities:                                            Treatment/Session Summary:    Treatment Assessment:   Patient did not report increased pain and reported less discomfort with the wedge. He was advised not to over do exercises the next few days due to the recent injections. Continue POC.    Communication/Consultation:  Spoke with PATIENT in regards to PT POC.  Equipment provided today:  HEP  Recommendations/Intent for next treatment session: Next visit will focus on LUMBAR MOBILITY, GLUTEAL STRENGTH.    >Total Treatment Billable Duration:  40 minutes   Time In: 1100  Time Out: 1142    Holly Sears PTA         Charge Capture  Events  [employer] Portal  Appt Desk  Attendance Report     Future Appointments   Date Time Provider Department Center   8/30/2024  9:15 AM Vahe Ruiz, PT [facility]   9/4/2024  8:45 AM Holly Sears PTA [facility]   9/11/2024  9:30 AM Holly Sears PTA [facility]

## 2024-08-30 ENCOUNTER — APPOINTMENT (OUTPATIENT)
Dept: PHYSICAL THERAPY | Age: 79
End: 2024-08-30
Payer: MEDICARE

## 2024-09-04 ENCOUNTER — HOSPITAL ENCOUNTER (OUTPATIENT)
Dept: PHYSICAL THERAPY | Age: 79
Setting detail: RECURRING SERIES
Discharge: HOME | End: 2024-09-07
Payer: MEDICARE

## 2024-09-04 PROCEDURE — 97110 THERAPEUTIC EXERCISES: CPT
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
## 2024-09-11 ENCOUNTER — HOSPITAL ENCOUNTER (OUTPATIENT)
Dept: PHYSICAL THERAPY | Age: 79
Setting detail: RECURRING SERIES
Discharge: HOME | End: 2024-09-14
Payer: MEDICARE

## 2024-09-11 PROCEDURE — 97110 THERAPEUTIC EXERCISES: CPT

## 2024-09-12 ENCOUNTER — TELEPHONE (OUTPATIENT)
Dept: ORTHOPEDIC SURGERY | Age: 79
End: 2024-09-12

## 2024-09-12 DIAGNOSIS — M51.36 DDD (DEGENERATIVE DISC DISEASE), LUMBAR: ICD-10-CM

## 2024-09-12 DIAGNOSIS — M54.50 LOW BACK PAIN, UNSPECIFIED BACK PAIN LATERALITY, UNSPECIFIED CHRONICITY, UNSPECIFIED WHETHER SCIATICA PRESENT: ICD-10-CM

## 2024-09-12 DIAGNOSIS — M54.16 LUMBAR RADICULOPATHY: Primary | ICD-10-CM

## 2024-09-12 DIAGNOSIS — M50.30 DDD (DEGENERATIVE DISC DISEASE), CERVICAL: ICD-10-CM

## 2024-09-13 ENCOUNTER — HOSPITAL ENCOUNTER (OUTPATIENT)
Dept: PHYSICAL THERAPY | Age: 79
Setting detail: RECURRING SERIES
Discharge: HOME | End: 2024-09-16
Payer: MEDICARE

## 2024-09-13 PROCEDURE — 97110 THERAPEUTIC EXERCISES: CPT

## 2024-09-18 ENCOUNTER — APPOINTMENT (OUTPATIENT)
Dept: PHYSICAL THERAPY | Age: 79
End: 2024-09-18
Payer: MEDICARE

## 2024-09-20 ENCOUNTER — APPOINTMENT (OUTPATIENT)
Dept: PHYSICAL THERAPY | Age: 79
End: 2024-09-20
Payer: MEDICARE

## 2024-09-25 ENCOUNTER — HOSPITAL ENCOUNTER (OUTPATIENT)
Dept: PHYSICAL THERAPY | Age: 79
Setting detail: RECURRING SERIES
End: 2024-09-25
Payer: MEDICARE

## 2024-09-25 ENCOUNTER — HOSPITAL ENCOUNTER (OUTPATIENT)
Dept: PHYSICAL THERAPY | Age: 79
Setting detail: RECURRING SERIES
Discharge: HOME | End: 2024-09-28
Payer: MEDICARE

## 2024-09-25 PROCEDURE — 97110 THERAPEUTIC EXERCISES: CPT

## 2024-09-25 NOTE — PROGRESS NOTES
Rodolfo Buckner  : 1945  Primary: Medicare Part A And B (Medicare)  Secondary: ANTHEM MEDICARE SUPP [facility] @ [place] [address] [place] [address]  Phone: [phone]  Fax: [phone] Plan Frequency: 1-2 sessions per week    Plan of Care/Certification Expiration Date: 11/10/24        Plan of Care/Certification Expiration Date:  Plan of Care/Certification Expiration Date: 11/10/24    Frequency/Duration:   Plan Frequency: 1-2 sessions per week      Time In/Out:          PT Visit Info:         Visit Count:  9    OUTPATIENT PHYSICAL THERAPY:   Treatment Note 2024       Episode  (right leg numbness and low back pain)               Treatment Diagnosis:    Vertebrogenic low back pain  Muscle spasm of back  Pain in right lower leg  Unsteadiness on feet  Medical/Referring Diagnosis:    Low back pain, unspecified back pain laterality, unspecified chronicity, unspecified whether sciati*  Lumbar radiculopathy  Lumbar degenerative disc disease      Referring Physician:  Robert Simon APRN - CNP MD Orders:  PT Eval and Treat   Return MD Appt:  TBD   Date of Onset:  Onset Date: 24     Allergies:   Patient has no known allergies.  Restrictions/Precautions:   PACE MAKER PRECAUTIONS      Interventions Planned (Treatment may consist of any combination of the following):     See Assessment Note    Subjective Comments:   Standing 8 min in [employer] continues to lead to numbness. Walking is still ok. Notes no worsening. Notes the numbness is from the right knee down to the foot and he notes that it can start on the left.    Initial Pain Level::    0/10  Post Session Pain Level:      0/10  Medications Last Reviewed:  2024  Updated Objective Findings:      24:   Sit to supine transfer - difficult and need of mod I assist  Supine hooklying : painful  - needs foot rest to ease low back pain  Seated lumbar flexion: 4-5/10 pain, range improves with repetition  Cervical

## 2024-09-27 ENCOUNTER — APPOINTMENT (OUTPATIENT)
Dept: PHYSICAL THERAPY | Age: 79
End: 2024-09-27
Payer: MEDICARE

## 2024-09-30 ENCOUNTER — APPOINTMENT (OUTPATIENT)
Dept: PHYSICAL THERAPY | Age: 79
End: 2024-09-30
Payer: MEDICARE

## 2024-10-01 DIAGNOSIS — I10 PRIMARY HYPERTENSION: ICD-10-CM

## 2024-10-01 RX ORDER — VALSARTAN AND HYDROCHLOROTHIAZIDE 320; 25 MG/1; MG/1
1 TABLET, FILM COATED ORAL DAILY
Qty: 90 TABLET | Refills: 1 | Status: SHIPPED | OUTPATIENT
Start: 2024-10-01

## 2024-10-08 NOTE — PROGRESS NOTES
Patient phone: [phone] (home)       Primary Insurance: Payor: MEDICARE / Plan: MEDICARE PART A AND B / Product Type: *No Product type* /   Subscriber ID: [id number] - (Medicare)      AMB Supply Order  Order Details     DME Location:   Order Date: 10/10/2024                (  X   )Supplies Needed       cpap Machine   (     ) CPAP Unit  (     ) Auto CPAP Unit  (     ) BiLevel Unit  (     ) Auto BiLevel Unit  (     ) ASV        (     ) Bilevel ST      Length of need: 12 months    Pressure:  8 cmH20  EPR:     Starting Ramp Pressure:   cm H20  Ramp Time: min        Patient had a diagnostic Apnea Hypopnea Index (AHI) of :    *SUPPLIES* Replace all as needed, or per coverage guidelines     Masks Type:  (    ) -Full Face Mask (1 per 3 mon)  (    ) -Full Mask (1 per month) Interface/Cushion      ( x ) -Nasal Mask (1 per 3 mon)  ( x  ) - Nasal Mask (1 per month) Interface/Cushion  (  x   ) -Pillow (2 per mon)  (     ) -Ybwlywcib (1 per 6 mon)            Other Supplies:    (   X  )-Ozdvhdkj (1 per 6 mon)  (   X  )-Bhvuql Tubing (1 per 3 mon)  (   X  )- Disposable Filter (2 per mon)  (   x  )-Cwzkgb Humidifier (1 per year)     (  x   )-Npmnoybuw (sometimes used with Full Face Mask) (1 per 6 mos)  (    )-Tubing-without heat (1 per 3 mos)  (     )-Non-Disposable Filter (1 per 6 mos)  (  x   )-Water Chamber (1 per 6 mos)  (     )-Humidifier non-heated (1 per 5 yrs)      Signed Date: 10/10/2024  Electronically Signed By: ANN Rose CNP  Electronically Dated:  10/10/2024     No orders of the defined types were placed in this encounter.         Collaborating Physician: Dr. Humble PETERSEN spent at least 22 minutes with this established patient, and >50% of the time was spent counseling and/or coordinating care regarding ayan.        Kavya Briceno, APRN - CNP  Electronically signed

## 2024-10-10 ENCOUNTER — TELEMEDICINE (OUTPATIENT)
Dept: SLEEP MEDICINE | Age: 79
End: 2024-10-10
Payer: MEDICARE

## 2024-10-10 DIAGNOSIS — G47.33 OSA ON CPAP: Primary | ICD-10-CM

## 2024-10-10 PROCEDURE — G8427 DOCREV CUR MEDS BY ELIG CLIN: HCPCS | Performed by: NURSE PRACTITIONER

## 2024-10-10 PROCEDURE — 1123F ACP DISCUSS/DSCN MKR DOCD: CPT | Performed by: NURSE PRACTITIONER

## 2024-10-10 PROCEDURE — 99213 OFFICE O/P EST LOW 20 MIN: CPT | Performed by: NURSE PRACTITIONER

## 2024-10-10 ASSESSMENT — SLEEP AND FATIGUE QUESTIONNAIRES
HOW LIKELY ARE YOU TO NOD OFF OR FALL ASLEEP WHILE SITTING QUIETLY AFTER LUNCH WITHOUT ALCOHOL: SLIGHT CHANCE OF DOZING
HOW LIKELY ARE YOU TO NOD OFF OR FALL ASLEEP WHEN YOU ARE A PASSENGER IN A CAR FOR AN HOUR WITHOUT A BREAK: WOULD NEVER DOZE
HOW LIKELY ARE YOU TO NOD OFF OR FALL ASLEEP WHILE WATCHING TV: WOULD NEVER DOZE
HOW LIKELY ARE YOU TO NOD OFF OR FALL ASLEEP WHILE SITTING INACTIVE IN A PUBLIC PLACE: WOULD NEVER DOZE
HOW LIKELY ARE YOU TO NOD OFF OR FALL ASLEEP WHILE SITTING AND TALKING TO SOMEONE: WOULD NEVER DOZE
HOW LIKELY ARE YOU TO NOD OFF OR FALL ASLEEP IN A CAR, WHILE STOPPED FOR A FEW MINUTES IN TRAFFIC: WOULD NEVER DOZE
ESS TOTAL SCORE: 1
HOW LIKELY ARE YOU TO NOD OFF OR FALL ASLEEP WHILE SITTING AND READING: WOULD NEVER DOZE

## 2024-10-23 DIAGNOSIS — E78.2 MIXED HYPERLIPIDEMIA: ICD-10-CM

## 2024-10-23 DIAGNOSIS — I10 PRIMARY HYPERTENSION: ICD-10-CM

## 2024-10-23 DIAGNOSIS — Z12.5 PROSTATE CANCER SCREENING: ICD-10-CM

## 2024-10-23 DIAGNOSIS — M1A.9XX0 CHRONIC GOUT WITHOUT TOPHUS, UNSPECIFIED CAUSE, UNSPECIFIED SITE: ICD-10-CM

## 2024-10-23 DIAGNOSIS — E11.65 TYPE 2 DIABETES MELLITUS WITH HYPERGLYCEMIA, WITH LONG-TERM CURRENT USE OF INSULIN (HCC): ICD-10-CM

## 2024-10-23 DIAGNOSIS — N18.32 STAGE 3B CHRONIC KIDNEY DISEASE (HCC): ICD-10-CM

## 2024-10-23 DIAGNOSIS — Z79.4 TYPE 2 DIABETES MELLITUS WITH HYPERGLYCEMIA, WITH LONG-TERM CURRENT USE OF INSULIN (HCC): ICD-10-CM

## 2024-10-23 LAB
ALBUMIN SERPL-MCNC: 3.8 G/DL (ref 3.2–4.6)
ALBUMIN/GLOB SERPL: 1.2 (ref 1–1.9)
ALP SERPL-CCNC: 86 U/L (ref 40–129)
ALT SERPL-CCNC: 22 U/L (ref 8–55)
ANION GAP SERPL CALC-SCNC: 11 MMOL/L (ref 9–18)
AST SERPL-CCNC: 21 U/L (ref 15–37)
BASOPHILS # BLD: 0.1 K/UL (ref 0–0.2)
BASOPHILS NFR BLD: 1 % (ref 0–2)
BILIRUB SERPL-MCNC: 0.3 MG/DL (ref 0–1.2)
BUN SERPL-MCNC: 26 MG/DL (ref 8–23)
CALCIUM SERPL-MCNC: 10.2 MG/DL (ref 8.8–10.2)
CHLORIDE SERPL-SCNC: 104 MMOL/L (ref 98–107)
CHOLEST SERPL-MCNC: 162 MG/DL (ref 0–200)
CO2 SERPL-SCNC: 26 MMOL/L (ref 20–28)
CREAT SERPL-MCNC: 1.29 MG/DL (ref 0.8–1.3)
DIFFERENTIAL METHOD BLD: ABNORMAL
EOSINOPHIL # BLD: 0.1 K/UL (ref 0–0.8)
EOSINOPHIL NFR BLD: 1 % (ref 0.5–7.8)
ERYTHROCYTE [DISTWIDTH] IN BLOOD BY AUTOMATED COUNT: 15.6 % (ref 11.9–14.6)
EST. AVERAGE GLUCOSE BLD GHB EST-MCNC: 168 MG/DL
GLOBULIN SER CALC-MCNC: 3.2 G/DL (ref 2.3–3.5)
GLUCOSE SERPL-MCNC: 81 MG/DL (ref 70–99)
HBA1C MFR BLD: 7.5 % (ref 0–5.6)
HCT VFR BLD AUTO: 41.3 % (ref 41.1–50.3)
HDLC SERPL-MCNC: 44 MG/DL (ref 40–60)
HDLC SERPL: 3.6 (ref 0–5)
HGB BLD-MCNC: 12.7 G/DL (ref 13.6–17.2)
IMM GRANULOCYTES # BLD AUTO: 0 K/UL (ref 0–0.5)
IMM GRANULOCYTES NFR BLD AUTO: 1 % (ref 0–5)
LDLC SERPL CALC-MCNC: 81 MG/DL (ref 0–100)
LYMPHOCYTES # BLD: 2.1 K/UL (ref 0.5–4.6)
LYMPHOCYTES NFR BLD: 28 % (ref 13–44)
MCH RBC QN AUTO: 26.6 PG (ref 26.1–32.9)
MCHC RBC AUTO-ENTMCNC: 30.8 G/DL (ref 31.4–35)
MCV RBC AUTO: 86.4 FL (ref 82–102)
MONOCYTES # BLD: 0.6 K/UL (ref 0.1–1.3)
MONOCYTES NFR BLD: 8 % (ref 4–12)
NEUTS SEG # BLD: 4.7 K/UL (ref 1.7–8.2)
NEUTS SEG NFR BLD: 62 % (ref 43–78)
NRBC # BLD: 0 K/UL (ref 0–0.2)
PLATELET # BLD AUTO: 227 K/UL (ref 150–450)
PMV BLD AUTO: 10.9 FL (ref 9.4–12.3)
POTASSIUM SERPL-SCNC: 4.8 MMOL/L (ref 3.5–5.1)
PROT SERPL-MCNC: 7 G/DL (ref 6.3–8.2)
PSA SERPL-MCNC: 0.3 NG/ML (ref 0–4)
RBC # BLD AUTO: 4.78 M/UL (ref 4.23–5.6)
SODIUM SERPL-SCNC: 141 MMOL/L (ref 136–145)
TRIGL SERPL-MCNC: 184 MG/DL (ref 0–150)
URATE SERPL-MCNC: 5.3 MG/DL (ref 3.9–8.2)
VLDLC SERPL CALC-MCNC: 37 MG/DL (ref 6–23)
WBC # BLD AUTO: 7.6 K/UL (ref 4.3–11.1)

## 2024-10-28 ENCOUNTER — OFFICE VISIT (OUTPATIENT)
Dept: INTERNAL MEDICINE CLINIC | Facility: CLINIC | Age: 79
End: 2024-10-28
Payer: MEDICARE

## 2024-10-28 VITALS
DIASTOLIC BLOOD PRESSURE: 70 MMHG | BODY MASS INDEX: 37.92 KG/M2 | HEIGHT: 69 IN | WEIGHT: 256 LBS | SYSTOLIC BLOOD PRESSURE: 134 MMHG

## 2024-10-28 DIAGNOSIS — M15.0 PRIMARY OSTEOARTHRITIS INVOLVING MULTIPLE JOINTS: ICD-10-CM

## 2024-10-28 DIAGNOSIS — N18.32 ANEMIA DUE TO STAGE 3B CHRONIC KIDNEY DISEASE (HCC): Primary | ICD-10-CM

## 2024-10-28 DIAGNOSIS — D63.1 ANEMIA DUE TO STAGE 3B CHRONIC KIDNEY DISEASE (HCC): Primary | ICD-10-CM

## 2024-10-28 DIAGNOSIS — M1A.9XX0 CHRONIC GOUT WITHOUT TOPHUS, UNSPECIFIED CAUSE, UNSPECIFIED SITE: ICD-10-CM

## 2024-10-28 DIAGNOSIS — E78.2 MIXED HYPERLIPIDEMIA: ICD-10-CM

## 2024-10-28 DIAGNOSIS — E11.65 TYPE 2 DIABETES MELLITUS WITH HYPERGLYCEMIA, WITH LONG-TERM CURRENT USE OF INSULIN (HCC): ICD-10-CM

## 2024-10-28 DIAGNOSIS — I10 PRIMARY HYPERTENSION: ICD-10-CM

## 2024-10-28 DIAGNOSIS — G47.33 OSA ON CPAP: ICD-10-CM

## 2024-10-28 DIAGNOSIS — K21.9 GASTROESOPHAGEAL REFLUX DISEASE WITHOUT ESOPHAGITIS: ICD-10-CM

## 2024-10-28 DIAGNOSIS — E66.9 OBESITY (BMI 30-39.9): ICD-10-CM

## 2024-10-28 DIAGNOSIS — Z79.4 TYPE 2 DIABETES MELLITUS WITH HYPERGLYCEMIA, WITH LONG-TERM CURRENT USE OF INSULIN (HCC): ICD-10-CM

## 2024-10-28 PROCEDURE — 1160F RVW MEDS BY RX/DR IN RCRD: CPT | Performed by: INTERNAL MEDICINE

## 2024-10-28 PROCEDURE — 1159F MED LIST DOCD IN RCRD: CPT | Performed by: INTERNAL MEDICINE

## 2024-10-28 PROCEDURE — G8417 CALC BMI ABV UP PARAM F/U: HCPCS | Performed by: INTERNAL MEDICINE

## 2024-10-28 PROCEDURE — 99214 OFFICE O/P EST MOD 30 MIN: CPT | Performed by: INTERNAL MEDICINE

## 2024-10-28 PROCEDURE — 3078F DIAST BP <80 MM HG: CPT | Performed by: INTERNAL MEDICINE

## 2024-10-28 PROCEDURE — G8427 DOCREV CUR MEDS BY ELIG CLIN: HCPCS | Performed by: INTERNAL MEDICINE

## 2024-10-28 PROCEDURE — G8482 FLU IMMUNIZE ORDER/ADMIN: HCPCS | Performed by: INTERNAL MEDICINE

## 2024-10-28 PROCEDURE — 1123F ACP DISCUSS/DSCN MKR DOCD: CPT | Performed by: INTERNAL MEDICINE

## 2024-10-28 PROCEDURE — 90653 IIV ADJUVANT VACCINE IM: CPT | Performed by: INTERNAL MEDICINE

## 2024-10-28 PROCEDURE — 3075F SYST BP GE 130 - 139MM HG: CPT | Performed by: INTERNAL MEDICINE

## 2024-10-28 PROCEDURE — 1036F TOBACCO NON-USER: CPT | Performed by: INTERNAL MEDICINE

## 2024-10-28 PROCEDURE — 3051F HG A1C>EQUAL 7.0%<8.0%: CPT | Performed by: INTERNAL MEDICINE

## 2024-10-28 PROCEDURE — G0008 ADMIN INFLUENZA VIRUS VAC: HCPCS | Performed by: INTERNAL MEDICINE

## 2024-10-28 SDOH — ECONOMIC STABILITY: FOOD INSECURITY: WITHIN THE PAST 12 MONTHS, THE FOOD YOU BOUGHT JUST DIDN'T LAST AND YOU DIDN'T HAVE MONEY TO GET MORE.: NEVER TRUE

## 2024-10-28 SDOH — ECONOMIC STABILITY: INCOME INSECURITY: HOW HARD IS IT FOR YOU TO PAY FOR THE VERY BASICS LIKE FOOD, HOUSING, MEDICAL CARE, AND HEATING?: NOT HARD AT ALL

## 2024-10-28 SDOH — ECONOMIC STABILITY: FOOD INSECURITY: WITHIN THE PAST 12 MONTHS, YOU WORRIED THAT YOUR FOOD WOULD RUN OUT BEFORE YOU GOT MONEY TO BUY MORE.: NEVER TRUE

## 2024-10-28 ASSESSMENT — ENCOUNTER SYMPTOMS: SHORTNESS OF BREATH: 0
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
## 2024-10-29 ENCOUNTER — APPOINTMENT (RX ONLY)
Dept: URBAN - METROPOLITAN AREA CLINIC 330 | Facility: CLINIC | Age: 79
Setting detail: DERMATOLOGY
End: 2024-10-29

## 2024-10-29 DIAGNOSIS — L57.0 ACTINIC KERATOSIS: ICD-10-CM

## 2024-10-29 DIAGNOSIS — Z85.820 PERSONAL HISTORY OF MALIGNANT MELANOMA OF SKIN: ICD-10-CM

## 2024-10-29 DIAGNOSIS — Z85.828 PERSONAL HISTORY OF OTHER MALIGNANT NEOPLASM OF SKIN: ICD-10-CM

## 2024-10-29 DIAGNOSIS — D22 MELANOCYTIC NEVI: ICD-10-CM | Status: STABLE

## 2024-10-29 DIAGNOSIS — L82.1 OTHER SEBORRHEIC KERATOSIS: ICD-10-CM

## 2024-10-29 DIAGNOSIS — L57.8 OTHER SKIN CHANGES DUE TO CHRONIC EXPOSURE TO NONIONIZING RADIATION: ICD-10-CM

## 2024-10-29 PROBLEM — D22.5 MELANOCYTIC NEVI OF TRUNK: Status: ACTIVE | Noted: 2024-10-29

## 2024-10-29 PROCEDURE — 17003 DESTRUCT PREMALG LES 2-14: CPT

## 2024-10-29 PROCEDURE — ? TREATMENT REGIMEN

## 2024-10-29 PROCEDURE — ? OTHER

## 2024-10-29 PROCEDURE — 99214 OFFICE O/P EST MOD 30 MIN: CPT | Mod: 25

## 2024-10-29 PROCEDURE — ? PRESCRIPTION

## 2024-10-29 PROCEDURE — ? COUNSELING

## 2024-10-29 PROCEDURE — ? MEDICAL PHOTOGRAPHY REVIEW

## 2024-10-29 PROCEDURE — ? MDM - TREATMENT GOALS

## 2024-10-29 PROCEDURE — ? FULL BODY SKIN EXAM

## 2024-10-29 PROCEDURE — ? LIQUID NITROGEN

## 2024-10-29 PROCEDURE — ? PRESCRIPTION MEDICATION MANAGEMENT

## 2024-10-29 PROCEDURE — 17000 DESTRUCT PREMALG LESION: CPT

## 2024-10-29 RX ORDER — FLUOROURACIL 5 MG/G
CREAM TOPICAL
Qty: 40 | Refills: 1 | Status: ERX | COMMUNITY
Start: 2024-10-29

## 2024-10-29 RX ADMIN — FLUOROURACIL: 5 CREAM TOPICAL at 00:00

## 2024-10-29 ASSESSMENT — LOCATION DETAILED DESCRIPTION DERM
LOCATION DETAILED: LEFT ULNAR DORSAL HAND
LOCATION DETAILED: LEFT SUPERIOR CRUS OF ANTIHELIX
LOCATION DETAILED: LEFT PROXIMAL ULNAR DORSAL FOREARM
LOCATION DETAILED: RIGHT RADIAL DORSAL HAND
LOCATION DETAILED: LEFT CENTRAL LATERAL NECK
LOCATION DETAILED: RIGHT DISTAL DORSAL FOREARM
LOCATION DETAILED: LEFT CENTRAL ZYGOMA
LOCATION DETAILED: RIGHT INFERIOR NASAL CHEEK
LOCATION DETAILED: LEFT INFERIOR CENTRAL MALAR CHEEK
LOCATION DETAILED: RIGHT INFERIOR CENTRAL MALAR CHEEK
LOCATION DETAILED: LEFT DISTAL DORSAL FOREARM
LOCATION DETAILED: LEFT ANTERIOR EARLOBE
LOCATION DETAILED: RIGHT CENTRAL FRONTAL SCALP
LOCATION DETAILED: LEFT INFERIOR LATERAL FOREHEAD
LOCATION DETAILED: INFERIOR THORACIC SPINE
LOCATION DETAILED: RIGHT SUPERIOR CENTRAL MALAR CHEEK
LOCATION DETAILED: RIGHT CENTRAL BUCCAL CHEEK

## 2024-10-29 ASSESSMENT — LOCATION ZONE DERM
LOCATION ZONE: FACE
LOCATION ZONE: HAND
LOCATION ZONE: NECK
LOCATION ZONE: EAR
LOCATION ZONE: SCALP
LOCATION ZONE: TRUNK
LOCATION ZONE: ARM

## 2024-10-29 ASSESSMENT — LOCATION SIMPLE DESCRIPTION DERM
LOCATION SIMPLE: LEFT FOREHEAD
LOCATION SIMPLE: LEFT ZYGOMA
LOCATION SIMPLE: LEFT EAR
LOCATION SIMPLE: RIGHT FOREARM
LOCATION SIMPLE: RIGHT HAND
LOCATION SIMPLE: RIGHT SCALP
LOCATION SIMPLE: UPPER BACK
LOCATION SIMPLE: RIGHT CHEEK
LOCATION SIMPLE: LEFT HAND
LOCATION SIMPLE: LEFT FOREARM
LOCATION SIMPLE: NECK
LOCATION SIMPLE: LEFT CHEEK

## 2024-10-29 ASSESSMENT — TOTAL NUMBER OF LESIONS: # OF LESIONS?: 19

## 2024-10-29 NOTE — PROCEDURE: LIQUID NITROGEN
Show Applicator Variable?: Yes
Render Post-Care Instructions In Note?: no
Number Of Freeze-Thaw Cycles: 3 freeze-thaw cycles
Duration Of Freeze Thaw-Cycle (Seconds): 2
Consent: The patient's consent was obtained including but not limited to risks of crusting, scabbing, blistering, scarring, darker or lighter pigmentary change, recurrence, incomplete removal and infection.
Post-Care Instructions: I reviewed with the patient in detail post-care instructions. Patient is to wear sunprotection, and avoid picking at any of the treated lesions. Pt may apply Vaseline to crusted or scabbing areas.
Detail Level: Detailed

## 2024-10-29 NOTE — PROCEDURE: PRESCRIPTION MEDICATION MANAGEMENT
Render In Strict Bullet Format?: No
Initiate Treatment: fluorouracil 5 % topical cream \\nApply to face and ears bid x 2-4 weeks this fall/winter
Detail Level: Simple

## 2024-10-30 RX ORDER — ESCITALOPRAM OXALATE 10 MG/1
10 TABLET ORAL DAILY
Qty: 30 TABLET | Refills: 5 | Status: SHIPPED | OUTPATIENT
Start: 2024-10-30

## 2024-11-01 RX ORDER — GABAPENTIN 300 MG/1
CAPSULE ORAL
Qty: 270 CAPSULE | Refills: 2 | Status: SHIPPED | OUTPATIENT
Start: 2024-11-01 | End: 2025-08-01

## 2024-11-20 ENCOUNTER — TELEPHONE (OUTPATIENT)
Dept: ORTHOPEDIC SURGERY | Age: 79
End: 2024-11-20

## 2024-11-20 NOTE — TELEPHONE ENCOUNTER
GILMAR did lt tka appx 3 years ago and has also seen for rt knee. Is wanting to get an injection in rt knee however, he ahs been seeing Cona also for injections in rt knee. Please advise

## 2025-01-02 NOTE — Clinical Note
Encounter Date: 1/1/2025       History     Chief Complaint   Patient presents with    Influenza     Pt to ED from home with c/o flu symptoms. Pt states he tested positive for the flu earlier today, went home and states he had a syncopal episode around [id number] and was unconscious for approximately 30 seconds per pt's sister. Pt's sister states pt's sweater and shirts were drenched in sweat and pt was pale. Pt denies cp, n/v/d.     34yo M presents to ED with sister with chief complaint syncopal episode, viral URI.    Patient began on Sunday with congestion, rhinorrhea, sinus pain and pressure, myalgias; the following day he developed mostly nonproductive cough as well as fever and chills.  Admits to poor appetite and intake since onset of symptoms.  Denies any recent illness or known sick contacts.  No severe odynophagia.  Admits to worsening cough, substernal chest discomfort with cough, persistent myalgias.  Admits to fatigue, generally feeling unwell.  Began with loose stools this morning.  Admits to 4 episodes of watery loose stools today.  Denies severe abdominal pain.  Went to an urgent care this morning, tested positive for flu, was given Tamiflu as well as albuterol/budesonide, and some other medication.  Took the Tamiflu today, no other medications taken today.  No antipyretics or antitussives taken today.    This evening he was sitting down to eat, he felt nauseous, sister states he was pale and diaphoretic, he then began to slump and fall over in the chair---she caught him before he fell to the floor.  He was unconscious for less than 30 seconds.  No head turning, do not bite his tongue, no urinary or bladder incontinence.  No generalized shaking movements.  A bit drowsy following the incident, but oriented shortly after the incident.  No history of seizure disorder.  Denies history of syncopal episodes.  No cardiac or pulmonary issues.  No personal history of ACS.  No hypertension, hyperlipidemia, DM.   Patient ID band present and verified. Patient contact is in the lobby. Contact(s) present: spouse. Nonsmoker.  Possible family history of premature cardiac disease in his mom.  No known family history of sudden cardiac death.  No history of CHF.  Does admit to shortness of breath, especially during severe coughing fits.  No reported photophobia, no neck pain or stiffness.  No IV drug use.  No history of immunosuppression or autoimmune disease.    No exogenous estrogen.  No leg swelling or calf pain.  No history of VTE.      Daily rx: Descovy for PrEP    Last HIV test negative 1mo ago      Review of patient's allergies indicates:   Allergen Reactions    Latex, natural rubber Itching     History reviewed. No pertinent past medical history.  Past Surgical History:   Procedure Laterality Date    ANAL SPHINCTEROTOMY      CONDYLOMA EXCISION/FULGURATION  2016     No family history on file.  Social History     Tobacco Use    Smoking status: Never   Substance Use Topics    Alcohol use: No     Alcohol/week: 0.0 standard drinks of alcohol    Drug use: Never     Review of Systems   Constitutional:  Positive for activity change, appetite change, chills, diaphoresis, fatigue and fever.   HENT:  Positive for congestion and rhinorrhea.    Eyes:  Negative for discharge and redness.   Respiratory:  Positive for cough.    Cardiovascular:  Positive for chest pain. Negative for leg swelling.   Gastrointestinal:  Positive for diarrhea and nausea. Negative for abdominal pain, blood in stool and vomiting.   Genitourinary:  Negative for decreased urine volume.   Musculoskeletal:  Positive for myalgias. Negative for neck pain and neck stiffness.   Neurological:  Positive for syncope, light-headedness and headaches.       Physical Exam     Initial Vitals [01/01/25 2144]   BP Pulse Resp Temp SpO2   112/76 (!) 133 (!) 22 98.7 °F (37.1 °C) 97 %      MAP       --         Physical Exam    Nursing note and vitals reviewed.  Constitutional: He appears well-developed and well-nourished. He is not diaphoretic. No distress.   Ill appearing nontoxic.   Speaking in full sentences without pause or difficulty.  Occasional nonproductive cough during exam   HENT:   Head: Normocephalic and atraumatic.   Neck: Neck supple.   Normal range of motion.  Cardiovascular:  Intact distal pulses.           Sinus tachycardia.  No pretibial edema.  No unilateral swelling or calf tenderness.   Pulmonary/Chest: Breath sounds normal. No respiratory distress.   Musculoskeletal:         General: Normal range of motion.      Cervical back: Normal range of motion and neck supple.     Neurological: He is alert and oriented to person, place, and time. GCS score is 15. GCS eye subscore is 4. GCS verbal subscore is 5. GCS motor subscore is 6.   Skin: Skin is warm.   Psychiatric: Thought content normal.   Mildly anxious         ED Course   Procedures  Labs Reviewed   CBC W/ AUTO DIFFERENTIAL - Abnormal       Result Value    WBC 7.46      RBC 4.92      Hemoglobin 15.3      Hematocrit 43.8      MCV 89      MCH 31.1 (*)     MCHC 34.9      RDW 12.6      Platelets 221      MPV 9.1 (*)     Immature Granulocytes 0.4      Gran # (ANC) 6.0      Immature Grans (Abs) 0.03      Lymph # 1.0      Mono # 0.5      Eos # 0.0      Baso # 0.00      nRBC 0      Gran % 79.7 (*)     Lymph % 12.9 (*)     Mono % 7.0      Eosinophil % 0.0      Basophil % 0.0      Differential Method Automated     COMPREHENSIVE METABOLIC PANEL - Abnormal    Sodium 134 (*)     Potassium 3.7      Chloride 101      CO2 24      Glucose 117 (*)     BUN 14      Creatinine 1.2      Calcium 9.6      Total Protein 9.8 (*)     Albumin 4.2      Total Bilirubin 0.8      Alkaline Phosphatase 54      AST 31      ALT 21      eGFR >60      Anion Gap 9     CK - Abnormal     (*)    POCT GLUCOSE - Abnormal    POCT Glucose 143 (*)    MAGNESIUM    Magnesium 2.0     TROPONIN I    Troponin I <0.006     RAPID HIV    HIV Rapid Testing Non-Reactive       EKG Readings: (Independently Interpreted)   Sinus tachycardia, ventricular rate 134 beats per  minute.  Normal PA, normal QT, normal QRS duration.  Right axis deviation.  No convincing ST elevation.  Questionable somewhat peaked T-waves to precordial leads.  Inverted T-wave lead 3.  Questionable LVH.  No previous for comparison.     No convincing malignant arrhythmia.  Normal QT.  No convincing evidence of ischemia.  No evidence of HOCM.  No evidence of WPW.  Do not think represents Brugada type pattern.  No convincing evidence of RV strain.       Imaging Results              X-Ray Chest 1 View (Final result)  Result time 01/01/25 23:20:31      Final result by Parvez Cross MD (01/01/25 23:20:31)                   Impression:      No acute cardiopulmonary process identified.      Electronically signed by: Parvez Cross MD  Date:    01/01/2025  Time:    23:20               Narrative:    EXAMINATION:  XR CHEST 1 VIEW    CLINICAL HISTORY:  Syncope and collapse    TECHNIQUE:  Single frontal view of the chest was performed.    COMPARISON:  None    FINDINGS:  Cardiac silhouette is normal in size.  Lungs are symmetrically expanded.  No evidence of focal consolidative process, pneumothorax, or significant pleural effusion.  No acute osseous abnormality identified.                                    X-Rays:   Independently Interpreted Readings:   Chest X-Ray: Personal interpretation:  No significant cardiomegaly, no convincing effusion, no obvious pneumothorax, no dense peripheral lobar consolidation.     Medications   lactated ringers bolus 1,000 mL (0 mLs Intravenous Stopped 1/2/25 0045)   acetaminophen tablet 1,000 mg (1,000 mg Oral Given 1/1/25 2255)   ibuprofen tablet 600 mg (600 mg Oral Given 1/1/25 2255)     Medical Decision Making  Differential diagnosis:  Dehydration, electrolyte derangement, ACS, PE, vasovagal syncope, seizure disorder, cardiac arrhythmia, hypoglycemia, symptomatic anemia    Amount and/or Complexity of Data Reviewed  External Data Reviewed: notes.  Labs: ordered.  Radiology:  ordered.  Discussion of management or test interpretation with external provider(s): Suspect syncope related to orthostasis, poor p.o. intake.    Risk  OTC drugs.  Prescription drug management.               ED Course as of 01/02/25 0535   Thu Jan 02, 2025   0011 Normal ambulatory SpO2 [SM]   0042 Patient is feeling much better on re-evaluation.  Tachycardia improved.  Still with mild fever.  After discussion, strongly encouraged continued vigorous p.o. hydration, p.r.n. antiemetic, p.r.n. antitussive, discussed appropriate fever control.  I do feel he can be safely discharged and follow up with the outpatient setting.  [SM]      ED Course User Index  [SM] Bill Flor PA-C                           Clinical Impression:  Final diagnoses:  [J11.1] Influenza (Primary)  [B34.9] Viral syndrome  [R55] Syncope, unspecified syncope type          ED Disposition Condition    Discharge Stable          ED Prescriptions       Medication Sig Dispense Start Date End Date Auth. Provider    ondansetron (ZOFRAN-ODT) 4 MG TbDL Take 1 tablet (4 mg total) by mouth every 6 (six) hours as needed (nausea). 20 tablet 1/2/2025 -- Bill Flor PA-C    dextromethorphan-guaiFENesin  mg/5 ml (ROBITUSSIN-DM)  mg/5 mL liquid Take 10 mLs by mouth 4 (four) times daily as needed (robitussin). 120 mL 1/2/2025 1/12/2025 Bill Flor PA-C    cetirizine (ZYRTEC) 10 MG tablet Take 1 tablet (10 mg total) by mouth once daily. for 10 days 10 tablet 1/2/2025 1/12/2025 Bill Flor PA-C    azelastine (ASTELIN) 137 mcg (0.1 %) nasal spray 1 spray (137 mcg total) by Nasal route 2 (two) times daily. 30 mL 1/2/2025 1/2/2026 Bill Flor PA-C          Follow-up Information       Follow up With Specialties Details Why Contact Info    Willian Juan MD Family Medicine Schedule an appointment as soon as possible for a visit  For reevaluation [address] [facility]  [place] [id number] [phone]               Basia  Bill MAY PA-C  01/02/25 0535

## 2025-01-08 ENCOUNTER — OFFICE VISIT (OUTPATIENT)
Dept: INTERNAL MEDICINE CLINIC | Facility: CLINIC | Age: 80
End: 2025-01-08

## 2025-01-08 VITALS — SYSTOLIC BLOOD PRESSURE: 128 MMHG | DIASTOLIC BLOOD PRESSURE: 80 MMHG

## 2025-01-08 DIAGNOSIS — J06.9 UPPER RESPIRATORY TRACT INFECTION, UNSPECIFIED TYPE: Primary | ICD-10-CM

## 2025-01-08 DIAGNOSIS — R05.1 ACUTE COUGH: ICD-10-CM

## 2025-01-08 LAB
INFLUENZA A ANTIGEN, POC: NEGATIVE
INFLUENZA B ANTIGEN, POC: NEGATIVE
LOT EXPIRE DATE: NORMAL
LOT KIT NUMBER: NORMAL
SARS-COV-2 RNA, POC: NEGATIVE
VALID INTERNAL CONTROL: YES
VENDOR AND KIT NAME POC: NORMAL

## 2025-01-08 RX ORDER — BENZONATATE 100 MG/1
100 CAPSULE ORAL 3 TIMES DAILY PRN
Qty: 30 CAPSULE | Refills: 0 | Status: SHIPPED | OUTPATIENT
Start: 2025-01-08 | End: 2025-01-18

## 2025-01-08 RX ORDER — PREDNISONE 10 MG/1
TABLET ORAL
Qty: 10 TABLET | Refills: 0 | Status: SHIPPED | OUTPATIENT
Start: 2025-01-08

## 2025-01-08 RX ORDER — AMOXICILLIN 875 MG/1
875 TABLET, COATED ORAL 2 TIMES DAILY
Qty: 20 TABLET | Refills: 0 | Status: SHIPPED | OUTPATIENT
Start: 2025-01-08 | End: 2025-01-18

## 2025-01-08 ASSESSMENT — ENCOUNTER SYMPTOMS
COUGH: 1
WHEEZING: 1

## 2025-01-08 NOTE — PROGRESS NOTES
Take 1 capsule by mouth daily 90 capsule 1    tamsulosin (FLOMAX) 0.4 MG capsule Take 1 capsule by mouth daily 90 capsule 2    blood glucose test strips (ONETOUCH VERIO) strip 1 each by In Vitro route 2 times daily Use as directed to check blood sugar twice a day. (E11.65) 200 each 3    rosuvastatin (CRESTOR) 10 MG tablet Take 1 tablet by mouth every evening 90 tablet 2    carvedilol (COREG) 6.25 MG tablet Take 1 tablet by mouth 2 times daily 180 tablet 2    finasteride (PROSCAR) 5 MG tablet Take 1 tablet by mouth daily 90 tablet 3    indomethacin (INDOCIN) 25 MG capsule TAKE ONE CAPSULE BY MOUTH THREE TIMES DAILY 15 capsule 1    Insulin Degludec (TRESIBA FLEXTOUCH) 200 UNIT/ML SOPN Inject 58 Units into the skin every morning 45 mL 3    Zinc Acetate, Oral, (ZINC ACETATE PO) Take 1 tablet by mouth daily      acetaminophen (TYLENOL) 500 MG tablet Take 2 tablets by mouth every 6 hours as needed      Cholecalciferol 50 MCG (2000 UT) TABS Take 1 tablet by mouth daily      Cyanocobalamin 1000 MCG SUBL Take 1,000 mcg by mouth daily      furosemide (LASIX) 20 MG tablet Take 1 tablet by mouth daily as needed      Probiotic Product (ACIDOPHILUS PROBIOTIC) CAPS capsule Take 1 tablet by mouth daily       No current facility-administered medications for this visit.       Review of Systems:  Review of Systems   Constitutional:  Negative for unexpected weight change.   HENT:  Positive for congestion.    Respiratory:  Positive for cough and wheezing.    All other systems reviewed and are negative.    Vitals:  /80     Physical Exam:  Physical Exam  Vitals reviewed.   Constitutional:       Appearance: Normal appearance.   HENT:      Head: Normocephalic and atraumatic.   Eyes:      Extraocular Movements: Extraocular movements intact.      Pupils: Pupils are equal, round, and reactive to light.   Cardiovascular:      Rate and Rhythm: Normal rate and regular rhythm.      Heart sounds: Normal heart sounds.   Pulmonary:      Effort:

## 2025-01-28 NOTE — PROGRESS NOTES
[facility] CARDIOLOGY  [address] [place] [address], [address]  [place] [address]  PHONE: [phone]      25    NAME:  Rodolfo Buckner  : 1945  MRN: [id number]         SUBJECTIVE:   Rodolfo Buckner is a 79 y.o. male seen for a follow up visit regarding the following:     Chief Complaint   Patient presents with    Cardiomyopathy            HPI:  Follow up  Cardiomyopathy   .    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 Managed to keep his weight stable through the holidays.      He's feeling ok, no chest discomfort, dyspnea.  Device check today  normal function and no events.  Still fairly sedentary.   4-5 K steps most days.               Past cardiac history:   - mild nonobstructive coronary artery disease   EF 20% by echo 2011   Echo 2012: EF improved to 35-40%, no significant valve disease   2014: images so poor even with Definity contrast an EF could not be estimated, only \"probably moderately depressed\".     Mar 2014 - MUGA - EF 33%   2014 - St Raj biventricular device - Dr Hammonds   Mar 2015 - even with contrast, difficult to see, EF estimated 45-50%    Oct 2016 - EF 43%, aortic root 3.8   Sep 2017 - 50-55% with distal apical dyskinesis, mild to moderate MR, AI, and PI   Oct 2018 - 54% with apical wma, impaired relaxation, no significant valvular regurgitation   Oct 2019- EF 60%, mild AI and MR   2020- normal arterial doppler and carotid doppler   Aug 2020- normal carotid doppler, negative LE arterial duplex   2021- vasodilator perfusion study inferior fixed defect, no ischemia, EF 49%   2021- severe COVID infection with AMS and PNA   2021- EF 50-55%, mild MR, TR, RVSP 19              Cardiac Medications       Antihypertensive Combinations       valsartan-hydroCHLOROthiazide (DIOVAN-HCT) 320-25 MG per tablet TAKE 1 TABLET BY MOUTH DAILY       Loop Diuretics

## 2025-01-29 ENCOUNTER — OFFICE VISIT (OUTPATIENT)
Age: 80
End: 2025-01-29
Payer: MEDICARE

## 2025-01-29 ENCOUNTER — NURSE ONLY (OUTPATIENT)
Age: 80
End: 2025-01-29

## 2025-01-29 VITALS
DIASTOLIC BLOOD PRESSURE: 60 MMHG | HEIGHT: 69 IN | SYSTOLIC BLOOD PRESSURE: 120 MMHG | HEART RATE: 72 BPM | WEIGHT: 255.2 LBS | BODY MASS INDEX: 37.8 KG/M2

## 2025-01-29 DIAGNOSIS — I42.8 NONISCHEMIC CARDIOMYOPATHY (HCC): ICD-10-CM

## 2025-01-29 DIAGNOSIS — E66.01 MORBID OBESITY: ICD-10-CM

## 2025-01-29 DIAGNOSIS — Z79.4 TYPE 2 DIABETES MELLITUS WITH HYPERGLYCEMIA, WITH LONG-TERM CURRENT USE OF INSULIN (HCC): ICD-10-CM

## 2025-01-29 DIAGNOSIS — I50.22 CHRONIC SYSTOLIC CHF (CONGESTIVE HEART FAILURE) (HCC): Primary | ICD-10-CM

## 2025-01-29 DIAGNOSIS — Z95.810 PRESENCE OF CARDIAC DEFIBRILLATOR: ICD-10-CM

## 2025-01-29 DIAGNOSIS — E11.65 TYPE 2 DIABETES MELLITUS WITH HYPERGLYCEMIA, WITH LONG-TERM CURRENT USE OF INSULIN (HCC): ICD-10-CM

## 2025-01-29 PROCEDURE — 1160F RVW MEDS BY RX/DR IN RCRD: CPT | Performed by: INTERNAL MEDICINE

## 2025-01-29 PROCEDURE — 99214 OFFICE O/P EST MOD 30 MIN: CPT | Performed by: INTERNAL MEDICINE

## 2025-01-29 PROCEDURE — 1159F MED LIST DOCD IN RCRD: CPT | Performed by: INTERNAL MEDICINE

## 2025-01-29 PROCEDURE — 1036F TOBACCO NON-USER: CPT | Performed by: INTERNAL MEDICINE

## 2025-01-29 PROCEDURE — 1123F ACP DISCUSS/DSCN MKR DOCD: CPT | Performed by: INTERNAL MEDICINE

## 2025-01-29 PROCEDURE — G8417 CALC BMI ABV UP PARAM F/U: HCPCS | Performed by: INTERNAL MEDICINE

## 2025-01-29 PROCEDURE — 3078F DIAST BP <80 MM HG: CPT | Performed by: INTERNAL MEDICINE

## 2025-01-29 PROCEDURE — 3074F SYST BP LT 130 MM HG: CPT | Performed by: INTERNAL MEDICINE

## 2025-01-29 PROCEDURE — G8427 DOCREV CUR MEDS BY ELIG CLIN: HCPCS | Performed by: INTERNAL MEDICINE

## 2025-01-29 PROCEDURE — 1126F AMNT PAIN NOTED NONE PRSNT: CPT | Performed by: INTERNAL MEDICINE

## 2025-01-29 RX ORDER — AMOXICILLIN 500 MG/1
CAPSULE ORAL
COMMUNITY
Start: 2025-01-20

## 2025-01-29 RX ORDER — ACETAMINOPHEN 650 MG/1
650 SUPPOSITORY RECTAL EVERY 4 HOURS PRN
COMMUNITY

## 2025-01-29 ASSESSMENT — ENCOUNTER SYMPTOMS: SHORTNESS OF BREATH: 0

## 2025-02-27 DIAGNOSIS — G47.01 INSOMNIA DUE TO MEDICAL CONDITION: ICD-10-CM

## 2025-02-27 DIAGNOSIS — R41.3 MEMORY CHANGES: ICD-10-CM

## 2025-02-27 DIAGNOSIS — M1A.9XX0 CHRONIC GOUT WITHOUT TOPHUS, UNSPECIFIED CAUSE, UNSPECIFIED SITE: Primary | ICD-10-CM

## 2025-02-27 RX ORDER — ALLOPURINOL 300 MG/1
300 TABLET ORAL DAILY
Qty: 90 TABLET | Refills: 1 | Status: SHIPPED | OUTPATIENT
Start: 2025-02-27

## 2025-02-27 RX ORDER — AMITRIPTYLINE HYDROCHLORIDE 10 MG/1
10 TABLET ORAL NIGHTLY
Qty: 90 TABLET | Refills: 1 | Status: SHIPPED | OUTPATIENT
Start: 2025-02-27

## 2025-02-27 RX ORDER — DONEPEZIL HYDROCHLORIDE 10 MG/1
10 TABLET, FILM COATED ORAL NIGHTLY
Qty: 90 TABLET | Refills: 1 | Status: SHIPPED | OUTPATIENT
Start: 2025-02-27

## 2025-02-27 NOTE — TELEPHONE ENCOUNTER
Requested Prescriptions     Pending Prescriptions Disp Refills    allopurinol (ZYLOPRIM) 300 MG tablet 90 tablet 1     Sig: Take 1 tablet by mouth daily    donepezil (ARICEPT) 10 MG tablet 90 tablet 1     Sig: Take 1 tablet by mouth nightly    amitriptyline (ELAVIL) 10 MG tablet 90 tablet 1     Sig: Take 1 tablet by mouth nightly

## 2025-03-31 RX ORDER — CARVEDILOL 6.25 MG/1
6.25 TABLET ORAL 2 TIMES DAILY
Qty: 180 TABLET | Refills: 2 | Status: SHIPPED | OUTPATIENT
Start: 2025-03-31

## 2025-03-31 RX ORDER — ROSUVASTATIN CALCIUM 10 MG/1
10 TABLET, COATED ORAL EVERY EVENING
Qty: 90 TABLET | Refills: 2 | Status: SHIPPED | OUTPATIENT
Start: 2025-03-31

## 2025-04-16 RX ORDER — ESCITALOPRAM OXALATE 10 MG/1
10 TABLET ORAL DAILY
Qty: 90 TABLET | Refills: 1 | Status: SHIPPED | OUTPATIENT
Start: 2025-04-16

## 2025-04-16 RX ORDER — OMEPRAZOLE 20 MG/1
20 CAPSULE, DELAYED RELEASE ORAL DAILY
Qty: 90 CAPSULE | Refills: 1 | Status: SHIPPED | OUTPATIENT
Start: 2025-04-16

## 2025-04-16 RX ORDER — FINASTERIDE 5 MG/1
5 TABLET, FILM COATED ORAL DAILY
Qty: 90 TABLET | Refills: 1 | Status: SHIPPED | OUTPATIENT
Start: 2025-04-16

## 2025-04-18 DIAGNOSIS — E78.2 MIXED HYPERLIPIDEMIA: ICD-10-CM

## 2025-04-18 DIAGNOSIS — N18.32 ANEMIA DUE TO STAGE 3B CHRONIC KIDNEY DISEASE (HCC): ICD-10-CM

## 2025-04-18 DIAGNOSIS — M1A.9XX0 CHRONIC GOUT WITHOUT TOPHUS, UNSPECIFIED CAUSE, UNSPECIFIED SITE: ICD-10-CM

## 2025-04-18 DIAGNOSIS — D63.1 ANEMIA DUE TO STAGE 3B CHRONIC KIDNEY DISEASE (HCC): ICD-10-CM

## 2025-04-18 DIAGNOSIS — E11.65 TYPE 2 DIABETES MELLITUS WITH HYPERGLYCEMIA, WITH LONG-TERM CURRENT USE OF INSULIN (HCC): ICD-10-CM

## 2025-04-18 DIAGNOSIS — Z79.4 TYPE 2 DIABETES MELLITUS WITH HYPERGLYCEMIA, WITH LONG-TERM CURRENT USE OF INSULIN (HCC): ICD-10-CM

## 2025-04-18 DIAGNOSIS — I10 PRIMARY HYPERTENSION: ICD-10-CM

## 2025-04-18 LAB
ALBUMIN SERPL-MCNC: 3.7 G/DL (ref 3.2–4.6)
ALBUMIN/GLOB SERPL: 1.2 (ref 1–1.9)
ALP SERPL-CCNC: 77 U/L (ref 40–129)
ALT SERPL-CCNC: 22 U/L (ref 8–55)
ANION GAP SERPL CALC-SCNC: 11 MMOL/L (ref 7–16)
AST SERPL-CCNC: 21 U/L (ref 15–37)
BASOPHILS # BLD: 0.05 K/UL (ref 0–0.2)
BASOPHILS NFR BLD: 0.6 % (ref 0–2)
BILIRUB SERPL-MCNC: <0.2 MG/DL (ref 0–1.2)
BUN SERPL-MCNC: 22 MG/DL (ref 8–23)
CALCIUM SERPL-MCNC: 9.9 MG/DL (ref 8.8–10.2)
CHLORIDE SERPL-SCNC: 105 MMOL/L (ref 98–107)
CHOLEST SERPL-MCNC: 160 MG/DL (ref 0–200)
CO2 SERPL-SCNC: 27 MMOL/L (ref 20–29)
CREAT SERPL-MCNC: 1.33 MG/DL (ref 0.8–1.3)
DIFFERENTIAL METHOD BLD: ABNORMAL
EOSINOPHIL # BLD: 0.07 K/UL (ref 0–0.8)
EOSINOPHIL NFR BLD: 0.8 % (ref 0.5–7.8)
ERYTHROCYTE [DISTWIDTH] IN BLOOD BY AUTOMATED COUNT: 14.9 % (ref 11.9–14.6)
EST. AVERAGE GLUCOSE BLD GHB EST-MCNC: 149 MG/DL
GLOBULIN SER CALC-MCNC: 3 G/DL (ref 2.3–3.5)
GLUCOSE SERPL-MCNC: 73 MG/DL (ref 70–99)
HBA1C MFR BLD: 6.8 % (ref 0–5.6)
HCT VFR BLD AUTO: 42.6 % (ref 41.1–50.3)
HDLC SERPL-MCNC: 45 MG/DL (ref 40–60)
HDLC SERPL: 3.6 (ref 0–5)
HGB BLD-MCNC: 13.3 G/DL (ref 13.6–17.2)
IMM GRANULOCYTES # BLD AUTO: 0.03 K/UL (ref 0–0.5)
IMM GRANULOCYTES NFR BLD AUTO: 0.4 % (ref 0–5)
LDLC SERPL CALC-MCNC: 85 MG/DL (ref 0–100)
LYMPHOCYTES # BLD: 2.2 K/UL (ref 0.5–4.6)
LYMPHOCYTES NFR BLD: 26.6 % (ref 13–44)
MCH RBC QN AUTO: 27.2 PG (ref 26.1–32.9)
MCHC RBC AUTO-ENTMCNC: 31.2 G/DL (ref 31.4–35)
MCV RBC AUTO: 87.1 FL (ref 82–102)
MONOCYTES # BLD: 0.7 K/UL (ref 0.1–1.3)
MONOCYTES NFR BLD: 8.5 % (ref 4–12)
NEUTS SEG # BLD: 5.21 K/UL (ref 1.7–8.2)
NEUTS SEG NFR BLD: 63.1 % (ref 43–78)
NRBC # BLD: 0 K/UL (ref 0–0.2)
PLATELET # BLD AUTO: 230 K/UL (ref 150–450)
PMV BLD AUTO: 10.6 FL (ref 9.4–12.3)
POTASSIUM SERPL-SCNC: 4.4 MMOL/L (ref 3.5–5.1)
PROT SERPL-MCNC: 6.7 G/DL (ref 6.3–8.2)
RBC # BLD AUTO: 4.89 M/UL (ref 4.23–5.6)
SODIUM SERPL-SCNC: 143 MMOL/L (ref 136–145)
TRIGL SERPL-MCNC: 152 MG/DL (ref 0–150)
URATE SERPL-MCNC: 4.7 MG/DL (ref 3.9–8.2)
VLDLC SERPL CALC-MCNC: 30 MG/DL (ref 6–23)
WBC # BLD AUTO: 8.3 K/UL (ref 4.3–11.1)

## 2025-04-21 ENCOUNTER — OFFICE VISIT (OUTPATIENT)
Dept: INTERNAL MEDICINE CLINIC | Facility: CLINIC | Age: 80
End: 2025-04-21
Payer: MEDICARE

## 2025-04-21 ENCOUNTER — RESULTS FOLLOW-UP (OUTPATIENT)
Dept: INTERNAL MEDICINE CLINIC | Facility: CLINIC | Age: 80
End: 2025-04-21

## 2025-04-21 VITALS
WEIGHT: 256.2 LBS | DIASTOLIC BLOOD PRESSURE: 62 MMHG | SYSTOLIC BLOOD PRESSURE: 118 MMHG | BODY MASS INDEX: 37.95 KG/M2 | HEIGHT: 69 IN

## 2025-04-21 DIAGNOSIS — M54.50 CHRONIC BILATERAL LOW BACK PAIN, UNSPECIFIED WHETHER SCIATICA PRESENT: ICD-10-CM

## 2025-04-21 DIAGNOSIS — M1A.9XX0 CHRONIC GOUT WITHOUT TOPHUS, UNSPECIFIED CAUSE, UNSPECIFIED SITE: ICD-10-CM

## 2025-04-21 DIAGNOSIS — I10 PRIMARY HYPERTENSION: ICD-10-CM

## 2025-04-21 DIAGNOSIS — D63.1 ANEMIA DUE TO STAGE 3B CHRONIC KIDNEY DISEASE (HCC): ICD-10-CM

## 2025-04-21 DIAGNOSIS — M48.062 NEUROGENIC CLAUDICATION DUE TO LUMBAR SPINAL STENOSIS: ICD-10-CM

## 2025-04-21 DIAGNOSIS — N18.32 ANEMIA DUE TO STAGE 3B CHRONIC KIDNEY DISEASE (HCC): ICD-10-CM

## 2025-04-21 DIAGNOSIS — G47.33 OSA ON CPAP: ICD-10-CM

## 2025-04-21 DIAGNOSIS — G89.29 CHRONIC BILATERAL LOW BACK PAIN, UNSPECIFIED WHETHER SCIATICA PRESENT: ICD-10-CM

## 2025-04-21 DIAGNOSIS — Z79.4 TYPE 2 DIABETES MELLITUS WITH HYPERGLYCEMIA, WITH LONG-TERM CURRENT USE OF INSULIN (HCC): ICD-10-CM

## 2025-04-21 DIAGNOSIS — R41.81 AGE-RELATED COGNITIVE DECLINE: ICD-10-CM

## 2025-04-21 DIAGNOSIS — E78.2 MIXED HYPERLIPIDEMIA: ICD-10-CM

## 2025-04-21 DIAGNOSIS — K21.9 GASTROESOPHAGEAL REFLUX DISEASE WITHOUT ESOPHAGITIS: ICD-10-CM

## 2025-04-21 DIAGNOSIS — I42.8 NONISCHEMIC CARDIOMYOPATHY (HCC): ICD-10-CM

## 2025-04-21 DIAGNOSIS — E66.9 OBESITY (BMI 30-39.9): ICD-10-CM

## 2025-04-21 DIAGNOSIS — Z00.00 ENCOUNTER FOR MEDICARE ANNUAL WELLNESS EXAM: Primary | ICD-10-CM

## 2025-04-21 DIAGNOSIS — M15.0 PRIMARY OSTEOARTHRITIS INVOLVING MULTIPLE JOINTS: ICD-10-CM

## 2025-04-21 DIAGNOSIS — Z95.810 PRESENCE OF CARDIAC DEFIBRILLATOR: ICD-10-CM

## 2025-04-21 DIAGNOSIS — E11.65 TYPE 2 DIABETES MELLITUS WITH HYPERGLYCEMIA, WITH LONG-TERM CURRENT USE OF INSULIN (HCC): ICD-10-CM

## 2025-04-21 DIAGNOSIS — N40.0 BENIGN PROSTATIC HYPERPLASIA, UNSPECIFIED WHETHER LOWER URINARY TRACT SYMPTOMS PRESENT: ICD-10-CM

## 2025-04-21 DIAGNOSIS — I50.22 CHRONIC SYSTOLIC CHF (CONGESTIVE HEART FAILURE) (HCC): ICD-10-CM

## 2025-04-21 DIAGNOSIS — G62.9 NEUROPATHY: ICD-10-CM

## 2025-04-21 PROCEDURE — 1159F MED LIST DOCD IN RCRD: CPT | Performed by: INTERNAL MEDICINE

## 2025-04-21 PROCEDURE — 3074F SYST BP LT 130 MM HG: CPT | Performed by: INTERNAL MEDICINE

## 2025-04-21 PROCEDURE — G2211 COMPLEX E/M VISIT ADD ON: HCPCS | Performed by: INTERNAL MEDICINE

## 2025-04-21 PROCEDURE — G8417 CALC BMI ABV UP PARAM F/U: HCPCS | Performed by: INTERNAL MEDICINE

## 2025-04-21 PROCEDURE — 1036F TOBACCO NON-USER: CPT | Performed by: INTERNAL MEDICINE

## 2025-04-21 PROCEDURE — 3044F HG A1C LEVEL LT 7.0%: CPT | Performed by: INTERNAL MEDICINE

## 2025-04-21 PROCEDURE — 1160F RVW MEDS BY RX/DR IN RCRD: CPT | Performed by: INTERNAL MEDICINE

## 2025-04-21 PROCEDURE — 99214 OFFICE O/P EST MOD 30 MIN: CPT | Performed by: INTERNAL MEDICINE

## 2025-04-21 PROCEDURE — 1123F ACP DISCUSS/DSCN MKR DOCD: CPT | Performed by: INTERNAL MEDICINE

## 2025-04-21 PROCEDURE — 3078F DIAST BP <80 MM HG: CPT | Performed by: INTERNAL MEDICINE

## 2025-04-21 PROCEDURE — G8427 DOCREV CUR MEDS BY ELIG CLIN: HCPCS | Performed by: INTERNAL MEDICINE

## 2025-04-21 PROCEDURE — G0439 PPPS, SUBSEQ VISIT: HCPCS | Performed by: INTERNAL MEDICINE

## 2025-04-21 SDOH — ECONOMIC STABILITY: FOOD INSECURITY: WITHIN THE PAST 12 MONTHS, YOU WORRIED THAT YOUR FOOD WOULD RUN OUT BEFORE YOU GOT MONEY TO BUY MORE.: NEVER TRUE

## 2025-04-21 SDOH — ECONOMIC STABILITY: FOOD INSECURITY: WITHIN THE PAST 12 MONTHS, THE FOOD YOU BOUGHT JUST DIDN'T LAST AND YOU DIDN'T HAVE MONEY TO GET MORE.: NEVER TRUE

## 2025-04-21 ASSESSMENT — PATIENT HEALTH QUESTIONNAIRE - PHQ9
1. LITTLE INTEREST OR PLEASURE IN DOING THINGS: NOT AT ALL
SUM OF ALL RESPONSES TO PHQ QUESTIONS 1-9: 0
SUM OF ALL RESPONSES TO PHQ QUESTIONS 1-9: 0
2. FEELING DOWN, DEPRESSED OR HOPELESS: NOT AT ALL
SUM OF ALL RESPONSES TO PHQ QUESTIONS 1-9: 0
SUM OF ALL RESPONSES TO PHQ QUESTIONS 1-9: 0

## 2025-04-21 ASSESSMENT — ENCOUNTER SYMPTOMS: BACK PAIN: 1

## 2025-04-29 ENCOUNTER — APPOINTMENT (OUTPATIENT)
Dept: URBAN - METROPOLITAN AREA CLINIC 330 | Facility: CLINIC | Age: 80
Setting detail: DERMATOLOGY
End: 2025-04-29

## 2025-04-29 DIAGNOSIS — L57.0 ACTINIC KERATOSIS: ICD-10-CM

## 2025-04-29 DIAGNOSIS — D485 NEOPLASM OF UNCERTAIN BEHAVIOR OF SKIN: ICD-10-CM | Status: INADEQUATELY CONTROLLED

## 2025-04-29 DIAGNOSIS — D22 MELANOCYTIC NEVI: ICD-10-CM | Status: STABLE

## 2025-04-29 DIAGNOSIS — L57.8 OTHER SKIN CHANGES DUE TO CHRONIC EXPOSURE TO NONIONIZING RADIATION: ICD-10-CM

## 2025-04-29 DIAGNOSIS — Z85.820 PERSONAL HISTORY OF MALIGNANT MELANOMA OF SKIN: ICD-10-CM

## 2025-04-29 DIAGNOSIS — Z85.828 PERSONAL HISTORY OF OTHER MALIGNANT NEOPLASM OF SKIN: ICD-10-CM

## 2025-04-29 PROBLEM — D48.5 NEOPLASM OF UNCERTAIN BEHAVIOR OF SKIN: Status: ACTIVE | Noted: 2025-04-29

## 2025-04-29 PROBLEM — D22.5 MELANOCYTIC NEVI OF TRUNK: Status: ACTIVE | Noted: 2025-04-29

## 2025-04-29 PROCEDURE — 17000 DESTRUCT PREMALG LESION: CPT | Mod: 59

## 2025-04-29 PROCEDURE — ? LIQUID NITROGEN

## 2025-04-29 PROCEDURE — ? MDM - TREATMENT GOALS

## 2025-04-29 PROCEDURE — 11102 TANGNTL BX SKIN SINGLE LES: CPT

## 2025-04-29 PROCEDURE — ? BIOPSY BY SHAVE METHOD

## 2025-04-29 PROCEDURE — ? COUNSELING

## 2025-04-29 PROCEDURE — ? MEDICAL PHOTOGRAPHY REVIEW

## 2025-04-29 PROCEDURE — 99214 OFFICE O/P EST MOD 30 MIN: CPT | Mod: 25

## 2025-04-29 PROCEDURE — ? PRESCRIPTION MEDICATION MANAGEMENT

## 2025-04-29 PROCEDURE — ? OTHER

## 2025-04-29 PROCEDURE — 17003 DESTRUCT PREMALG LES 2-14: CPT

## 2025-04-29 PROCEDURE — ? TREATMENT REGIMEN

## 2025-04-29 PROCEDURE — ? FULL BODY SKIN EXAM

## 2025-04-29 ASSESSMENT — LOCATION DETAILED DESCRIPTION DERM
LOCATION DETAILED: INFERIOR THORACIC SPINE
LOCATION DETAILED: RIGHT DORSAL INDEX METACARPOPHALANGEAL JOINT
LOCATION DETAILED: RIGHT PROXIMAL DORSAL INDEX FINGER
LOCATION DETAILED: RIGHT DISTAL DORSAL FOREARM
LOCATION DETAILED: LEFT CENTRAL ZYGOMA
LOCATION DETAILED: RIGHT CENTRAL FRONTAL SCALP
LOCATION DETAILED: LEFT MID TEMPLE
LOCATION DETAILED: RIGHT DISTAL DORSAL FOREARM
LOCATION DETAILED: LEFT INFERIOR LATERAL FOREHEAD
LOCATION DETAILED: RIGHT INFERIOR NASAL CHEEK
LOCATION DETAILED: LEFT SUPERIOR HELIX
LOCATION DETAILED: LEFT ULNAR DORSAL HAND
LOCATION DETAILED: LEFT SUPERIOR CRUS OF ANTIHELIX
LOCATION DETAILED: LEFT DISTAL DORSAL FOREARM
LOCATION DETAILED: LEFT ANTERIOR EARLOBE
LOCATION DETAILED: RIGHT SUPERIOR HELIX

## 2025-04-29 ASSESSMENT — LOCATION SIMPLE DESCRIPTION DERM
LOCATION SIMPLE: LEFT TEMPLE
LOCATION SIMPLE: RIGHT HAND
LOCATION SIMPLE: UPPER BACK
LOCATION SIMPLE: LEFT FOREARM
LOCATION SIMPLE: RIGHT CHEEK
LOCATION SIMPLE: RIGHT INDEX FINGER
LOCATION SIMPLE: LEFT EAR
LOCATION SIMPLE: RIGHT EAR
LOCATION SIMPLE: LEFT HAND
LOCATION SIMPLE: LEFT FOREHEAD
LOCATION SIMPLE: RIGHT SCALP
LOCATION SIMPLE: RIGHT FOREARM
LOCATION SIMPLE: LEFT ZYGOMA
LOCATION SIMPLE: RIGHT FOREARM

## 2025-04-29 ASSESSMENT — LOCATION ZONE DERM
LOCATION ZONE: SCALP
LOCATION ZONE: ARM
LOCATION ZONE: HAND
LOCATION ZONE: FINGER
LOCATION ZONE: EAR
LOCATION ZONE: TRUNK
LOCATION ZONE: FACE
LOCATION ZONE: ARM

## 2025-04-29 ASSESSMENT — PAIN INTENSITY VAS: HOW INTENSE IS YOUR PAIN 0 BEING NO PAIN, 10 BEING THE MOST SEVERE PAIN POSSIBLE?: 1/10 PAIN

## 2025-04-29 NOTE — PROCEDURE: PRESCRIPTION MEDICATION MANAGEMENT
Render In Strict Bullet Format?: No
Plan: Patient did not use fluorouracil 5 % topical cream. Treated some AKs with ln2 today.
Detail Level: Simple

## 2025-04-29 NOTE — PROCEDURE: LIQUID NITROGEN
Render Note In Bullet Format When Appropriate: No
Show Aperture Variable?: Yes
Detail Level: Detailed
Number Of Freeze-Thaw Cycles: 3 freeze-thaw cycles
Consent: The patient's consent was obtained including but not limited to risks of crusting, scabbing, blistering, scarring, darker or lighter pigmentary change, recurrence, incomplete removal and infection.
Post-Care Instructions: I reviewed with the patient in detail post-care instructions. Patient is to wear sunprotection, and avoid picking at any of the treated lesions. Pt may apply Vaseline to crusted or scabbing areas.
Duration Of Freeze Thaw-Cycle (Seconds): 2

## 2025-04-30 ENCOUNTER — HOSPITAL ENCOUNTER (OUTPATIENT)
Dept: CT IMAGING | Age: 80
Discharge: HOME | End: 2025-05-02
Attending: INTERNAL MEDICINE
Payer: MEDICARE

## 2025-04-30 DIAGNOSIS — G89.29 CHRONIC BILATERAL LOW BACK PAIN, UNSPECIFIED WHETHER SCIATICA PRESENT: ICD-10-CM

## 2025-04-30 DIAGNOSIS — M48.062 NEUROGENIC CLAUDICATION DUE TO LUMBAR SPINAL STENOSIS: ICD-10-CM

## 2025-04-30 DIAGNOSIS — M54.50 CHRONIC BILATERAL LOW BACK PAIN, UNSPECIFIED WHETHER SCIATICA PRESENT: ICD-10-CM

## 2025-04-30 PROCEDURE — 72131 CT LUMBAR SPINE W/O DYE: CPT

## 2025-05-01 ENCOUNTER — RESULTS FOLLOW-UP (OUTPATIENT)
Dept: INTERNAL MEDICINE CLINIC | Facility: CLINIC | Age: 80
End: 2025-05-01

## 2025-05-01 DIAGNOSIS — M62.89 MASS OF ILIOPSOAS MUSCLE GROUP: Primary | ICD-10-CM

## 2025-05-01 DIAGNOSIS — M48.062 SPINAL STENOSIS OF LUMBAR REGION WITH NEUROGENIC CLAUDICATION: ICD-10-CM

## 2025-05-01 DIAGNOSIS — N28.9 KIDNEY LESION, NATIVE, LEFT: ICD-10-CM

## 2025-05-01 DIAGNOSIS — R93.7 ABNORMAL CT SCAN, LUMBAR SPINE: ICD-10-CM

## 2025-05-02 DIAGNOSIS — M48.062 SPINAL STENOSIS OF LUMBAR REGION WITH NEUROGENIC CLAUDICATION: Primary | ICD-10-CM

## 2025-05-02 DIAGNOSIS — R93.7 ABNORMAL CT SCAN, LUMBAR SPINE: ICD-10-CM

## 2025-05-06 ENCOUNTER — APPOINTMENT (OUTPATIENT)
Dept: URBAN - METROPOLITAN AREA CLINIC 330 | Facility: CLINIC | Age: 80
Setting detail: DERMATOLOGY
End: 2025-05-06

## 2025-05-06 PROBLEM — C44.92 SQUAMOUS CELL CARCINOMA OF SKIN, UNSPECIFIED: Status: ACTIVE | Noted: 2025-05-06

## 2025-05-06 PROCEDURE — ? REFERRAL

## 2025-05-08 ENCOUNTER — HOSPITAL ENCOUNTER (OUTPATIENT)
Dept: CT IMAGING | Age: 80
Discharge: HOME | End: 2025-05-10
Attending: INTERNAL MEDICINE
Payer: MEDICARE

## 2025-05-08 DIAGNOSIS — M62.89 MASS OF ILIOPSOAS MUSCLE GROUP: ICD-10-CM

## 2025-05-08 DIAGNOSIS — N28.9 KIDNEY LESION, NATIVE, LEFT: ICD-10-CM

## 2025-05-08 PROCEDURE — 6360000004 HC RX CONTRAST MEDICATION: Performed by: INTERNAL MEDICINE

## 2025-05-08 PROCEDURE — 74177 CT ABD & PELVIS W/CONTRAST: CPT

## 2025-05-08 RX ORDER — IOPAMIDOL 755 MG/ML
100 INJECTION, SOLUTION INTRAVASCULAR
Status: COMPLETED | OUTPATIENT
Start: 2025-05-08 | End: 2025-05-08

## 2025-05-08 RX ADMIN — IOPAMIDOL 100 ML: 755 INJECTION, SOLUTION INTRAVENOUS at 14:22

## 2025-05-12 ENCOUNTER — APPOINTMENT (OUTPATIENT)
Dept: URBAN - METROPOLITAN AREA CLINIC 330 | Facility: CLINIC | Age: 80
Setting detail: DERMATOLOGY
End: 2025-05-12

## 2025-05-12 ENCOUNTER — RESULTS FOLLOW-UP (OUTPATIENT)
Dept: INTERNAL MEDICINE CLINIC | Facility: CLINIC | Age: 80
End: 2025-05-12

## 2025-05-12 DIAGNOSIS — N28.89 RENAL MASS: Primary | ICD-10-CM

## 2025-05-12 DIAGNOSIS — R93.5 ABNORMAL ABDOMINAL CT SCAN: ICD-10-CM

## 2025-05-12 PROBLEM — C44.622 SQUAMOUS CELL CARCINOMA OF SKIN OF RIGHT UPPER LIMB, INCLUDING SHOULDER: Status: ACTIVE | Noted: 2025-05-12

## 2025-05-12 PROCEDURE — ? PRESCRIPTION

## 2025-05-12 PROCEDURE — 12034 INTMD RPR S/TR/EXT 7.6-12.5: CPT

## 2025-05-12 PROCEDURE — ? MOHS SURGERY

## 2025-05-12 PROCEDURE — 17313 MOHS 1 STAGE T/A/L: CPT

## 2025-05-12 RX ORDER — MUPIROCIN 20 MG/G
OINTMENT TOPICAL
Qty: 22 | Refills: 1 | Status: ERX | COMMUNITY
Start: 2025-05-12

## 2025-05-12 RX ADMIN — MUPIROCIN: 20 OINTMENT TOPICAL at 00:00

## 2025-05-12 NOTE — PROCEDURE: MOHS SURGERY
Mohs Case Number: [id number]
Date Of Previous Biopsy (Optional): 4/29/25
Previous Accession (Optional): [id number]
Biopsy Photograph Reviewed: Yes
Consent Type: Consent 1 (Standard)
Eye Shield Used: No
Initial Size Of Lesion: 2.5
X Size Of Lesion In Cm (Optional): 1.6
Number Of Stages: 1
Primary Defect Length In Cm (Final Defect Size - Required For Flaps/Grafts): 4.7
Primary Defect Width In Cm (Final Defect Size - Required For Flaps/Grafts): 3.6
Primary Defect Depth In Cm (Optional But Required For Some Insurers): 0
Repair Type: Intermediate Layered Repair
Which Instrument Did You Use For Dermabrasion?: Wire Brush
Which Eyelid Repair Cpt Are You Using?: 14570
Oculoplastic Surgeon Procedure Text (A): After obtaining clear surgical margins the patient was sent to oculoplastics for surgical repair.  The patient understands they will receive post-surgical care and follow-up from the referring physician's office.
Otolaryngologist Procedure Text (A): After obtaining clear surgical margins the patient was sent to otolaryngology for surgical repair.  The patient understands they will receive post-surgical care and follow-up from the referring physician's office.
Plastic Surgeon Procedure Text (A): After obtaining clear surgical margins the patient was sent to plastics for surgical repair.  The patient understands they will receive post-surgical care and follow-up from the referring physician's office.
Mid-Level Procedure Text (A): After obtaining clear surgical margins the patient was sent to a mid-level provider for surgical repair.  The patient understands they will receive post-surgical care and follow-up from the mid-level provider.
Provider Procedure Text (A): After obtaining clear surgical margins the defect was repaired by another provider.
Asc Procedure Text (A): After obtaining clear surgical margins the patient was sent to an ASC for surgical repair.  The patient understands they will receive post-surgical care and follow-up from the ASC physician.
Simple / Intermediate / Complex Repair - Final Wound Length In Cm: 9.1
Deep Sutures: 3-0 Monocryl
Epidermal Sutures: 3-0 Prolene
Suture Removal: 14 days
Suturegard Retention Suture: 2-0 Nylon
Retention Suture Bite Size: 3 mm
Length To Time In Minutes Device Was In Place: 10
Undermining Type: Entire Wound
Debridement Text: The wound edges were debrided prior to proceeding with the closure to facilitate wound healing.
Helical Rim Text: The closure involved the helical rim.
Vermilion Border Text: The closure involved the vermilion border.
Nostril Rim Text: The closure involved the nostril rim.
Retention Suture Text: Retention sutures were placed to support the closure and prevent dehiscence.
Location Indication Override (Is Already Calculated Based On Selected Body Location): Area L
Area H Indication Text: Tumors in this location are included in Area H (eyelids, eyebrows, nose, lips, chin, ear, pre-auricular, post-auricular, temple, genitalia, hands, feet, ankles and areola).  Tissue conservation is critical in these anatomic locations.
Area M Indication Text: Tumors in this location are included in Area M (cheek, forehead, scalp, neck, jawline and pretibial skin).  Mohs surgery is indicated for tumors in these anatomic locations.
Area L Indication Text: Tumors in this location are included in Area L (trunk and extremities).  Mohs surgery is indicated for larger tumors, or tumors with aggressive histologic features, in these anatomic locations.
Tumor Debulked?: dermablade
Depth Of Tumor Invasion (For Histology): tumor not visualized
Perineural Invasion (For Histology - Be Specific If Possible): absent
Special Stains Stage 1 - Results: Base On Clearance Noted Above
Stage 2: Additional Anesthesia Type: 1% lidocaine with epinephrine
Staging Info: By selecting yes to the question above you will include information on AJCC 8 tumor staging in your Mohs note. Information on tumor staging will be automatically added for SCCs on the head and neck. AJCC 8 includes tumor size, tumor depth, perineural involvement and bone invasion.
Tumor Depth: Less than 6mm from granular layer and no invasion beyond the subcutaneous fat
Was The Patient On Physician Recommended Anticoagulation Therapy?: Please Select the Appropriate Response
Medical Necessity Statement: Based on my medical judgement, Mohs surgery is the most appropriate treatment for this cancer compared to other treatments.
Alternatives Discussed Intro (Do Not Add Period): I discussed alternative treatments to Mohs surgery and specifically discussed the risks and benefits of
Consent 1/Introductory Paragraph: The rationale for Mohs was explained to the patient and consent was obtained. The risks, benefits and alternatives to therapy were discussed in detail. Specifically, the risks of infection, scarring, bleeding, prolonged wound healing, incomplete removal, allergy to anesthesia, nerve injury and recurrence were addressed. Prior to the procedure, the treatment site was clearly identified and confirmed by the patient. All components of Universal Protocol/PAUSE Rule completed.
Consent 2/Introductory Paragraph: Mohs surgery was explained to the patient and consent was obtained. The risks, benefits and alternatives to therapy were discussed in detail. Specifically, the risks of infection, scarring, bleeding, prolonged wound healing, incomplete removal, allergy to anesthesia, nerve injury and recurrence were addressed. Prior to the procedure, the treatment site was clearly identified and confirmed by the patient. All components of Universal Protocol/PAUSE Rule completed.
Consent 3/Introductory Paragraph: I gave the patient a chance to ask questions they had about the procedure.  Following this I explained the Mohs procedure and consent was obtained. The risks, benefits and alternatives to therapy were discussed in detail. Specifically, the risks of infection, scarring, bleeding, prolonged wound healing, incomplete removal, allergy to anesthesia, nerve injury and recurrence were addressed. Prior to the procedure, the treatment site was clearly identified and confirmed by the patient. All components of Universal Protocol/PAUSE Rule completed.
Consent (Temporal Branch)/Introductory Paragraph: The rationale for Mohs was explained to the patient and consent was obtained. The risks, benefits and alternatives to therapy were discussed in detail. Specifically, the risks of damage to the temporal branch of the facial nerve, infection, scarring, bleeding, prolonged wound healing, incomplete removal, allergy to anesthesia, and recurrence were addressed. Prior to the procedure, the treatment site was clearly identified and confirmed by the patient. All components of Universal Protocol/PAUSE Rule completed.
Consent (Marginal Mandibular)/Introductory Paragraph: The rationale for Mohs was explained to the patient and consent was obtained. The risks, benefits and alternatives to therapy were discussed in detail. Specifically, the risks of damage to the marginal mandibular branch of the facial nerve, infection, scarring, bleeding, prolonged wound healing, incomplete removal, allergy to anesthesia, and recurrence were addressed. Prior to the procedure, the treatment site was clearly identified and confirmed by the patient. All components of Universal Protocol/PAUSE Rule completed.
Consent (Spinal Accessory)/Introductory Paragraph: The rationale for Mohs was explained to the patient and consent was obtained. The risks, benefits and alternatives to therapy were discussed in detail. Specifically, the risks of damage to the spinal accessory nerve, infection, scarring, bleeding, prolonged wound healing, incomplete removal, allergy to anesthesia, and recurrence were addressed. Prior to the procedure, the treatment site was clearly identified and confirmed by the patient. All components of Universal Protocol/PAUSE Rule completed.
Consent (Near Eyelid Margin)/Introductory Paragraph: The rationale for Mohs was explained to the patient and consent was obtained. The risks, benefits and alternatives to therapy were discussed in detail. Specifically, the risks of ectropion or eyelid deformity, infection, scarring, bleeding, prolonged wound healing, incomplete removal, allergy to anesthesia, nerve injury and recurrence were addressed. Prior to the procedure, the treatment site was clearly identified and confirmed by the patient. All components of Universal Protocol/PAUSE Rule completed.
Consent (Ear)/Introductory Paragraph: The rationale for Mohs was explained to the patient and consent was obtained. The risks, benefits and alternatives to therapy were discussed in detail. Specifically, the risks of ear deformity, infection, scarring, bleeding, prolonged wound healing, incomplete removal, allergy to anesthesia, nerve injury and recurrence were addressed. Prior to the procedure, the treatment site was clearly identified and confirmed by the patient. All components of Universal Protocol/PAUSE Rule completed.
Consent (Nose)/Introductory Paragraph: The rationale for Mohs was explained to the patient and consent was obtained. The risks, benefits and alternatives to therapy were discussed in detail. Specifically, the risks of nasal deformity, changes in the flow of air through the nose, infection, scarring, bleeding, prolonged wound healing, incomplete removal, allergy to anesthesia, nerve injury and recurrence were addressed. Prior to the procedure, the treatment site was clearly identified and confirmed by the patient. All components of Universal Protocol/PAUSE Rule completed.
Consent (Lip)/Introductory Paragraph: The rationale for Mohs was explained to the patient and consent was obtained. The risks, benefits and alternatives to therapy were discussed in detail. Specifically, the risks of lip deformity, changes in the oral aperture, infection, scarring, bleeding, prolonged wound healing, incomplete removal, allergy to anesthesia, nerve injury and recurrence were addressed. Prior to the procedure, the treatment site was clearly identified and confirmed by the patient. All components of Universal Protocol/PAUSE Rule completed.
Consent (Scalp)/Introductory Paragraph: The rationale for Mohs was explained to the patient and consent was obtained. The risks, benefits and alternatives to therapy were discussed in detail. Specifically, the risks of changes in hair growth pattern secondary to repair, infection, scarring, bleeding, prolonged wound healing, incomplete removal, allergy to anesthesia, nerve injury and recurrence were addressed. Prior to the procedure, the treatment site was clearly identified and confirmed by the patient. All components of Universal Protocol/PAUSE Rule completed.
Detail Level: Detailed
Postop Diagnosis: same
Surgeon: Kimberly Augustine MD
Anesthesia Volume In Cc: 6
Hemostasis: Electrocautery
Estimated Blood Loss (Cc): minimal
Brow Lift Text: A midfrontal incision was made medially to the defect to allow access to the tissues just superior to the left eyebrow. Following careful dissection inferiorly in a supraperiosteal plane to the level of the left eyebrow, several 3-0 monocryl sutures were used to resuspend the eyebrow orbicularis oculi muscular unit to the superior frontal bone periosteum. This resulted in an appropriate reapproximation of static eyebrow symmetry and correction of the left brow ptosis.
Dermal Closure: buried vertical mattress
Epidermal Closure: running
Suturegard Intro: Intraoperative tissue expansion was performed, utilizing the SUTUREGARD device, in order to reduce wound tension.
Suturegard Body: The suture ends were repeatedly re-tightened and re-clamped to achieve the desired tissue expansion.
Hemigard Intro: Due to skin fragility and wound tension, it was decided to use HEMIGARD adhesive retention suture devices to permit a linear closure. The skin was cleaned and dried for a 6cm distance away from the wound. Excessive hair, if present, was removed to allow for adhesion.
Hemigard Postcare Instructions: The HEMIGARD strips are to remain completely dry for at least 5-7 days.
Donor Site Anesthesia Type: same as repair anesthesia
Epidermal Closure Graft Donor Site (Optional): simple interrupted
Graft Donor Site Bandage (Optional-Leave Blank If You Don't Want In Note): Steri-strips and a pressure bandage were applied to the donor site.
Closure 2 Information: This tab is for additional flaps and grafts, including complex repair and grafts and complex repair and flaps. You can also specify a different location for the additional defect, if the location is the same you do not need to select a new one. We will insert the automated text for the repair you select below just as we do for solitary flaps and grafts. Please note that at this time if you select a location with a different insurance zone you will need to override the ICD10 and CPT if appropriate.
Closure 3 Information: This tab is for additional flaps and grafts above and beyond our usual structured repairs.  Please note if you enter information here it will not currently bill and you will need to add the billing information manually.
Wound Care: Petrolatum
Dressing: pressure dressing
Dressing (No Sutures): dry sterile dressing
Unna Boot Text: An Unna boot was placed to help immobilize the limb and facilitate more rapid healing.
Home Suture Removal Text: Patient was provided instructions on removing sutures and will remove their sutures at home.  If they have any questions or difficulties they will call the office.
Post-Care Instructions: I reviewed with the patient in detail post-care instructions. Patient is not to engage in any heavy lifting, exercise, or swimming for the next 14 days. Should the patient develop any fevers, chills, bleeding, severe pain patient will contact the office immediately.
Pain Refusal Text: I offered to prescribe pain medication but the patient refused to take this medication.
Mauc Instructions: By selecting yes to the question below the MAUC number will be added into the note.  This will be calculated automatically based on the diagnosis chosen, the size entered, the body zone selected (H,M,L) and the specific indications you chose. You will also have the option to override the Mohs AUC if you disagree with the automatically calculated number and this option is found in the Case Summary tab.
Where Do You Want The Question To Include Opioid Counseling Located?: Case Summary Tab
Eye Protection Verbiage: Before proceeding with the stage, a plastic scleral shield was inserted. The globe was anesthetized with a few drops of proparacaine hydrochloride ophthalmic solution, USP 0.5%. Then, an appropriate sized scleral shield was chosen and coated with lacrilube ointment. The shield was gently inserted and left in place for the duration of each stage. After the stage was completed, the shield was gently removed.
Mohs Method Verbiage: An incision at a 90 degree angle following the standard Mohs approach was done and the specimen was harvested as a microscopic controlled layer.
Surgeon/Pathologist Verbiage (Will Incorporate Name Of Surgeon From Intro If Not Blank): operated in two distinct and integrated capacities as the surgeon and pathologist.
Mohs Histo Method Verbiage: Each section was then chromacoded and processed in the Mohs lab using the Mohs protocol and submitted for tissue processing
Subsequent Stages Histo Method Verbiage: Using a similar technique to that described above, a thin layer of tissue was removed from all areas where tumor was visible on the previous stage.  The tissue was again oriented, mapped, dyed, and processed as above.
Mohs Rapid Report Verbiage: The area of clinically evident tumor was marked with skin marking ink and appropriately hatched.  The initial incision was made following the Mohs approach through the skin.  The specimen was taken to the lab, divided into the necessary number of pieces, chromacoded and processed according to the Mohs protocol.  This was repeated in successive stages until a tumor free defect was achieved.
Complex Repair Preamble Text (Leave Blank If You Do Not Want): Extensive wide undermining was performed.
Intermediate Repair Preamble Text (Leave Blank If You Do Not Want): Undermining was performed with blunt dissection.
Graft Cartilage Fenestration Text: The cartilage was fenestrated with a 2mm punch biopsy to help facilitate graft survival and healing.
Non-Graft Cartilage Fenestration Text: The cartilage was fenestrated with a 2mm punch biopsy to help facilitate healing.
Secondary Intention Text (Leave Blank If You Do Not Want): The defect will heal with secondary intention.
No Repair - Repaired With Adjacent Surgical Defect Text (Leave Blank If You Do Not Want): After obtaining clear surgical margins the defect was repaired concurrently with another surgical defect which was in close approximation.
Unique Flap 1 Name:  Pedicled Propellar Flap
Unique Flap 1 Text: A decision was made to reconstruct the defect utilizing an  pedicled propellar flap.  The donor pedicle which included the  was injected with anesthesia.  The flap was excised through the skin and subcutaneous tissue down to the layer of the underlying musculature.  The flap was carefully excised within this deep plane to maintain its blood supply.  The edges of the donor site were undermined.   The donor site was closed in a primary fashion.  The flap was then rotated into position and sutured and the pedicle was tucked underneath the skin surface.  Once the flap was sutured into place, adequate blood supply was confirmed with blanching and refill.
Adjacent Tissue Transfer Text: The defect edges were debeveled with a #15 scalpel blade. Given the location of the defect and the proximity to free margins an adjacent tissue transfer was deemed most appropriate. Using a sterile surgical marker, an appropriate flap was drawn incorporating the defect and placing the expected incisions within the relaxed skin tension lines where possible. The area thus outlined was incised deep to adipose tissue with a #15 scalpel blade. The skin margins were undermined to an appropriate distance in all directions utilizing iris scissors.
Advancement Flap (Single) Text: The defect edges were debeveled with a #15 scalpel blade. Given the location of the defect and the proximity to free margins a single advancement flap was deemed most appropriate. Using a sterile surgical marker, an appropriate advancement flap was drawn incorporating the defect and placing the expected incisions within the relaxed skin tension lines where possible. The area thus outlined was incised deep to adipose tissue with a #15 scalpel blade. The skin margins were undermined to an appropriate distance in all directions utilizing iris scissors. Following this, the designed flap was advanced into the primary defect and sutured into place.
Advancement Flap (Double) Text: The defect edges were debeveled with a #15 scalpel blade. Given the location of the defect and the proximity to free margins a double advancement flap was deemed most appropriate. Using a sterile surgical marker, the appropriate advancement flaps were drawn incorporating the defect and placing the expected incisions within the relaxed skin tension lines where possible. The area thus outlined was incised deep to adipose tissue with a #15 scalpel blade. The skin margins were undermined to an appropriate distance in all directions utilizing iris scissors. Following this, the designed flaps were advanced into the primary defect and sutured into place.
Advancement-Rotation Flap Text: The defect edges were debeveled with a #15 scalpel blade. Given the location of the defect, shape of the defect and the proximity to free margins an advancement-rotation flap was deemed most appropriate. Using a sterile surgical marker, an appropriate flap was drawn incorporating the defect and placing the expected incisions within the relaxed skin tension lines where possible. The area thus outlined was incised deep to adipose tissue with a #15 scalpel blade. The skin margins were undermined to an appropriate distance in all directions utilizing iris scissors. Following this, the designed flap was placed into the primary defect and sutured into place.
Alar Island Pedicle Flap Text: The defect edges were debeveled with a #15 scalpel blade. Given the location of the defect, shape of the defect and the proximity to the alar rim an island pedicle advancement flap was deemed most appropriate. Using a sterile surgical marker, an appropriate advancement flap was drawn incorporating the defect, outlining the appropriate donor tissue and placing the expected incisions within the nasal ala running parallel to the alar rim. The area thus outlined was incised with a #15 scalpel blade. The skin margins were undermined minimally to an appropriate distance in all directions around the primary defect and laterally outward around the island pedicle utilizing iris scissors.  There was minimal undermining beneath the pedicle flap. Following this, the designed flap was placed in the primary defect and sutured into place.
A-T Advancement Flap Text: The defect edges were debeveled with a #15 scalpel blade. Given the location of the defect, shape of the defect and the proximity to free margins an A-T advancement flap was deemed most appropriate. Using a sterile surgical marker, an appropriate advancement flap was drawn incorporating the defect and placing the expected incisions within the relaxed skin tension lines where possible. The area thus outlined was incised deep to adipose tissue with a #15 scalpel blade. The skin margins were undermined to an appropriate distance in all directions utilizing iris scissors. Following this, the designed flap was advanced into the primary defect and sutured into place.
Banner Transposition Flap Text: The defect edges were debeveled with a #15 scalpel blade. Given the location of the defect and the proximity to free margins a Banner transposition flap was deemed most appropriate. Using a sterile surgical marker, an appropriate flap was drawn around the defect. The area thus outlined was incised deep to adipose tissue with a #15 scalpel blade. The skin margins were undermined to an appropriate distance in all directions utilizing iris scissors. Following this, the designed flap was placed in the primary defect and sutured into place.
Bilateral Helical Rim Advancement Flap Text: The defect edges were debeveled with a #15 blade scalpel.  Given the location of the defect and the proximity to free margins (helical rim) a bilateral helical rim advancement flap was deemed most appropriate. Using a sterile surgical marker, the appropriate advancement flaps were drawn incorporating the defect and placing the expected incisions between the helical rim and antihelix where possible.  The area thus outlined was incised through and through with a #15 scalpel blade.  With a skin hook and iris scissors, the flaps were gently and sharply undermined and freed up. Following this, the designed flaps were placed into the primary defect and sutured into place.
Bilateral Rotation Flap Text: The defect edges were debeveled with a #15 scalpel blade. Given the location of the defect, shape of the defect and the proximity to free margins a bilateral rotation flap was deemed most appropriate. Using a sterile surgical marker, an appropriate rotation flap was drawn incorporating the defect and placing the expected incisions within the relaxed skin tension lines where possible. The area thus outlined was incised deep to adipose tissue with a #15 scalpel blade. The skin margins were undermined to an appropriate distance in all directions utilizing iris scissors. Following this, the designed flap was carried over into the primary defect and sutured into place.
Bilobed Flap Text: The defect edges were debeveled with a #15 scalpel blade. Given the location of the defect and the proximity to free margins a bilobe flap was deemed most appropriate. Using a sterile surgical marker, an appropriate bilobe flap drawn around the defect. The area thus outlined was incised deep to adipose tissue with a #15 scalpel blade. The skin margins were undermined to an appropriate distance in all directions utilizing iris scissors.  Following this, the designed flap was placed into the primary defect and sutured into place.
Bilobed Transposition Flap Text: The defect edges were debeveled with a #15 scalpel blade. Given the location of the defect and the proximity to free margins a bilobed transposition flap was deemed most appropriate. Using a sterile surgical marker, an appropriate bilobe flap drawn around the defect. The area thus outlined was incised deep to adipose tissue with a #15 scalpel blade. The skin margins were undermined to an appropriate distance in all directions utilizing iris scissors.  Following this, the designed flap was placed into the primary defect and sutured into place.
Bi-Rhombic Flap Text: The defect edges were debeveled with a #15 scalpel blade. Given the location of the defect and the proximity to free margins a bi-rhombic flap was deemed most appropriate. Using a sterile surgical marker, an appropriate rhombic flap was drawn incorporating the defect. The area thus outlined was incised deep to adipose tissue with a #15 scalpel blade. The skin margins were undermined to an appropriate distance in all directions utilizing iris scissors. Following this, the designed flap was placed into the primary defect and sutured into place.
Burow's Advancement Flap Text: The defect edges were debeveled with a #15 scalpel blade. Given the location of the defect and the proximity to free margins a Burow's advancement flap was deemed most appropriate. Using a sterile surgical marker, the appropriate advancement flap was drawn incorporating the defect and placing the expected incisions within the relaxed skin tension lines where possible. The area thus outlined was incised deep to adipose tissue with a #15 scalpel blade. The skin margins were undermined to an appropriate distance in all directions utilizing iris scissors. Following this, the designed flap was advanced into the primary defect and sutured into place.
Chonodrocutaneous Helical Advancement Flap Text: The defect edges were debeveled with a #15 scalpel blade. Given the location of the defect and the proximity to free margins a chondrocutaneous helical advancement flap was deemed most appropriate. Using a sterile surgical marker, the appropriate advancement flap was drawn incorporating the defect and placing the expected incisions within the relaxed skin tension lines where possible. The area thus outlined was incised deep to adipose tissue with a #15 scalpel blade. The skin margins were undermined to an appropriate distance in all directions utilizing iris scissors. Following this, the designed flap was advanced into the primary defect and sutured into place.
Crescentic Advancement Flap Text: The defect edges were debeveled with a #15 scalpel blade. Given the location of the defect and the proximity to free margins a crescentic advancement flap was deemed most appropriate. Using a sterile surgical marker, the appropriate advancement flap was drawn incorporating the defect and placing the expected incisions within the relaxed skin tension lines where possible. The area thus outlined was incised deep to adipose tissue with a #15 scalpel blade. The skin margins were undermined to an appropriate distance in all directions utilizing iris scissors. Following this, the designed flap was advanced into the primary defect and sutured into place.
Dorsal Nasal Flap Text: The defect edges were debeveled with a #15 scalpel blade. Given the location of the defect and the proximity to free margins a dorsal nasal flap was deemed most appropriate. Using a sterile surgical marker, an appropriate dorsal nasal flap was drawn around the defect. The area thus outlined was incised deep to adipose tissue with a #15 scalpel blade. The skin margins were undermined to an appropriate distance in all directions utilizing iris scissors. Following this, the designed flap was placed in the primary defect and sutured into place.
Double Island Pedicle Flap Text: The defect edges were debeveled with a #15 scalpel blade. Given the location of the defect, shape of the defect and the proximity to free margins a double island pedicle advancement flap was deemed most appropriate. Using a sterile surgical marker, an appropriate advancement flap was drawn incorporating the defect, outlining the appropriate donor tissue and placing the expected incisions within the relaxed skin tension lines where possible. The area thus outlined was incised deep to adipose tissue with a #15 scalpel blade. The skin margins were undermined to an appropriate distance in all directions around the primary defect and laterally outward around the island pedicle utilizing iris scissors.  There was minimal undermining beneath the pedicle flap. Following this, the flap was placed into the primary defect and sutured into place.
Double O-Z Flap Text: The defect edges were debeveled with a #15 scalpel blade. Given the location of the defect, shape of the defect and the proximity to free margins a Double O-Z flap was deemed most appropriate. Using a sterile surgical marker, an appropriate transposition flap was drawn incorporating the defect and placing the expected incisions within the relaxed skin tension lines where possible. The area thus outlined was incised deep to adipose tissue with a #15 scalpel blade. The skin margins were undermined to an appropriate distance in all directions utilizing iris scissors. Following this, the designed flap was placed into the primary defect and sutured into place.
Double O-Z Plasty Text: The defect edges were debeveled with a #15 scalpel blade. Given the location of the defect, shape of the defect and the proximity to free margins a Double O-Z plasty (double transposition flap) was deemed most appropriate. Using a sterile surgical marker, the appropriate transposition flaps were drawn incorporating the defect and placing the expected incisions within the relaxed skin tension lines where possible. The area thus outlined was incised deep to adipose tissue with a #15 scalpel blade. The skin margins were undermined to an appropriate distance in all directions utilizing iris scissors.  Hemostasis was achieved with electrocautery.  The flaps were then transposed into place, one clockwise and the other counterclockwise, and anchored with interrupted buried subcutaneous sutures.
Double Z Plasty Text: The lesion was extirpated to the level of the fat with a #15 scalpel blade. Given the location of the defect, shape of the defect and the proximity to free margins a double Z-plasty was deemed most appropriate for repair. Using a sterile surgical marker, the appropriate transposition arms of the double Z-plasty were drawn incorporating the defect and placing the expected incisions within the relaxed skin tension lines where possible. The area thus outlined was incised deep to adipose tissue with a #15 scalpel blade. The skin margins were undermined to an appropriate distance in all directions utilizing iris scissors. The opposing transposition arms were then transposed and carried over into place in opposite direction and anchored with interrupted buried subcutaneous sutures.
Ear Star Wedge Flap Text: The defect edges were debeveled with a #15 blade scalpel.  Given the location of the defect and the proximity to free margins (helical rim) an ear star wedge flap was deemed most appropriate. Using a sterile surgical marker, the appropriate flap was drawn incorporating the defect and placing the expected incisions between the helical rim and antihelix where possible.  The area thus outlined was incised through and through with a #15 scalpel blade. Following this, the designed flap was placed in the primary defect and sutured into place.
Flip-Flop Flap Text: The defect edges were debeveled with a #15 blade scalpel.  Given the location of the defect and the proximity to free margins a flip-flop flap was deemed most appropriate. Using a sterile surgical marker, the appropriate flap was drawn incorporating the defect and placing the expected incisions between the helical rim and antihelix where possible.  The area thus outlined was incised through and through with a #15 scalpel blade. Following this, the designed flap was carried over into the primary defect and sutured into place.
Hatchet Flap Text: The defect edges were debeveled with a #15 scalpel blade. Given the location of the defect, shape of the defect and the proximity to free margins a hatchet flap was deemed most appropriate. Using a sterile surgical marker, an appropriate hatchet flap was drawn incorporating the defect and placing the expected incisions within the relaxed skin tension lines where possible. The area thus outlined was incised deep to adipose tissue with a #15 scalpel blade. The skin margins were undermined to an appropriate distance in all directions utilizing iris scissors. Following this, the designed flap was placed into the primary defect and sutured into place.
Helical Rim Advancement Flap Text: The defect edges were debeveled with a #15 blade scalpel.  Given the location of the defect and the proximity to free margins (helical rim) a double helical rim advancement flap was deemed most appropriate. Using a sterile surgical marker, the appropriate advancement flaps were drawn incorporating the defect and placing the expected incisions between the helical rim and antihelix where possible.  The area thus outlined was incised through and through with a #15 scalpel blade.  With a skin hook and iris scissors, the flaps were gently and sharply undermined and freed up. Folllowing this, the designed flaps were placed into the primary defect and sutured into place.
H Plasty Text: Given the location of the defect, shape of the defect and the proximity to free margins a H-plasty was deemed most appropriate for repair. Using a sterile surgical marker, the appropriate advancement arms of the H-plasty were drawn incorporating the defect and placing the expected incisions within the relaxed skin tension lines where possible. The area thus outlined was incised deep to adipose tissue with a #15 scalpel blade. The skin margins were undermined to an appropriate distance in all directions utilizing iris scissors.  The opposing advancement arms were then advanced into place in opposite direction and anchored with interrupted buried subcutaneous sutures.
Island Pedicle Flap Text: The defect edges were debeveled with a #15 scalpel blade. Given the location of the defect, shape of the defect and the proximity to free margins an island pedicle advancement flap was deemed most appropriate. Using a sterile surgical marker, an appropriate advancement flap was drawn incorporating the defect, outlining the appropriate donor tissue and placing the expected incisions within the relaxed skin tension lines where possible. The area thus outlined was incised deep to adipose tissue with a #15 scalpel blade. The skin margins were undermined to an appropriate distance in all directions around the primary defect and laterally outward around the island pedicle utilizing iris scissors.  There was minimal undermining beneath the pedicle flap. Following this, the flap was placed into the primary defect and sutured into place.
Island Pedicle Flap With Canthal Suspension Text: The defect edges were debeveled with a #15 scalpel blade. Given the location of the defect, shape of the defect and the proximity to free margins an island pedicle advancement flap was deemed most appropriate. Using a sterile surgical marker, an appropriate advancement flap was drawn incorporating the defect, outlining the appropriate donor tissue and placing the expected incisions within the relaxed skin tension lines where possible. The area thus outlined was incised deep to adipose tissue with a #15 scalpel blade. The skin margins were undermined to an appropriate distance in all directions around the primary defect and laterally outward around the island pedicle utilizing iris scissors.  There was minimal undermining beneath the pedicle flap. A suspension suture was placed in the canthal tendon to prevent tension and prevent ectropion. Following this, the designed flap was placed into the primary defect and sutured into place.
Island Pedicle Flap-Requiring Vessel Identification Text: The defect edges were debeveled with a #15 scalpel blade. Given the location of the defect, shape of the defect and the proximity to free margins an island pedicle advancement flap was deemed most appropriate. Using a sterile surgical marker, an appropriate advancement flap was drawn, based on the axial vessel mentioned above, incorporating the defect, outlining the appropriate donor tissue and placing the expected incisions within the relaxed skin tension lines where possible. The area thus outlined was incised deep to adipose tissue with a #15 scalpel blade. The skin margins were undermined to an appropriate distance in all directions around the primary defect and laterally outward around the island pedicle utilizing iris scissors.  There was minimal undermining beneath the pedicle flap. Following this, the designed flap was placed in the primary defect and sutured into place.
Keystone Flap Text: The defect edges were debeveled with a #15 scalpel blade. Given the location of the defect, shape of the defect a keystone flap was deemed most appropriate. Using a sterile surgical marker, an appropriate keystone flap was drawn incorporating the defect, outlining the appropriate donor tissue and placing the expected incisions within the relaxed skin tension lines where possible. The area thus outlined was incised deep to adipose tissue with a #15 scalpel blade. The skin margins were undermined to an appropriate distance in all directions around the primary defect and laterally outward around the flap utilizing iris scissors. Following this, the designed flap was placed in the primary defect and sutured into place.
Melolabial Transposition Flap Text: The defect edges were debeveled with a #15 scalpel blade. Given the location of the defect and the proximity to free margins a melolabial flap was deemed most appropriate. Using a sterile surgical marker, an appropriate melolabial transposition flap was drawn incorporating the defect. The area thus outlined was incised deep to adipose tissue with a #15 scalpel blade. The skin margins were undermined to an appropriate distance in all directions utilizing iris scissors. Following this, the designed flap was placed into the primary defect and sutured into place.
Mercedes Flap Text: The defect edges were debeveled with a #15 scalpel blade. Given the location of the defect, shape of the defect and the proximity to free margins a Mercedes flap was deemed most appropriate. Using a sterile surgical marker, an appropriate advancement flap was drawn incorporating the defect and placing the expected incisions within the relaxed skin tension lines where possible. The area thus outlined was incised deep to adipose tissue with a #15 scalpel blade. The skin margins were undermined to an appropriate distance in all directions utilizing iris scissors. Following this, the designed flap was advanced into the primary defect and sutured into place.
Modified Advancement Flap Text: The defect edges were debeveled with a #15 scalpel blade. Given the location of the defect, shape of the defect and the proximity to free margins a modified advancement flap was deemed most appropriate. Using a sterile surgical marker, an appropriate advancement flap was drawn incorporating the defect and placing the expected incisions within the relaxed skin tension lines where possible. The area thus outlined was incised deep to adipose tissue with a #15 scalpel blade. The skin margins were undermined to an appropriate distance in all directions utilizing iris scissors. Following this, the designed flap was advanced into the primary defect and sutured into place.
Mucosal Advancement Flap Text: Given the location of the defect, shape of the defect and the proximity to free margins a mucosal advancement flap was deemed most appropriate. Incisions were made with a 15 blade scalpel in the appropriate fashion along the cutaneous vermilion border and the mucosal lip. The remaining actinically damaged mucosal tissue was excised.  The mucosal advancement flap was then elevated to the gingival sulcus with care taken to preserve the neurovascular structures and advanced into the primary defect. Care was taken to ensure that precise realignment of the vermilion border was achieved.
Muscle Hinge Flap Text: The defect edges were debeveled with a #15 scalpel blade.  Given the size, depth and location of the defect and the proximity to free margins a muscle hinge flap was deemed most appropriate. Using a sterile surgical marker, an appropriate hinge flap was drawn incorporating the defect. The area thus outlined was incised with a #15 scalpel blade. The skin margins were undermined to an appropriate distance in all directions utilizing iris scissors. Following this, the designed flap was placed in the primary defect and sutured into place.
Mustarde Flap Text: The defect edges were debeveled with a #15 scalpel blade.  Given the size, depth and location of the defect and the proximity to free margins a Mustarde flap was deemed most appropriate. Using a sterile surgical marker, an appropriate flap was drawn incorporating the defect. The area thus outlined was incised with a #15 scalpel blade. The skin margins were undermined to an appropriate distance in all directions utilizing iris scissors. Following this, the designed flap was placed in the primary defect and sutured into place.
Nasal Turnover Hinge Flap Text: The defect edges were debeveled with a #15 scalpel blade.  Given the size, depth, location of the defect and the defect being full thickness a nasal turnover hinge flap was deemed most appropriate. Using a sterile surgical marker, an appropriate hinge flap was drawn incorporating the defect. The area thus outlined was incised with a #15 scalpel blade. The flap was designed to recreate the nasal mucosal lining and the alar rim. The skin margins were undermined to an appropriate distance in all directions utilizing iris scissors. Following this, the designed flap was placed into the primary defect and sutured into place
Nasalis-Muscle-Based Myocutaneous Island Pedicle Flap Text: Using a #15 blade, an incision was made around the donor flap to the level of the nasalis muscle. Wide lateral undermining was then performed in both the subcutaneous plane above the nasalis muscle, and in a submuscular plane just above periosteum. This allowed the formation of a free nasalis muscle axial pedicle (based on the angular artery) which was still attached to the actual cutaneous flap, increasing its mobility and vascular viability. Hemostasis was obtained with pinpoint electrocoagulation. The flap was mobilized into position and the pivotal anchor points positioned and stabilized with buried interrupted sutures. Subcutaneous and dermal tissues were closed in a multilayered fashion with sutures. Tissue redundancies were excised, and the epidermal edges were apposed without significant tension and sutured with sutures.
Nasalis Myocutaneous Flap Text: Using a #15 blade, an incision was made around the donor flap to the level of the nasalis muscle. Wide lateral undermining was then performed in both the subcutaneous plane above the nasalis muscle, and in a submuscular plane just above periosteum. This allowed the formation of a free nasalis muscle axial pedicle which was still attached to the actual cutaneous flap, increasing its mobility and vascular viability. Hemostasis was obtained with pinpoint electrocoagulation. The flap was mobilized into position and the pivotal anchor points positioned and stabilized with buried interrupted sutures. Subcutaneous and dermal tissues were closed in a multilayered fashion with sutures. Tissue redundancies were excised, and the epidermal edges were apposed without significant tension and sutured with sutures.
Nasolabial Transposition Flap Text: The defect edges were debeveled with a #15 scalpel blade.  Given the size, depth and location of the defect and the proximity to free margins a nasolabial transposition flap was deemed most appropriate. Using a sterile surgical marker, an appropriate flap was drawn incorporating the defect. The area thus outlined was incised with a #15 scalpel blade. The skin margins were undermined to an appropriate distance in all directions utilizing iris scissors. Following this, the designed flap was carried into the primary defect and sutured into place.
Orbicularis Oris Muscle Flap Text: The defect edges were debeveled with a #15 scalpel blade.  Given that the defect affected the competency of the oral sphincter an orbicularis oris muscle flap was deemed most appropriate to restore this competency and normal muscle function.  Using a sterile surgical marker, an appropriate flap was drawn incorporating the defect. The area thus outlined was incised with a #15 scalpel blade. Following this, the designed flap was placed into the primary defect and sutured into place.
O-T Advancement Flap Text: The defect edges were debeveled with a #15 scalpel blade. Given the location of the defect, shape of the defect and the proximity to free margins an O-T advancement flap was deemed most appropriate. Using a sterile surgical marker, an appropriate advancement flap was drawn incorporating the defect and placing the expected incisions within the relaxed skin tension lines where possible. The area thus outlined was incised deep to adipose tissue with a #15 scalpel blade. The skin margins were undermined to an appropriate distance in all directions utilizing iris scissors. Following this, the designed flap was advanced into the primary defect and sutured into place.
O-T Plasty Text: The defect edges were debeveled with a #15 scalpel blade. Given the location of the defect, shape of the defect and the proximity to free margins an O-T plasty was deemed most appropriate. Using a sterile surgical marker, an appropriate O-T plasty was drawn incorporating the defect and placing the expected incisions within the relaxed skin tension lines where possible. The area thus outlined was incised deep to adipose tissue with a #15 scalpel blade. The skin margins were undermined to an appropriate distance in all directions utilizing iris scissors. Following this, the designed flap was placed into the primary defect and sutured into place.
O-L Flap Text: The defect edges were debeveled with a #15 scalpel blade. Given the location of the defect, shape of the defect and the proximity to free margins an O-L flap was deemed most appropriate. Using a sterile surgical marker, an appropriate advancement flap was drawn incorporating the defect and placing the expected incisions within the relaxed skin tension lines where possible. The area thus outlined was incised deep to adipose tissue with a #15 scalpel blade. The skin margins were undermined to an appropriate distance in all directions utilizing iris scissors. Following this, the designed flap was advanced into the primary defect and sutured into place.
O-Z Flap Text: The defect edges were debeveled with a #15 scalpel blade. Given the location of the defect, shape of the defect and the proximity to free margins an O-Z flap was deemed most appropriate. Using a sterile surgical marker, an appropriate transposition flap was drawn incorporating the defect and placing the expected incisions within the relaxed skin tension lines where possible. The area thus outlined was incised deep to adipose tissue with a #15 scalpel blade. The skin margins were undermined to an appropriate distance in all directions utilizing iris scissors. Following this, the designed flap was placed into the primary defect and sutured into place.
O-Z Plasty Text: The defect edges were debeveled with a #15 scalpel blade. Given the location of the defect, shape of the defect and the proximity to free margins an O-Z plasty (double transposition flap) was deemed most appropriate. Using a sterile surgical marker, the appropriate transposition flaps were drawn incorporating the defect and placing the expected incisions within the relaxed skin tension lines where possible. The area thus outlined was incised deep to adipose tissue with a #15 scalpel blade. The skin margins were undermined to an appropriate distance in all directions utilizing iris scissors.  Hemostasis was achieved with electrocautery.  The flaps were then transposed into place, one clockwise and the other counterclockwise, and anchored with interrupted buried subcutaneous sutures.
Peng Advancement Flap Text: The defect edges were debeveled with a #15 scalpel blade. Given the location of the defect, shape of the defect and the proximity to free margins a Peng advancement flap was deemed most appropriate. Using a sterile surgical marker, an appropriate advancement flap was drawn incorporating the defect and placing the expected incisions within the relaxed skin tension lines where possible. The area thus outlined was incised deep to adipose tissue with a #15 scalpel blade. The skin margins were undermined to an appropriate distance in all directions utilizing iris scissors. Following this, the designed flap was advanced into the primary defect and sutured into place.
Rectangular Flap Text: The defect edges were debeveled with a #15 scalpel blade. Given the location of the defect and the proximity to free margins a rectangular flap was deemed most appropriate. Using a sterile surgical marker, an appropriate rectangular flap was drawn incorporating the defect. The area thus outlined was incised deep to adipose tissue with a #15 scalpel blade. The skin margins were undermined to an appropriate distance in all directions utilizing iris scissors. Following this, the designed flap was carried over into the primary defect and sutured into place.
Rhombic Flap Text: The defect edges were debeveled with a #15 scalpel blade. Given the location of the defect and the proximity to free margins a rhombic flap was deemed most appropriate. Using a sterile surgical marker, an appropriate rhombic flap was drawn incorporating the defect. The area thus outlined was incised deep to adipose tissue with a #15 scalpel blade. The skin margins were undermined to an appropriate distance in all directions utilizing iris scissors. Following this, the designed flap was placed into the primary defect and sutured into place.
Rhomboid Transposition Flap Text: The defect edges were debeveled with a #15 scalpel blade. Given the location of the defect and the proximity to free margins a rhomboid transposition flap was deemed most appropriate. Using a sterile surgical marker, an appropriate rhomboid flap was drawn incorporating the defect. The area thus outlined was incised deep to adipose tissue with a #15 scalpel blade. The skin margins were undermined to an appropriate distance in all directions utilizing iris scissors. Following this, the designed flap was placed into the primary defect and sutured into place.
Rotation Flap Text: The defect edges were debeveled with a #15 scalpel blade. Given the location of the defect, shape of the defect and the proximity to free margins a rotation flap was deemed most appropriate. Using a sterile surgical marker, an appropriate rotation flap was drawn incorporating the defect and placing the expected incisions within the relaxed skin tension lines where possible. The area thus outlined was incised deep to adipose tissue with a #15 scalpel blade. The skin margins were undermined to an appropriate distance in all directions utilizing iris scissors. Following this, the designed flap was placed into the primary defect and sutured into place.
Spiral Flap Text: The defect edges were debeveled with a #15 scalpel blade. Given the location of the defect, shape of the defect and the proximity to free margins a spiral flap was deemed most appropriate. Using a sterile surgical marker, an appropriate rotation flap was drawn incorporating the defect and placing the expected incisions within the relaxed skin tension lines where possible. The area thus outlined was incised deep to adipose tissue with a #15 scalpel blade. The skin margins were undermined to an appropriate distance in all directions utilizing iris scissors. Following this, the designed flap was placed into the primary defect and sutured into place.
Staged Advancement Flap Text: The defect edges were debeveled with a #15 scalpel blade. Given the location of the defect, shape of the defect and the proximity to free margins a staged advancement flap was deemed most appropriate. Using a sterile surgical marker, an appropriate advancement flap was drawn incorporating the defect and placing the expected incisions within the relaxed skin tension lines where possible. The area thus outlined was incised deep to adipose tissue with a #15 scalpel blade. The skin margins were undermined to an appropriate distance in all directions utilizing iris scissors. Following this, the designed flap was placed into the primary defect and sutured into place.
Star Wedge Flap Text: The defect edges were debeveled with a #15 scalpel blade. Given the location of the defect, shape of the defect and the proximity to free margins a star wedge flap was deemed most appropriate. Using a sterile surgical marker, an appropriate rotation flap was drawn incorporating the defect and placing the expected incisions within the relaxed skin tension lines where possible. The area thus outlined was incised deep to adipose tissue with a #15 scalpel blade. The skin margins were undermined to an appropriate distance in all directions utilizing iris scissors. Following this, the designed flap was placed into the primary defect and sutured into place.
Transposition Flap Text: The defect edges were debeveled with a #15 scalpel blade. Given the location of the defect and the proximity to free margins a transposition flap was deemed most appropriate. Using a sterile surgical marker, an appropriate transposition flap was drawn incorporating the defect. The area thus outlined was incised deep to adipose tissue with a #15 scalpel blade. The skin margins were undermined to an appropriate distance in all directions utilizing iris scissors. Following this, the designed flap was placed into the primary defect and sutured into place.
Trilobed Flap Text: The defect edges were debeveled with a #15 scalpel blade. Given the location of the defect and the proximity to free margins a trilobed flap was deemed most appropriate. Using a sterile surgical marker, an appropriate trilobed flap was drawn around the defect. The area thus outlined was incised deep to adipose tissue with a #15 scalpel blade. The skin margins were undermined to an appropriate distance in all directions utilizing iris scissors. Following this, the designed flap was placed in the primary defect and sutured into place.
V-Y Flap Text: The defect edges were debeveled with a #15 scalpel blade. Given the location of the defect, shape of the defect and the proximity to free margins a V-Y flap was deemed most appropriate. Using a sterile surgical marker, an appropriate advancement flap was drawn incorporating the defect and placing the expected incisions within the relaxed skin tension lines where possible. The area thus outlined was incised deep to adipose tissue with a #15 scalpel blade. The skin margins were undermined to an appropriate distance in all directions utilizing iris scissors. Following this, the designed flap was advanced into the primary defect and sutured into place.
V-Y Plasty Text: The defect edges were debeveled with a #15 scalpel blade. Given the location of the defect, shape of the defect and the proximity to free margins an V-Y advancement flap was deemed most appropriate. Using a sterile surgical marker, an appropriate advancement flap was drawn incorporating the defect and placing the expected incisions within the relaxed skin tension lines where possible. The area thus outlined was incised deep to adipose tissue with a #15 scalpel blade. The skin margins were undermined to an appropriate distance in all directions utilizing iris scissors. Following this, the designed flap was advanced into the primary defect and sutured into place.
W Plasty Text: The lesion was extirpated to the level of the fat with a #15 scalpel blade. Given the location of the defect, shape of the defect and the proximity to free margins a W-plasty was deemed most appropriate for repair. Using a sterile surgical marker, the appropriate transposition arms of the W-plasty were drawn incorporating the defect and placing the expected incisions within the relaxed skin tension lines where possible. The area thus outlined was incised deep to adipose tissue with a #15 scalpel blade. The skin margins were undermined to an appropriate distance in all directions utilizing iris scissors.  The opposing transposition arms were then transposed into place in opposite direction and anchored with interrupted buried subcutaneous sutures.
Z Plasty Text: The lesion was extirpated to the level of the fat with a #15 scalpel blade. Given the location of the defect, shape of the defect and the proximity to free margins a Z-plasty was deemed most appropriate for repair. Using a sterile surgical marker, the appropriate transposition arms of the Z-plasty were drawn incorporating the defect and placing the expected incisions within the relaxed skin tension lines where possible. The area thus outlined was incised deep to adipose tissue with a #15 scalpel blade. The skin margins were undermined to an appropriate distance in all directions utilizing iris scissors.  The opposing transposition arms were then transposed into place in opposite direction and anchored with interrupted buried subcutaneous sutures.
Zygomaticofacial Flap Text: Given the location of the defect, shape of the defect and the proximity to free margins a zygomaticofacial flap was deemed most appropriate for repair. Using a sterile surgical marker, the appropriate flap was drawn incorporating the defect and placing the expected incisions within the relaxed skin tension lines where possible. The area thus outlined was incised deep to adipose tissue with a #15 scalpel blade with preservation of a vascular pedicle.  The skin margins were undermined to an appropriate distance in all directions utilizing iris scissors.  The flap was then placed into the defect and anchored with interrupted buried subcutaneous sutures.
Abbe Flap (Lower To Upper Lip) Text: The defect of the upper lip was assessed and measured.  Given the location and size of the defect, an Abbe flap was deemed most appropriate. Using a sterile surgical marker, an appropriate Abbe flap was measured and drawn on the lower lip. Local anesthesia was then infiltrated. A scalpel was then used to incise the upper lip through and through the skin, vermilion, muscle and mucosa, leaving the flap pedicled on the opposite side.  The flap was then rotated and transferred to the lower lip defect.  The flap was then sutured into place with a three layer technique, closing the orbicularis oris muscle layer with subcutaneous buried sutures, followed by a mucosal layer and an epidermal layer.
Abbe Flap (Upper To Lower Lip) Text: The defect of the lower lip was assessed and measured.  Given the location and size of the defect, an Abbe flap was deemed most appropriate. Using a sterile surgical marker, an appropriate Abbe flap was measured and drawn on the upper lip. Local anesthesia was then infiltrated.  A scalpel was then used to incise the upper lip through and through the skin, vermilion, muscle and mucosa, leaving the flap pedicled on the opposite side.  The flap was then rotated and transferred to the lower lip defect.  The flap was then sutured into place with a three layer technique, closing the orbicularis oris muscle layer with subcutaneous buried sutures, followed by a mucosal layer and an epidermal layer.
Cheek Interpolation Flap Text: A decision was made to reconstruct the defect utilizing an interpolation axial flap and a staged reconstruction.  A telfa template was made of the defect.  This telfa template was then used to outline the Cheek Interpolation flap.  The donor area for the pedicle flap was then injected with anesthesia.  The flap was excised through the skin and subcutaneous tissue down to the layer of the underlying musculature.  The interpolation flap was carefully excised within this deep plane to maintain its blood supply.  The edges of the donor site were undermined.   The donor site was closed in a primary fashion.  The pedicle was then rotated into position and sutured.  Once the tube was sutured into place, adequate blood supply was confirmed with blanching and refill.  The pedicle was then wrapped with xeroform gauze and dressed appropriately with a telfa and gauze bandage to ensure continued blood supply and protect the attached pedicle.
Cheek-To-Nose Interpolation Flap Text: A decision was made to reconstruct the defect utilizing an interpolation axial flap and a staged reconstruction.  A telfa template was made of the defect.  This telfa template was then used to outline the Cheek-To-Nose Interpolation flap.  The donor area for the pedicle flap was then injected with anesthesia.  The flap was excised through the skin and subcutaneous tissue down to the layer of the underlying musculature.  The interpolation flap was carefully excised within this deep plane to maintain its blood supply.  The edges of the donor site were undermined.   The donor site was closed in a primary fashion.  The pedicle was then rotated into position and sutured.  Once the tube was sutured into place, adequate blood supply was confirmed with blanching and refill.  The pedicle was then wrapped with xeroform gauze and dressed appropriately with a telfa and gauze bandage to ensure continued blood supply and protect the attached pedicle.
Estlander Flap (Lower To Upper Lip) Text: The defect of the lower lip was assessed and measured.  Given the location and size of the defect, an Estlander flap was deemed most appropriate. Using a sterile surgical marker, an appropriate Estlander flap was measured and drawn on the upper lip. Local anesthesia was then infiltrated. A scalpel was then used to incise the lateral aspect of the flap, through skin, muscle and mucosa, leaving the flap pedicled medially.  The flap was then rotated and positioned to fill the lower lip defect.  The flap was then sutured into place with a three layer technique, closing the orbicularis oris muscle layer with subcutaneous buried sutures, followed by a mucosal layer and an epidermal layer.
Interpolation Flap Text: A decision was made to reconstruct the defect utilizing an interpolation axial flap and a staged reconstruction.  A telfa template was made of the defect.  This telfa template was then used to outline the interpolation flap.  The donor area for the pedicle flap was then injected with anesthesia.  The flap was excised through the skin and subcutaneous tissue down to the layer of the underlying musculature.  The interpolation flap was carefully excised within this deep plane to maintain its blood supply.  The edges of the donor site were undermined.   The donor site was closed in a primary fashion.  The pedicle was then rotated into position and sutured.  Once the tube was sutured into place, adequate blood supply was confirmed with blanching and refill.  The pedicle was then wrapped with xeroform gauze and dressed appropriately with a telfa and gauze bandage to ensure continued blood supply and protect the attached pedicle.
Melolabial Interpolation Flap Text: A decision was made to reconstruct the defect utilizing an interpolation axial flap and a staged reconstruction.  A telfa template was made of the defect.  This telfa template was then used to outline the melolabial interpolation flap.  The donor area for the pedicle flap was then injected with anesthesia.  The flap was excised through the skin and subcutaneous tissue down to the layer of the underlying musculature.  The pedicle flap was carefully excised within this deep plane to maintain its blood supply.  The edges of the donor site were undermined.   The donor site was closed in a primary fashion.  The pedicle was then rotated into position and sutured.  Once the tube was sutured into place, adequate blood supply was confirmed with blanching and refill.  The pedicle was then wrapped with xeroform gauze and dressed appropriately with a telfa and gauze bandage to ensure continued blood supply and protect the attached pedicle.
Mastoid Interpolation Flap Text: A decision was made to reconstruct the defect utilizing an interpolation axial flap and a staged reconstruction.  A telfa template was made of the defect.  This telfa template was then used to outline the mastoid interpolation flap.  The donor area for the pedicle flap was then injected with anesthesia.  The flap was excised through the skin and subcutaneous tissue down to the layer of the underlying musculature.  The pedicle flap was carefully excised within this deep plane to maintain its blood supply.  The edges of the donor site were undermined.   The donor site was closed in a primary fashion.  The pedicle was then rotated into position and sutured.  Once the tube was sutured into place, adequate blood supply was confirmed with blanching and refill.  The pedicle was then wrapped with xeroform gauze and dressed appropriately with a telfa and gauze bandage to ensure continued blood supply and protect the attached pedicle.
Paramedian Forehead Flap Text: A decision was made to reconstruct the defect utilizing an interpolation axial flap and a staged reconstruction.  A telfa template was made of the defect.  This telfa template was then used to outline the paramedian forehead pedicle flap.  The donor area for the pedicle flap was then injected with anesthesia.  The flap was excised through the skin and subcutaneous tissue down to the layer of the underlying musculature.  The pedicle flap was carefully excised within this deep plane to maintain its blood supply.  The edges of the donor site were undermined.   The donor site was closed in a primary fashion.  The pedicle was then rotated into position and sutured.  Once the tube was sutured into place, adequate blood supply was confirmed with blanching and refill.  The pedicle was then wrapped with xeroform gauze and dressed appropriately with a telfa and gauze bandage to ensure continued blood supply and protect the attached pedicle.
Posterior Auricular Interpolation Flap Text: A decision was made to reconstruct the defect utilizing an interpolation axial flap and a staged reconstruction.  A telfa template was made of the defect.  This telfa template was then used to outline the posterior auricular interpolation flap.  The donor area for the pedicle flap was then injected with anesthesia.  The flap was excised through the skin and subcutaneous tissue down to the layer of the underlying musculature.  The pedicle flap was carefully excised within this deep plane to maintain its blood supply.  The edges of the donor site were undermined.   The donor site was closed in a primary fashion.  The pedicle was then rotated into position and sutured.  Once the tube was sutured into place, adequate blood supply was confirmed with blanching and refill.  The pedicle was then wrapped with xeroform gauze and dressed appropriately with a telfa and gauze bandage to ensure continued blood supply and protect the attached pedicle.
Cheiloplasty (Complex) Text: A decision was made to reconstruct the defect with a  cheiloplasty.  The defect was undermined extensively.  Additional orbicularis oris muscle was excised with a 15 blade scalpel.  The defect was converted into a full thickness wedge to facilite a better cosmetic result.  Small vessels were then tied off with 5-0 monocyrl. The orbicularis oris, superficial fascia, adipose and dermis were then reapproximated.  After the deeper layers were approximated the epidermis was reapproximated with particular care given to realign the vermilion border.
Cheiloplasty (Less Than 50%) Text: A decision was made to reconstruct the defect with a  cheiloplasty.  The defect was undermined extensively.  Additional orbicularis oris muscle was excised with a 15 blade scalpel.  The defect was converted into a full thickness wedge, of less than 50% of the vertical height of the lip, to facilite a better cosmetic result.  Small vessels were then tied off with 5-0 monocyrl. The orbicularis oris, superficial fascia, adipose and dermis were then reapproximated.  After the deeper layers were approximated the epidermis was reapproximated with particular care given to realign the vermilion border.
Ear Wedge Repair Text: A wedge excision was completed by carrying down an excision through the full thickness of the ear and cartilage with an inward facing Burow's triangle. The wound was then closed in a layered fashion.
Full Thickness Lip Wedge Repair (Flap) Text: Given the location of the defect and the proximity to free margins a full thickness wedge repair was deemed most appropriate. Using a sterile surgical marker, the appropriate repair was drawn incorporating the defect and placing the expected incisions perpendicular to the vermilion border.  The vermilion border was also meticulously outlined to ensure appropriate reapproximation during the repair.  The area thus outlined was incised through and through with a #15 scalpel blade.  The muscularis and dermis were reaproximated with deep sutures following hemostasis. Care was taken to realign the vermilion border before proceeding with the superficial closure.  Once the vermilion was realigned the superfical and mucosal closure was finished.
Burow's Graft Text: The defect edges were debeveled with a #15 scalpel blade. Given the location of the defect, shape of the defect, the proximity to free margins and the presence of a standing cone deformity a Burow's skin graft was deemed most appropriate. The standing cone was removed and this tissue was then trimmed to the shape of the primary defect. The adipose tissue was also removed until only dermis and epidermis were left.  The skin margins of the secondary defect were undermined to an appropriate distance in all directions utilizing iris scissors.  The secondary defect was closed with interrupted buried subcutaneous sutures.  The skin edges were then re-apposed with running  sutures.  The skin graft was then placed in the primary defect and oriented appropriately.
Cartilage Graft Text: The defect edges were debeveled with a #15 scalpel blade. Given the location of the defect, shape of the defect, the fact the defect involved a full thickness cartilage defect a cartilage graft was deemed most appropriate.  An appropriate donor site was identified, cleansed, and anesthetized. The cartilage graft was then harvested and transferred to the recipient site, oriented appropriately and then sutured into place.  The secondary defect was then repaired using a primary closure.
Composite Graft Text: The defect edges were debeveled with a #15 scalpel blade. Given the location of the defect, shape of the defect, the proximity to free margins and the fact the defect was full thickness a composite graft was deemed most appropriate.  The defect was outline and then transferred to the donor site.  A full thickness graft was then excised from the donor site. The graft was then placed in the primary defect, oriented appropriately and then sutured into place.  The secondary defect was then repaired using a primary closure.
Epidermal Autograft Text: The defect edges were debeveled with a #15 scalpel blade. Given the location of the defect, shape of the defect and the proximity to free margins an epidermal autograft was deemed most appropriate. Using a sterile surgical marker, the primary defect shape was transferred to the donor site. The epidermal graft was then harvested.  The skin graft was then placed in the primary defect and oriented appropriately.
Dermal Autograft Text: The defect edges were debeveled with a #15 scalpel blade. Given the location of the defect, shape of the defect and the proximity to free margins a dermal autograft was deemed most appropriate. Using a sterile surgical marker, the primary defect shape was transferred to the donor site. The area thus outlined was incised deep to adipose tissue with a #15 scalpel blade.  The harvested graft was then trimmed of adipose and epidermal tissue until only dermis was left.  The skin graft was then placed in the primary defect and oriented appropriately.
Ftsg Text: The defect edges were debeveled with a #15 scalpel blade. Given the location of the defect, shape of the defect and the proximity to free margins a full thickness skin graft was deemed most appropriate. Using a sterile surgical marker, the primary defect shape was transferred to the donor site. The area thus outlined was incised deep to adipose tissue with a #15 scalpel blade.  The harvested graft was then trimmed of adipose tissue until only dermis and epidermis was left.  The skin margins of the secondary defect were undermined to an appropriate distance in all directions utilizing iris scissors.  The secondary defect was closed with interrupted buried subcutaneous sutures.  The skin edges were then re-apposed with running  sutures.  The skin graft was then placed in the primary defect and oriented appropriately.
Pinch Graft Text: The defect edges were debeveled with a #15 scalpel blade. Given the location of the defect, shape of the defect and the proximity to free margins a pinch graft was deemed most appropriate. Using a sterile surgical marker, the primary defect shape was transferred to the donor site. The area thus outlined was incised deep to adipose tissue with a #15 scalpel blade.  The harvested graft was then trimmed of adipose tissue until only dermis and epidermis was left. The skin margins of the secondary defect were undermined to an appropriate distance in all directions utilizing iris scissors.  The secondary defect was closed with interrupted buried subcutaneous sutures.  The skin edges were then re-apposed with running  sutures.  The skin graft was then placed in the primary defect and oriented appropriately.
Skin Substitute Text: The defect edges were debeveled with a #15 scalpel blade. Given the location of the defect, shape of the defect and the proximity to free margins a skin substitute graft was deemed most appropriate.  The graft material was trimmed to fit the size of the defect. The graft was then placed in the primary defect and oriented appropriately.
Split-Thickness Skin Graft Text: The defect edges were debeveled with a #15 scalpel blade. Given the location of the defect, shape of the defect and the proximity to free margins a split thickness skin graft was deemed most appropriate. Using a sterile surgical marker, the primary defect shape was transferred to the donor site. The split thickness graft was then harvested.  The skin graft was then placed in the primary defect and oriented appropriately.
Tissue Cultured Epidermal Autograft Text: The defect edges were debeveled with a #15 scalpel blade. Given the location of the defect, shape of the defect and the proximity to free margins a tissue cultured epidermal autograft was deemed most appropriate.  The graft was then trimmed to fit the size of the defect.  The graft was then placed in the primary defect and oriented appropriately.
Xenograft Text: The defect edges were debeveled with a #15 scalpel blade. Given the location of the defect, shape of the defect and the proximity to free margins a xenograft was deemed most appropriate.  The graft was then trimmed to fit the size of the defect.  The graft was then placed in the primary defect and oriented appropriately.
Complex Repair And Flap Additional Text (Will Appearing After The Standard Complex Repair Text): The complex repair was not sufficient to completely close the primary defect. The remaining additional defect was repaired with the flap mentioned below.
Complex Repair And Graft Additional Text (Will Appearing After The Standard Complex Repair Text): The complex repair was not sufficient to completely close the primary defect. The remaining additional defect was repaired with the graft mentioned below.
Eyelid Full Thickness Repair - 73143: The eyelid defect was full thickness which required a wedge repair of the eyelid. Special care was taken to ensure that the eyelid margin was realligned when placing sutures.
Eyelid Partial Thickness Repair - 67479: The eyelid defect was partial thickness which required a wedge repair of the eyelid. Special care was taken to ensure that the eyelid margin was realligned when placing sutures.
Intermediate Repair And Flap Additional Text (Will Appearing After The Standard Complex Repair Text): The intermediate repair was not sufficient to completely close the primary defect. The remaining additional defect was repaired with the flap mentioned below.
Intermediate Repair And Graft Additional Text (Will Appearing After The Standard Complex Repair Text): The intermediate repair was not sufficient to completely close the primary defect. The remaining additional defect was repaired with the graft mentioned below.
Localized Dermabrasion With 15 Blade Text: The patient was draped in routine manner.  Localized dermabrasion using a 15 blade was performed in routine manner to papillary dermis. This spot dermabrasion is being performed to complete skin cancer reconstruction. It also will eliminate the other sun damaged precancerous cells that are known to be part of the regional effect of a lifetime's worth of sun exposure. This localized dermabrasion is therapeutic and should not be considered cosmetic in any regard.
Localized Dermabrasion With Sand Papertext: The patient was draped in routine manner.  Localized dermabrasion using sterile sand paper was performed in routine manner to papillary dermis. This spot dermabrasion is being performed to complete skin cancer reconstruction. It also will eliminate the other sun damaged precancerous cells that are known to be part of the regional effect of a lifetime's worth of sun exposure. This localized dermabrasion is therapeutic and should not be considered cosmetic in any regard.
Localized Dermabrasion With Wire Brush Text: The patient was draped in routine manner.  Localized dermabrasion using 3 x 17 mm wire brush was performed in routine manner to papillary dermis. This spot dermabrasion is being performed to complete skin cancer reconstruction. It also will eliminate the other sun damaged precancerous cells that are known to be part of the regional effect of a lifetime's worth of sun exposure. This localized dermabrasion is therapeutic and should not be considered cosmetic in any regard.
Purse String (Simple) Text: Given the location of the defect and the characteristics of the surrounding skin a purse string closure was deemed most appropriate.  Undermining was performed circumferentially around the surgical defect.  A purse string suture was then placed and tightened.
Purse String (Intermediate) Text: Given the location of the defect and the characteristics of the surrounding skin a purse string intermediate closure was deemed most appropriate.  Undermining was performed circumferentially around the surgical defect.  A purse string suture was then placed and tightened.
Partial Purse String (Simple) Text: Given the location of the defect and the characteristics of the surrounding skin a simple purse string closure was deemed most appropriate.  Undermining was performed circumferentially around the surgical defect.  A purse string suture was then placed and tightened. Wound tension only allowed a partial closure of the circular defect.
Partial Purse String (Intermediate) Text: Given the location of the defect and the characteristics of the surrounding skin an intermediate purse string closure was deemed most appropriate.  Undermining was performed circumferentially around the surgical defect.  A purse string suture was then placed and tightened. Wound tension only allowed a partial closure of the circular defect.
Tarsorrhaphy Text: A tarsorrhaphy was performed using Frost sutures.
Manual Repair Warning Statement: We plan on removing the manually selected variable below in favor of our much easier automatic structured text blocks found in the previous tab. We decided to do this to help make the flow better and give you the full power of structured data. Manual selection is never going to be ideal in our platform and I would encourage you to avoid using manual selection from this point on, especially since I will be sunsetting this feature. It is important that you do one of two things with the customized text below. First, you can save all of the text in a word file so you can have it for future reference. Second, transfer the text to the appropriate area in the Library tab. Lastly, if there is a flap or graft type which we do not have you need to let us know right away so I can add it in before the variable is hidden. No need to panic, we plan to give you roughly 6 months to make the change.
Same Histology In Subsequent Stages Text: The pattern and morphology of the tumor is as described in the first stage.
No Residual Tumor Seen Histology Text: There were no malignant cells seen in the sections examined.
Inflammation Suggestive Of Cancer Camouflage Histology Text: There was a dense lymphocytic infiltrate which prevented adequate histologic evaluation of adjacent structures.
Bcc Histology Text: There were numerous aggregates of basaloid cells.
Bcc Infiltrative Histology Text: There were numerous aggregates of basaloid cells demonstrating an infiltrative pattern.
Mart-1 - Positive Histology Text: MART-1 staining demonstrates areas of higher density and clustering of melanocytes with Pagetoid spread upwards within the epidermis. The surgical margins are positive for tumor cells.
Mart-1 - Negative Histology Text: MART-1 staining demonstrates a normal density and pattern of melanocytes along the dermal-epidermal junction. The surgical margins are negative for tumor cells.
Information: Selecting Yes will display possible errors in your note based on the variables you have selected. This validation is only offered as a suggestion for you. PLEASE NOTE THAT THE VALIDATION TEXT WILL BE REMOVED WHEN YOU FINALIZE YOUR NOTE. IF YOU WANT TO FAX A PRELIMINARY NOTE YOU WILL NEED TO TOGGLE THIS TO 'NO' IF YOU DO NOT WANT IT IN YOUR FAXED NOTE.
Bill 59 Modifier?: No - Continue to Bill 79 Modifier

## 2025-05-13 NOTE — PROGRESS NOTES
Urologic Oncology  [facility]ematology & Oncology  [address] [place] [id number]  [phone]          Rodolfo Buckner  : 1945      INITIAL EVALUATION    Chief Complaint   Patient presents with    New Patient       HPI: Rodolfo Buckner is a 79 y.o. male with a left renal mass and large right iliopsoas mass.    Patient is from [place].  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  He has 40 to 50-year history of chronic back pain.  He recently underwent a CT scan of the L-spine on 2025 because of worsening back pain.  He was found to have a large right iliopsoas mass and therefore underwent a contrasted CT scan of the abdomen pelvis on 2025.  He has several renal masses that are incompletely characterized.  The 1 on the left may be a solid lesion while the one on the right appears more consistent with a hyperdense cyst.  Importantly he has an area of soft tissue density in the left mid ureter and possibly a filling defect up in the collecting system.  He also has a approximately 6 cm solid mass just anterior to the right iliopsoas muscle.  It is unclear to me whether this represents an enlarged lymph node or something else.    He denies any hematuria or flank pain.  His creatinine on 2025 was 1.33.    He is a never smoker.  He has multiple comorbidities as listed in the note below.  He is on aspirin daily and has a pacer and a defibrillator.  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        PMH:     Past Medical History:   Diagnosis Date    Abnormal MMSE 2022    29 out of 30 and normal clock draw    Age-related cognitive decline     30 out of 30 MMSE    Arthritis     DJD- shoulder, knees, hips    Cancer (HCC)     skin CA left lobe ear    Chronic systolic CHF (congestive heart failure) (HCC)     COVID-19 2021    Dizziness 2020    hx     Esophagitis 10/2022    GERD

## 2025-05-16 NOTE — PROGRESS NOTES
consistent  with neoplasm until proven otherwise. Recommend evaluation with  contrast-enhanced CT or MRI of the abdomen/pelvis to evaluate for this lesion as  well as potential for other lesions as well. Additionally, there is some  lobulated tissue at the left kidney which also could be evaluated.     Severe lumbar spondylosis and mild dextroscoliosis. Multilevel neuroforaminal  and spinal canal stenosis as detailed above. Small Schmorl's nodes within  superior endplate of L3 and L4  5/1/25: Referral to neurosurgery and CT AP ordered by PCP office.  5/8/25: CT AP w/IV contrast:  IMPRESSION:  Left renal solid masslike lesion worrisome for neoplasm until proven otherwise.  Recommend considering biopsy, ablation or surgical resection.  Area of eccentric soft tissue density wall of the mid left ureter measuring  approximately 4.3 mm x 5.1 mm x 23 mm which may represent urothelial thickening,  metastasis or possibly blood clot.   Right renal complex parapelvic cyst with dependent increased density which may  represent blood or mass. Neoplasm should be excluded.  Right retroperitoneal mass which appears to be contiguous with the right  iliopsoas and posterior to the inferior vena cava. This mass may originate from  the iliopsoas or may represent an enlarged lymph node. Neoplasm until proven  otherwise.    Creatinine: 4/18/25 - 1.33, 10/23/24 - 1.29, 4/10/24 - 1.60, 10/25/23 - 1.40  HGB: 4/18/25 - 13.3, 10/23/24 - 12.7, 4/10/24 - 12.9  No recent UA.    Records located in [facility].      Pertinent Notes from Referring Provider: From result note from Dr. Goodman on 5/12/25: \"Renal mass is confirmed and also lymph node enlargement or retroperitoneal mass   Have sent a referral to Dr Freire for further eval  Urologist who specializes in oncology\"    Other Pertinent Information: Pt is on finasteride. Takes multiple diabetic medications, no GLP-1's.

## 2025-05-19 ENCOUNTER — OFFICE VISIT (OUTPATIENT)
Age: 80
End: 2025-05-19
Payer: MEDICARE

## 2025-05-19 ENCOUNTER — HOSPITAL ENCOUNTER (OUTPATIENT)
Dept: LAB | Age: 80
Discharge: HOME | End: 2025-05-19
Payer: MEDICARE

## 2025-05-19 VITALS
SYSTOLIC BLOOD PRESSURE: 129 MMHG | RESPIRATION RATE: 16 BRPM | WEIGHT: 260 LBS | HEART RATE: 68 BPM | TEMPERATURE: 97.6 F | OXYGEN SATURATION: 97 % | HEIGHT: 69 IN | DIASTOLIC BLOOD PRESSURE: 62 MMHG | BODY MASS INDEX: 38.51 KG/M2

## 2025-05-19 DIAGNOSIS — N28.89 LEFT RENAL MASS: Primary | ICD-10-CM

## 2025-05-19 DIAGNOSIS — N28.89 LEFT RENAL MASS: ICD-10-CM

## 2025-05-19 PROCEDURE — G8427 DOCREV CUR MEDS BY ELIG CLIN: HCPCS | Performed by: UROLOGY

## 2025-05-19 PROCEDURE — 1126F AMNT PAIN NOTED NONE PRSNT: CPT | Performed by: UROLOGY

## 2025-05-19 PROCEDURE — 3074F SYST BP LT 130 MM HG: CPT | Performed by: UROLOGY

## 2025-05-19 PROCEDURE — 3078F DIAST BP <80 MM HG: CPT | Performed by: UROLOGY

## 2025-05-19 PROCEDURE — 1160F RVW MEDS BY RX/DR IN RCRD: CPT | Performed by: UROLOGY

## 2025-05-19 PROCEDURE — 99205 OFFICE O/P NEW HI 60 MIN: CPT | Performed by: UROLOGY

## 2025-05-19 PROCEDURE — 1036F TOBACCO NON-USER: CPT | Performed by: UROLOGY

## 2025-05-19 PROCEDURE — 1123F ACP DISCUSS/DSCN MKR DOCD: CPT | Performed by: UROLOGY

## 2025-05-19 PROCEDURE — 88112 CYTOPATH CELL ENHANCE TECH: CPT

## 2025-05-19 PROCEDURE — G8417 CALC BMI ABV UP PARAM F/U: HCPCS | Performed by: UROLOGY

## 2025-05-19 PROCEDURE — 1159F MED LIST DOCD IN RCRD: CPT | Performed by: UROLOGY

## 2025-05-19 ASSESSMENT — ENCOUNTER SYMPTOMS
GASTROINTESTINAL NEGATIVE: 1
RESPIRATORY NEGATIVE: 1

## 2025-05-19 NOTE — PATIENT INSTRUCTIONS
Patient Information from Today's Visit    The members of your [facility] are listed below:    Physician Provider: Rubio Freire, Urologic Oncologist  Advanced Practice Clinician: LESVIA Mukherjee  Medical Assistant: Nidia MARKS CMA  :Mercedes ALLEN  Supportive Care Services: Derrick CRESPO LMSW    Diagnosis (Information Sheet Provided on Day of Diagnosis): Left renal mass    Follow Up Instructions:   Records reviewed   CT scan reviewed   We will get some labs today.  We will refer to you to IR for a biopsy. If you need anything prior please do not hesitate to call our office.  Has Treatment Plan Been Finalized? No    Current Labs:   No visits with results within 3 Day(s) from this visit.   Latest known visit with results is:   Orders Only on 04/18/2025   Component Date Value Ref Range Status    Uric Acid 04/18/2025 4.7  3.9 - 8.2 MG/DL Final    Hemoglobin A1C 04/18/2025 6.8 (H)  0 - 5.6 % Final    Comment: Reference Range  Normal       <5.7%  Prediabetes  5.7-6.4%  Diabetes     >6.4%      Estimated Avg Glucose 04/18/2025 149  mg/dL Final    Cholesterol, Total 04/18/2025 160  0 - 200 MG/DL Final    Comment: Low: Less than or equal to 200 mg/dL  Borderline High: 201-239 mg/dL  High: Greater than or equal to 240 mg/dL      Triglycerides 04/18/2025 152 (H)  0 - 150 MG/DL Final    Comment: Borderline High: 150-199 mg/dL, High: 200-499 mg/dL  Very High: Greater than or equal to 500 mg/dL      HDL 04/18/2025 45  40 - 60 MG/DL Final    LDL Cholesterol 04/18/2025 85  0 - 100 MG/DL Final    Comment: Near Optimal: 100-129 mg/dL  Borderline High: 130-159, High: 160-189 mg/dL  Very High: Greater than or equal to 190 mg/dL      VLDL Cholesterol Calculated 04/18/2025 30 (H)  6 - 23 MG/DL Final    Chol/HDL Ratio 04/18/2025 3.6  0.0 - 5.0   Final    Sodium 04/18/2025 143  136 - 145 mmol/L Final    Potassium 04/18/2025 4.4  3.5 - 5.1 mmol/L Final    Chloride 04/18/2025 105  98 - 107 mmol/L Final    CO2 04/18/2025

## 2025-05-20 LAB
CYTOLOGY-NON GYN: NORMAL
SPECIMEN SOURCE: NORMAL

## 2025-05-21 NOTE — PROGRESS NOTES
indomethacin (INDOCIN) 25 MG capsule TAKE ONE CAPSULE BY MOUTH THREE TIMES DAILY 15 capsule 1    Insulin Degludec (TRESIBA FLEXTOUCH) 200 UNIT/ML SOPN Inject 58 Units into the skin every morning 45 mL 3    Zinc Acetate, Oral, (ZINC ACETATE PO) Take 1 tablet by mouth daily      acetaminophen (TYLENOL) 500 MG tablet Take 2 tablets by mouth every 6 hours as needed      Cholecalciferol 50 MCG (2000 UT) TABS Take 1 tablet by mouth daily      Cyanocobalamin 1000 MCG SUBL Take 1,000 mcg by mouth daily      furosemide (LASIX) 20 MG tablet Take 1 tablet by mouth daily as needed      Probiotic Product (ACIDOPHILUS PROBIOTIC) CAPS capsule Take 1 tablet by mouth daily       No current facility-administered medications for this visit.            Allergies    No Known Allergies         Family History    Family History   Problem Relation Age of Onset    Cancer Mother     Heart Attack Father             Social History    Social History     Socioeconomic History    Marital status:      Spouse name: Not on file    Number of children: Not on file    Years of education: Not on file    Highest education level: Not on file   Occupational History    Not on file   Tobacco Use    Smoking status: Never    Smokeless tobacco: Never   Vaping Use    Vaping status: Never Used   Substance and Sexual Activity    Alcohol use: No    Drug use: No    Sexual activity: Defer     Birth control/protection: None   Other Topics Concern    Not on file   Social History Narrative    Not on file     Social Drivers of Health     Financial Resource Strain: Low Risk  (10/28/2024)    Overall Financial Resource Strain (CARDIA)     Difficulty of Paying Living Expenses: Not hard at all   Food Insecurity: No Food Insecurity (4/21/2025)    Hunger Vital Sign     Worried About Running Out of Food in the Last Year: Never true     Ran Out of Food in the Last Year: Never true   Transportation Needs: No Transportation Needs (4/21/2025)    PRAPARE - Transportation

## 2025-05-22 ENCOUNTER — OFFICE VISIT (OUTPATIENT)
Dept: NEUROSURGERY | Age: 80
End: 2025-05-22

## 2025-05-22 ENCOUNTER — TELEPHONE (OUTPATIENT)
Dept: INTERVENTIONAL RADIOLOGY/VASCULAR | Age: 80
End: 2025-05-22

## 2025-05-22 ENCOUNTER — HOSPITAL ENCOUNTER (OUTPATIENT)
Dept: GENERAL RADIOLOGY | Age: 80
Discharge: HOME | End: 2025-05-24
Payer: MEDICARE

## 2025-05-22 VITALS
SYSTOLIC BLOOD PRESSURE: 123 MMHG | BODY MASS INDEX: 38.51 KG/M2 | OXYGEN SATURATION: 98 % | WEIGHT: 260 LBS | HEIGHT: 69 IN | HEART RATE: 74 BPM | DIASTOLIC BLOOD PRESSURE: 61 MMHG | TEMPERATURE: 97.2 F

## 2025-05-22 DIAGNOSIS — M48.062 SPINAL STENOSIS OF LUMBAR REGION WITH NEUROGENIC CLAUDICATION: ICD-10-CM

## 2025-05-22 DIAGNOSIS — M47.9 SPONDYLOSIS: Primary | ICD-10-CM

## 2025-05-22 DIAGNOSIS — M41.50 DEGENERATIVE SCOLIOSIS IN ADULT PATIENT: ICD-10-CM

## 2025-05-22 DIAGNOSIS — R93.7 ABNORMAL CT SCAN, LUMBAR SPINE: ICD-10-CM

## 2025-05-22 DIAGNOSIS — M54.9 MECHANICAL BACK PAIN: ICD-10-CM

## 2025-05-22 PROCEDURE — 72120 X-RAY BEND ONLY L-S SPINE: CPT

## 2025-05-22 NOTE — TELEPHONE ENCOUNTER
[] Phone call to: Patient    [] Number used to reach this person: [phone]    [] Voicemail reached: Detailed Voicemail     [] Appointment date:  5/30/25    [] Arrival time:  0900    [] Location given: yes    [] AM Medicine with a small sip of water: Yes    [] Patient is NPO: Yes    [] Need for : Yes    [] Anesthetic Moderate Sedation    [] Blood thinners held: No    [] Education on Hold requirements prior to procedure: NA     [] Allergies: OTHER: see list    [] Reviewed    [] Latex Allergy: No    [] Lidocaine Allergy: No    [] CPAP at night: Yes and No    [] Any recent infections: No    [] Diabetes: Yes    [] Additional information:  BGL      [] Time taken to answer all questions    [] Call back phone number of [phone] given

## 2025-05-27 ENCOUNTER — APPOINTMENT (OUTPATIENT)
Dept: URBAN - METROPOLITAN AREA CLINIC 330 | Facility: CLINIC | Age: 80
Setting detail: DERMATOLOGY
End: 2025-05-27

## 2025-05-27 DIAGNOSIS — Z48.817 ENCOUNTER FOR SURGICAL AFTERCARE FOLLOWING SURGERY ON THE SKIN AND SUBCUTANEOUS TISSUE: ICD-10-CM

## 2025-05-27 PROCEDURE — ? POST-OP WOUND CHECK

## 2025-05-27 ASSESSMENT — LOCATION SIMPLE DESCRIPTION DERM: LOCATION SIMPLE: RIGHT FOREARM

## 2025-05-27 ASSESSMENT — LOCATION ZONE DERM: LOCATION ZONE: ARM

## 2025-05-27 ASSESSMENT — LOCATION DETAILED DESCRIPTION DERM: LOCATION DETAILED: RIGHT DISTAL DORSAL FOREARM

## 2025-05-27 NOTE — PROCEDURE: POST-OP WOUND CHECK
Detail Level: Detailed
Add 1585x Cpt? (Do Not Bill If You Billed For The Procedure Placing The Sutures. This Is An Add-On Code That Must Be Billed With An E/M Visit Code): No
Wound Evaluated By: Dr. Augustine

## 2025-05-30 ENCOUNTER — HOSPITAL ENCOUNTER (OUTPATIENT)
Dept: CT IMAGING | Age: 80
End: 2025-05-30
Attending: UROLOGY
Payer: MEDICARE

## 2025-05-30 VITALS
RESPIRATION RATE: 16 BRPM | OXYGEN SATURATION: 93 % | DIASTOLIC BLOOD PRESSURE: 70 MMHG | SYSTOLIC BLOOD PRESSURE: 154 MMHG | HEIGHT: 69 IN | WEIGHT: 260 LBS | TEMPERATURE: 97.8 F | HEART RATE: 63 BPM | BODY MASS INDEX: 38.51 KG/M2

## 2025-05-30 DIAGNOSIS — N28.89 LEFT RENAL MASS: ICD-10-CM

## 2025-05-30 LAB
GLUCOSE BLD STRIP.AUTO-MCNC: 62 MG/DL (ref 65–100)
SERVICE CMNT-IMP: ABNORMAL

## 2025-05-30 PROCEDURE — 2709999900 CT BIOPSY ABDOMEN RETROPERITONEUM

## 2025-05-30 PROCEDURE — 82962 GLUCOSE BLOOD TEST: CPT

## 2025-05-30 PROCEDURE — 99153 MOD SED SAME PHYS/QHP EA: CPT

## 2025-05-30 PROCEDURE — 88341 IMHCHEM/IMCYTCHM EA ADD ANTB: CPT

## 2025-05-30 PROCEDURE — 88305 TISSUE EXAM BY PATHOLOGIST: CPT

## 2025-05-30 PROCEDURE — 99152 MOD SED SAME PHYS/QHP 5/>YRS: CPT | Performed by: RADIOLOGY

## 2025-05-30 PROCEDURE — 6360000002 HC RX W HCPCS: Performed by: RADIOLOGY

## 2025-05-30 PROCEDURE — 88342 IMHCHEM/IMCYTCHM 1ST ANTB: CPT

## 2025-05-30 PROCEDURE — 88360 TUMOR IMMUNOHISTOCHEM/MANUAL: CPT

## 2025-05-30 PROCEDURE — 77012 CT SCAN FOR NEEDLE BIOPSY: CPT | Performed by: RADIOLOGY

## 2025-05-30 PROCEDURE — 49180 BIOPSY ABDOMINAL MASS: CPT | Performed by: RADIOLOGY

## 2025-05-30 RX ORDER — LIDOCAINE HYDROCHLORIDE 10 MG/ML
INJECTION, SOLUTION EPIDURAL; INFILTRATION; INTRACAUDAL; PERINEURAL PRN
Status: COMPLETED | OUTPATIENT
Start: 2025-05-30 | End: 2025-05-30

## 2025-05-30 RX ORDER — FENTANYL CITRATE 50 UG/ML
INJECTION, SOLUTION INTRAMUSCULAR; INTRAVENOUS PRN
Status: COMPLETED | OUTPATIENT
Start: 2025-05-30 | End: 2025-05-30

## 2025-05-30 RX ORDER — MIDAZOLAM HYDROCHLORIDE 2 MG/2ML
INJECTION, SOLUTION INTRAMUSCULAR; INTRAVENOUS PRN
Status: COMPLETED | OUTPATIENT
Start: 2025-05-30 | End: 2025-05-30

## 2025-05-30 RX ADMIN — MIDAZOLAM HYDROCHLORIDE 1 MG: 1 INJECTION, SOLUTION INTRAMUSCULAR; INTRAVENOUS at 10:26

## 2025-05-30 RX ADMIN — FENTANYL CITRATE 50 MCG: 50 INJECTION, SOLUTION INTRAMUSCULAR; INTRAVENOUS at 10:22

## 2025-05-30 RX ADMIN — LIDOCAINE HYDROCHLORIDE 5 ML: 10 INJECTION, SOLUTION EPIDURAL; INFILTRATION; INTRACAUDAL; PERINEURAL at 10:36

## 2025-05-30 RX ADMIN — MIDAZOLAM HYDROCHLORIDE 1 MG: 1 INJECTION, SOLUTION INTRAMUSCULAR; INTRAVENOUS at 10:22

## 2025-05-30 RX ADMIN — FENTANYL CITRATE 50 MCG: 50 INJECTION, SOLUTION INTRAMUSCULAR; INTRAVENOUS at 10:26

## 2025-05-30 ASSESSMENT — PAIN - FUNCTIONAL ASSESSMENT
PAIN_FUNCTIONAL_ASSESSMENT: NONE - DENIES PAIN

## 2025-05-30 NOTE — OR NURSING
TRANSFER - OUT REPORT:     Verbal report given to Arlene Ott RN on Rodolfo Buckner  being transferred to IR Recovery for routine progression of patient care.      Report consisted of patient’s Situation, Background, Assessment and Recommendations(SBAR).       Information from the following report(s) SBAR, Procedure Summary, and MAR was reviewed with the receiving nurse.     Opportunity for questions and clarification was provided.        Conscious Sedation:    100 Mcg of Fentanyl administered   2 Mg of Versed administered     Pt tolerated procedure Well.      Peripheral Intravenous Line:      Right flank has a  sterile band-aid type dressing that is clean, dry, intact, and non-tender.    VITALS:  BP (!) 177/86   Pulse 73   Temp 97.8 °F (36.6 °C) (Infrared)   Resp 16   Ht 1.753 m (5' 9\")   Wt 117.9 kg (260 lb)   SpO2 94%   BMI 38.40 kg/m² .   No

## 2025-05-30 NOTE — H&P
[facility]  Department of Interventional Radiology  [phone]    History and Physical    Patient:  Rodolfo Buckner MRN:  [id number]  SSN:  [id number]    YOB: 1945  Age:  79 y.o.  Sex:  male      Primary Care Provider:  Dori Goodman MD  Referring Physician:  Rubio Freire MD    Subjective:     Chief Complaint: right RP mass    History of the Present Illness:  The patient is a 79 y.o. male who presents with a left renal mass and RP mass on right.  NPO..        Past Medical History:   Diagnosis Date    Abnormal MMSE 05/24/2022    29 out of 30 and normal clock draw    Age-related cognitive decline 2020    30 out of 30 MMSE    Arthritis     DJD- shoulder, knees, hips    Cancer ([facility])     skin CA left lobe ear    Chronic systolic CHF (congestive heart failure) ([facility])     COVID-19 2/9/2021    Dizziness 06/09/2020    hx     Esophagitis 10/2022    GERD (gastroesophageal reflux disease)     managed with medication     Gout     managed with medication     H/O echocardiogram 10/14/2019    EF 60.1%    Hip pain, bilateral     POA    Hyperlipidemia     Hypertension     managed with medication     Morbid obesity ([facility])     Neuropathy     feet and legs    MICHELL on CPAP 08/26/2014    uses nightly    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 Raj pacemaker/defibrillator placed 6/25/14--0 shocks     Psychiatric disorder     DEPRESSION    Routine eye exam 2020    no diabetic retinopathy- Jervey    Spinal stenosis of lumbar region     Stage 3b chronic kidney disease (HCC)     Type 2 diabetes mellitus with hyperglycemia, with long-term current use of insulin ([facility]) 09/23/2020    managed with oral medication and Tresiba, average 's, no problems with hypoglycemia, A1c 6.5 on 8/24/2022    Under care of podiatry      Past Surgical History:   Procedure Laterality Date    APPENDECTOMY      CATARACT REMOVAL Left     COLONOSCOPY

## 2025-05-30 NOTE — FLOWSHEET NOTE
Recovery period without difficulty. Pt alert and oriented and denies pain. Dressing is clean, dry, and intact. Reviewed discharge instructions with patient and wife, both verbalized understanding. [facility] escorted [facility] via wheelchair. Vital signs and Rosanna score completed.

## 2025-05-30 NOTE — PRE SEDATION
Sedation Pre-Procedure Note    Patient Name: Rodolfo Buckner   YOB: 1945  Room/Bed: Room/bed info not found  Medical Record Number: [id number]  Date: 5/30/2025   Time: 9:39 AM       Indication:  right rp mass    Consent: I have discussed with the patient and/or the patient representative the indication, alternatives, and the possible risks and/or complications of the planned procedure and the anesthesia methods. The patient and/or patient representative appear to understand and agree to proceed.    Vital Signs:   Vitals:    05/30/25 0910   BP: (!) 167/80   Pulse: 66   Resp: 18   Temp: 97.8 °F (36.6 °C)   SpO2: 98%       Past Medical History:   has a past medical history of Abnormal MMSE, Age-related cognitive decline, Arthritis, Cancer ([facility]), Chronic systolic CHF (congestive heart failure) ([facility]), COVID-19, Dizziness, Esophagitis, GERD (gastroesophageal reflux disease), Gout, H/O echocardiogram, Hip pain, bilateral, Hyperlipidemia, Hypertension, Morbid obesity ([facility]), Neuropathy, MICHELL on CPAP, Presence of combination internal cardiac defibrillator (ICD) and pacemaker, Psychiatric disorder, Routine eye exam, Spinal stenosis of lumbar region, Stage 3b chronic kidney disease ([facility]), Type 2 diabetes mellitus with hyperglycemia, with long-term current use of insulin ([facility]), and Under care of podiatry.    Past Surgical History:   has a past surgical history that includes pr unlisted procedure cardiac surgery (cath); Appendectomy; Tonsillectomy; orthopedic surgery; orthopedic surgery; Colonoscopy; pacemaker; Mohs surgery (Left, 05/23/2022); ep device procedure (N/A, 10/11/2022); joint replacement (Left); Upper gastrointestinal endoscopy (N/A, 10/31/2022); Colonoscopy (03/02/2023); and Cataract removal (Left).    Medications:   Scheduled Meds:   Continuous Infusions:   PRN Meds:   Home Meds:   Prior to Admission medications    Medication Sig Start Date End Date Taking? Authorizing Provider   finasteride (PROSCAR)  5 MG tablet Take 1 tablet by mouth daily 4/16/25   Dori Goodman MD   omeprazole (PRILOSEC) 20 MG delayed release capsule Take 1 capsule by mouth daily 4/16/25   Dori Goodman MD   SITagliptin (JANUVIA) 50 MG tablet Take 1 tablet by mouth daily 4/16/25   Dori Goodman MD   metFORMIN (GLUCOPHAGE) 500 MG tablet Take 1 tablet by mouth 2 times daily (with meals) 4/16/25   Doir Goodman MD   escitalopram (LEXAPRO) 10 MG tablet Take 1 tablet by mouth daily 4/16/25   Dori Goodman MD   rosuvastatin (CRESTOR) 10 MG tablet TAKE 1 TABLET BY MOUTH EVERY EVENING 3/31/25   Dori Goodman MD   carvedilol (COREG) 6.25 MG tablet TAKE 1 TABLET BY MOUTH TWICE DAILY 3/31/25   Dori Goodman MD   allopurinol (ZYLOPRIM) 300 MG tablet Take 1 tablet by mouth daily 2/27/25   Dori Goodman MD   donepezil (ARICEPT) 10 MG tablet Take 1 tablet by mouth nightly 2/27/25   Dori Goodman MD   amitriptyline (ELAVIL) 10 MG tablet Take 1 tablet by mouth nightly 2/27/25   Dori Goodman MD   acetaminophen (TYLENOL) 650 MG suppository Place 1 suppository rectally every 4 hours as needed for Fever    Thierry Cain MD   amoxicillin (AMOXIL) 500 MG capsule Prior to procedure. 1/20/25   Thierry Cain MD   gabapentin (NEURONTIN) 300 MG capsule TAKE ONE CAPSULE BY MOUTH EVERY MORNING AND 2 CAPSULES AT BEDTIME 11/1/24 8/1/25  Dori Goodman MD   valsartan-hydroCHLOROthiazide (DIOVAN-HCT) 320-25 MG per tablet TAKE 1 TABLET BY MOUTH DAILY 10/1/24   Dori Goodman MD   celecoxib (CELEBREX) 200 MG capsule Take 1 capsule by mouth daily 8/26/24   Dori Goodman MD   tamsulosin (FLOMAX) 0.4 MG capsule Take 1 capsule by mouth daily 7/29/24   Dori Goodman MD   blood glucose test strips (ONETOUCH VERIO) strip 1 each by In Vitro route 2 times daily Use as directed to check blood sugar twice a day. (E11.65) 4/15/24   Dori Goodman MD

## 2025-05-30 NOTE — DISCHARGE INSTRUCTIONS
If you have any questions about your procedure, please call the Interventional Radiology department at [phone].      After business hours (5pm) and weekends, call the answering service at [phone] and ask for the Interventional Radiologist on call to be paged.

## 2025-06-04 RX ORDER — INSULIN DEGLUDEC 200 U/ML
58 INJECTION, SOLUTION SUBCUTANEOUS EVERY MORNING
Qty: 45 ML | Refills: 3 | Status: SHIPPED | OUTPATIENT
Start: 2025-06-04

## 2025-06-04 NOTE — TELEPHONE ENCOUNTER
Requested Prescriptions     Pending Prescriptions Disp Refills    Insulin Degludec (TRESIBA FLEXTOUCH) 200 UNIT/ML SOPN 45 mL 3     Sig: Inject 58 Units into the skin every morning

## 2025-06-09 ENCOUNTER — HOSPITAL ENCOUNTER (OUTPATIENT)
Dept: INTERVENTIONAL RADIOLOGY/VASCULAR | Age: 80
Discharge: HOME | End: 2025-06-11
Attending: STUDENT IN AN ORGANIZED HEALTH CARE EDUCATION/TRAINING PROGRAM
Payer: MEDICARE

## 2025-06-09 ENCOUNTER — HOSPITAL ENCOUNTER (OUTPATIENT)
Dept: CT IMAGING | Age: 80
Discharge: HOME | End: 2025-06-11
Attending: STUDENT IN AN ORGANIZED HEALTH CARE EDUCATION/TRAINING PROGRAM
Payer: MEDICARE

## 2025-06-09 VITALS
TEMPERATURE: 97.5 F | HEIGHT: 69 IN | WEIGHT: 260 LBS | SYSTOLIC BLOOD PRESSURE: 123 MMHG | DIASTOLIC BLOOD PRESSURE: 59 MMHG | BODY MASS INDEX: 38.51 KG/M2 | OXYGEN SATURATION: 97 % | RESPIRATION RATE: 16 BRPM | HEART RATE: 69 BPM

## 2025-06-09 DIAGNOSIS — M48.062 SPINAL STENOSIS OF LUMBAR REGION WITH NEUROGENIC CLAUDICATION: ICD-10-CM

## 2025-06-09 DIAGNOSIS — M47.9 SPONDYLOSIS: ICD-10-CM

## 2025-06-09 DIAGNOSIS — M54.9 MECHANICAL BACK PAIN: ICD-10-CM

## 2025-06-09 DIAGNOSIS — M41.50 DEGENERATIVE SCOLIOSIS IN ADULT PATIENT: ICD-10-CM

## 2025-06-09 PROCEDURE — 62304 MYELOGRAPHY LUMBAR INJECTION: CPT | Performed by: RADIOLOGY

## 2025-06-09 PROCEDURE — 6360000004 HC RX CONTRAST MEDICATION: Performed by: PHYSICIAN ASSISTANT

## 2025-06-09 PROCEDURE — 6360000002 HC RX W HCPCS: Performed by: PHYSICIAN ASSISTANT

## 2025-06-09 PROCEDURE — 62304 MYELOGRAPHY LUMBAR INJECTION: CPT

## 2025-06-09 PROCEDURE — 72132 CT LUMBAR SPINE W/DYE: CPT

## 2025-06-09 RX ORDER — IOPAMIDOL 408 MG/ML
INJECTION, SOLUTION INTRATHECAL PRN
Status: COMPLETED | OUTPATIENT
Start: 2025-06-09 | End: 2025-06-09

## 2025-06-09 RX ORDER — LIDOCAINE HYDROCHLORIDE 10 MG/ML
INJECTION, SOLUTION EPIDURAL; INFILTRATION; INTRACAUDAL; PERINEURAL PRN
Status: COMPLETED | OUTPATIENT
Start: 2025-06-09 | End: 2025-06-09

## 2025-06-09 RX ADMIN — IOPAMIDOL 15 ML: 408 INJECTION, SOLUTION INTRATHECAL at 11:38

## 2025-06-09 RX ADMIN — LIDOCAINE HYDROCHLORIDE 5 ML: 10 INJECTION, SOLUTION EPIDURAL; INFILTRATION; INTRACAUDAL; PERINEURAL at 11:35

## 2025-06-09 NOTE — OR NURSING
Recovery period without difficulty. Pt alert and oriented and denies pain. Dressing is clean, dry, and intact. Reviewed discharge instructions with patient and spouse, both verbalized understanding. Pt escorted to lobby discharge area via wheelchair. Vital signs and Rosanna score completed.

## 2025-06-09 NOTE — OP NOTE
I, Shantel Banks PA-C, hereby attest that the medical record entry for myelogram 6/9/25 accurately reflects notations that I made in my capacity as a PA when I treated the above listed Medicare beneficiary. I do hereby attest that this information is true, accurate and complete to the best of my knowledge and I understand that any falsification, omission, or concealment of material fact may subject me to administrative, civil, or criminal liability.

## 2025-06-09 NOTE — DISCHARGE INSTRUCTIONS
If you have any questions about your procedure, please call the Interventional Radiology department at [phone].      After business hours (5pm) and weekends, call the answering service at [phone] and ask for the Interventional Radiologist on call to be paged.

## 2025-06-25 ENCOUNTER — OFFICE VISIT (OUTPATIENT)
Dept: INTERNAL MEDICINE CLINIC | Facility: CLINIC | Age: 80
End: 2025-06-25
Payer: MEDICARE

## 2025-06-25 VITALS — WEIGHT: 257 LBS | HEIGHT: 69 IN | BODY MASS INDEX: 38.06 KG/M2

## 2025-06-25 DIAGNOSIS — M1A.9XX0 CHRONIC GOUT WITHOUT TOPHUS, UNSPECIFIED CAUSE, UNSPECIFIED SITE: ICD-10-CM

## 2025-06-25 DIAGNOSIS — D63.1 ANEMIA DUE TO STAGE 3B CHRONIC KIDNEY DISEASE (HCC): ICD-10-CM

## 2025-06-25 DIAGNOSIS — G47.01 INSOMNIA DUE TO MEDICAL CONDITION: ICD-10-CM

## 2025-06-25 DIAGNOSIS — N18.32 ANEMIA DUE TO STAGE 3B CHRONIC KIDNEY DISEASE (HCC): ICD-10-CM

## 2025-06-25 DIAGNOSIS — E66.01 MORBID (SEVERE) OBESITY DUE TO EXCESS CALORIES (HCC): ICD-10-CM

## 2025-06-25 DIAGNOSIS — E11.65 TYPE 2 DIABETES MELLITUS WITH HYPERGLYCEMIA, WITH LONG-TERM CURRENT USE OF INSULIN (HCC): ICD-10-CM

## 2025-06-25 DIAGNOSIS — I10 PRIMARY HYPERTENSION: ICD-10-CM

## 2025-06-25 DIAGNOSIS — C85.19 B-CELL LYMPHOMA OF EXTRANODAL SITE EXCLUDING SPLEEN AND OTHER SOLID ORGANS, UNSPECIFIED B-CELL LYMPHOMA TYPE (HCC): Primary | ICD-10-CM

## 2025-06-25 DIAGNOSIS — G47.33 OSA ON CPAP: ICD-10-CM

## 2025-06-25 DIAGNOSIS — Z79.4 TYPE 2 DIABETES MELLITUS WITH HYPERGLYCEMIA, WITH LONG-TERM CURRENT USE OF INSULIN (HCC): ICD-10-CM

## 2025-06-25 DIAGNOSIS — N40.1 BPH ASSOCIATED WITH NOCTURIA: ICD-10-CM

## 2025-06-25 DIAGNOSIS — R35.1 BPH ASSOCIATED WITH NOCTURIA: ICD-10-CM

## 2025-06-25 DIAGNOSIS — R41.3 MEMORY CHANGES: ICD-10-CM

## 2025-06-25 PROCEDURE — 1036F TOBACCO NON-USER: CPT | Performed by: INTERNAL MEDICINE

## 2025-06-25 PROCEDURE — 3044F HG A1C LEVEL LT 7.0%: CPT | Performed by: INTERNAL MEDICINE

## 2025-06-25 PROCEDURE — 1123F ACP DISCUSS/DSCN MKR DOCD: CPT | Performed by: INTERNAL MEDICINE

## 2025-06-25 PROCEDURE — G8417 CALC BMI ABV UP PARAM F/U: HCPCS | Performed by: INTERNAL MEDICINE

## 2025-06-25 PROCEDURE — 1160F RVW MEDS BY RX/DR IN RCRD: CPT | Performed by: INTERNAL MEDICINE

## 2025-06-25 PROCEDURE — 1159F MED LIST DOCD IN RCRD: CPT | Performed by: INTERNAL MEDICINE

## 2025-06-25 PROCEDURE — G8427 DOCREV CUR MEDS BY ELIG CLIN: HCPCS | Performed by: INTERNAL MEDICINE

## 2025-06-25 PROCEDURE — 99214 OFFICE O/P EST MOD 30 MIN: CPT | Performed by: INTERNAL MEDICINE

## 2025-06-25 RX ORDER — AMITRIPTYLINE HYDROCHLORIDE 10 MG/1
10 TABLET ORAL NIGHTLY
Qty: 90 TABLET | Refills: 1 | Status: SHIPPED | OUTPATIENT
Start: 2025-06-25

## 2025-06-25 RX ORDER — FINASTERIDE 5 MG/1
5 TABLET, FILM COATED ORAL DAILY
Qty: 90 TABLET | Refills: 1 | Status: SHIPPED | OUTPATIENT
Start: 2025-06-25

## 2025-06-25 RX ORDER — TAMSULOSIN HYDROCHLORIDE 0.4 MG/1
0.4 CAPSULE ORAL DAILY
Qty: 90 CAPSULE | Refills: 1 | Status: SHIPPED | OUTPATIENT
Start: 2025-06-25

## 2025-06-25 RX ORDER — OMEPRAZOLE 20 MG/1
20 CAPSULE, DELAYED RELEASE ORAL DAILY
Qty: 90 CAPSULE | Refills: 1 | Status: SHIPPED | OUTPATIENT
Start: 2025-06-25

## 2025-06-25 RX ORDER — INSULIN DEGLUDEC 200 U/ML
58 INJECTION, SOLUTION SUBCUTANEOUS EVERY MORNING
Qty: 45 ML | Refills: 3 | Status: SHIPPED | OUTPATIENT
Start: 2025-06-25

## 2025-06-25 RX ORDER — VALSARTAN AND HYDROCHLOROTHIAZIDE 320; 25 MG/1; MG/1
1 TABLET, FILM COATED ORAL DAILY
Qty: 90 TABLET | Refills: 1 | Status: SHIPPED | OUTPATIENT
Start: 2025-06-25

## 2025-06-25 RX ORDER — ROSUVASTATIN CALCIUM 10 MG/1
10 TABLET, COATED ORAL EVERY EVENING
Qty: 90 TABLET | Refills: 1 | Status: SHIPPED | OUTPATIENT
Start: 2025-06-25

## 2025-06-25 RX ORDER — BLOOD SUGAR DIAGNOSTIC
1 STRIP MISCELLANEOUS 2 TIMES DAILY
Qty: 200 EACH | Refills: 3 | Status: SHIPPED | OUTPATIENT
Start: 2025-06-25

## 2025-06-25 RX ORDER — DONEPEZIL HYDROCHLORIDE 10 MG/1
10 TABLET, FILM COATED ORAL NIGHTLY
Qty: 90 TABLET | Refills: 1 | Status: SHIPPED | OUTPATIENT
Start: 2025-06-25

## 2025-06-25 RX ORDER — MUPIROCIN 2 %
OINTMENT (GRAM) TOPICAL DAILY PRN
COMMUNITY
Start: 2025-05-12

## 2025-06-25 RX ORDER — CARVEDILOL 6.25 MG/1
6.25 TABLET ORAL 2 TIMES DAILY
Qty: 180 TABLET | Refills: 1 | Status: SHIPPED | OUTPATIENT
Start: 2025-06-25

## 2025-06-25 RX ORDER — ESCITALOPRAM OXALATE 10 MG/1
10 TABLET ORAL DAILY
Qty: 90 TABLET | Refills: 1 | Status: SHIPPED | OUTPATIENT
Start: 2025-06-25

## 2025-06-25 RX ORDER — CELECOXIB 200 MG/1
200 CAPSULE ORAL DAILY
Qty: 90 CAPSULE | Refills: 1 | Status: SHIPPED | OUTPATIENT
Start: 2025-06-25

## 2025-06-25 RX ORDER — ALLOPURINOL 300 MG/1
300 TABLET ORAL DAILY
Qty: 90 TABLET | Refills: 1 | Status: SHIPPED | OUTPATIENT
Start: 2025-06-25

## 2025-06-25 RX ORDER — GABAPENTIN 300 MG/1
CAPSULE ORAL
Qty: 270 CAPSULE | Refills: 1 | Status: SHIPPED | OUTPATIENT
Start: 2025-06-25 | End: 2026-03-25

## 2025-06-25 ASSESSMENT — ENCOUNTER SYMPTOMS
SHORTNESS OF BREATH: 0
BACK PAIN: 1

## 2025-06-25 NOTE — PROGRESS NOTES
weight change.   Respiratory:  Negative for shortness of breath.    Cardiovascular:  Negative for chest pain.   Musculoskeletal:  Positive for arthralgias, back pain and gait problem.   All other systems reviewed and are negative.    Vitals:  Ht 1.753 m (5' 9\")   Wt 116.6 kg (257 lb)   BMI 37.95 kg/m²     Physical Exam:  Physical Exam  Vitals reviewed.   Constitutional:       Appearance: Normal appearance. He is obese.   HENT:      Head: Normocephalic and atraumatic.   Eyes:      Extraocular Movements: Extraocular movements intact.      Pupils: Pupils are equal, round, and reactive to light.   Cardiovascular:      Rate and Rhythm: Normal rate and regular rhythm.      Heart sounds: Normal heart sounds.   Pulmonary:      Effort: Pulmonary effort is normal.      Breath sounds: Normal breath sounds.   Musculoskeletal:         General: Normal range of motion.      Cervical back: Normal range of motion and neck supple.   Skin:     General: Skin is warm and dry.      Findings: Bruising present.   Neurological:      Mental Status: He is alert and oriented to person, place, and time.      Motor: Weakness present.      Gait: Gait abnormal.   Psychiatric:         Mood and Affect: Mood normal.         Behavior: Behavior normal.         Thought Content: Thought content normal.         Judgment: Judgment normal.        Assessment/Plan:   Rodolfo was seen today for follow-up.    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

## 2025-06-26 NOTE — PROGRESS NOTES
obtained after the  instillation of intrathecal contrast via lumbar puncture. Multiplanar  reformations were reviewed. Please see same day interventional radiology report  for full procedural details.    Radiation dose reduction techniques were used for this study:  All CT scans  performed at this facility use one or all of the following: Automated exposure  control, adjustment of the mA and/or kVp according to patient's size, iterative  reconstruction.      FINDINGS:    There are 5 lumbar vertebral bodies, with vertebral body numbering performed  with the designation of the last well formed intervertebral disc as L5-S1.    There is a mild dextroconvex lumbar spine curvature. There are Schmorl's nodes  in the superior endplates of L3 and L4.    There are multilevel anterior osteophytes spanning from T9-L4, raising the  possibility of diffuse idiopathic skeletal hyperostosis..    There is no evidence of acute lumbar spine fracture. There is partial  ossification of the discs from T9-T10 through T12-L1.    The visualized portions of the spinal cord are of normal caliber.. The conus  medullaris terminates at the L1 level. The cauda equina nerve roots are  redundant secondary to the below-described canal stenosis.    There are no suspicious nodules or masses at the imaged lung bases. There is a  5.1 x 4.5 cm mass along the anterior surface of the psoas muscle at the L2 level  (axial image 60). Scattered atherosclerosis.    There are cardiac pacemaker defibrillator leads on the topogram images.      Level specific findings:    T12-L1: Partial ossification of the disc. No significant canal or foraminal  stenosis.    L1-L2: Vacuum disc phenomenon with mild bulging of the disc. There is mild  spinal canal stenosis. The neuroforamina are patent.    L2-L3: There is retrolisthesis of L2 on L3. Left asymmetric disc bulge.  Bilateral marginal osteophytes. Ligamentum flavum thickening. The constellation  of features results in

## 2025-06-27 ENCOUNTER — OFFICE VISIT (OUTPATIENT)
Age: 80
End: 2025-06-27

## 2025-06-27 ENCOUNTER — HOSPITAL ENCOUNTER (OUTPATIENT)
Dept: LAB | Age: 80
Discharge: HOME | End: 2025-06-27
Payer: MEDICARE

## 2025-06-27 ENCOUNTER — OFFICE VISIT (OUTPATIENT)
Dept: ONCOLOGY | Age: 80
End: 2025-06-27

## 2025-06-27 VITALS
BODY MASS INDEX: 38.02 KG/M2 | TEMPERATURE: 97.6 F | OXYGEN SATURATION: 99 % | SYSTOLIC BLOOD PRESSURE: 138 MMHG | HEART RATE: 64 BPM | DIASTOLIC BLOOD PRESSURE: 66 MMHG | HEIGHT: 69 IN | WEIGHT: 256.7 LBS | RESPIRATION RATE: 19 BRPM

## 2025-06-27 VITALS
RESPIRATION RATE: 19 BRPM | TEMPERATURE: 97.6 F | HEIGHT: 69 IN | HEART RATE: 64 BPM | SYSTOLIC BLOOD PRESSURE: 138 MMHG | BODY MASS INDEX: 37.92 KG/M2 | DIASTOLIC BLOOD PRESSURE: 66 MMHG | OXYGEN SATURATION: 99 % | WEIGHT: 256 LBS

## 2025-06-27 DIAGNOSIS — C82.03 FOLLICULAR LYMPHOMA GRADE I OF INTRA-ABDOMINAL LYMPH NODES (HCC): Primary | ICD-10-CM

## 2025-06-27 DIAGNOSIS — C82.03 FOLLICULAR LYMPHOMA GRADE I OF INTRA-ABDOMINAL LYMPH NODES (HCC): ICD-10-CM

## 2025-06-27 DIAGNOSIS — R94.5 ABNORMAL RESULTS OF LIVER FUNCTION STUDIES: ICD-10-CM

## 2025-06-27 DIAGNOSIS — N28.89 RENAL MASS, LEFT: ICD-10-CM

## 2025-06-27 LAB
ALBUMIN SERPL-MCNC: 3.8 G/DL (ref 3.2–4.6)
ALBUMIN/GLOB SERPL: 1.1 (ref 1–1.9)
ALP SERPL-CCNC: 85 U/L (ref 40–129)
ALT SERPL-CCNC: 20 U/L (ref 8–55)
ANION GAP SERPL CALC-SCNC: 10 MMOL/L (ref 7–16)
AST SERPL-CCNC: 18 U/L (ref 15–37)
BASOPHILS # BLD: 0.04 K/UL (ref 0–0.2)
BASOPHILS NFR BLD: 0.4 % (ref 0–2)
BILIRUB SERPL-MCNC: 0.5 MG/DL (ref 0–1.2)
BUN SERPL-MCNC: 29 MG/DL (ref 8–23)
CALCIUM SERPL-MCNC: 9.6 MG/DL (ref 8.8–10.2)
CHLORIDE SERPL-SCNC: 104 MMOL/L (ref 98–107)
CO2 SERPL-SCNC: 24 MMOL/L (ref 20–29)
CREAT SERPL-MCNC: 1.34 MG/DL (ref 0.8–1.3)
DIFFERENTIAL METHOD BLD: ABNORMAL
EOSINOPHIL # BLD: 0.11 K/UL (ref 0–0.8)
EOSINOPHIL NFR BLD: 1.1 % (ref 0.5–7.8)
ERYTHROCYTE [DISTWIDTH] IN BLOOD BY AUTOMATED COUNT: 14.8 % (ref 11.9–14.6)
GLOBULIN SER CALC-MCNC: 3.4 G/DL (ref 2.3–3.5)
GLUCOSE SERPL-MCNC: 90 MG/DL (ref 70–99)
HAV IGM SER QL: NONREACTIVE
HBV CORE IGM SER QL: NONREACTIVE
HBV SURFACE AG SER QL: NONREACTIVE
HCT VFR BLD AUTO: 42.3 % (ref 41.1–50.3)
HCV AB SER QL: NONREACTIVE
HGB BLD-MCNC: 13 G/DL (ref 13.6–17.2)
IMM GRANULOCYTES # BLD AUTO: 0.04 K/UL (ref 0–0.5)
IMM GRANULOCYTES NFR BLD AUTO: 0.4 % (ref 0–5)
KAPPA LC FREE SER-MCNC: 41.3 MG/L (ref 2.4–20.7)
KAPPA LC FREE/LAMBDA FREE SER: 1 (ref 0.2–0.8)
LAMBDA LC FREE SERPL-MCNC: 39.5 MG/L (ref 4.2–27.7)
LDH SERPL L TO P-CCNC: 142 U/L (ref 127–281)
LYMPHOCYTES # BLD: 1.9 K/UL (ref 0.5–4.6)
LYMPHOCYTES NFR BLD: 19.3 % (ref 13–44)
MCH RBC QN AUTO: 27 PG (ref 26.1–32.9)
MCHC RBC AUTO-ENTMCNC: 30.7 G/DL (ref 31.4–35)
MCV RBC AUTO: 87.8 FL (ref 82–102)
MONOCYTES # BLD: 0.8 K/UL (ref 0.1–1.3)
MONOCYTES NFR BLD: 8.1 % (ref 4–12)
NEUTS SEG # BLD: 6.97 K/UL (ref 1.7–8.2)
NEUTS SEG NFR BLD: 70.7 % (ref 43–78)
NRBC # BLD: 0 K/UL (ref 0–0.2)
PLATELET # BLD AUTO: 219 K/UL (ref 150–450)
PMV BLD AUTO: 9.8 FL (ref 9.4–12.3)
POTASSIUM SERPL-SCNC: 4.2 MMOL/L (ref 3.5–5.1)
PROT SERPL-MCNC: 7.3 G/DL (ref 6.3–8.2)
RBC # BLD AUTO: 4.82 M/UL (ref 4.23–5.6)
SODIUM SERPL-SCNC: 138 MMOL/L (ref 136–145)
URATE SERPL-MCNC: 5.4 MG/DL (ref 3.9–8.2)
WBC # BLD AUTO: 9.9 K/UL (ref 4.3–11.1)

## 2025-06-27 PROCEDURE — 36415 COLL VENOUS BLD VENIPUNCTURE: CPT

## 2025-06-27 PROCEDURE — 80074 ACUTE HEPATITIS PANEL: CPT

## 2025-06-27 PROCEDURE — 84165 PROTEIN E-PHORESIS SERUM: CPT

## 2025-06-27 PROCEDURE — 85025 COMPLETE CBC W/AUTO DIFF WBC: CPT

## 2025-06-27 PROCEDURE — 82784 ASSAY IGA/IGD/IGG/IGM EACH: CPT

## 2025-06-27 PROCEDURE — 83615 LACTATE (LD) (LDH) ENZYME: CPT

## 2025-06-27 PROCEDURE — 86334 IMMUNOFIX E-PHORESIS SERUM: CPT

## 2025-06-27 PROCEDURE — 82232 ASSAY OF BETA-2 PROTEIN: CPT

## 2025-06-27 PROCEDURE — 83521 IG LIGHT CHAINS FREE EACH: CPT

## 2025-06-27 PROCEDURE — 84550 ASSAY OF BLOOD/URIC ACID: CPT

## 2025-06-27 PROCEDURE — 80053 COMPREHEN METABOLIC PANEL: CPT

## 2025-06-27 ASSESSMENT — PATIENT HEALTH QUESTIONNAIRE - PHQ9
SUM OF ALL RESPONSES TO PHQ QUESTIONS 1-9: 0
2. FEELING DOWN, DEPRESSED OR HOPELESS: NOT AT ALL
1. LITTLE INTEREST OR PLEASURE IN DOING THINGS: NOT AT ALL

## 2025-06-27 ASSESSMENT — ENCOUNTER SYMPTOMS
RESPIRATORY NEGATIVE: 1
GASTROINTESTINAL NEGATIVE: 1

## 2025-06-27 NOTE — PROGRESS NOTES
[facility]  [address] [place] [address]  Conor Durham MD    Patient Name Rodolfo Buckner   MRN [id number]   Date 06/27/25     Hematology/Oncology Diagnosis  1. Follicular lymphoma, G1  2. Left renal mass    History of Present Illness  Rodolfo Buckner is a 79 y.o. man with HTN/HLD, CAD, CHF s/p CRT-D, MICHELL, CKD, DM2, chronic back pain d/t DDD, lumbar stenosis, OA, who presents to [facility] care.     - 4/30/25  CT L-spine obtained d/t back pain.  This incidentally noted a large right iliopsoas mass.  - 5/8/2025 CT abdomen/pelvis with contrast showed several renal masses, incompletely characterized.  Additionally soft tissue density in the left mid ureter and possibly a filling defect up in the collecting system.  Approximately 6 cm solid mass just anterior to the right iliopsoas muscle noted..   - 5/19/2025  OV with Dr Freire/onc urology.  Requested biopsy of iliopsoas mass.    - 5/30/25  CT-guided biopsy of iliopsoas mass.  Pathology showed G1 FCL.     With his wife today and at his baseline of health.  He presented asymptomatically with abnormal imaging noted on CT L-spine this past April.  Follow-up CT abdomen/pelvis showed right iliopsoas mass as well as soft tissue density in the left mid ureter.  He was treated for multiple medical conditions including heart disease, obstructive sleep apnea and diabetes but in general he manages these well.  His back pain is at baseline.    ROS   12 point review of system negative except as noted in HPI    Past Medical History:   Diagnosis Date    Abnormal MMSE 05/24/2022    29 out of 30 and normal clock draw    Age-related cognitive decline 2020    30 out of 30 MMSE    Arthritis     DJD- shoulder, knees, hips    Cancer (HCC)     skin CA left lobe ear    Chronic systolic CHF (congestive heart failure) (HCC)     COVID-19 2/9/2021    Dizziness 06/09/2020    hx     Esophagitis 10/2022    GERD (gastroesophageal reflux disease)     managed with

## 2025-06-27 NOTE — PATIENT INSTRUCTIONS
Patient Information from Today's Visit    The members of your Oncology Medical Home are listed below:    Physician Provider: Rubio Freire, Urologic Oncologist  Advanced Practice Clinician: LESVIA Mukherjee  Medical Assistant: Nidia MARKS CMA  :Mercedes ALLEN  Supportive Care Services: Derrick CRESPO LMSW    Diagnosis (Information Sheet Provided on Day of Diagnosis): Follicular lymphoma     Follow Up Instructions:   Pathology reviewed  Continue to follow medical oncology(Dr Durham)  Proceed with PET scan as scheduled, we would like to see what that shows.  We will plan to see you back after the PET. If you need anything prior please do not hesitate to call our office.  Has Treatment Plan Been Finalized? No    Current Labs:   Hospital Outpatient Visit on 06/27/2025   Component Date Value Ref Range Status    WBC 06/27/2025 9.9  4.3 - 11.1 K/uL Final    RBC 06/27/2025 4.82  4.23 - 5.6 M/uL Final    Hemoglobin 06/27/2025 13.0 (L)  13.6 - 17.2 g/dL Final    Hematocrit 06/27/2025 42.3  41.1 - 50.3 % Final    MCV 06/27/2025 87.8  82.0 - 102.0 FL Final    MCH 06/27/2025 27.0  26.1 - 32.9 PG Final    MCHC 06/27/2025 30.7 (L)  31.4 - 35.0 g/dL Final    RDW 06/27/2025 14.8 (H)  11.9 - 14.6 % Final    Platelets 06/27/2025 219  150 - 450 K/uL Final    MPV 06/27/2025 9.8  9.4 - 12.3 FL Final    nRBC 06/27/2025 0.00  0.0 - 0.2 K/uL Final    **Note: Absolute NRBC parameter is now reported with Hemogram**    Neutrophils % 06/27/2025 70.7  43.0 - 78.0 % Final    Lymphocytes % 06/27/2025 19.3  13.0 - 44.0 % Final    Monocytes % 06/27/2025 8.1  4.0 - 12.0 % Final    Eosinophils % 06/27/2025 1.1  0.5 - 7.8 % Final    Basophils % 06/27/2025 0.4  0.0 - 2.0 % Final    Immature Granulocytes % 06/27/2025 0.4  0.0 - 5.0 % Final    Neutrophils Absolute 06/27/2025 6.97  1.70 - 8.20 K/UL Final    Lymphocytes Absolute 06/27/2025 1.90  0.50 - 4.60 K/UL Final    Monocytes Absolute 06/27/2025 0.80  0.10 - 1.30 K/UL Final    Eosinophils

## 2025-06-27 NOTE — PATIENT INSTRUCTIONS
Patient Information from Today's Visit    The members of your Oncology Medical Home are listed below:    Physician Provider: Dr. Conor Durham, Medical Oncologist  Advanced Practice Clinician:  Registered Nurse: Alissa WALKER RN  Nurse Navigator: Ivanna PERALES RN  Medical Assistant: Kristian BUCK MA  :Barbara GONZALEZ  Supportive Care Services: Derrick CRESPO LM    Diagnosis (Information Sheet Provided on Day of Diagnosis): Follicular Lymphoma    Follow Up Instructions:   -Dr. Durham has stated that your diagnosis of follicular lymphoma is a low grade b cell lymphoma diagnosis.  -There were some questionable areas on your kidneys so we need to look further into the masses they found on your kidney.  -We will have you scheduled for a PET scan and get some labs drawn to see exactly what kind of treatment regimen you will receive.    Has Treatment Plan Been Finalized? No    Current Labs: will get labs after visit      Please refer to After Visit Summary or Status4 for upcoming appointment information. Please call our office for rescheduling needs at least 24 hours before your scheduled appointment time.If you have any questions regarding your upcoming schedule please reach out to your care team through Status[employer] or call [phone].     Please notify your assigned Nurse Navigator of any unplanned hospital admissions or Emergency Room visits within 24 hours of discharge.    -------------------------------------------------------------------------------------------------------------------  Please call our office at [phone] if you have any  of the following symptoms:   Fever of 100.5 or greater  Chills  Shortness of breath  Swelling or pain in one leg    After office hours an answering service is available and will contact a provider for emergencies or if you are experiencing any of the above symptoms.        SHADI Garland, RN  Hematology Navigator  [facility]  Office: [phone]   Fax:

## 2025-06-28 LAB — B2 MICROGLOB SERPL-MCNC: 3.2 MG/L (ref 0.6–2.4)

## 2025-06-30 LAB
ALBUMIN SERPL ELPH-MCNC: 3.6 G/DL (ref 2.9–4.4)
ALBUMIN/GLOB SERPL: 1.3 (ref 0.7–1.7)
ALPHA1 GLOB SERPL ELPH-MCNC: 0.1 G/DL (ref 0–0.4)
ALPHA2 GLOB SERPL ELPH-MCNC: 0.9 G/DL (ref 0.4–1)
B-GLOBULIN SERPL ELPH-MCNC: 1.1 G/DL (ref 0.7–1.3)
GAMMA GLOB SERPL ELPH-MCNC: 0.7 G/DL (ref 0.4–1.8)
GLOBULIN SER-MCNC: 2.9 G/DL (ref 2.2–3.9)
IGA SERPL-MCNC: 236 MG/DL (ref 61–437)
IGG SERPL-MCNC: 837 MG/DL (ref 603–1613)
IGM SERPL-MCNC: 79 MG/DL (ref 15–143)
INTERPRETATION SERPL IEP-IMP: NORMAL
M PROTEIN SERPL ELPH-MCNC: NORMAL G/DL
PROT SERPL-MCNC: 6.5 G/DL (ref 6–8.5)

## 2025-07-03 ENCOUNTER — OFFICE VISIT (OUTPATIENT)
Dept: NEUROSURGERY | Age: 80
End: 2025-07-03
Payer: MEDICARE

## 2025-07-03 VITALS
WEIGHT: 256 LBS | DIASTOLIC BLOOD PRESSURE: 56 MMHG | HEART RATE: 69 BPM | BODY MASS INDEX: 37.92 KG/M2 | SYSTOLIC BLOOD PRESSURE: 135 MMHG | TEMPERATURE: 97.7 F | HEIGHT: 69 IN | OXYGEN SATURATION: 97 %

## 2025-07-03 DIAGNOSIS — M47.9 SPONDYLOSIS: Primary | ICD-10-CM

## 2025-07-03 DIAGNOSIS — M41.50 DEGENERATIVE SCOLIOSIS IN ADULT PATIENT: ICD-10-CM

## 2025-07-03 DIAGNOSIS — M48.062 SPINAL STENOSIS OF LUMBAR REGION WITH NEUROGENIC CLAUDICATION: ICD-10-CM

## 2025-07-03 DIAGNOSIS — M54.9 MECHANICAL BACK PAIN: ICD-10-CM

## 2025-07-03 PROCEDURE — G8427 DOCREV CUR MEDS BY ELIG CLIN: HCPCS | Performed by: STUDENT IN AN ORGANIZED HEALTH CARE EDUCATION/TRAINING PROGRAM

## 2025-07-03 PROCEDURE — 1159F MED LIST DOCD IN RCRD: CPT | Performed by: STUDENT IN AN ORGANIZED HEALTH CARE EDUCATION/TRAINING PROGRAM

## 2025-07-03 PROCEDURE — 3075F SYST BP GE 130 - 139MM HG: CPT | Performed by: STUDENT IN AN ORGANIZED HEALTH CARE EDUCATION/TRAINING PROGRAM

## 2025-07-03 PROCEDURE — 1125F AMNT PAIN NOTED PAIN PRSNT: CPT | Performed by: STUDENT IN AN ORGANIZED HEALTH CARE EDUCATION/TRAINING PROGRAM

## 2025-07-03 PROCEDURE — 3078F DIAST BP <80 MM HG: CPT | Performed by: STUDENT IN AN ORGANIZED HEALTH CARE EDUCATION/TRAINING PROGRAM

## 2025-07-03 PROCEDURE — G8417 CALC BMI ABV UP PARAM F/U: HCPCS | Performed by: STUDENT IN AN ORGANIZED HEALTH CARE EDUCATION/TRAINING PROGRAM

## 2025-07-03 PROCEDURE — 1123F ACP DISCUSS/DSCN MKR DOCD: CPT | Performed by: STUDENT IN AN ORGANIZED HEALTH CARE EDUCATION/TRAINING PROGRAM

## 2025-07-03 PROCEDURE — 1036F TOBACCO NON-USER: CPT | Performed by: STUDENT IN AN ORGANIZED HEALTH CARE EDUCATION/TRAINING PROGRAM

## 2025-07-03 PROCEDURE — 99214 OFFICE O/P EST MOD 30 MIN: CPT | Performed by: STUDENT IN AN ORGANIZED HEALTH CARE EDUCATION/TRAINING PROGRAM

## 2025-07-03 NOTE — PROGRESS NOTES
[facility]      Neurosurgery Clinic Note         Patient Name: Rodolfo Buckner    Medical Record Number: [id number]    YOB: 1945    Date of Visit: 07/03/25         Visit Type: Follow-up         REASON FOR VISIT: fu post ct myelo- pacs         HISTORY OF PRESENT ILLNESS:      History of Present Illness  I had the pleasure of meeting Rodolfo Buckner in the neurosurgical clinic today. Rodolfo Buckner is a pleasant 79 y.o. year young male who presented for follow-up visit after obtaining a CT myelogram.  Patient reported he recently got diagnosed with follicular lymphoma.  He continues to have bilateral leg numbness as well as back pain.  He understands surgical intervention is on hold until his lymphoma is treated.    He has had back pain for 50 years, worsened by bending or lifting. Diagnosed with spinal stenosis several years ago, symptoms have increased over the past year. He reports foot numbness, initially thought to originate from the knee but now suspects it starts in the foot and extends to the knee. This has led to multiple falls, including one face-down fall. He does not consume alcohol or use drugs. Numbness prevents prolonged standing, such as during [employer] services, forcing him to sit after two songs.          ASSESSMENT AND PLAN:       Diagnosis Orders   1. Spondylosis        2. Spinal stenosis of lumbar region with neurogenic claudication        3. Degenerative scoliosis in adult patient        4. Mechanical back pain            Assessment & Plan  Back pain.  Experienced for 50 years, worsened by bending and lifting. CT myelogram shows significant stenosis at L2-3, L3-4, and L4-5. Continue lymphoma treatment. Discuss further management if pain persists post-treatment.    Foot numbness.  Present for a year, extending to the knee, causing falls. CT myelogram indicates stenosis at L2-3, L3-4, and L4-5. Continue lymphoma treatment. Discuss further management if

## 2025-07-11 ENCOUNTER — HOSPITAL ENCOUNTER (OUTPATIENT)
Dept: PET IMAGING | Age: 80
Discharge: HOME | End: 2025-07-14
Payer: MEDICARE

## 2025-07-11 DIAGNOSIS — C82.03 FOLLICULAR LYMPHOMA GRADE I OF INTRA-ABDOMINAL LYMPH NODES (HCC): ICD-10-CM

## 2025-07-11 LAB
GLUCOSE BLD STRIP.AUTO-MCNC: 86 MG/DL (ref 65–100)
SERVICE CMNT-IMP: NORMAL

## 2025-07-11 PROCEDURE — 3430000000 HC RX DIAGNOSTIC RADIOPHARMACEUTICAL: Performed by: INTERNAL MEDICINE

## 2025-07-11 PROCEDURE — 2500000003 HC RX 250 WO HCPCS: Performed by: INTERNAL MEDICINE

## 2025-07-11 PROCEDURE — 82962 GLUCOSE BLOOD TEST: CPT

## 2025-07-11 PROCEDURE — A9609 HC RX DIAGNOSTIC RADIOPHARMACEUTICAL: HCPCS | Performed by: INTERNAL MEDICINE

## 2025-07-11 PROCEDURE — 78815 PET IMAGE W/CT SKULL-THIGH: CPT

## 2025-07-11 PROCEDURE — 6360000004 HC RX CONTRAST MEDICATION: Performed by: INTERNAL MEDICINE

## 2025-07-11 RX ORDER — FLUDEOXYGLUCOSE F 18 200 MCI/ML
11.25 INJECTION, SOLUTION INTRAVENOUS
Status: COMPLETED | OUTPATIENT
Start: 2025-07-11 | End: 2025-07-11

## 2025-07-11 RX ORDER — DIATRIZOATE MEGLUMINE AND DIATRIZOATE SODIUM 660; 100 MG/ML; MG/ML
10 SOLUTION ORAL; RECTAL
Status: DISCONTINUED | OUTPATIENT
Start: 2025-07-11 | End: 2025-07-15 | Stop reason: HOSPADM

## 2025-07-11 RX ORDER — SODIUM CHLORIDE 0.9 % (FLUSH) 0.9 %
10 SYRINGE (ML) INJECTION AS NEEDED
Status: DISCONTINUED | OUTPATIENT
Start: 2025-07-11 | End: 2025-07-15 | Stop reason: HOSPADM

## 2025-07-11 RX ADMIN — FLUDEOXYGLUCOSE F 18 11.25 MILLICURIE: 200 INJECTION, SOLUTION INTRAVENOUS at 10:36

## 2025-07-11 RX ADMIN — DIATRIZOATE MEGLUMINE AND DIATRIZOATE SODIUM 10 ML: 660; 100 LIQUID ORAL; RECTAL at 10:36

## 2025-07-11 RX ADMIN — SODIUM CHLORIDE, PRESERVATIVE FREE 10 ML: 5 INJECTION INTRAVENOUS at 10:36

## 2025-07-18 ENCOUNTER — HOSPITAL ENCOUNTER (OUTPATIENT)
Dept: LAB | Age: 80
Discharge: HOME | End: 2025-07-18

## 2025-07-18 ENCOUNTER — OFFICE VISIT (OUTPATIENT)
Dept: ONCOLOGY | Age: 80
End: 2025-07-18
Payer: MEDICARE

## 2025-07-18 ENCOUNTER — OFFICE VISIT (OUTPATIENT)
Age: 80
End: 2025-07-18

## 2025-07-18 VITALS
TEMPERATURE: 97.8 F | BODY MASS INDEX: 38.16 KG/M2 | RESPIRATION RATE: 18 BRPM | DIASTOLIC BLOOD PRESSURE: 65 MMHG | WEIGHT: 257.6 LBS | HEART RATE: 69 BPM | OXYGEN SATURATION: 98 % | SYSTOLIC BLOOD PRESSURE: 157 MMHG | HEIGHT: 69 IN

## 2025-07-18 VITALS
RESPIRATION RATE: 18 BRPM | OXYGEN SATURATION: 98 % | SYSTOLIC BLOOD PRESSURE: 157 MMHG | WEIGHT: 257 LBS | TEMPERATURE: 97.8 F | HEART RATE: 69 BPM | BODY MASS INDEX: 38.06 KG/M2 | HEIGHT: 69 IN | DIASTOLIC BLOOD PRESSURE: 65 MMHG

## 2025-07-18 DIAGNOSIS — N28.89 RENAL MASS, LEFT: Primary | ICD-10-CM

## 2025-07-18 DIAGNOSIS — N28.89 RENAL MASS, LEFT: ICD-10-CM

## 2025-07-18 DIAGNOSIS — C82.03 FOLLICULAR LYMPHOMA GRADE I OF INTRA-ABDOMINAL LYMPH NODES (HCC): ICD-10-CM

## 2025-07-18 DIAGNOSIS — C82.03 FOLLICULAR LYMPHOMA GRADE I OF INTRA-ABDOMINAL LYMPH NODES (HCC): Primary | ICD-10-CM

## 2025-07-18 PROCEDURE — G2211 COMPLEX E/M VISIT ADD ON: HCPCS | Performed by: INTERNAL MEDICINE

## 2025-07-18 PROCEDURE — 3078F DIAST BP <80 MM HG: CPT | Performed by: INTERNAL MEDICINE

## 2025-07-18 PROCEDURE — G8417 CALC BMI ABV UP PARAM F/U: HCPCS | Performed by: INTERNAL MEDICINE

## 2025-07-18 PROCEDURE — 1123F ACP DISCUSS/DSCN MKR DOCD: CPT | Performed by: INTERNAL MEDICINE

## 2025-07-18 PROCEDURE — G8428 CUR MEDS NOT DOCUMENT: HCPCS | Performed by: INTERNAL MEDICINE

## 2025-07-18 PROCEDURE — 3077F SYST BP >= 140 MM HG: CPT | Performed by: INTERNAL MEDICINE

## 2025-07-18 PROCEDURE — 1159F MED LIST DOCD IN RCRD: CPT | Performed by: INTERNAL MEDICINE

## 2025-07-18 PROCEDURE — 1160F RVW MEDS BY RX/DR IN RCRD: CPT | Performed by: INTERNAL MEDICINE

## 2025-07-18 PROCEDURE — 1036F TOBACCO NON-USER: CPT | Performed by: INTERNAL MEDICINE

## 2025-07-18 PROCEDURE — 99214 OFFICE O/P EST MOD 30 MIN: CPT | Performed by: INTERNAL MEDICINE

## 2025-07-18 PROCEDURE — 1126F AMNT PAIN NOTED NONE PRSNT: CPT | Performed by: INTERNAL MEDICINE

## 2025-07-18 ASSESSMENT — PATIENT HEALTH QUESTIONNAIRE - PHQ9
1. LITTLE INTEREST OR PLEASURE IN DOING THINGS: NOT AT ALL
SUM OF ALL RESPONSES TO PHQ QUESTIONS 1-9: 0
2. FEELING DOWN, DEPRESSED OR HOPELESS: NOT AT ALL
1. LITTLE INTEREST OR PLEASURE IN DOING THINGS: NOT AT ALL
SUM OF ALL RESPONSES TO PHQ QUESTIONS 1-9: 0
SUM OF ALL RESPONSES TO PHQ QUESTIONS 1-9: 0
2. FEELING DOWN, DEPRESSED OR HOPELESS: NOT AT ALL

## 2025-07-18 NOTE — PATIENT INSTRUCTIONS
Patient Information from Today's Visit    The members of your Oncology Medical Home are listed below:    Physician Provider: Rubio Freire, Urologic Oncology Surgeon  Physician Assistant: Everett Hodges PA  Navigator:   Medical Assistant: Nidia MARKS MA + Katty TORIBIO MA  : Eva VITALE   Supportive Care Services: Derrick CRESPO LMSW    Diagnosis: lymphoma      Follow Up Instructions: -  - pet reviewed  - plan for surgery to look at what is going on the right renal pelvic mass      Current Labs:         Please refer to After Visit Summary or MyChart for upcoming appointment information. Please call our office for rescheduling needs at least 24 hours before your scheduled appointment time.If you have any questions regarding your upcoming schedule please reach out to your care team through [employer] or call [phone].     Please notify your assigned Nurse Navigator of any unplanned hospital admissions or Emergency Room visits within 24 hours of discharge.    -------------------------------------------------------------------------------------------------------------------  Please call our office at [phone] if you have any  of the following symptoms:   Fever of 100.5 or greater  Chills  Shortness of breath  Swelling or pain in one leg    After office hours an answering service is available and will contact a provider for emergencies or if you are experiencing any of the above symptoms.        Aisha Vargas, KATYA

## 2025-07-18 NOTE — PROGRESS NOTES
control/protection: None   Other Topics Concern    Not on file   Social History Narrative    Not on file     Social Drivers of Health     Financial Resource Strain: Low Risk  (10/28/2024)    Overall Financial Resource Strain (CARDIA)     Difficulty of Paying Living Expenses: Not hard at all   Food Insecurity: No Food Insecurity (4/21/2025)    Hunger Vital Sign     Worried About Running Out of Food in the Last Year: Never true     Ran Out of Food in the Last Year: Never true   Transportation Needs: No Transportation Needs (4/21/2025)    PRAPARE - Transportation     Lack of Transportation (Medical): No     Lack of Transportation (Non-Medical): No   Physical Activity: Sufficiently Active (4/21/2025)    Exercise Vital Sign     Days of Exercise per Week: 5 days     Minutes of Exercise per Session: 30 min   Stress: Not on file   Social Connections: Not on file   Intimate Partner Violence: Not on file   Housing Stability: Low Risk  (4/21/2025)    Housing Stability Vital Sign     Unable to Pay for Housing in the Last Year: No     Number of Times Moved in the Last Year: 0     Homeless in the Last Year: No        Current Outpatient Medications   Medication Sig Dispense Refill    allopurinol (ZYLOPRIM) 300 MG tablet Take 1 tablet by mouth daily 90 tablet 1    amitriptyline (ELAVIL) 10 MG tablet Take 1 tablet by mouth nightly 90 tablet 1    blood glucose test strips (ONETOUCH VERIO) strip 1 each by In Vitro route 2 times daily Use as directed to check blood sugar twice a day. (E11.65) 200 each 3    carvedilol (COREG) 6.25 MG tablet Take 1 tablet by mouth 2 times daily 180 tablet 1    celecoxib (CELEBREX) 200 MG capsule Take 1 capsule by mouth daily 90 capsule 1    donepezil (ARICEPT) 10 MG tablet Take 1 tablet by mouth nightly 90 tablet 1    escitalopram (LEXAPRO) 10 MG tablet Take 1 tablet by mouth daily 90 tablet 1    finasteride (PROSCAR) 5 MG tablet Take 1 tablet by mouth daily 90 tablet 1    gabapentin (NEURONTIN) 300 MG

## 2025-07-18 NOTE — PATIENT INSTRUCTIONS
Patient Information from Today's Visit    The members of your Oncology Medical Home are listed below:    Physician Provider: Dr. Conor Durham, Medical Oncologist  Advanced Practice Clinician: Gloria Pablo NP  Registered Nurse: Alissa WALKER RN  Nurse Navigator: Ivanna PERALES RN  Medical Assistant: Kristian BUCK MA  :Barbara GONZALEZ  Supportive Care Services: Tamara LEIJA LMSW    Diagnosis (Information Sheet Provided on Day of Diagnosis): Follicular Lymphoma    Follow Up Instructions:   -We reviewed your PET scan results today. There was only one spot that showed up on the scan.  -We will get a bone marrow biopsy that will be done at Interventional Radiology. This is to check if the lymphoma is in the bone marrow.   -We will have you see radiation oncology to see if this spot is able to be radiated as a treatment form.   -You will keep your appointment with urology.       Has Treatment Plan Been Finalized? No    Current Labs: No labs this visit.         Please refer to After Visit Summary or [employer] for upcoming appointment information. Please call our office for rescheduling needs at least 24 hours before your scheduled appointment time.If you have any questions regarding your upcoming schedule please reach out to your care team through [employer] or call [phone].     Please notify your assigned Nurse Navigator of any unplanned hospital admissions or Emergency Room visits within 24 hours of discharge.    -------------------------------------------------------------------------------------------------------------------  Please call our office at [phone] if you have any  of the following symptoms:   Fever of 100.5 or greater  Chills  Shortness of breath  Swelling or pain in one leg    After office hours an answering service is available and will contact a provider for emergencies or if you are experiencing any of the above symptoms.        Katty Ellis, RN-BSN  Hematology Nurse Navigator   [facility]

## 2025-07-18 NOTE — PROGRESS NOTES
After oral administration of gastroview and intravenous  administration of 11.2 mCi of F18 FDG, noncontrast CT images were obtained for  attenuation correction and for fusion with emission PET images. A series of  overlapping emission PET images were then obtained beginning 60 minutes after  injection of FDG. The area imaged spanned the region from the skull base to the  mid thighs.    COMPARISON: CT dated 5/8/2025    FINDINGS:    Head/Neck: There is no abnormal uptake in the neck.  There is no significant  adenopathy.    Chest: There is no abnormal uptake in the chest. No significant pulmonary mass  or infiltrate. No significant adenopathy. There is mild coronary artery  calcification. Pacemaker is present.    Abdomen: There is no abnormal uptake in the liver or spleen. There is a  hypermetabolic right retroperitoneal paraspinal mass measuring 4.7 x 3.7 cm and  9.7 SUV max. There is low-level uptake within the 2.4 cm exophytic left renal  mass, 3.1 SUV max. No definite abnormal uptake associated with the right renal  pelvis lesion.    Pelvis: There is no significant abnormal uptake in the pelvis.  There is no  significant adenopathy.  There are no bony lesions.    Impression  1. Hypermetabolic right retroperitoneal paraspinal mass consistent with known  lymphoma.  2. Low-level uptake within the exophytic left renal mass, concerning for primary  renal cell carcinoma or lymphoma.  3. No definite uptake within the right renal pelvis mass, but CT appearance is  concerning for malignancy.  4. No acute findings in the neck, chest, or pelvis.      Electronically signed by VINCENT AMARO            ASSESSMENT:    Mr. Rodolfo Buckner is a 79 y.o. male with a diagnosis of follicular lymphoma, grade 1.  He also has a mass within the right renal pelvis seen on his last CT scan and an enhancing left renal mass.  In regards to the left renal mass I have recommended continued observation of this.  I think it is more important

## 2025-07-21 NOTE — PROGRESS NOTES
[facility] RADIATION ONCOLOGY CONSULTATION    Patient: Rodolfo Buckner MRN: [id number]  SSN: [id number]    YOB: 1945  Age: 79 y.o.  Sex: male      Other Providers:  Conor Durham MD     CHIEF COMPLAINT: Back pain    DIAGNOSIS: Follicular lymphoma, final stage pending    PREVIOUS RADIATION TREATMENT:  None    HISTORY OF PRESENT ILLNESS:  Rodolfo Buckner is a 79 y.o. male who I am seeing at the request of Conor Durham MD.    Mr. Buckner was in his usual state of health until 4/25 at which time he reported back pain.  CT of the lumbar spine 4/30/2025 showed a large right iliopsoas mass.  CT abdomen pelvis 5/8/2025 showed several renal masses and soft tissue density in the left mid ureter with possible filling defect up in the collecting system.  There is a 6 cm solid mass just anterior to the right iliopsoas muscle.  He was evaluated by Dr. Freire 5/19/2025.  CT-guided biopsy of the iliopsoas mass 5/30/2025 showed a grade 1 follicular lymphoma.  PET/CT 7/11/2025 shows the hypermetabolic right peritoneal paraspinal mass consistent with the known lymphoma.  There is low-level uptake within the exophytic left renal mass concerning for primary renal cell carcinoma or lymphoma.  There is no definite uptake in the right renal pelvic mass however the CT appearance is concerning for malignancy.  He is scheduled for cystoscopy 8/7/2025 and bone marrow biopsy 7/31/2025.His pain is 0/10.    PAST MEDICAL HISTORY:    Past Medical History:   Diagnosis Date    Abnormal MMSE 05/24/2022    29 out of 30 and normal clock draw    Age-related cognitive decline 2020    30 out of 30 MMSE    Arthritis     DJD- shoulder, knees, hips    Cancer (HCC)     skin CA left lobe ear    Chronic systolic CHF (congestive heart failure) (HCC)     COVID-19 2/9/2021    Dizziness 06/09/2020    hx     Esophagitis 10/2022    GERD (gastroesophageal reflux disease)     managed with medication

## 2025-07-24 ENCOUNTER — HOSPITAL ENCOUNTER (OUTPATIENT)
Dept: RADIATION ONCOLOGY | Age: 80
Setting detail: RECURRING SERIES
Discharge: HOME | End: 2025-07-27
Payer: MEDICARE

## 2025-07-24 ENCOUNTER — HOSPITAL ENCOUNTER (OUTPATIENT)
Dept: SURGERY | Age: 80
Discharge: HOME | End: 2025-07-27
Payer: MEDICARE

## 2025-07-24 VITALS
BODY MASS INDEX: 37.74 KG/M2 | HEART RATE: 66 BPM | SYSTOLIC BLOOD PRESSURE: 138 MMHG | OXYGEN SATURATION: 95 % | RESPIRATION RATE: 18 BRPM | TEMPERATURE: 97.5 F | WEIGHT: 254.8 LBS | DIASTOLIC BLOOD PRESSURE: 67 MMHG | HEIGHT: 69 IN

## 2025-07-24 VITALS
DIASTOLIC BLOOD PRESSURE: 68 MMHG | TEMPERATURE: 98.2 F | WEIGHT: 254 LBS | BODY MASS INDEX: 37.51 KG/M2 | SYSTOLIC BLOOD PRESSURE: 102 MMHG

## 2025-07-24 LAB
EKG ATRIAL RATE: 68 BPM
EKG DIAGNOSIS: NORMAL
EKG P AXIS: 62 DEGREES
EKG P-R INTERVAL: 170 MS
EKG Q-T INTERVAL: 470 MS
EKG QRS DURATION: 182 MS
EKG QTC CALCULATION (BAZETT): 499 MS
EKG R AXIS: 205 DEGREES
EKG T AXIS: 32 DEGREES
EKG VENTRICULAR RATE: 68 BPM
HGB BLD-MCNC: 13.2 G/DL (ref 13.6–17.2)
POTASSIUM SERPL-SCNC: 4.1 MMOL/L (ref 3.5–5.1)

## 2025-07-24 PROCEDURE — 85018 HEMOGLOBIN: CPT

## 2025-07-24 PROCEDURE — 93005 ELECTROCARDIOGRAM TRACING: CPT

## 2025-07-24 PROCEDURE — 84132 ASSAY OF SERUM POTASSIUM: CPT

## 2025-07-24 PROCEDURE — 99211 OFF/OP EST MAY X REQ PHY/QHP: CPT

## 2025-07-24 PROCEDURE — 93010 ELECTROCARDIOGRAM REPORT: CPT | Performed by: INTERNAL MEDICINE

## 2025-07-24 NOTE — PRE-PROCEDURE INSTRUCTIONS
Patient verified name and     Order for consent was not found in EHR and ; patient verified.     Type lB surgery, phone assessment complete.    Labs per surgeon: none at present time;   Labs per anesthesia protocol: hgb and potassium collected and results in Epic. Poc glucose on DOS  EKG: EKG collected and results in Epic. Patient has a hx of CAD including St Raj Defibrillator. Denies any recent shocks. Procedure pass sent to anesthesia and order received that rep is not needed on DOS    Patient provided with and instructed on educational handouts including Guide to Surgery, Preventing Surgical Site Infections, Pain Management, and [place] [facility] Brochure.    Patient answered medical/surgical history questions at their best of ability. All prior to admission medications documented in EPIC. Original medication prescription bottle was not visualized during patient appointment.     Patient instructed to hold all vitamins 7 days prior to surgery and NSAIDS 5 days prior to surgery, patient verbalized understanding.     Patient teach back successful and patient demonstrates knowledge of instructions.

## 2025-07-24 NOTE — PROGRESS NOTES
Radiation Oncology - New Patient        Rodolfo Buckner  is a 79 y.o. year old male with Follicular Lymphoma who presents for: Initial consult.    Proposed Radiotherapy: External Beam Radiation Therapy       Intent of therapy and anticipated number of fractions (length of treatment) reviewed by MD. See MD note.     Bowel Prep:N/A    Menopausal Status: Not Applicable      Consent for Radiotherapy: Consent to be signed prior to CT simulation    CT Sim Scheduled: Yes -one week post surgery.     Pertinent Information:    Surgery pre assessment apt today at .   Pathology 5-30-25.  Bone marrow biopsy scheduled 7-31-25.   Scheduled for surgery with Dr. Freire on 8-7-25.     Vitals:    07/24/25 1350   BP: 102/68   Temp: 98.2 °F (36.8 °C)   Pain: 0/10    Previous Radiation Treatment Reported: No    Pacemaker or Other Chest Implant Reported: Yes    Collagen Vascular Disease Reported: No    Test Results, if applicable:  PSA:  AUA:  PET / CT / PSMA: pet scan 7-11-25.   Colonoscopy:     Education: No education today.

## 2025-07-24 NOTE — PRE-PROCEDURE INSTRUCTIONS
PLEASE CONTINUE TAKING ALL PRESCRIPTION MEDICATIONS UP TO THE DAY OF SURGERY UNLESS OTHERWISE DIRECTED BELOW. You may take Tylenol, allergy,  and/or indigestion medications.     TAKE ONLY THESE MEDICATIONS ON THE DAY OF SURGERY   TYLENOL IF NEEDED  ALLOPURINOL  CARVEDILOL (COREG)  GABAPENTIN  ESCITALOPRAM (LEXAPRO)  METFORMIN   JANUVIA  FINASTERIDE  TAMSULOSIN (FLOMAX)  OMEPRAZOLE   TRESIBA INSULIN    46  UNITS MORNING OF SURGERY     DISCONTINUE all vitamins and supplements VITAMIN D, VITAMIN B12, AND ZINC FOR 7 days prior to surgery. DISCONTINUE Non-Steroidal Anti-Inflammatory (NSAIDS) such as Advil and Aleve 5 days prior to surgery.     Home Medications to Hold- please continue all other medications except these.    HOLD CELEBREX 5 DAYS PRIOR TO SURGERY   HOLD VALSARTAN -HCTZ MORNING OF SURGERY    HOLD LASIX (FUROSEMIDE) MORNING OF SURGERY    HOLD ARICEPT (DONEPEZIL ) 7 DAYS PRIOR TO SURGERY     Comments      On the day before surgery please take 2 Tylenol in the morning /a/nd then again before bed. You may use either regular or extra strength.           Please do not bring home medications with you on the day of surgery unless otherwise directed by your nurse.  If you are instructed to bring home medications, please give them to your nurse as they will be administered by the nursing staff.    If you have any questions, please call [facility] [phone].    A copy of this note was provided to the patient for reference.

## 2025-07-28 ENCOUNTER — TELEPHONE (OUTPATIENT)
Dept: ONCOLOGY | Age: 80
End: 2025-07-28

## 2025-07-28 DIAGNOSIS — C82.03 FOLLICULAR LYMPHOMA GRADE I OF INTRA-ABDOMINAL LYMPH NODES (HCC): Primary | ICD-10-CM

## 2025-07-28 NOTE — TELEPHONE ENCOUNTER
Called pt to inform him that he will need labs drawn prior to his BMBx. Will make a lab appt for pt on 7/30 at 2:00. Juan ESCOTO.

## 2025-07-28 NOTE — PROGRESS NOTES
[facility] CARDIOLOGY  [address] [place] [address], [address]  [place] [address]  PHONE: [phone]      25    NAME:  Rodolfo Buckner  : 1945  MRN: [id number]         SUBJECTIVE:   Rodolfo Buckner is a 79 y.o. male seen for a follow up visit regarding the following:     Chief Complaint   Patient presents with    Follow-up    Congestive Heart Failure            HPI:  Follow up  Follow-up and Congestive Heart Failure   .    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 Dx'ed with follicular lymphoma. Found incidentally on imaging for back pain, multiple renal masses, large iliopsoas  mass, clinically stage 1.  There is also concern for a possible separate renal cell carcinoma vs lymphoma, awaiting biopsy by Dr Ferire and a bone marrow bx by oncology. He's established with radiation oncology.       Denies shortness of breath, chest discomfort.                   Past cardiac history:   - mild nonobstructive coronary artery disease   EF 20% by echo 2011   Echo 2012: EF improved to 35-40%, no significant valve disease   2014: images so poor even with Definity contrast an EF could not be estimated, only \"probably moderately depressed\".     Mar 2014 - MUGA - EF 33%   2014 - St Raj biventricular device - Dr Hammonds   Mar 2015 - even with contrast, difficult to see, EF estimated 45-50%    Oct 2016 - EF 43%, aortic root 3.8   Sep 2017 - 50-55% with distal apical dyskinesis, mild to moderate MR, AI, and PI   Oct 2018 - 54% with apical wma, impaired relaxation, no significant valvular regurgitation   Oct 2019- EF 60%, mild AI and MR   2020- normal arterial doppler and carotid doppler   Aug 2020- normal carotid doppler, negative LE arterial duplex   2021- vasodilator perfusion study inferior fixed defect, no ischemia, EF 49%   2021- severe COVID infection with AMS and PNA   2021- EF

## 2025-07-30 ENCOUNTER — HOSPITAL ENCOUNTER (OUTPATIENT)
Dept: LAB | Age: 80
Discharge: HOME | End: 2025-07-30
Payer: MEDICARE

## 2025-07-30 ENCOUNTER — OFFICE VISIT (OUTPATIENT)
Age: 80
End: 2025-07-30
Payer: MEDICARE

## 2025-07-30 VITALS
HEART RATE: 72 BPM | SYSTOLIC BLOOD PRESSURE: 124 MMHG | WEIGHT: 257 LBS | DIASTOLIC BLOOD PRESSURE: 86 MMHG | BODY MASS INDEX: 37.95 KG/M2

## 2025-07-30 DIAGNOSIS — C82.03 FOLLICULAR LYMPHOMA GRADE I OF INTRA-ABDOMINAL LYMPH NODES (HCC): ICD-10-CM

## 2025-07-30 DIAGNOSIS — I50.32 HEART FAILURE WITH RECOVERED EJECTION FRACTION (HFRECEF) (HCC): ICD-10-CM

## 2025-07-30 DIAGNOSIS — Z95.810 PRESENCE OF CARDIAC DEFIBRILLATOR: ICD-10-CM

## 2025-07-30 DIAGNOSIS — I42.8 NONISCHEMIC CARDIOMYOPATHY (HCC): Primary | ICD-10-CM

## 2025-07-30 LAB
ALBUMIN SERPL-MCNC: 4 G/DL (ref 3.2–4.6)
ALBUMIN/GLOB SERPL: 1.2 (ref 1–1.9)
ALP SERPL-CCNC: 74 U/L (ref 40–129)
ALT SERPL-CCNC: 21 U/L (ref 8–55)
ANION GAP SERPL CALC-SCNC: 13 MMOL/L (ref 7–16)
AST SERPL-CCNC: 23 U/L (ref 15–37)
BASOPHILS # BLD: 0.04 K/UL (ref 0–0.2)
BASOPHILS NFR BLD: 0.5 % (ref 0–2)
BILIRUB SERPL-MCNC: 0.4 MG/DL (ref 0–1.2)
BUN SERPL-MCNC: 21 MG/DL (ref 8–23)
CALCIUM SERPL-MCNC: 9.7 MG/DL (ref 8.8–10.2)
CHLORIDE SERPL-SCNC: 102 MMOL/L (ref 98–107)
CO2 SERPL-SCNC: 24 MMOL/L (ref 20–29)
CREAT SERPL-MCNC: 1.25 MG/DL (ref 0.8–1.3)
DIFFERENTIAL METHOD BLD: ABNORMAL
EOSINOPHIL # BLD: 0.06 K/UL (ref 0–0.8)
EOSINOPHIL NFR BLD: 0.7 % (ref 0.5–7.8)
ERYTHROCYTE [DISTWIDTH] IN BLOOD BY AUTOMATED COUNT: 14.6 % (ref 11.9–14.6)
GLOBULIN SER CALC-MCNC: 3.4 G/DL (ref 2.3–3.5)
GLUCOSE SERPL-MCNC: 77 MG/DL (ref 70–99)
HCT VFR BLD AUTO: 42.7 % (ref 41.1–50.3)
HGB BLD-MCNC: 13 G/DL (ref 13.6–17.2)
IMM GRANULOCYTES # BLD AUTO: 0.03 K/UL (ref 0–0.5)
IMM GRANULOCYTES NFR BLD AUTO: 0.4 % (ref 0–5)
INR PPP: 1
LYMPHOCYTES # BLD: 2.1 K/UL (ref 0.5–4.6)
LYMPHOCYTES NFR BLD: 25.4 % (ref 13–44)
MCH RBC QN AUTO: 27.1 PG (ref 26.1–32.9)
MCHC RBC AUTO-ENTMCNC: 30.4 G/DL (ref 31.4–35)
MCV RBC AUTO: 89 FL (ref 82–102)
MONOCYTES # BLD: 0.63 K/UL (ref 0.1–1.3)
MONOCYTES NFR BLD: 7.6 % (ref 4–12)
NEUTS SEG # BLD: 5.41 K/UL (ref 1.7–8.2)
NEUTS SEG NFR BLD: 65.4 % (ref 43–78)
NRBC # BLD: 0 K/UL (ref 0–0.2)
PLATELET # BLD AUTO: 229 K/UL (ref 150–450)
PMV BLD AUTO: 9.4 FL (ref 9.4–12.3)
POTASSIUM SERPL-SCNC: 3.9 MMOL/L (ref 3.5–5.1)
PROT SERPL-MCNC: 7.4 G/DL (ref 6.3–8.2)
PROTHROMBIN TIME: 13.1 SEC (ref 11.3–14.9)
RBC # BLD AUTO: 4.8 M/UL (ref 4.23–5.6)
SODIUM SERPL-SCNC: 139 MMOL/L (ref 136–145)
WBC # BLD AUTO: 8.3 K/UL (ref 4.3–11.1)

## 2025-07-30 PROCEDURE — 36415 COLL VENOUS BLD VENIPUNCTURE: CPT

## 2025-07-30 PROCEDURE — 3079F DIAST BP 80-89 MM HG: CPT | Performed by: INTERNAL MEDICINE

## 2025-07-30 PROCEDURE — 85610 PROTHROMBIN TIME: CPT

## 2025-07-30 PROCEDURE — 3074F SYST BP LT 130 MM HG: CPT | Performed by: INTERNAL MEDICINE

## 2025-07-30 PROCEDURE — 85025 COMPLETE CBC W/AUTO DIFF WBC: CPT

## 2025-07-30 PROCEDURE — 1126F AMNT PAIN NOTED NONE PRSNT: CPT | Performed by: INTERNAL MEDICINE

## 2025-07-30 PROCEDURE — 1123F ACP DISCUSS/DSCN MKR DOCD: CPT | Performed by: INTERNAL MEDICINE

## 2025-07-30 PROCEDURE — G8427 DOCREV CUR MEDS BY ELIG CLIN: HCPCS | Performed by: INTERNAL MEDICINE

## 2025-07-30 PROCEDURE — 80053 COMPREHEN METABOLIC PANEL: CPT

## 2025-07-30 PROCEDURE — 1160F RVW MEDS BY RX/DR IN RCRD: CPT | Performed by: INTERNAL MEDICINE

## 2025-07-30 PROCEDURE — 1159F MED LIST DOCD IN RCRD: CPT | Performed by: INTERNAL MEDICINE

## 2025-07-30 PROCEDURE — 99214 OFFICE O/P EST MOD 30 MIN: CPT | Performed by: INTERNAL MEDICINE

## 2025-07-30 PROCEDURE — G8417 CALC BMI ABV UP PARAM F/U: HCPCS | Performed by: INTERNAL MEDICINE

## 2025-07-30 PROCEDURE — 1036F TOBACCO NON-USER: CPT | Performed by: INTERNAL MEDICINE

## 2025-07-30 ASSESSMENT — ENCOUNTER SYMPTOMS: SHORTNESS OF BREATH: 0

## 2025-07-31 ENCOUNTER — HOSPITAL ENCOUNTER (OUTPATIENT)
Dept: CT IMAGING | Age: 80
Discharge: HOME | End: 2025-08-02
Attending: INTERNAL MEDICINE
Payer: MEDICARE

## 2025-07-31 VITALS
RESPIRATION RATE: 16 BRPM | BODY MASS INDEX: 38.06 KG/M2 | HEIGHT: 69 IN | WEIGHT: 257 LBS | TEMPERATURE: 97.2 F | OXYGEN SATURATION: 98 % | SYSTOLIC BLOOD PRESSURE: 171 MMHG | DIASTOLIC BLOOD PRESSURE: 115 MMHG

## 2025-07-31 DIAGNOSIS — C82.03 FOLLICULAR LYMPHOMA GRADE I OF INTRA-ABDOMINAL LYMPH NODES (HCC): ICD-10-CM

## 2025-07-31 LAB
BASOPHILS # BLD: 0.05 K/UL (ref 0–0.2)
BASOPHILS NFR BLD: 0.6 % (ref 0–2)
BONE MARROW PREP & WRIGHT STAIN: NORMAL
DIFFERENTIAL METHOD BLD: ABNORMAL
EOSINOPHIL # BLD: 0.08 K/UL (ref 0–0.8)
EOSINOPHIL NFR BLD: 0.9 % (ref 0.5–7.8)
ERYTHROCYTE [DISTWIDTH] IN BLOOD BY AUTOMATED COUNT: 14.8 % (ref 11.9–14.6)
GLUCOSE BLD STRIP.AUTO-MCNC: 115 MG/DL (ref 65–100)
HCT VFR BLD AUTO: 41.3 % (ref 41.1–50.3)
HGB BLD-MCNC: 12.7 G/DL (ref 13.6–17.2)
IMM GRANULOCYTES # BLD AUTO: 0.03 K/UL (ref 0–0.5)
IMM GRANULOCYTES NFR BLD AUTO: 0.4 % (ref 0–5)
LYMPHOCYTES # BLD: 2.34 K/UL (ref 0.5–4.6)
LYMPHOCYTES NFR BLD: 27.7 % (ref 13–44)
MCH RBC QN AUTO: 27.5 PG (ref 26.1–32.9)
MCHC RBC AUTO-ENTMCNC: 30.8 G/DL (ref 31.4–35)
MCV RBC AUTO: 89.6 FL (ref 82–102)
MONOCYTES # BLD: 0.76 K/UL (ref 0.1–1.3)
MONOCYTES NFR BLD: 9 % (ref 4–12)
NEUTS SEG # BLD: 5.2 K/UL (ref 1.7–8.2)
NEUTS SEG NFR BLD: 61.4 % (ref 43–78)
NRBC # BLD: 0 K/UL (ref 0–0.2)
PLATELET # BLD AUTO: 230 K/UL (ref 150–450)
PMV BLD AUTO: 10.2 FL (ref 9.4–12.3)
RBC # BLD AUTO: 4.61 M/UL (ref 4.23–5.6)
SERVICE CMNT-IMP: ABNORMAL
WBC # BLD AUTO: 8.5 K/UL (ref 4.3–11.1)

## 2025-07-31 PROCEDURE — 88342 IMHCHEM/IMCYTCHM 1ST ANTB: CPT

## 2025-07-31 PROCEDURE — 88341 IMHCHEM/IMCYTCHM EA ADD ANTB: CPT

## 2025-07-31 PROCEDURE — 99153 MOD SED SAME PHYS/QHP EA: CPT

## 2025-07-31 PROCEDURE — 88311 DECALCIFY TISSUE: CPT

## 2025-07-31 PROCEDURE — 88305 TISSUE EXAM BY PATHOLOGIST: CPT

## 2025-07-31 PROCEDURE — 6360000002 HC RX W HCPCS: Performed by: RADIOLOGY

## 2025-07-31 PROCEDURE — 2709999900 CT BONE MARROW BIOPSY AND ASPIRATION

## 2025-07-31 PROCEDURE — 88313 SPECIAL STAINS GROUP 2: CPT

## 2025-07-31 PROCEDURE — 6360000002 HC RX W HCPCS: Performed by: PHYSICIAN ASSISTANT

## 2025-07-31 PROCEDURE — 85025 COMPLETE CBC W/AUTO DIFF WBC: CPT

## 2025-07-31 PROCEDURE — 2580000003 HC RX 258: Performed by: RADIOLOGY

## 2025-07-31 PROCEDURE — 82962 GLUCOSE BLOOD TEST: CPT

## 2025-07-31 RX ORDER — MIDAZOLAM HYDROCHLORIDE 1 MG/ML
INJECTION, SOLUTION INTRAMUSCULAR; INTRAVENOUS PRN
Status: COMPLETED | OUTPATIENT
Start: 2025-07-31 | End: 2025-07-31

## 2025-07-31 RX ORDER — FENTANYL CITRATE 50 UG/ML
INJECTION, SOLUTION INTRAMUSCULAR; INTRAVENOUS PRN
Status: COMPLETED | OUTPATIENT
Start: 2025-07-31 | End: 2025-07-31

## 2025-07-31 RX ORDER — LIDOCAINE HYDROCHLORIDE 10 MG/ML
INJECTION, SOLUTION EPIDURAL; INFILTRATION; INTRACAUDAL; PERINEURAL PRN
Status: COMPLETED | OUTPATIENT
Start: 2025-07-31 | End: 2025-07-31

## 2025-07-31 RX ORDER — SODIUM CHLORIDE 9 MG/ML
INJECTION, SOLUTION INTRAVENOUS CONTINUOUS PRN
Status: COMPLETED | OUTPATIENT
Start: 2025-07-31 | End: 2025-07-31

## 2025-07-31 RX ADMIN — FENTANYL CITRATE 50 MCG: 50 INJECTION, SOLUTION INTRAMUSCULAR; INTRAVENOUS at 15:17

## 2025-07-31 RX ADMIN — SODIUM CHLORIDE 75 ML/HR: 9 INJECTION, SOLUTION INTRAVENOUS at 15:12

## 2025-07-31 RX ADMIN — MIDAZOLAM 0.5 MG: 1 INJECTION INTRAMUSCULAR; INTRAVENOUS at 15:24

## 2025-07-31 RX ADMIN — FENTANYL CITRATE 25 MCG: 50 INJECTION, SOLUTION INTRAMUSCULAR; INTRAVENOUS at 15:24

## 2025-07-31 RX ADMIN — MIDAZOLAM 1 MG: 1 INJECTION INTRAMUSCULAR; INTRAVENOUS at 15:12

## 2025-07-31 RX ADMIN — MIDAZOLAM 1 MG: 1 INJECTION INTRAMUSCULAR; INTRAVENOUS at 15:17

## 2025-07-31 RX ADMIN — FENTANYL CITRATE 50 MCG: 50 INJECTION, SOLUTION INTRAMUSCULAR; INTRAVENOUS at 15:12

## 2025-07-31 RX ADMIN — LIDOCAINE HYDROCHLORIDE 5 ML: 10 INJECTION, SOLUTION EPIDURAL; INFILTRATION; INTRACAUDAL; PERINEURAL at 15:19

## 2025-07-31 ASSESSMENT — PAIN - FUNCTIONAL ASSESSMENT
PAIN_FUNCTIONAL_ASSESSMENT: NONE - DENIES PAIN

## 2025-07-31 NOTE — PRE SEDATION
Sedation Pre-Procedure Note    Patient Name: Rodolfo Buckner   YOB: 1945  Room/Bed: Room/bed info not found  Medical Record Number: [id number]  Date: 7/31/2025   Time: 2:42 PM       Indication: CT-guided bone marrow biopsy and aspiration    Consent: I have discussed with the patient and/or the patient representative the indication, alternatives, and the possible risks and/or complications of the planned procedure and the anesthesia methods. The patient and/or patient representative appear to understand and agree to proceed.    Vital Signs:   Vitals:    07/31/25 1405   BP: (!) 156/72   Pulse: 66   Resp: 16   Temp: 97.2 °F (36.2 °C)   SpO2: 98%       Past Medical History:   has a past medical history of Abnormal MMSE, Age-related cognitive decline, Arthritis, CAD (coronary artery disease), Cancer ([facility]), Chronic systolic CHF (congestive heart failure) ([facility]), COVID-19, Dizziness, Esophagitis, GERD (gastroesophageal reflux disease), Gout, H/O echocardiogram, Hip pain, bilateral, Hyperlipidemia, Hypertension, Morbid obesity ([facility]), Neuropathy, MICHELL on CPAP, Presence of combination internal cardiac defibrillator (ICD) and pacemaker, Psychiatric disorder, Renal mass, Routine eye exam, Spinal stenosis of lumbar region, Stage 3b chronic kidney disease ([facility]), Type 2 diabetes mellitus with hyperglycemia, with long-term current use of insulin ([facility]), and Under care of podiatry.    Past Surgical History:   has a past surgical history that includes pr unlisted procedure cardiac surgery (cath); Appendectomy (1956); Tonsillectomy; orthopedic surgery; Colonoscopy; pacemaker; Mohs surgery (Left, 05/23/2022); ep device procedure (N/A, 10/11/2022); joint replacement (Left); Upper gastrointestinal endoscopy (N/A, 10/31/2022); Colonoscopy (03/02/2023); Cataract removal (Left); and CT BIOPSY ABDOMEN RETROPERITONEUM (05/30/2025).    Medications:   Scheduled Meds:   Continuous Infusions:   PRN Meds:   Home Meds:   Prior to

## 2025-07-31 NOTE — H&P
[facility]  Department of Interventional Radiology  [phone]    History and Physical    Patient:  Rodolfo Buckner MRN:  [id number]  SSN:  [id number]    YOB: 1945  Age:  79 y.o.  Sex:  male      Primary Care Provider:  Dori Goodman MD  Referring Physician:  Conor Durham MD    Subjective:     Chief Complaint: Follicular Lymphoma    History of the Present Illness:  The patient is a 79 y.o. male who presents for CT guided bone marrow biopsy and aspiration.      Patient developed significant back pain and underwent CT lumbar spine April 30 that showed a large right iliopsoas mass.  CT abdomen pelvis on 5-8 showed several renal masses and a soft tissue density in the left mid ureter as well as sick centimeter solid mass anterior to the right iliopsoas muscle.  CT-guided biopsy of the iliopsoas mass revealed follicular lymphoma.    Patient is n.p.o. and denies taking any blood thinners.  He has not previously had a bone marrow biopsy.      Past Medical History:   Diagnosis Date    Abnormal MMSE 05/24/2022    29 out of 30 and normal clock draw    Age-related cognitive decline 2020    30 out of 30 MMSE    Arthritis     DJD- shoulder, knees, hips    CAD (coronary artery disease)     sees [facility] Cardiology    Cancer ([facility])     skin CA left lobe ear    Chronic systolic CHF (congestive heart failure) ([facility])     COVID-19 2/9/2021    Dizziness 06/09/2020    hx     Esophagitis 10/2022    GERD (gastroesophageal reflux disease)     managed with medication     Gout     managed with medication     H/O echocardiogram 12/02/2021    Mild tricuspid valve regurgitation present. Right Ventricular Arterial Pressure (RVSP) 19 mmHg. Estimated LVEF 50-55%.Normal cavity size and systolic function Wall motion: unable to assess  Abnormal left ventricular septal motion consistent with right ventricular pacing. Left ventricular diastolic dysfunction.RV: Not well visualized.Mitral valve

## 2025-07-31 NOTE — PROCEDURES
PROCEDURE NOTE  Date: 7/31/2025   Name: Rodolfo Buckner  YOB: 1945    Procedures        Reading Physician Reading Date Result Priority   Skyler Ace MD  [phone] 7/31/2025    Tabby Khan PA  [phone] 7/31/2025         Narrative & Impression  Title: CT guided bone marrow aspiration and core bone biopsy.     History: 79 year old male with follicular lymphoma.        :  Aliyah Khan PA-C     Supervising Physician: Dr. Skyler Ace MD. Supervising physician attest  that he was immediately available to supervise entire procedure. He reviewed the  permanently stored images and agrees with the report as written.     Radiation dose reduction techniques were used for this study. [facility]  CT scanner use one or all of the following: Automated exposure control,  adjustment of the mA and/or kV according to patient size, iterative  reconstruction.     Consent: Informed written and oral consent was obtained from the patient after  explanation of benefits and risks (including, but not limited to:  Infection,  Hemorrhage, Visceral Injury).  The patient's questions were answered to their  satisfaction.  The patient stated understanding and requested that we proceed.       Procedure: Maximal sterile barrier technique was used.  With the patient prone a  preliminary CT scan through the pelvis was performed with radio-opaque markers  on the skin.     Following routine prep and drape of the pelvis, a local field block with  lidocaine was achieved.      Using intermittent CT technique, a marrow aspirate followed by a core biopsy was  obtained with the 11 gauge On Control biopsy device.      The needle was removed. Hemostasis was achieved with manual compression. A  bandage was applied.     Total Exam .57 mGy-cm     Complications: None.     Medications: Moderate sedation was given under the direction and supervision of  Dr. Skyler Ace MD. Total

## 2025-07-31 NOTE — DISCHARGE INSTRUCTIONS
If you have any questions about your procedure, please call the Interventional Radiology department at [phone].   After business hours (5pm) and weekends, call the answering service at [phone] and ask for the Interventional Radiologist on call to be paged.

## 2025-08-05 ENCOUNTER — TELEPHONE (OUTPATIENT)
Dept: ONCOLOGY | Age: 80
End: 2025-08-05

## 2025-08-06 ENCOUNTER — ANESTHESIA EVENT (OUTPATIENT)
Dept: SURGERY | Age: 80
End: 2025-08-06
Payer: MEDICARE

## 2025-08-07 ENCOUNTER — HOSPITAL ENCOUNTER (OUTPATIENT)
Age: 80
Setting detail: OUTPATIENT SURGERY
Discharge: HOME | End: 2025-08-07
Attending: UROLOGY | Admitting: UROLOGY
Payer: MEDICARE

## 2025-08-07 ENCOUNTER — HOSPITAL ENCOUNTER (EMERGENCY)
Age: 80
Discharge: HOME | End: 2025-08-07
Attending: STUDENT IN AN ORGANIZED HEALTH CARE EDUCATION/TRAINING PROGRAM
Payer: MEDICARE

## 2025-08-07 ENCOUNTER — ANESTHESIA (OUTPATIENT)
Dept: SURGERY | Age: 80
End: 2025-08-07
Payer: MEDICARE

## 2025-08-07 VITALS
OXYGEN SATURATION: 90 % | WEIGHT: 257 LBS | HEIGHT: 69 IN | TEMPERATURE: 97.4 F | RESPIRATION RATE: 16 BRPM | BODY MASS INDEX: 38.06 KG/M2 | SYSTOLIC BLOOD PRESSURE: 160 MMHG | HEART RATE: 79 BPM | DIASTOLIC BLOOD PRESSURE: 76 MMHG

## 2025-08-07 VITALS
HEIGHT: 69 IN | SYSTOLIC BLOOD PRESSURE: 139 MMHG | BODY MASS INDEX: 38.06 KG/M2 | DIASTOLIC BLOOD PRESSURE: 73 MMHG | HEART RATE: 86 BPM | WEIGHT: 257 LBS | TEMPERATURE: 97.9 F | RESPIRATION RATE: 17 BRPM | OXYGEN SATURATION: 96 %

## 2025-08-07 DIAGNOSIS — Z98.890 POST-OPERATIVE STATE: ICD-10-CM

## 2025-08-07 DIAGNOSIS — R31.9 HEMATURIA, UNSPECIFIED TYPE: Primary | ICD-10-CM

## 2025-08-07 DIAGNOSIS — N28.89 RENAL MASS, LEFT: Primary | ICD-10-CM

## 2025-08-07 LAB
ALBUMIN SERPL-MCNC: 4 G/DL (ref 3.2–4.6)
ALBUMIN/GLOB SERPL: 1 (ref 1–1.9)
ALP SERPL-CCNC: 86 U/L (ref 40–129)
ALT SERPL-CCNC: 22 U/L (ref 8–55)
ANION GAP SERPL CALC-SCNC: 14 MMOL/L (ref 7–16)
AST SERPL-CCNC: 29 U/L (ref 15–37)
BASOPHILS # BLD: 0.03 K/UL (ref 0–0.2)
BASOPHILS NFR BLD: 0.2 % (ref 0–2)
BILIRUB SERPL-MCNC: 0.4 MG/DL (ref 0–1.2)
BUN SERPL-MCNC: 18 MG/DL (ref 8–23)
CALCIUM SERPL-MCNC: 10 MG/DL (ref 8.8–10.2)
CHLORIDE SERPL-SCNC: 100 MMOL/L (ref 98–107)
CO2 SERPL-SCNC: 24 MMOL/L (ref 20–29)
CREAT SERPL-MCNC: 1.26 MG/DL (ref 0.8–1.3)
DIFFERENTIAL METHOD BLD: ABNORMAL
EOSINOPHIL # BLD: 0 K/UL (ref 0–0.8)
EOSINOPHIL NFR BLD: 0 % (ref 0.5–7.8)
ERYTHROCYTE [DISTWIDTH] IN BLOOD BY AUTOMATED COUNT: 14.5 % (ref 11.9–14.6)
GLOBULIN SER CALC-MCNC: 4.1 G/DL (ref 2.3–3.5)
GLUCOSE BLD STRIP.AUTO-MCNC: 67 MG/DL (ref 65–100)
GLUCOSE BLD STRIP.AUTO-MCNC: 80 MG/DL (ref 65–100)
GLUCOSE SERPL-MCNC: 140 MG/DL (ref 70–99)
HCT VFR BLD AUTO: 45.6 % (ref 41.1–50.3)
HGB BLD-MCNC: 14.5 G/DL (ref 13.6–17.2)
IMM GRANULOCYTES # BLD AUTO: 0.07 K/UL (ref 0–0.5)
IMM GRANULOCYTES NFR BLD AUTO: 0.6 % (ref 0–5)
LYMPHOCYTES # BLD: 0.77 K/UL (ref 0.5–4.6)
LYMPHOCYTES NFR BLD: 6.1 % (ref 13–44)
MCH RBC QN AUTO: 27.5 PG (ref 26.1–32.9)
MCHC RBC AUTO-ENTMCNC: 31.8 G/DL (ref 31.4–35)
MCV RBC AUTO: 86.5 FL (ref 82–102)
MONOCYTES # BLD: 0.25 K/UL (ref 0.1–1.3)
MONOCYTES NFR BLD: 2 % (ref 4–12)
NEUTS SEG # BLD: 11.53 K/UL (ref 1.7–8.2)
NEUTS SEG NFR BLD: 91.1 % (ref 43–78)
NRBC # BLD: 0 K/UL (ref 0–0.2)
PLATELET # BLD AUTO: 239 K/UL (ref 150–450)
PMV BLD AUTO: 10.2 FL (ref 9.4–12.3)
POTASSIUM BLD-SCNC: 4.6 MMOL/L (ref 3.5–5.1)
POTASSIUM SERPL-SCNC: 4.6 MMOL/L (ref 3.5–5.1)
PROT SERPL-MCNC: 8.1 G/DL (ref 6.3–8.2)
RBC # BLD AUTO: 5.27 M/UL (ref 4.23–5.6)
SERVICE CMNT-IMP: NORMAL
SERVICE CMNT-IMP: NORMAL
SODIUM SERPL-SCNC: 137 MMOL/L (ref 136–145)
WBC # BLD AUTO: 12.7 K/UL (ref 4.3–11.1)

## 2025-08-07 PROCEDURE — 84132 ASSAY OF SERUM POTASSIUM: CPT

## 2025-08-07 PROCEDURE — 3700000001 HC ADD 15 MINUTES (ANESTHESIA): Performed by: UROLOGY

## 2025-08-07 PROCEDURE — 6360000002 HC RX W HCPCS

## 2025-08-07 PROCEDURE — 80053 COMPREHEN METABOLIC PANEL: CPT

## 2025-08-07 PROCEDURE — 85025 COMPLETE CBC W/AUTO DIFF WBC: CPT

## 2025-08-07 PROCEDURE — 99283 EMERGENCY DEPT VISIT LOW MDM: CPT

## 2025-08-07 PROCEDURE — 2500000003 HC RX 250 WO HCPCS: Performed by: REGISTERED NURSE

## 2025-08-07 PROCEDURE — 6360000002 HC RX W HCPCS: Performed by: PHYSICIAN ASSISTANT

## 2025-08-07 PROCEDURE — 82962 GLUCOSE BLOOD TEST: CPT

## 2025-08-07 PROCEDURE — 2580000003 HC RX 258: Performed by: STUDENT IN AN ORGANIZED HEALTH CARE EDUCATION/TRAINING PROGRAM

## 2025-08-07 PROCEDURE — 2709999900 HC NON-CHARGEABLE SUPPLY: Performed by: UROLOGY

## 2025-08-07 PROCEDURE — C1747 HC ENDOSCOPE, SINGLE, URINARY TRACT: HCPCS | Performed by: UROLOGY

## 2025-08-07 PROCEDURE — 7100000011 HC PHASE II RECOVERY - ADDTL 15 MIN: Performed by: UROLOGY

## 2025-08-07 PROCEDURE — 52351 CYSTOURETERO & OR PYELOSCOPE: CPT | Performed by: UROLOGY

## 2025-08-07 PROCEDURE — 74420 UROGRAPHY RTRGR +-KUB: CPT | Performed by: UROLOGY

## 2025-08-07 PROCEDURE — 6360000004 HC RX CONTRAST MEDICATION: Performed by: UROLOGY

## 2025-08-07 PROCEDURE — 3600000004 HC SURGERY LEVEL 4 BASE: Performed by: UROLOGY

## 2025-08-07 PROCEDURE — 3600000014 HC SURGERY LEVEL 4 ADDTL 15MIN: Performed by: UROLOGY

## 2025-08-07 PROCEDURE — 2500000003 HC RX 250 WO HCPCS

## 2025-08-07 PROCEDURE — C2617 STENT, NON-COR, TEM W/O DEL: HCPCS | Performed by: UROLOGY

## 2025-08-07 PROCEDURE — 7100000001 HC PACU RECOVERY - ADDTL 15 MIN: Performed by: UROLOGY

## 2025-08-07 PROCEDURE — 3700000000 HC ANESTHESIA ATTENDED CARE: Performed by: UROLOGY

## 2025-08-07 PROCEDURE — C1758 CATHETER, URETERAL: HCPCS | Performed by: UROLOGY

## 2025-08-07 PROCEDURE — 6360000002 HC RX W HCPCS: Performed by: REGISTERED NURSE

## 2025-08-07 PROCEDURE — 52332 CYSTOSCOPY AND TREATMENT: CPT | Performed by: UROLOGY

## 2025-08-07 PROCEDURE — 7100000000 HC PACU RECOVERY - FIRST 15 MIN: Performed by: UROLOGY

## 2025-08-07 PROCEDURE — C1769 GUIDE WIRE: HCPCS | Performed by: UROLOGY

## 2025-08-07 PROCEDURE — 7100000010 HC PHASE II RECOVERY - FIRST 15 MIN: Performed by: UROLOGY

## 2025-08-07 DEVICE — IMPLANTABLE DEVICE: Type: IMPLANTABLE DEVICE | Site: URETER | Status: FUNCTIONAL

## 2025-08-07 RX ORDER — ONDANSETRON 2 MG/ML
INJECTION INTRAMUSCULAR; INTRAVENOUS
Status: DISCONTINUED | OUTPATIENT
Start: 2025-08-07 | End: 2025-08-07 | Stop reason: SDUPTHER

## 2025-08-07 RX ORDER — LIDOCAINE HYDROCHLORIDE 20 MG/ML
INJECTION, SOLUTION EPIDURAL; INFILTRATION; INTRACAUDAL; PERINEURAL
Status: DISCONTINUED | OUTPATIENT
Start: 2025-08-07 | End: 2025-08-07 | Stop reason: SDUPTHER

## 2025-08-07 RX ORDER — SODIUM CHLORIDE, SODIUM LACTATE, POTASSIUM CHLORIDE, CALCIUM CHLORIDE 600; 310; 30; 20 MG/100ML; MG/100ML; MG/100ML; MG/100ML
INJECTION, SOLUTION INTRAVENOUS CONTINUOUS
Status: DISCONTINUED | OUTPATIENT
Start: 2025-08-07 | End: 2025-08-07 | Stop reason: HOSPADM

## 2025-08-07 RX ORDER — PROCHLORPERAZINE EDISYLATE 5 MG/ML
5 INJECTION INTRAMUSCULAR; INTRAVENOUS
Status: DISCONTINUED | OUTPATIENT
Start: 2025-08-07 | End: 2025-08-07 | Stop reason: HOSPADM

## 2025-08-07 RX ORDER — SODIUM CHLORIDE 0.9 % (FLUSH) 0.9 %
5-40 SYRINGE (ML) INJECTION EVERY 12 HOURS SCHEDULED
Status: DISCONTINUED | OUTPATIENT
Start: 2025-08-07 | End: 2025-08-07 | Stop reason: HOSPADM

## 2025-08-07 RX ORDER — LIDOCAINE HYDROCHLORIDE 10 MG/ML
1 INJECTION, SOLUTION INFILTRATION; PERINEURAL
Status: DISCONTINUED | OUTPATIENT
Start: 2025-08-07 | End: 2025-08-07 | Stop reason: HOSPADM

## 2025-08-07 RX ORDER — LABETALOL HYDROCHLORIDE 5 MG/ML
10 INJECTION, SOLUTION INTRAVENOUS
Status: DISCONTINUED | OUTPATIENT
Start: 2025-08-07 | End: 2025-08-07 | Stop reason: HOSPADM

## 2025-08-07 RX ORDER — NEOSTIGMINE METHYLSULFATE 1 MG/ML
INJECTION INTRAVENOUS
Status: DISCONTINUED | OUTPATIENT
Start: 2025-08-07 | End: 2025-08-07 | Stop reason: SDUPTHER

## 2025-08-07 RX ORDER — IOPAMIDOL 612 MG/ML
INJECTION, SOLUTION INTRAVASCULAR PRN
Status: DISCONTINUED | OUTPATIENT
Start: 2025-08-07 | End: 2025-08-07 | Stop reason: ALTCHOICE

## 2025-08-07 RX ORDER — OXYCODONE HYDROCHLORIDE 5 MG/1
5 TABLET ORAL PRN
Status: DISCONTINUED | OUTPATIENT
Start: 2025-08-07 | End: 2025-08-07 | Stop reason: HOSPADM

## 2025-08-07 RX ORDER — HYDROCODONE BITARTRATE AND ACETAMINOPHEN 5; 325 MG/1; MG/1
1 TABLET ORAL EVERY 8 HOURS PRN
Qty: 10 TABLET | Refills: 0 | Status: SHIPPED | OUTPATIENT
Start: 2025-08-07 | End: 2025-08-14

## 2025-08-07 RX ORDER — ACETAMINOPHEN 500 MG
1000 TABLET ORAL ONCE
Status: DISCONTINUED | OUTPATIENT
Start: 2025-08-07 | End: 2025-08-07 | Stop reason: HOSPADM

## 2025-08-07 RX ORDER — HALOPERIDOL 5 MG/ML
1 INJECTION INTRAMUSCULAR
Status: DISCONTINUED | OUTPATIENT
Start: 2025-08-07 | End: 2025-08-07 | Stop reason: HOSPADM

## 2025-08-07 RX ORDER — SODIUM CHLORIDE 9 MG/ML
INJECTION, SOLUTION INTRAVENOUS PRN
Status: DISCONTINUED | OUTPATIENT
Start: 2025-08-07 | End: 2025-08-07 | Stop reason: HOSPADM

## 2025-08-07 RX ORDER — OXYCODONE HYDROCHLORIDE 5 MG/1
10 TABLET ORAL PRN
Status: DISCONTINUED | OUTPATIENT
Start: 2025-08-07 | End: 2025-08-07 | Stop reason: HOSPADM

## 2025-08-07 RX ORDER — DEXAMETHASONE SODIUM PHOSPHATE 4 MG/ML
INJECTION, SOLUTION INTRA-ARTICULAR; INTRALESIONAL; INTRAMUSCULAR; INTRAVENOUS; SOFT TISSUE
Status: DISCONTINUED | OUTPATIENT
Start: 2025-08-07 | End: 2025-08-07 | Stop reason: SDUPTHER

## 2025-08-07 RX ORDER — FENTANYL CITRATE 50 UG/ML
INJECTION, SOLUTION INTRAMUSCULAR; INTRAVENOUS
Status: DISCONTINUED | OUTPATIENT
Start: 2025-08-07 | End: 2025-08-07 | Stop reason: SDUPTHER

## 2025-08-07 RX ORDER — SULFAMETHOXAZOLE AND TRIMETHOPRIM 800; 160 MG/1; MG/1
1 TABLET ORAL 2 TIMES DAILY
Qty: 6 TABLET | Refills: 0 | Status: SHIPPED | OUTPATIENT
Start: 2025-08-09 | End: 2025-08-12

## 2025-08-07 RX ORDER — SODIUM CHLORIDE 0.9 % (FLUSH) 0.9 %
5-40 SYRINGE (ML) INJECTION PRN
Status: DISCONTINUED | OUTPATIENT
Start: 2025-08-07 | End: 2025-08-07 | Stop reason: HOSPADM

## 2025-08-07 RX ORDER — EPHEDRINE SULFATE 5 MG/ML
INJECTION INTRAVENOUS
Status: DISCONTINUED | OUTPATIENT
Start: 2025-08-07 | End: 2025-08-07 | Stop reason: SDUPTHER

## 2025-08-07 RX ORDER — IPRATROPIUM BROMIDE AND ALBUTEROL SULFATE 2.5; .5 MG/3ML; MG/3ML
1 SOLUTION RESPIRATORY (INHALATION)
Status: DISCONTINUED | OUTPATIENT
Start: 2025-08-07 | End: 2025-08-07 | Stop reason: HOSPADM

## 2025-08-07 RX ORDER — ROCURONIUM BROMIDE 10 MG/ML
INJECTION, SOLUTION INTRAVENOUS
Status: DISCONTINUED | OUTPATIENT
Start: 2025-08-07 | End: 2025-08-07 | Stop reason: SDUPTHER

## 2025-08-07 RX ORDER — GLYCOPYRROLATE 0.2 MG/ML
INJECTION INTRAMUSCULAR; INTRAVENOUS
Status: DISCONTINUED | OUTPATIENT
Start: 2025-08-07 | End: 2025-08-07 | Stop reason: SDUPTHER

## 2025-08-07 RX ORDER — PROPOFOL 10 MG/ML
INJECTION, EMULSION INTRAVENOUS
Status: DISCONTINUED | OUTPATIENT
Start: 2025-08-07 | End: 2025-08-07 | Stop reason: SDUPTHER

## 2025-08-07 RX ADMIN — ROCURONIUM BROMIDE 10 MG: 10 INJECTION, SOLUTION INTRAVENOUS at 11:59

## 2025-08-07 RX ADMIN — SUGAMMADEX 200 MG: 100 INJECTION, SOLUTION INTRAVENOUS at 12:28

## 2025-08-07 RX ADMIN — ROCURONIUM BROMIDE 40 MG: 10 INJECTION, SOLUTION INTRAVENOUS at 11:32

## 2025-08-07 RX ADMIN — SODIUM CHLORIDE, SODIUM LACTATE, POTASSIUM CHLORIDE, AND CALCIUM CHLORIDE: .6; .31; .03; .02 INJECTION, SOLUTION INTRAVENOUS at 10:09

## 2025-08-07 RX ADMIN — LIDOCAINE HYDROCHLORIDE 100 MG: 20 INJECTION, SOLUTION EPIDURAL; INFILTRATION; INTRACAUDAL; PERINEURAL at 11:32

## 2025-08-07 RX ADMIN — NEOSTIGMINE METHYLSULFATE 5 MG: 1 INJECTION, SOLUTION INTRAVENOUS at 12:14

## 2025-08-07 RX ADMIN — DEXAMETHASONE SODIUM PHOSPHATE 4 MG: 4 INJECTION INTRA-ARTICULAR; INTRALESIONAL; INTRAMUSCULAR; INTRAVENOUS; SOFT TISSUE at 11:42

## 2025-08-07 RX ADMIN — PROPOFOL 160 MG: 10 INJECTION, EMULSION INTRAVENOUS at 11:32

## 2025-08-07 RX ADMIN — FENTANYL CITRATE 50 MCG: 50 INJECTION, SOLUTION INTRAMUSCULAR; INTRAVENOUS at 12:07

## 2025-08-07 RX ADMIN — Medication 2000 MG: at 11:44

## 2025-08-07 RX ADMIN — EPHEDRINE SULFATE 7.5 MG: 5 INJECTION INTRAVENOUS at 11:54

## 2025-08-07 RX ADMIN — GLYCOPYRROLATE 0.8 MG: 0.2 INJECTION INTRAMUSCULAR; INTRAVENOUS at 12:14

## 2025-08-07 RX ADMIN — FENTANYL CITRATE 50 MCG: 50 INJECTION, SOLUTION INTRAMUSCULAR; INTRAVENOUS at 11:32

## 2025-08-07 RX ADMIN — ONDANSETRON 4 MG: 2 INJECTION, SOLUTION INTRAMUSCULAR; INTRAVENOUS at 11:42

## 2025-08-07 RX ADMIN — EPHEDRINE SULFATE 5 MG: 5 INJECTION INTRAVENOUS at 12:01

## 2025-08-07 ASSESSMENT — PAIN SCALES - GENERAL
PAINLEVEL_OUTOF10: 10
PAINLEVEL_OUTOF10: 8

## 2025-08-07 ASSESSMENT — PAIN DESCRIPTION - LOCATION
LOCATION: ABDOMEN
LOCATION: PENIS

## 2025-08-07 ASSESSMENT — PAIN - FUNCTIONAL ASSESSMENT
PAIN_FUNCTIONAL_ASSESSMENT: 0-10
PAIN_FUNCTIONAL_ASSESSMENT: 0-10

## 2025-08-07 ASSESSMENT — PAIN DESCRIPTION - DESCRIPTORS
DESCRIPTORS: DISCOMFORT
DESCRIPTORS: CRAMPING

## 2025-08-08 ENCOUNTER — CARE COORDINATION (OUTPATIENT)
Dept: CARE COORDINATION | Facility: CLINIC | Age: 80
End: 2025-08-08

## 2025-08-08 ENCOUNTER — HOSPITAL ENCOUNTER (OUTPATIENT)
Dept: GENERAL RADIOLOGY | Age: 80
Discharge: HOME | End: 2025-08-10

## 2025-08-18 ENCOUNTER — HOSPITAL ENCOUNTER (OUTPATIENT)
Dept: CT IMAGING | Age: 80
Discharge: HOME | End: 2025-08-20
Attending: UROLOGY
Payer: MEDICARE

## 2025-08-18 VITALS
HEIGHT: 69 IN | WEIGHT: 257 LBS | OXYGEN SATURATION: 98 % | HEART RATE: 67 BPM | TEMPERATURE: 98 F | DIASTOLIC BLOOD PRESSURE: 74 MMHG | SYSTOLIC BLOOD PRESSURE: 158 MMHG | RESPIRATION RATE: 16 BRPM | BODY MASS INDEX: 38.06 KG/M2

## 2025-08-18 DIAGNOSIS — N28.89 LEFT RENAL MASS: ICD-10-CM

## 2025-08-18 LAB
GLUCOSE BLD STRIP.AUTO-MCNC: 102 MG/DL (ref 65–100)
SERVICE CMNT-IMP: ABNORMAL

## 2025-08-18 PROCEDURE — 88341 IMHCHEM/IMCYTCHM EA ADD ANTB: CPT

## 2025-08-18 PROCEDURE — 6360000002 HC RX W HCPCS: Performed by: RADIOLOGY

## 2025-08-18 PROCEDURE — 99153 MOD SED SAME PHYS/QHP EA: CPT

## 2025-08-18 PROCEDURE — 88305 TISSUE EXAM BY PATHOLOGIST: CPT

## 2025-08-18 PROCEDURE — 99152 MOD SED SAME PHYS/QHP 5/>YRS: CPT | Performed by: RADIOLOGY

## 2025-08-18 PROCEDURE — 50200 RENAL BIOPSY PERQ: CPT | Performed by: RADIOLOGY

## 2025-08-18 PROCEDURE — 88342 IMHCHEM/IMCYTCHM 1ST ANTB: CPT

## 2025-08-18 PROCEDURE — 2709999900 CT BIOPSY RENAL

## 2025-08-18 PROCEDURE — 82962 GLUCOSE BLOOD TEST: CPT

## 2025-08-18 PROCEDURE — 77012 CT SCAN FOR NEEDLE BIOPSY: CPT | Performed by: RADIOLOGY

## 2025-08-18 RX ORDER — FENTANYL CITRATE 50 UG/ML
INJECTION, SOLUTION INTRAMUSCULAR; INTRAVENOUS PRN
Status: COMPLETED | OUTPATIENT
Start: 2025-08-18 | End: 2025-08-18

## 2025-08-18 RX ORDER — MIDAZOLAM HYDROCHLORIDE 1 MG/ML
INJECTION, SOLUTION INTRAMUSCULAR; INTRAVENOUS PRN
Status: COMPLETED | OUTPATIENT
Start: 2025-08-18 | End: 2025-08-18

## 2025-08-18 RX ORDER — HYDRALAZINE HYDROCHLORIDE 20 MG/ML
INJECTION INTRAMUSCULAR; INTRAVENOUS PRN
Status: COMPLETED | OUTPATIENT
Start: 2025-08-18 | End: 2025-08-18

## 2025-08-18 RX ORDER — LIDOCAINE HYDROCHLORIDE 10 MG/ML
INJECTION, SOLUTION EPIDURAL; INFILTRATION; INTRACAUDAL; PERINEURAL PRN
Status: COMPLETED | OUTPATIENT
Start: 2025-08-18 | End: 2025-08-18

## 2025-08-18 RX ADMIN — FENTANYL CITRATE 50 MCG: 50 INJECTION, SOLUTION INTRAMUSCULAR; INTRAVENOUS at 09:00

## 2025-08-18 RX ADMIN — LIDOCAINE HYDROCHLORIDE 6 ML: 10 INJECTION, SOLUTION EPIDURAL; INFILTRATION; INTRACAUDAL; PERINEURAL at 09:21

## 2025-08-18 RX ADMIN — MIDAZOLAM 1 MG: 1 INJECTION INTRAMUSCULAR; INTRAVENOUS at 09:16

## 2025-08-18 RX ADMIN — HYDRALAZINE HYDROCHLORIDE 10 MG: 20 INJECTION INTRAMUSCULAR; INTRAVENOUS at 09:10

## 2025-08-18 RX ADMIN — MIDAZOLAM 1 MG: 1 INJECTION INTRAMUSCULAR; INTRAVENOUS at 09:00

## 2025-08-18 RX ADMIN — FENTANYL CITRATE 50 MCG: 50 INJECTION, SOLUTION INTRAMUSCULAR; INTRAVENOUS at 09:16

## 2025-08-18 ASSESSMENT — PAIN SCALES - GENERAL
PAINLEVEL_OUTOF10: 0

## 2025-08-20 ENCOUNTER — HOSPITAL ENCOUNTER (OUTPATIENT)
Dept: RADIATION ONCOLOGY | Age: 80
Setting detail: RECURRING SERIES
Discharge: HOME | End: 2025-08-23
Payer: MEDICARE

## 2025-08-20 VITALS
HEIGHT: 69 IN | DIASTOLIC BLOOD PRESSURE: 71 MMHG | HEART RATE: 70 BPM | OXYGEN SATURATION: 97 % | TEMPERATURE: 97.7 F | SYSTOLIC BLOOD PRESSURE: 131 MMHG | BODY MASS INDEX: 37.33 KG/M2 | RESPIRATION RATE: 18 BRPM | WEIGHT: 252 LBS

## 2025-08-20 PROCEDURE — 99211 OFF/OP EST MAY X REQ PHY/QHP: CPT

## 2025-08-27 DIAGNOSIS — G47.01 INSOMNIA DUE TO MEDICAL CONDITION: ICD-10-CM

## 2025-08-27 DIAGNOSIS — M1A.9XX0 CHRONIC GOUT WITHOUT TOPHUS, UNSPECIFIED CAUSE, UNSPECIFIED SITE: ICD-10-CM

## 2025-08-27 DIAGNOSIS — R41.3 MEMORY CHANGES: ICD-10-CM

## 2025-08-27 RX ORDER — ALLOPURINOL 300 MG/1
300 TABLET ORAL DAILY
Qty: 90 TABLET | Refills: 0 | Status: SHIPPED | OUTPATIENT
Start: 2025-08-27

## 2025-08-27 RX ORDER — AMITRIPTYLINE HYDROCHLORIDE 10 MG/1
10 TABLET ORAL NIGHTLY
Qty: 90 TABLET | Refills: 0 | Status: SHIPPED | OUTPATIENT
Start: 2025-08-27

## 2025-08-27 RX ORDER — DONEPEZIL HYDROCHLORIDE 10 MG/1
10 TABLET, FILM COATED ORAL NIGHTLY
Qty: 90 TABLET | Refills: 0 | Status: SHIPPED | OUTPATIENT
Start: 2025-08-27

## 2025-08-27 RX ORDER — INSULIN DEGLUDEC 200 U/ML
58 INJECTION, SOLUTION SUBCUTANEOUS EVERY MORNING
Qty: 45 ML | Refills: 0 | Status: SHIPPED | OUTPATIENT
Start: 2025-08-27

## 2025-09-02 ENCOUNTER — HOSPITAL ENCOUNTER (OUTPATIENT)
Dept: RADIATION ONCOLOGY | Age: 80
Setting detail: RECURRING SERIES
Discharge: HOME | End: 2025-09-05
Payer: MEDICARE

## 2025-09-02 LAB
RAD ONC ARIA COURSE FIRST TREATMENT DATE: NORMAL
RAD ONC ARIA COURSE ID: NORMAL
RAD ONC ARIA COURSE INTENT: NORMAL
RAD ONC ARIA COURSE LAST TREATMENT DATE: NORMAL
RAD ONC ARIA COURSE SESSION NUMBER: 1
RAD ONC ARIA COURSE START DATE: NORMAL
RAD ONC ARIA COURSE TREATMENT ELAPSED DAYS: 0
RAD ONC ARIA PLAN FRACTIONS TREATED TO DATE: 1
RAD ONC ARIA PLAN ID: NORMAL
RAD ONC ARIA PLAN PRESCRIBED DOSE PER FRACTION: 2 GY
RAD ONC ARIA PLAN PRIMARY REFERENCE POINT: NORMAL
RAD ONC ARIA PLAN TOTAL FRACTIONS PRESCRIBED: 12
RAD ONC ARIA PLAN TOTAL PRESCRIBED DOSE: 2400 CGY
RAD ONC ARIA REFERENCE POINT DOSAGE GIVEN TO DATE: 2 GY
RAD ONC ARIA REFERENCE POINT ID: NORMAL
RAD ONC ARIA REFERENCE POINT SESSION DOSAGE GIVEN: 2 GY

## 2025-09-02 PROCEDURE — 77386 HC NTSTY MODUL RAD TX DLVR CPLX: CPT

## 2025-09-02 RX ORDER — ONDANSETRON 8 MG/1
8 TABLET, FILM COATED ORAL EVERY 8 HOURS PRN
Qty: 90 TABLET | Refills: 0 | Status: SHIPPED | OUTPATIENT
Start: 2025-09-02

## 2025-09-03 ENCOUNTER — HOSPITAL ENCOUNTER (OUTPATIENT)
Dept: RADIATION ONCOLOGY | Age: 80
Setting detail: RECURRING SERIES
Discharge: HOME | End: 2025-09-06
Payer: MEDICARE

## 2025-09-03 LAB
RAD ONC ARIA COURSE FIRST TREATMENT DATE: NORMAL
RAD ONC ARIA COURSE ID: NORMAL
RAD ONC ARIA COURSE INTENT: NORMAL
RAD ONC ARIA COURSE LAST TREATMENT DATE: NORMAL
RAD ONC ARIA COURSE SESSION NUMBER: 2
RAD ONC ARIA COURSE START DATE: NORMAL
RAD ONC ARIA COURSE TREATMENT ELAPSED DAYS: 1
RAD ONC ARIA PLAN FRACTIONS TREATED TO DATE: 2
RAD ONC ARIA PLAN ID: NORMAL
RAD ONC ARIA PLAN PRESCRIBED DOSE PER FRACTION: 2 GY
RAD ONC ARIA PLAN PRIMARY REFERENCE POINT: NORMAL
RAD ONC ARIA PLAN TOTAL FRACTIONS PRESCRIBED: 12
RAD ONC ARIA PLAN TOTAL PRESCRIBED DOSE: 2400 CGY
RAD ONC ARIA REFERENCE POINT DOSAGE GIVEN TO DATE: 4 GY
RAD ONC ARIA REFERENCE POINT ID: NORMAL
RAD ONC ARIA REFERENCE POINT SESSION DOSAGE GIVEN: 2 GY

## 2025-09-03 PROCEDURE — 77386 HC NTSTY MODUL RAD TX DLVR CPLX: CPT

## 2025-09-04 LAB
RAD ONC ARIA COURSE FIRST TREATMENT DATE: NORMAL
RAD ONC ARIA COURSE ID: NORMAL
RAD ONC ARIA COURSE INTENT: NORMAL
RAD ONC ARIA COURSE LAST TREATMENT DATE: NORMAL
RAD ONC ARIA COURSE SESSION NUMBER: 3
RAD ONC ARIA COURSE START DATE: NORMAL
RAD ONC ARIA COURSE TREATMENT ELAPSED DAYS: 2
RAD ONC ARIA PLAN FRACTIONS TREATED TO DATE: 3
RAD ONC ARIA PLAN ID: NORMAL
RAD ONC ARIA PLAN PRESCRIBED DOSE PER FRACTION: 2 GY
RAD ONC ARIA PLAN PRIMARY REFERENCE POINT: NORMAL
RAD ONC ARIA PLAN TOTAL FRACTIONS PRESCRIBED: 12
RAD ONC ARIA PLAN TOTAL PRESCRIBED DOSE: 2400 CGY
RAD ONC ARIA REFERENCE POINT DOSAGE GIVEN TO DATE: 6 GY
RAD ONC ARIA REFERENCE POINT ID: NORMAL
RAD ONC ARIA REFERENCE POINT SESSION DOSAGE GIVEN: 2 GY

## 2025-09-05 ENCOUNTER — OFFICE VISIT (OUTPATIENT)
Dept: ONCOLOGY | Age: 80
End: 2025-09-05
Payer: MEDICARE

## 2025-09-05 ENCOUNTER — HOSPITAL ENCOUNTER (OUTPATIENT)
Dept: LAB | Age: 80
Discharge: HOME | End: 2025-09-05

## 2025-09-05 VITALS
SYSTOLIC BLOOD PRESSURE: 126 MMHG | TEMPERATURE: 97.7 F | OXYGEN SATURATION: 97 % | RESPIRATION RATE: 16 BRPM | HEIGHT: 69 IN | WEIGHT: 258 LBS | HEART RATE: 61 BPM | DIASTOLIC BLOOD PRESSURE: 58 MMHG | BODY MASS INDEX: 38.21 KG/M2

## 2025-09-05 DIAGNOSIS — C82.03 FOLLICULAR LYMPHOMA GRADE I OF INTRA-ABDOMINAL LYMPH NODES (HCC): Primary | ICD-10-CM

## 2025-09-05 DIAGNOSIS — C64.2 CLEAR CELL RENAL CELL CARCINOMA, LEFT (HCC): ICD-10-CM

## 2025-09-05 LAB
RAD ONC ARIA COURSE FIRST TREATMENT DATE: NORMAL
RAD ONC ARIA COURSE ID: NORMAL
RAD ONC ARIA COURSE INTENT: NORMAL
RAD ONC ARIA COURSE LAST TREATMENT DATE: NORMAL
RAD ONC ARIA COURSE SESSION NUMBER: 4
RAD ONC ARIA COURSE START DATE: NORMAL
RAD ONC ARIA COURSE TREATMENT ELAPSED DAYS: 3
RAD ONC ARIA PLAN FRACTIONS TREATED TO DATE: 4
RAD ONC ARIA PLAN ID: NORMAL
RAD ONC ARIA PLAN PRESCRIBED DOSE PER FRACTION: 2 GY
RAD ONC ARIA PLAN PRIMARY REFERENCE POINT: NORMAL
RAD ONC ARIA PLAN TOTAL FRACTIONS PRESCRIBED: 12
RAD ONC ARIA PLAN TOTAL PRESCRIBED DOSE: 2400 CGY
RAD ONC ARIA REFERENCE POINT DOSAGE GIVEN TO DATE: 8 GY
RAD ONC ARIA REFERENCE POINT ID: NORMAL
RAD ONC ARIA REFERENCE POINT SESSION DOSAGE GIVEN: 2 GY

## 2025-09-05 PROCEDURE — 3074F SYST BP LT 130 MM HG: CPT | Performed by: INTERNAL MEDICINE

## 2025-09-05 PROCEDURE — 1159F MED LIST DOCD IN RCRD: CPT | Performed by: INTERNAL MEDICINE

## 2025-09-05 PROCEDURE — G2211 COMPLEX E/M VISIT ADD ON: HCPCS | Performed by: INTERNAL MEDICINE

## 2025-09-05 PROCEDURE — 99214 OFFICE O/P EST MOD 30 MIN: CPT | Performed by: INTERNAL MEDICINE

## 2025-09-05 PROCEDURE — 1126F AMNT PAIN NOTED NONE PRSNT: CPT | Performed by: INTERNAL MEDICINE

## 2025-09-05 PROCEDURE — 3078F DIAST BP <80 MM HG: CPT | Performed by: INTERNAL MEDICINE

## 2025-09-05 PROCEDURE — G8417 CALC BMI ABV UP PARAM F/U: HCPCS | Performed by: INTERNAL MEDICINE

## 2025-09-05 PROCEDURE — 1036F TOBACCO NON-USER: CPT | Performed by: INTERNAL MEDICINE

## 2025-09-05 PROCEDURE — 1123F ACP DISCUSS/DSCN MKR DOCD: CPT | Performed by: INTERNAL MEDICINE

## 2025-09-05 PROCEDURE — 1160F RVW MEDS BY RX/DR IN RCRD: CPT | Performed by: INTERNAL MEDICINE

## 2025-09-05 PROCEDURE — G8427 DOCREV CUR MEDS BY ELIG CLIN: HCPCS | Performed by: INTERNAL MEDICINE

## 2025-09-05 ASSESSMENT — PATIENT HEALTH QUESTIONNAIRE - PHQ9
SUM OF ALL RESPONSES TO PHQ QUESTIONS 1-9: 0
1. LITTLE INTEREST OR PLEASURE IN DOING THINGS: NOT AT ALL
2. FEELING DOWN, DEPRESSED OR HOPELESS: NOT AT ALL
SUM OF ALL RESPONSES TO PHQ QUESTIONS 1-9: 0

## (undated) DEVICE — Device

## (undated) DEVICE — SOLUTION IRRIG 3000ML H2O STRL BAG

## (undated) DEVICE — SOLUTION IRRIG 1000ML H2O PIC PLAS SHATTERPROOF CONTAINER

## (undated) DEVICE — SYRINGE, LUER SLIP, STERILE, 60ML: Brand: MEDLINE

## (undated) DEVICE — CONTAINER FORMALIN PREFILLED 10% NBF 60ML

## (undated) DEVICE — SYR LR LCK 1ML GRAD NSAF 30ML --

## (undated) DEVICE — SUTURE PDS II SZ 1 L96IN ABSRB VLT TP-1 L65MM 1/2 CIR Z880G

## (undated) DEVICE — SUTURE ABSORBABLE MONOFILAMENT 4-0 CV15 6 IN PUR V-LOC 90 VLOCM1203

## (undated) DEVICE — FORCEPS BX L240CM JAW DIA2.8MM L CAP W/ NDL MIC MESH TOOTH

## (undated) DEVICE — SOLUTION IV 1000ML 0.9% SOD CHL

## (undated) DEVICE — 3M™ STERI-DRAPE™ INSTRUMENT POUCH 1018: Brand: STERI-DRAPE™

## (undated) DEVICE — SUTURE VCRL SZ 1 L27IN ABSRB UD L36MM CP-1 1/2 CIR REV CUT J268H

## (undated) DEVICE — SYR 10ML CTRL LR LCK NSAF LF --

## (undated) DEVICE — UTILITY MARKER,BLACK WITH LABELS: Brand: DEVON

## (undated) DEVICE — PLASMABLADE X PS210-030S-LIGHT 3.0SL: Brand: PLASMABLADE™ X

## (undated) DEVICE — CORD RETRCT SIL

## (undated) DEVICE — KIT PROC KNE TRACKING PK/1 -- VIZADISC MAKO

## (undated) DEVICE — CATHETER F BLLN 5CC 20FR INF CTRL 2 W SIL ALLY AND HYDRGEL

## (undated) DEVICE — NEEDLE SYR 18GA L1.5IN RED PLAS HUB S STL BLNT FILL W/O

## (undated) DEVICE — GUIDEWIRE UROLOGICAL STR 3 CM 0.038 INX150 CM DUAL-FLEX

## (undated) DEVICE — 450 ML BOTTLE OF 0.05% CHLORHEXIDINE GLUCONATE IN 99.95% STERILE WATER FOR IRRIGATION, USP AND APPLICATOR.: Brand: IRRISEPT ANTIMICROBIAL WOUND LAVAGE

## (undated) DEVICE — STOCKINETTE IMPERV 12X48IN STE -- MEDICHOICE

## (undated) DEVICE — STRYKER PERFORMANCE SERIES SAGITTAL BLADE: Brand: STRYKER PERFORMANCE SERIES

## (undated) DEVICE — KIT INT FIX FEM TIB CKPT MAKOPLASTY

## (undated) DEVICE — PREP CHLORAPREP 10.5 ML ORG --

## (undated) DEVICE — CONNECTOR TBNG OD5-7MM O2 END DISP

## (undated) DEVICE — CANNULA NSL ORAL AD FOR CAPNOFLEX CO2 O2 AIRLFE

## (undated) DEVICE — Z DISCONTINUED PER MEDLINE USE 2741944 DRESSING AQUACEL 12 IN SURG W9XL30CM SIL CVR WTRPRF VIR BACT BARR ANTIMIC

## (undated) DEVICE — ADHESIVE SKIN CLSR 0.7ML TOP DERMBND ADV

## (undated) DEVICE — NEEDLE HYPO 21GA L1.5IN GRN POLYPR HUB S STL REG BVL STR

## (undated) DEVICE — SOLUTION IRRIG 3000ML 0.9% SOD CHL FLX CONT 0797208] ICU MEDICAL INC]

## (undated) DEVICE — BAG URIN DRNGE ANTI REFLX TWR SLDE TAP 4000ML

## (undated) DEVICE — TRAY PREP DRY W/ PREM GLV 2 APPL 6 SPNG 2 UNDPD 1 OVERWRAP

## (undated) DEVICE — SOLUTION IV 250ML 0.9% SOD CHL CLR INJ FLX BG CONT PRT CLSR

## (undated) DEVICE — HANDPIECE SET WITH COAXIAL HIGH FLOW TIP AND SUCTION TUBE: Brand: INTERPULSE

## (undated) DEVICE — BUTTON SWITCH PENCIL BLADE ELECTRODE, HOLSTER: Brand: EDGE

## (undated) DEVICE — SUTURE STRATAFIX SPRL SZ 3-0 L9IN ABSRB VLT FS L26MM 3/8 SXPD2B419

## (undated) DEVICE — YANKAUER,BULB TIP,W/O VENT,RIGID,STERILE: Brand: MEDLINE

## (undated) DEVICE — DEVICE SECUREMENT AD W/ TRICOT ANCHR PD FOR F LTX SIL CATH

## (undated) DEVICE — BAG DRAIN UROLOGY GENESIS NS

## (undated) DEVICE — PAD,NON-ADHERENT,3X8,STERILE,LF,1/PK: Brand: MEDLINE

## (undated) DEVICE — DRESSING POSTOP AG PRISMASEAL 3.5X6IN

## (undated) DEVICE — BLOCK BITE AD 60FR W/ VELC STRP ADDRESSES MOST PT AND

## (undated) DEVICE — KENDALL RADIOLUCENT FOAM MONITORING ELECTRODE RECTANGULAR SHAPE: Brand: KENDALL

## (undated) DEVICE — SYRINGE MED 3ML CLR PLAS STD N CTRL LUERLOCK TIP DISP

## (undated) DEVICE — KIT DRP FOR RIO ROBOTIC ARM ASST SYS

## (undated) DEVICE — GARMENT COMPR M FOR 12-18IN CALF INTMIT SGL BLDR HEMO-FORCE

## (undated) DEVICE — SUTURE MCRYL SZ 2-0 L27IN ABSRB UD CP-1 1 L36MM 1/2 CIR REV Y266H

## (undated) DEVICE — CURETTE BNE CEM 10IN DISP --

## (undated) DEVICE — BIPOLAR SEALER 23-112-1 AQM 6.0: Brand: AQUAMANTYS ®

## (undated) DEVICE — SYRINGE MED 10ML LUERLOCK TIP W/O SFTY DISP

## (undated) DEVICE — GUIDEPIN ORTHOPEDIC NAVIGATION 4X140 MM 2P SCREW STRL

## (undated) DEVICE — URETEROSCOPE DGT FLX SHTH GLOB STD MOB CART CMSO IMAGER

## (undated) DEVICE — LUBE JELLY FOIL PACK 1.4 OZ: Brand: MEDLINE INDUSTRIES, INC.

## (undated) DEVICE — CATHETER URET 5FR L70CM OPN END SGL LUMN INJ HUB FLEXIMA

## (undated) DEVICE — SYR 50ML LR LCK 1ML GRAD NSAF --

## (undated) DEVICE — NEEDLE HYPO 18GA L1.5IN PNK S STL HUB POLYPR SHLD REG BVL

## (undated) DEVICE — HOOD: Brand: FLYTE

## (undated) DEVICE — ESOPHAGEAL BALLOON DILATATION CATHETER: Brand: CRE FIXED WIRE

## (undated) DEVICE — AIRLIFE™ OXYGEN TUBING 7 FEET (2.1 M) CRUSH RESISTANT OXYGEN TUBING, VINYL TIPPED: Brand: AIRLIFE™

## (undated) DEVICE — BLADE SURG SAW STD S STL OSC W/ SERR EDGE DISP

## (undated) DEVICE — GUIDEPIN ORTHOPEDIC NAVIGATION 4X110 MM 2P SCREW STRL

## (undated) DEVICE — REM POLYHESIVE ADULT PATIENT RETURN ELECTRODE: Brand: VALLEYLAB

## (undated) DEVICE — STERILE PRESSURE PROTECTOR PAD® FOR DE MAYO UNIVERSAL DISTRACTOR® (10/CASE): Brand: DE MAYO UNIVERSAL DISTRACTOR®

## (undated) DEVICE — SINGLE PORT MANIFOLD: Brand: NEPTUNE 2

## (undated) DEVICE — TOTAL KNEE DR KAVOLUS: Brand: MEDLINE INDUSTRIES, INC.

## (undated) DEVICE — GLOVE SURG SZ 75 L12IN FNGR THK79MIL GRN LTX FREE

## (undated) DEVICE — DRAPE SHT 3 QTR PROXIMA 53X77 --

## (undated) DEVICE — GAUZE,SPONGE,4"X4",12PLY,WOVEN,NS,LF: Brand: MEDLINE